# Patient Record
Sex: MALE | Race: WHITE | NOT HISPANIC OR LATINO | Employment: FULL TIME | URBAN - METROPOLITAN AREA
[De-identification: names, ages, dates, MRNs, and addresses within clinical notes are randomized per-mention and may not be internally consistent; named-entity substitution may affect disease eponyms.]

---

## 2019-01-13 ENCOUNTER — APPOINTMENT (OUTPATIENT)
Dept: GENERAL RADIOLOGY | Facility: HOSPITAL | Age: 34
End: 2019-01-13

## 2019-01-13 ENCOUNTER — APPOINTMENT (OUTPATIENT)
Dept: CT IMAGING | Facility: HOSPITAL | Age: 34
End: 2019-01-13

## 2019-01-13 ENCOUNTER — HOSPITAL ENCOUNTER (INPATIENT)
Facility: HOSPITAL | Age: 34
LOS: 12 days | Discharge: HOME | End: 2019-01-25
Attending: EMERGENCY MEDICINE | Admitting: SURGERY

## 2019-01-13 ENCOUNTER — APPOINTMENT (OUTPATIENT)
Dept: ULTRASOUND IMAGING | Facility: HOSPITAL | Age: 34
End: 2019-01-13

## 2019-01-13 DIAGNOSIS — D64.9 ANEMIA, UNSPECIFIED TYPE: Primary | ICD-10-CM

## 2019-01-13 DIAGNOSIS — N17.9 ACUTE RENAL FAILURE, UNSPECIFIED ACUTE RENAL FAILURE TYPE (HCC): ICD-10-CM

## 2019-01-13 DIAGNOSIS — I50.9 ACUTE CONGESTIVE HEART FAILURE, UNSPECIFIED HEART FAILURE TYPE (HCC): ICD-10-CM

## 2019-01-13 LAB
ABO GROUP BLD: NORMAL
ALBUMIN SERPL-MCNC: 2.5 G/DL (ref 3.5–5.2)
ALBUMIN/GLOB SERPL: 0.7 G/DL
ALP SERPL-CCNC: 97 U/L (ref 39–117)
ALT SERPL W P-5'-P-CCNC: 30 U/L (ref 1–41)
AMORPH URATE CRY URNS QL MICRO: ABNORMAL /HPF
ANION GAP SERPL CALCULATED.3IONS-SCNC: 35 MMOL/L
AST SERPL-CCNC: 15 U/L (ref 1–40)
BACTERIA UR QL AUTO: ABNORMAL /HPF
BASOPHILS # BLD AUTO: 0.05 10*3/MM3 (ref 0–0.2)
BASOPHILS NFR BLD AUTO: 0.4 % (ref 0–1.5)
BH CV VAS MEAS BASILIC ANTECUBITAL FOSSA LEFT: 0.4 CM
BH CV VAS MEAS BASILIC ANTECUBITAL FOSSA RIGHT: 0.3 CM
BH CV VAS MEAS BASILIC FOREARM LEFT - DIST: 0.3 CM
BH CV VAS MEAS BASILIC FOREARM LEFT - MID: 0.28 CM
BH CV VAS MEAS BASILIC FOREARM LEFT - PROX: 0.46 CM
BH CV VAS MEAS BASILIC FOREARM RIGHT - DIST: 0.2 CM
BH CV VAS MEAS BASILIC FOREARM RIGHT - MID: 0.28 CM
BH CV VAS MEAS BASILIC FOREARM RIGHT - PROX: 0.27 CM
BH CV VAS MEAS BASILIC UPPER ARM LEFT - DIST: 0.73 CM
BH CV VAS MEAS BASILIC UPPER ARM LEFT - MID: 0.71 CM
BH CV VAS MEAS BASILIC UPPER ARM LEFT - PROX: 0.79 CM
BH CV VAS MEAS BASILIC UPPER ARM RIGHT - DIST: 0.45 CM
BH CV VAS MEAS BASILIC UPPER ARM RIGHT - MID: 0.69 CM
BH CV VAS MEAS BASILIC UPPER ARM RIGHT - PROX: 0.77 CM
BH CV VAS MEAS CEPHALIC ANTECUBITAL FOSSA LEFT: 0.59 CM
BH CV VAS MEAS CEPHALIC ANTECUBITAL FOSSA RIGHT: 0.78 CM
BH CV VAS MEAS CEPHALIC FOREARM LEFT - DIST: 0.34 CM
BH CV VAS MEAS CEPHALIC FOREARM LEFT - MID: 0.29 CM
BH CV VAS MEAS CEPHALIC FOREARM LEFT - PROX: 0.35 CM
BH CV VAS MEAS CEPHALIC FOREARM RIGHT - DIST: 0.34 CM
BH CV VAS MEAS CEPHALIC FOREARM RIGHT - MID: 0.52 CM
BH CV VAS MEAS CEPHALIC FOREARM RIGHT - PROX: 0.41 CM
BH CV VAS MEAS CEPHALIC UPPER ARM LEFT - DIST: 0.43 CM
BH CV VAS MEAS CEPHALIC UPPER ARM LEFT - MID: 0.47 CM
BH CV VAS MEAS CEPHALIC UPPER ARM LEFT - PROX: 0.47 CM
BH CV VAS MEAS CEPHALIC UPPER ARM RIGHT - DIST: 0.36 CM
BH CV VAS MEAS CEPHALIC UPPER ARM RIGHT - MID: 0.34 CM
BH CV VAS MEAS CEPHALIC UPPER ARM RIGHT - PROX: 0.43 CM
BH CV VAS MEAS RADIAL UPPER ARM LEFT - DIST: 0.23 CM
BH CV VAS MEAS RADIAL UPPER ARM LEFT - MID: 0.24 CM
BH CV VAS MEAS RADIAL UPPER ARM LEFT - PROX: 0.35 CM
BH CV VAS MEAS RADIAL UPPER ARM RIGHT - DIST: 0.22 CM
BH CV VAS MEAS RADIAL UPPER ARM RIGHT - MID: 0.25 CM
BH CV VAS MEAS RADIAL UPPER ARM RIGHT - PROX: 0.31 CM
BILIRUB SERPL-MCNC: 0.2 MG/DL (ref 0.1–1.2)
BILIRUB UR QL STRIP: NEGATIVE
BLD GP AB SCN SERPL QL: NEGATIVE
BUN BLD-MCNC: 258 MG/DL (ref 6–20)
BUN/CREAT SERPL: 14.7 (ref 7–25)
CALCIUM SPEC-SCNC: 9.6 MG/DL (ref 8.6–10.5)
CHLORIDE SERPL-SCNC: 94 MMOL/L (ref 98–107)
CLARITY UR: CLEAR
CO2 SERPL-SCNC: 11 MMOL/L (ref 22–29)
COLOR UR: YELLOW
CREAT BLD-MCNC: 17.6 MG/DL (ref 0.76–1.27)
CREAT UR-MCNC: 71.6 MG/DL
DEPRECATED RDW RBC AUTO: 40.5 FL (ref 37–54)
EOSINOPHIL # BLD AUTO: 0.63 10*3/MM3 (ref 0–0.7)
EOSINOPHIL NFR BLD AUTO: 5.5 % (ref 0.3–6.2)
ERYTHROCYTE [DISTWIDTH] IN BLOOD BY AUTOMATED COUNT: 13 % (ref 11.5–14.5)
GFR SERPL CREATININE-BSD FRML MDRD: 3 ML/MIN/1.73
GFR SERPL CREATININE-BSD FRML MDRD: ABNORMAL ML/MIN/1.73
GLOBULIN UR ELPH-MCNC: 3.7 GM/DL
GLUCOSE BLD-MCNC: 120 MG/DL (ref 65–99)
GLUCOSE BLDC GLUCOMTR-MCNC: 122 MG/DL (ref 70–130)
GLUCOSE UR STRIP-MCNC: NEGATIVE MG/DL
HBV SURFACE AG SERPL QL IA: NORMAL
HCT VFR BLD AUTO: 16.4 % (ref 40.4–52.2)
HGB BLD-MCNC: 5.1 G/DL (ref 13.7–17.6)
HGB UR QL STRIP.AUTO: ABNORMAL
HOLD SPECIMEN: NORMAL
HOLD SPECIMEN: NORMAL
HYALINE CASTS UR QL AUTO: ABNORMAL /LPF
IMM GRANULOCYTES # BLD AUTO: 0.03 10*3/MM3 (ref 0–0.03)
IMM GRANULOCYTES NFR BLD AUTO: 0.3 % (ref 0–0.5)
KETONES UR QL STRIP: NEGATIVE
LEUKOCYTE ESTERASE UR QL STRIP.AUTO: ABNORMAL
LYMPHOCYTES # BLD AUTO: 0.7 10*3/MM3 (ref 0.9–4.8)
LYMPHOCYTES NFR BLD AUTO: 6.1 % (ref 19.6–45.3)
MCH RBC QN AUTO: 26.6 PG (ref 27–32.7)
MCHC RBC AUTO-ENTMCNC: 31.1 G/DL (ref 32.6–36.4)
MCV RBC AUTO: 85.4 FL (ref 79.8–96.2)
MONOCYTES # BLD AUTO: 0.43 10*3/MM3 (ref 0.2–1.2)
MONOCYTES NFR BLD AUTO: 3.7 % (ref 5–12)
NEUTROPHILS # BLD AUTO: 9.7 10*3/MM3 (ref 1.9–8.1)
NEUTROPHILS NFR BLD AUTO: 84 % (ref 42.7–76)
NITRITE UR QL STRIP: NEGATIVE
NRBC BLD AUTO-RTO: 0 /100 WBC (ref 0–0)
NT-PROBNP SERPL-MCNC: ABNORMAL PG/ML (ref 5–450)
PH UR STRIP.AUTO: <=5 [PH] (ref 5–8)
PLATELET # BLD AUTO: 317 10*3/MM3 (ref 140–500)
PMV BLD AUTO: 9.8 FL (ref 6–12)
POTASSIUM BLD-SCNC: 5.8 MMOL/L (ref 3.5–5.2)
PROT SERPL-MCNC: 6.2 G/DL (ref 6–8.5)
PROT UR QL STRIP: ABNORMAL
PROT UR-MCNC: 177 MG/DL
RBC # BLD AUTO: 1.92 10*6/MM3 (ref 4.6–6)
RBC # UR: ABNORMAL /HPF
REF LAB TEST METHOD: ABNORMAL
RH BLD: POSITIVE
SODIUM BLD-SCNC: 140 MMOL/L (ref 136–145)
SP GR UR STRIP: 1.02 (ref 1–1.03)
SQUAMOUS #/AREA URNS HPF: ABNORMAL /HPF
T&S EXPIRATION DATE: NORMAL
T4 FREE SERPL-MCNC: 0.55 NG/DL (ref 0.93–1.7)
TROPONIN T SERPL-MCNC: 0.12 NG/ML (ref 0–0.03)
TSH SERPL DL<=0.05 MIU/L-ACNC: 0.63 MIU/ML (ref 0.27–4.2)
UROBILINOGEN UR QL STRIP: ABNORMAL
WBC NRBC COR # BLD: 11.54 10*3/MM3 (ref 4.5–10.7)
WBC UR QL AUTO: ABNORMAL /HPF
WHOLE BLOOD HOLD SPECIMEN: NORMAL
WHOLE BLOOD HOLD SPECIMEN: NORMAL

## 2019-01-13 PROCEDURE — 86900 BLOOD TYPING SEROLOGIC ABO: CPT

## 2019-01-13 PROCEDURE — 86256 FLUORESCENT ANTIBODY TITER: CPT | Performed by: INTERNAL MEDICINE

## 2019-01-13 PROCEDURE — 86235 NUCLEAR ANTIGEN ANTIBODY: CPT | Performed by: INTERNAL MEDICINE

## 2019-01-13 PROCEDURE — 36415 COLL VENOUS BLD VENIPUNCTURE: CPT

## 2019-01-13 PROCEDURE — 84443 ASSAY THYROID STIM HORMONE: CPT | Performed by: EMERGENCY MEDICINE

## 2019-01-13 PROCEDURE — 85025 COMPLETE CBC W/AUTO DIFF WBC: CPT | Performed by: EMERGENCY MEDICINE

## 2019-01-13 PROCEDURE — P9016 RBC LEUKOCYTES REDUCED: HCPCS

## 2019-01-13 PROCEDURE — 93010 ELECTROCARDIOGRAM REPORT: CPT | Performed by: INTERNAL MEDICINE

## 2019-01-13 PROCEDURE — 71045 X-RAY EXAM CHEST 1 VIEW: CPT

## 2019-01-13 PROCEDURE — 86060 ANTISTREPTOLYSIN O TITER: CPT | Performed by: INTERNAL MEDICINE

## 2019-01-13 PROCEDURE — 86901 BLOOD TYPING SEROLOGIC RH(D): CPT | Performed by: EMERGENCY MEDICINE

## 2019-01-13 PROCEDURE — 86431 RHEUMATOID FACTOR QUANT: CPT | Performed by: INTERNAL MEDICINE

## 2019-01-13 PROCEDURE — 86901 BLOOD TYPING SEROLOGIC RH(D): CPT

## 2019-01-13 PROCEDURE — 83520 IMMUNOASSAY QUANT NOS NONAB: CPT | Performed by: INTERNAL MEDICINE

## 2019-01-13 PROCEDURE — 63710000001 INSULIN REGULAR HUMAN PER 5 UNITS: Performed by: EMERGENCY MEDICINE

## 2019-01-13 PROCEDURE — 86923 COMPATIBILITY TEST ELECTRIC: CPT

## 2019-01-13 PROCEDURE — 82962 GLUCOSE BLOOD TEST: CPT

## 2019-01-13 PROCEDURE — 82570 ASSAY OF URINE CREATININE: CPT | Performed by: EMERGENCY MEDICINE

## 2019-01-13 PROCEDURE — 80053 COMPREHEN METABOLIC PANEL: CPT | Performed by: EMERGENCY MEDICINE

## 2019-01-13 PROCEDURE — 84156 ASSAY OF PROTEIN URINE: CPT | Performed by: EMERGENCY MEDICINE

## 2019-01-13 PROCEDURE — 76775 US EXAM ABDO BACK WALL LIM: CPT

## 2019-01-13 PROCEDURE — 84484 ASSAY OF TROPONIN QUANT: CPT | Performed by: EMERGENCY MEDICINE

## 2019-01-13 PROCEDURE — 25010000002 CALCIUM GLUCONATE PER 10 ML: Performed by: EMERGENCY MEDICINE

## 2019-01-13 PROCEDURE — 86900 BLOOD TYPING SEROLOGIC ABO: CPT | Performed by: EMERGENCY MEDICINE

## 2019-01-13 PROCEDURE — 87340 HEPATITIS B SURFACE AG IA: CPT | Performed by: INTERNAL MEDICINE

## 2019-01-13 PROCEDURE — 83516 IMMUNOASSAY NONANTIBODY: CPT | Performed by: INTERNAL MEDICINE

## 2019-01-13 PROCEDURE — 86225 DNA ANTIBODY NATIVE: CPT | Performed by: INTERNAL MEDICINE

## 2019-01-13 PROCEDURE — 99291 CRITICAL CARE FIRST HOUR: CPT

## 2019-01-13 PROCEDURE — 5A1D70Z PERFORMANCE OF URINARY FILTRATION, INTERMITTENT, LESS THAN 6 HOURS PER DAY: ICD-10-PCS | Performed by: INTERNAL MEDICINE

## 2019-01-13 PROCEDURE — 81001 URINALYSIS AUTO W/SCOPE: CPT | Performed by: EMERGENCY MEDICINE

## 2019-01-13 PROCEDURE — 93005 ELECTROCARDIOGRAM TRACING: CPT | Performed by: EMERGENCY MEDICINE

## 2019-01-13 PROCEDURE — 83880 ASSAY OF NATRIURETIC PEPTIDE: CPT | Performed by: EMERGENCY MEDICINE

## 2019-01-13 PROCEDURE — 02HV33Z INSERTION OF INFUSION DEVICE INTO SUPERIOR VENA CAVA, PERCUTANEOUS APPROACH: ICD-10-PCS | Performed by: INTERNAL MEDICINE

## 2019-01-13 PROCEDURE — 86160 COMPLEMENT ANTIGEN: CPT | Performed by: INTERNAL MEDICINE

## 2019-01-13 PROCEDURE — 71250 CT THORAX DX C-: CPT

## 2019-01-13 PROCEDURE — 71046 X-RAY EXAM CHEST 2 VIEWS: CPT

## 2019-01-13 PROCEDURE — 25010000002 HEPARIN (PORCINE) PER 1000 UNITS: Performed by: SURGERY

## 2019-01-13 PROCEDURE — 86850 RBC ANTIBODY SCREEN: CPT | Performed by: EMERGENCY MEDICINE

## 2019-01-13 PROCEDURE — 84439 ASSAY OF FREE THYROXINE: CPT | Performed by: EMERGENCY MEDICINE

## 2019-01-13 RX ORDER — SODIUM CHLORIDE 0.9 % (FLUSH) 0.9 %
3-10 SYRINGE (ML) INJECTION AS NEEDED
Status: DISCONTINUED | OUTPATIENT
Start: 2019-01-13 | End: 2019-01-25 | Stop reason: HOSPADM

## 2019-01-13 RX ORDER — HEPARIN SODIUM 1000 [USP'U]/ML
3000 INJECTION, SOLUTION INTRAVENOUS; SUBCUTANEOUS ONCE
Status: COMPLETED | OUTPATIENT
Start: 2019-01-13 | End: 2019-01-13

## 2019-01-13 RX ORDER — SODIUM CHLORIDE 0.9 % (FLUSH) 0.9 %
3 SYRINGE (ML) INJECTION EVERY 12 HOURS SCHEDULED
Status: DISCONTINUED | OUTPATIENT
Start: 2019-01-13 | End: 2019-01-25 | Stop reason: HOSPADM

## 2019-01-13 RX ORDER — SODIUM CHLORIDE 0.9 % (FLUSH) 0.9 %
10 SYRINGE (ML) INJECTION AS NEEDED
Status: DISCONTINUED | OUTPATIENT
Start: 2019-01-13 | End: 2019-01-25 | Stop reason: HOSPADM

## 2019-01-13 RX ORDER — PANTOPRAZOLE SODIUM 40 MG/10ML
40 INJECTION, POWDER, LYOPHILIZED, FOR SOLUTION INTRAVENOUS
Status: DISCONTINUED | OUTPATIENT
Start: 2019-01-14 | End: 2019-01-15

## 2019-01-13 RX ORDER — DEXTROSE MONOHYDRATE 25 G/50ML
50 INJECTION, SOLUTION INTRAVENOUS ONCE
Status: COMPLETED | OUTPATIENT
Start: 2019-01-13 | End: 2019-01-13

## 2019-01-13 RX ADMIN — SODIUM CHLORIDE, PRESERVATIVE FREE 3 ML: 5 INJECTION INTRAVENOUS at 21:47

## 2019-01-13 RX ADMIN — SODIUM CHLORIDE, POTASSIUM CHLORIDE, SODIUM LACTATE AND CALCIUM CHLORIDE 500 ML: 600; 310; 30; 20 INJECTION, SOLUTION INTRAVENOUS at 16:46

## 2019-01-13 RX ADMIN — INSULIN HUMAN 10 UNITS: 100 INJECTION, SOLUTION PARENTERAL at 17:46

## 2019-01-13 RX ADMIN — CALCIUM GLUCONATE 1 G: 98 INJECTION, SOLUTION INTRAVENOUS at 17:46

## 2019-01-13 RX ADMIN — HEPARIN SODIUM 3000 UNITS: 1000 INJECTION, SOLUTION INTRAVENOUS; SUBCUTANEOUS at 21:46

## 2019-01-13 RX ADMIN — SODIUM BICARBONATE 50 MEQ: 84 INJECTION, SOLUTION INTRAVENOUS at 17:46

## 2019-01-13 RX ADMIN — DEXTROSE MONOHYDRATE 50 ML: 25 INJECTION, SOLUTION INTRAVENOUS at 17:46

## 2019-01-14 ENCOUNTER — APPOINTMENT (OUTPATIENT)
Dept: CARDIOLOGY | Facility: HOSPITAL | Age: 34
End: 2019-01-14
Attending: INTERNAL MEDICINE

## 2019-01-14 LAB
ABO + RH BLD: NORMAL
ABO + RH BLD: NORMAL
ALBUMIN SERPL-MCNC: 2.2 G/DL (ref 3.5–5.2)
ALBUMIN SERPL-MCNC: 2.2 G/DL (ref 3.5–5.2)
ANION GAP SERPL CALCULATED.3IONS-SCNC: 22.8 MMOL/L
ANION GAP SERPL CALCULATED.3IONS-SCNC: 24.8 MMOL/L
BASOPHILS # BLD AUTO: 0.03 10*3/MM3 (ref 0–0.2)
BASOPHILS NFR BLD AUTO: 0.3 % (ref 0–1.5)
BH BB BLOOD EXPIRATION DATE: NORMAL
BH BB BLOOD EXPIRATION DATE: NORMAL
BH BB BLOOD TYPE BARCODE: 7300
BH BB BLOOD TYPE BARCODE: 7300
BH BB DISPENSE STATUS: NORMAL
BH BB DISPENSE STATUS: NORMAL
BH BB PRODUCT CODE: NORMAL
BH BB PRODUCT CODE: NORMAL
BH BB UNIT NUMBER: NORMAL
BH BB UNIT NUMBER: NORMAL
BH CV ECHO MEAS - ACS: 2.3 CM
BH CV ECHO MEAS - AI DEC SLOPE: 367 CM/SEC^2
BH CV ECHO MEAS - AI MAX PG: 71.2 MMHG
BH CV ECHO MEAS - AI MAX VEL: 422 CM/SEC
BH CV ECHO MEAS - AI P1/2T: 336.8 MSEC
BH CV ECHO MEAS - AO MAX PG (FULL): 1.2 MMHG
BH CV ECHO MEAS - AO MAX PG: 7.4 MMHG
BH CV ECHO MEAS - AO MEAN PG (FULL): 2 MMHG
BH CV ECHO MEAS - AO MEAN PG: 5 MMHG
BH CV ECHO MEAS - AO ROOT AREA (BSA CORRECTED): 1.8
BH CV ECHO MEAS - AO ROOT AREA: 9.6 CM^2
BH CV ECHO MEAS - AO ROOT DIAM: 3.5 CM
BH CV ECHO MEAS - AO V2 MAX: 136 CM/SEC
BH CV ECHO MEAS - AO V2 MEAN: 107 CM/SEC
BH CV ECHO MEAS - AO V2 VTI: 22.9 CM
BH CV ECHO MEAS - AVA(I,A): 2.8 CM^2
BH CV ECHO MEAS - AVA(I,D): 2.8 CM^2
BH CV ECHO MEAS - AVA(V,A): 3.2 CM^2
BH CV ECHO MEAS - AVA(V,D): 3.2 CM^2
BH CV ECHO MEAS - BSA(HAYCOCK): 2 M^2
BH CV ECHO MEAS - BSA: 2 M^2
BH CV ECHO MEAS - BZI_BMI: 26.3 KILOGRAMS/M^2
BH CV ECHO MEAS - BZI_METRIC_HEIGHT: 175.3 CM
BH CV ECHO MEAS - BZI_METRIC_WEIGHT: 80.7 KG
BH CV ECHO MEAS - EDV(CUBED): 262.1 ML
BH CV ECHO MEAS - EDV(MOD-SP2): 206 ML
BH CV ECHO MEAS - EDV(MOD-SP4): 272 ML
BH CV ECHO MEAS - EDV(TEICH): 208.5 ML
BH CV ECHO MEAS - EF(CUBED): 43.2 %
BH CV ECHO MEAS - EF(MOD-BP): 23 %
BH CV ECHO MEAS - EF(MOD-SP2): 16 %
BH CV ECHO MEAS - EF(MOD-SP4): 29.8 %
BH CV ECHO MEAS - EF(TEICH): 35.1 %
BH CV ECHO MEAS - ESV(CUBED): 148.9 ML
BH CV ECHO MEAS - ESV(MOD-SP2): 173 ML
BH CV ECHO MEAS - ESV(MOD-SP4): 191 ML
BH CV ECHO MEAS - ESV(TEICH): 135.3 ML
BH CV ECHO MEAS - FS: 17.2 %
BH CV ECHO MEAS - IVS/LVPW: 1
BH CV ECHO MEAS - IVSD: 1.4 CM
BH CV ECHO MEAS - LAT PEAK E' VEL: 5 CM/SEC
BH CV ECHO MEAS - LV DIASTOLIC VOL/BSA (35-75): 138.4 ML/M^2
BH CV ECHO MEAS - LV MASS(C)D: 430.4 GRAMS
BH CV ECHO MEAS - LV MASS(C)DI: 218.9 GRAMS/M^2
BH CV ECHO MEAS - LV MAX PG: 6.2 MMHG
BH CV ECHO MEAS - LV MEAN PG: 3 MMHG
BH CV ECHO MEAS - LV SYSTOLIC VOL/BSA (12-30): 97.2 ML/M^2
BH CV ECHO MEAS - LV V1 MAX: 124 CM/SEC
BH CV ECHO MEAS - LV V1 MEAN: 83.8 CM/SEC
BH CV ECHO MEAS - LV V1 VTI: 18.8 CM
BH CV ECHO MEAS - LVIDD: 6.4 CM
BH CV ECHO MEAS - LVIDS: 5.3 CM
BH CV ECHO MEAS - LVLD AP2: 10.5 CM
BH CV ECHO MEAS - LVLD AP4: 9.8 CM
BH CV ECHO MEAS - LVLS AP2: 10 CM
BH CV ECHO MEAS - LVLS AP4: 9.2 CM
BH CV ECHO MEAS - LVOT AREA (M): 3.5 CM^2
BH CV ECHO MEAS - LVOT AREA: 3.5 CM^2
BH CV ECHO MEAS - LVOT DIAM: 2.1 CM
BH CV ECHO MEAS - LVPWD: 1.4 CM
BH CV ECHO MEAS - MED PEAK E' VEL: 4 CM/SEC
BH CV ECHO MEAS - MV A DUR: 0.08 SEC
BH CV ECHO MEAS - MV A MAX VEL: 56 CM/SEC
BH CV ECHO MEAS - MV DEC SLOPE: 797 CM/SEC^2
BH CV ECHO MEAS - MV DEC TIME: 0.13 SEC
BH CV ECHO MEAS - MV E MAX VEL: 119 CM/SEC
BH CV ECHO MEAS - MV E/A: 2.1
BH CV ECHO MEAS - MV MAX PG: 4.8 MMHG
BH CV ECHO MEAS - MV MEAN PG: 2 MMHG
BH CV ECHO MEAS - MV P1/2T MAX VEL: 121 CM/SEC
BH CV ECHO MEAS - MV P1/2T: 44.5 MSEC
BH CV ECHO MEAS - MV V2 MAX: 109 CM/SEC
BH CV ECHO MEAS - MV V2 MEAN: 68.8 CM/SEC
BH CV ECHO MEAS - MV V2 VTI: 26.4 CM
BH CV ECHO MEAS - MVA P1/2T LCG: 1.8 CM^2
BH CV ECHO MEAS - MVA(P1/2T): 4.9 CM^2
BH CV ECHO MEAS - MVA(VTI): 2.5 CM^2
BH CV ECHO MEAS - PA ACC TIME: 0.11 SEC
BH CV ECHO MEAS - PA MAX PG (FULL): 2.3 MMHG
BH CV ECHO MEAS - PA MAX PG: 6.3 MMHG
BH CV ECHO MEAS - PA PR(ACCEL): 31.3 MMHG
BH CV ECHO MEAS - PA V2 MAX: 125 CM/SEC
BH CV ECHO MEAS - PULM A REVS DUR: 0.1 SEC
BH CV ECHO MEAS - PULM A REVS VEL: 28.9 CM/SEC
BH CV ECHO MEAS - PULM DIAS VEL: 42.7 CM/SEC
BH CV ECHO MEAS - PULM S/D: 1.4
BH CV ECHO MEAS - PULM SYS VEL: 59.7 CM/SEC
BH CV ECHO MEAS - PVA(V,A): 5.3 CM^2
BH CV ECHO MEAS - PVA(V,D): 5.3 CM^2
BH CV ECHO MEAS - QP/QS: 1.7
BH CV ECHO MEAS - RAP SYSTOLE: 8 MMHG
BH CV ECHO MEAS - RV MAX PG: 4 MMHG
BH CV ECHO MEAS - RV MEAN PG: 2 MMHG
BH CV ECHO MEAS - RV V1 MAX: 100 CM/SEC
BH CV ECHO MEAS - RV V1 MEAN: 63 CM/SEC
BH CV ECHO MEAS - RV V1 VTI: 17.1 CM
BH CV ECHO MEAS - RVOT AREA: 6.6 CM^2
BH CV ECHO MEAS - RVOT DIAM: 2.9 CM
BH CV ECHO MEAS - SI(AO): 112.1 ML/M^2
BH CV ECHO MEAS - SI(CUBED): 57.6 ML/M^2
BH CV ECHO MEAS - SI(LVOT): 33.1 ML/M^2
BH CV ECHO MEAS - SI(MOD-SP2): 16.8 ML/M^2
BH CV ECHO MEAS - SI(MOD-SP4): 41.2 ML/M^2
BH CV ECHO MEAS - SI(TEICH): 37.2 ML/M^2
BH CV ECHO MEAS - SV(AO): 220.3 ML
BH CV ECHO MEAS - SV(CUBED): 113.3 ML
BH CV ECHO MEAS - SV(LVOT): 65.1 ML
BH CV ECHO MEAS - SV(MOD-SP2): 33 ML
BH CV ECHO MEAS - SV(MOD-SP4): 81 ML
BH CV ECHO MEAS - SV(RVOT): 112.9 ML
BH CV ECHO MEAS - SV(TEICH): 73.2 ML
BH CV ECHO MEAS - TAPSE (>1.6): 2.3 CM2
BH CV ECHO MEASUREMENTS AVERAGE E/E' RATIO: 26.44
BH CV VAS BP LEFT ARM: NORMAL MMHG
BH CV XLRA - RV BASE: 3.9 CM
BH CV XLRA - TDI S': 16 CM/SEC
BUN BLD-MCNC: 129 MG/DL (ref 6–20)
BUN BLD-MCNC: 133 MG/DL (ref 6–20)
BUN/CREAT SERPL: 11.6 (ref 7–25)
BUN/CREAT SERPL: 12.4 (ref 7–25)
CALCIUM SPEC-SCNC: 8.4 MG/DL (ref 8.6–10.5)
CALCIUM SPEC-SCNC: 8.5 MG/DL (ref 8.6–10.5)
CHLORIDE SERPL-SCNC: 95 MMOL/L (ref 98–107)
CHLORIDE SERPL-SCNC: 98 MMOL/L (ref 98–107)
CO2 SERPL-SCNC: 22.2 MMOL/L (ref 22–29)
CO2 SERPL-SCNC: 25.2 MMOL/L (ref 22–29)
CREAT BLD-MCNC: 10.42 MG/DL (ref 0.76–1.27)
CREAT BLD-MCNC: 11.5 MG/DL (ref 0.76–1.27)
DEPRECATED RDW RBC AUTO: 40.7 FL (ref 37–54)
EOSINOPHIL # BLD AUTO: 0.52 10*3/MM3 (ref 0–0.7)
EOSINOPHIL NFR BLD AUTO: 5.4 % (ref 0.3–6.2)
ERYTHROCYTE [DISTWIDTH] IN BLOOD BY AUTOMATED COUNT: 13.4 % (ref 11.5–14.5)
FERRITIN SERPL-MCNC: 867.1 NG/ML (ref 30–400)
GFR SERPL CREATININE-BSD FRML MDRD: 5 ML/MIN/1.73
GFR SERPL CREATININE-BSD FRML MDRD: 6 ML/MIN/1.73
GFR SERPL CREATININE-BSD FRML MDRD: ABNORMAL ML/MIN/1.73
GFR SERPL CREATININE-BSD FRML MDRD: ABNORMAL ML/MIN/1.73
GLUCOSE BLD-MCNC: 153 MG/DL (ref 65–99)
GLUCOSE BLD-MCNC: 92 MG/DL (ref 65–99)
GLUCOSE BLDC GLUCOMTR-MCNC: 164 MG/DL (ref 70–130)
GLUCOSE BLDC GLUCOMTR-MCNC: 94 MG/DL (ref 70–130)
HCT VFR BLD AUTO: 20.6 % (ref 40.4–52.2)
HCT VFR BLD AUTO: 21.2 % (ref 40.4–52.2)
HCT VFR BLD AUTO: 21.4 % (ref 40.4–52.2)
HCT VFR BLD AUTO: 24 % (ref 40.4–52.2)
HGB BLD-MCNC: 6.8 G/DL (ref 13.7–17.6)
HGB BLD-MCNC: 7.1 G/DL (ref 13.7–17.6)
HGB BLD-MCNC: 7.2 G/DL (ref 13.7–17.6)
HGB BLD-MCNC: 8 G/DL (ref 13.7–17.6)
IMM GRANULOCYTES # BLD AUTO: 0.02 10*3/MM3 (ref 0–0.03)
IMM GRANULOCYTES NFR BLD AUTO: 0.2 % (ref 0–0.5)
IRON 24H UR-MRATE: 28 MCG/DL (ref 59–158)
IRON SATN MFR SERPL: 15 % (ref 20–50)
LEFT ATRIUM VOLUME INDEX: 53 ML/M2
LYMPHOCYTES # BLD AUTO: 0.56 10*3/MM3 (ref 0.9–4.8)
LYMPHOCYTES NFR BLD AUTO: 5.8 % (ref 19.6–45.3)
MAXIMAL PREDICTED HEART RATE: 186 BPM
MCH RBC QN AUTO: 28 PG (ref 27–32.7)
MCHC RBC AUTO-ENTMCNC: 33.5 G/DL (ref 32.6–36.4)
MCV RBC AUTO: 83.5 FL (ref 79.8–96.2)
MONOCYTES # BLD AUTO: 0.4 10*3/MM3 (ref 0.2–1.2)
MONOCYTES NFR BLD AUTO: 4.1 % (ref 5–12)
NEUTROPHILS # BLD AUTO: 8.16 10*3/MM3 (ref 1.9–8.1)
NEUTROPHILS NFR BLD AUTO: 84.2 % (ref 42.7–76)
PHOSPHATE SERPL-MCNC: 8.2 MG/DL (ref 2.5–4.5)
PHOSPHATE SERPL-MCNC: 9.7 MG/DL (ref 2.5–4.5)
PLATELET # BLD AUTO: 256 10*3/MM3 (ref 140–500)
PMV BLD AUTO: 9.9 FL (ref 6–12)
POTASSIUM BLD-SCNC: 4.1 MMOL/L (ref 3.5–5.2)
POTASSIUM BLD-SCNC: 4.5 MMOL/L (ref 3.5–5.2)
PROCALCITONIN SERPL-MCNC: 3.67 NG/ML (ref 0.1–0.25)
RBC # BLD AUTO: 2.54 10*6/MM3 (ref 4.6–6)
SODIUM BLD-SCNC: 143 MMOL/L (ref 136–145)
SODIUM BLD-SCNC: 145 MMOL/L (ref 136–145)
STRESS TARGET HR: 158 BPM
TIBC SERPL-MCNC: 191 MCG/DL
TRANSFERRIN SERPL-MCNC: 128 MG/DL (ref 200–360)
UNIT  ABO: NORMAL
UNIT  ABO: NORMAL
UNIT  RH: NORMAL
UNIT  RH: NORMAL
WBC NRBC COR # BLD: 9.69 10*3/MM3 (ref 4.5–10.7)

## 2019-01-14 PROCEDURE — 84145 PROCALCITONIN (PCT): CPT | Performed by: INTERNAL MEDICINE

## 2019-01-14 PROCEDURE — 25010000002 ALBUMIN HUMAN 5% PER 50 ML: Performed by: INTERNAL MEDICINE

## 2019-01-14 PROCEDURE — 83540 ASSAY OF IRON: CPT | Performed by: INTERNAL MEDICINE

## 2019-01-14 PROCEDURE — P9016 RBC LEUKOCYTES REDUCED: HCPCS

## 2019-01-14 PROCEDURE — 82728 ASSAY OF FERRITIN: CPT | Performed by: INTERNAL MEDICINE

## 2019-01-14 PROCEDURE — P9041 ALBUMIN (HUMAN),5%, 50ML: HCPCS | Performed by: INTERNAL MEDICINE

## 2019-01-14 PROCEDURE — 36514 APHERESIS PLASMA: CPT

## 2019-01-14 PROCEDURE — 25010000002 PIPERACILLIN SOD-TAZOBACTAM PER 1 G: Performed by: INTERNAL MEDICINE

## 2019-01-14 PROCEDURE — 6A551Z3 PHERESIS OF PLASMA, MULTIPLE: ICD-10-PCS | Performed by: INTERNAL MEDICINE

## 2019-01-14 PROCEDURE — 85025 COMPLETE CBC W/AUTO DIFF WBC: CPT | Performed by: INTERNAL MEDICINE

## 2019-01-14 PROCEDURE — 80069 RENAL FUNCTION PANEL: CPT | Performed by: INTERNAL MEDICINE

## 2019-01-14 PROCEDURE — 82962 GLUCOSE BLOOD TEST: CPT

## 2019-01-14 PROCEDURE — 86900 BLOOD TYPING SEROLOGIC ABO: CPT

## 2019-01-14 PROCEDURE — 5A1D70Z PERFORMANCE OF URINARY FILTRATION, INTERMITTENT, LESS THAN 6 HOURS PER DAY: ICD-10-PCS | Performed by: INTERNAL MEDICINE

## 2019-01-14 PROCEDURE — 85018 HEMOGLOBIN: CPT | Performed by: INTERNAL MEDICINE

## 2019-01-14 PROCEDURE — 85014 HEMATOCRIT: CPT | Performed by: INTERNAL MEDICINE

## 2019-01-14 PROCEDURE — 25010000002 AZITHROMYCIN PER 500 MG: Performed by: INTERNAL MEDICINE

## 2019-01-14 PROCEDURE — 25010000002 HEPARIN (PORCINE) PER 1000 UNITS: Performed by: INTERNAL MEDICINE

## 2019-01-14 PROCEDURE — 93306 TTE W/DOPPLER COMPLETE: CPT

## 2019-01-14 PROCEDURE — 93005 ELECTROCARDIOGRAM TRACING: CPT | Performed by: INTERNAL MEDICINE

## 2019-01-14 PROCEDURE — 93306 TTE W/DOPPLER COMPLETE: CPT | Performed by: INTERNAL MEDICINE

## 2019-01-14 PROCEDURE — 93010 ELECTROCARDIOGRAM REPORT: CPT | Performed by: INTERNAL MEDICINE

## 2019-01-14 PROCEDURE — 99254 IP/OBS CNSLTJ NEW/EST MOD 60: CPT | Performed by: INTERNAL MEDICINE

## 2019-01-14 PROCEDURE — 84466 ASSAY OF TRANSFERRIN: CPT | Performed by: INTERNAL MEDICINE

## 2019-01-14 PROCEDURE — 25010000002 PERFLUTREN (DEFINITY) 8.476 MG IN SODIUM CHLORIDE 0.9 % 10 ML INJECTION: Performed by: INTERNAL MEDICINE

## 2019-01-14 PROCEDURE — 25010000002 CALCIUM GLUCONATE PER 10 ML: Performed by: INTERNAL MEDICINE

## 2019-01-14 RX ORDER — ANTICOAGULANT CITRATE DEXTROSE SOLUTION FORMULA A 12.25; 11; 3.65 G/500ML; G/500ML; G/500ML
1000 SOLUTION INTRAVENOUS DAILY
Status: COMPLETED | OUTPATIENT
Start: 2019-01-14 | End: 2019-01-18

## 2019-01-14 RX ORDER — AMLODIPINE BESYLATE 5 MG/1
5 TABLET ORAL
Status: DISCONTINUED | OUTPATIENT
Start: 2019-01-14 | End: 2019-01-22

## 2019-01-14 RX ORDER — ALBUMIN, HUMAN INJ 5% 5 %
4000 SOLUTION INTRAVENOUS ONCE
Status: DISCONTINUED | OUTPATIENT
Start: 2019-01-14 | End: 2019-01-14

## 2019-01-14 RX ORDER — HEPARIN SODIUM 1000 [USP'U]/ML
3000 INJECTION, SOLUTION INTRAVENOUS; SUBCUTANEOUS AS NEEDED
Status: DISCONTINUED | OUTPATIENT
Start: 2019-01-14 | End: 2019-01-25 | Stop reason: HOSPADM

## 2019-01-14 RX ORDER — CARVEDILOL 12.5 MG/1
12.5 TABLET ORAL EVERY 12 HOURS SCHEDULED
Status: DISCONTINUED | OUTPATIENT
Start: 2019-01-14 | End: 2019-01-16

## 2019-01-14 RX ORDER — ALBUMIN, HUMAN INJ 5% 5 %
4000 SOLUTION INTRAVENOUS DAILY
Status: COMPLETED | OUTPATIENT
Start: 2019-01-14 | End: 2019-01-18

## 2019-01-14 RX ORDER — ALBUMIN (HUMAN) 12.5 G/50ML
12.5 SOLUTION INTRAVENOUS AS NEEDED
Status: ACTIVE | OUTPATIENT
Start: 2019-01-14 | End: 2019-01-15

## 2019-01-14 RX ORDER — ALBUMIN (HUMAN) 12.5 G/50ML
12.5 SOLUTION INTRAVENOUS AS NEEDED
Status: ACTIVE | OUTPATIENT
Start: 2019-01-14 | End: 2019-01-14

## 2019-01-14 RX ADMIN — CALCIUM GLUCONATE 4 G: 98 INJECTION, SOLUTION INTRAVENOUS at 12:30

## 2019-01-14 RX ADMIN — ANTICOAGULANT CITRATE DEXTROSE SOLUTION FORMULA A 500 ML: 12.25; 11; 3.65 SOLUTION INTRAVENOUS at 12:30

## 2019-01-14 RX ADMIN — SODIUM CHLORIDE, PRESERVATIVE FREE 3 ML: 5 INJECTION INTRAVENOUS at 20:18

## 2019-01-14 RX ADMIN — SODIUM CHLORIDE, PRESERVATIVE FREE 3 ML: 5 INJECTION INTRAVENOUS at 10:49

## 2019-01-14 RX ADMIN — PERFLUTREN 3 ML: 6.52 INJECTION, SUSPENSION INTRAVENOUS at 09:45

## 2019-01-14 RX ADMIN — PANTOPRAZOLE SODIUM 40 MG: 40 INJECTION, POWDER, FOR SOLUTION INTRAVENOUS at 06:52

## 2019-01-14 RX ADMIN — AMLODIPINE BESYLATE 5 MG: 5 TABLET ORAL at 10:48

## 2019-01-14 RX ADMIN — AZITHROMYCIN MONOHYDRATE 500 MG: 500 INJECTION, POWDER, LYOPHILIZED, FOR SOLUTION INTRAVENOUS at 10:48

## 2019-01-14 RX ADMIN — CARVEDILOL 12.5 MG: 12.5 TABLET, FILM COATED ORAL at 20:14

## 2019-01-14 RX ADMIN — HEPARIN SODIUM 3000 UNITS: 1000 INJECTION INTRAVENOUS; SUBCUTANEOUS at 18:20

## 2019-01-14 RX ADMIN — TAZOBACTAM SODIUM AND PIPERACILLIN SODIUM 3.38 G: 375; 3 INJECTION, SOLUTION INTRAVENOUS at 09:59

## 2019-01-14 RX ADMIN — CARVEDILOL 12.5 MG: 12.5 TABLET, FILM COATED ORAL at 09:48

## 2019-01-14 RX ADMIN — ALBUMIN (HUMAN) 4000 ML: 12.5 SOLUTION INTRAVENOUS at 12:30

## 2019-01-14 RX ADMIN — TAZOBACTAM SODIUM AND PIPERACILLIN SODIUM 3.38 G: 375; 3 INJECTION, SOLUTION INTRAVENOUS at 20:14

## 2019-01-15 ENCOUNTER — APPOINTMENT (OUTPATIENT)
Dept: GENERAL RADIOLOGY | Facility: HOSPITAL | Age: 34
End: 2019-01-15

## 2019-01-15 LAB
ABO + RH BLD: NORMAL
ABO + RH BLD: NORMAL
ALBUMIN SERPL-MCNC: 2.7 G/DL (ref 3.5–5.2)
ANION GAP SERPL CALCULATED.3IONS-SCNC: 14.5 MMOL/L
ASO AB SERPL-ACNC: 32.1 IU/ML (ref 0–200)
BH BB BLOOD EXPIRATION DATE: NORMAL
BH BB BLOOD EXPIRATION DATE: NORMAL
BH BB BLOOD TYPE BARCODE: 7300
BH BB BLOOD TYPE BARCODE: 7300
BH BB DISPENSE STATUS: NORMAL
BH BB DISPENSE STATUS: NORMAL
BH BB PRODUCT CODE: NORMAL
BH BB PRODUCT CODE: NORMAL
BH BB UNIT NUMBER: NORMAL
BH BB UNIT NUMBER: NORMAL
BUN BLD-MCNC: 78 MG/DL (ref 6–20)
BUN/CREAT SERPL: 10.1 (ref 7–25)
C3 SERPL-MCNC: 119 MG/DL (ref 82–167)
C4 SERPL-MCNC: 21 MG/DL (ref 14–44)
CALCIUM SPEC-SCNC: 8.2 MG/DL (ref 8.6–10.5)
CHLORIDE SERPL-SCNC: 101 MMOL/L (ref 98–107)
CHROMATIN AB SERPL-ACNC: <0.2 AI (ref 0–0.9)
CK SERPL-CCNC: 61 U/L (ref 20–200)
CO2 SERPL-SCNC: 26.5 MMOL/L (ref 22–29)
CREAT BLD-MCNC: 7.74 MG/DL (ref 0.76–1.27)
DEPRECATED RDW RBC AUTO: 44.3 FL (ref 37–54)
DSDNA AB SER-ACNC: <1 IU/ML (ref 0–9)
ENA RNP AB SER-ACNC: <0.2 AI (ref 0–0.9)
ENA SM AB SER-ACNC: <0.2 AI (ref 0–0.9)
ENA SS-A AB SER-ACNC: <0.2 AI (ref 0–0.9)
ENA SS-B AB SER-ACNC: <0.2 AI (ref 0–0.9)
ERYTHROCYTE [DISTWIDTH] IN BLOOD BY AUTOMATED COUNT: 13.9 % (ref 11.5–14.5)
GFR SERPL CREATININE-BSD FRML MDRD: 8 ML/MIN/1.73
GFR SERPL CREATININE-BSD FRML MDRD: ABNORMAL ML/MIN/1.73
GLUCOSE BLD-MCNC: 112 MG/DL (ref 65–99)
GLUCOSE BLDC GLUCOMTR-MCNC: 134 MG/DL (ref 70–130)
GLUCOSE BLDC GLUCOMTR-MCNC: 172 MG/DL (ref 70–130)
GLUCOSE BLDC GLUCOMTR-MCNC: 181 MG/DL (ref 70–130)
HCT VFR BLD AUTO: 23.1 % (ref 40.4–52.2)
HCT VFR BLD AUTO: 23.8 % (ref 40.4–52.2)
HCT VFR BLD AUTO: 24.2 % (ref 40.4–52.2)
HCT VFR BLD AUTO: 24.2 % (ref 40.4–52.2)
HCT VFR BLD AUTO: 26.9 % (ref 40.4–52.2)
HGB BLD-MCNC: 7.7 G/DL (ref 13.7–17.6)
HGB BLD-MCNC: 7.8 G/DL (ref 13.7–17.6)
HGB BLD-MCNC: 7.9 G/DL (ref 13.7–17.6)
HGB BLD-MCNC: 7.9 G/DL (ref 13.7–17.6)
HGB BLD-MCNC: 8.9 G/DL (ref 13.7–17.6)
INR PPP: 1.44 (ref 0.9–1.1)
MAGNESIUM SERPL-MCNC: 2.2 MG/DL (ref 1.6–2.6)
MCH RBC QN AUTO: 28.2 PG (ref 27–32.7)
MCHC RBC AUTO-ENTMCNC: 32.6 G/DL (ref 32.6–36.4)
MCV RBC AUTO: 86.4 FL (ref 79.8–96.2)
PHOSPHATE SERPL-MCNC: 7.6 MG/DL (ref 2.5–4.5)
PLATELET # BLD AUTO: 207 10*3/MM3 (ref 140–500)
PMV BLD AUTO: 10.2 FL (ref 6–12)
POTASSIUM BLD-SCNC: 4.7 MMOL/L (ref 3.5–5.2)
PROTHROMBIN TIME: 17.3 SECONDS (ref 11.7–14.2)
RA LATEX TURBID: 13.6 IU/ML (ref 0–13.9)
RBC # BLD AUTO: 2.8 10*6/MM3 (ref 4.6–6)
SODIUM BLD-SCNC: 142 MMOL/L (ref 136–145)
UNIT  ABO: NORMAL
UNIT  ABO: NORMAL
UNIT  RH: NORMAL
UNIT  RH: NORMAL
WBC NRBC COR # BLD: 11.96 10*3/MM3 (ref 4.5–10.7)

## 2019-01-15 PROCEDURE — 85014 HEMATOCRIT: CPT | Performed by: INTERNAL MEDICINE

## 2019-01-15 PROCEDURE — 80069 RENAL FUNCTION PANEL: CPT | Performed by: INTERNAL MEDICINE

## 2019-01-15 PROCEDURE — 83735 ASSAY OF MAGNESIUM: CPT | Performed by: INTERNAL MEDICINE

## 2019-01-15 PROCEDURE — 85610 PROTHROMBIN TIME: CPT | Performed by: INTERNAL MEDICINE

## 2019-01-15 PROCEDURE — 25010000002 CALCIUM GLUCONATE PER 10 ML: Performed by: INTERNAL MEDICINE

## 2019-01-15 PROCEDURE — 25010000002 HEPARIN (PORCINE) PER 1000 UNITS: Performed by: INTERNAL MEDICINE

## 2019-01-15 PROCEDURE — 82550 ASSAY OF CK (CPK): CPT | Performed by: INTERNAL MEDICINE

## 2019-01-15 PROCEDURE — 82668 ASSAY OF ERYTHROPOIETIN: CPT | Performed by: INTERNAL MEDICINE

## 2019-01-15 PROCEDURE — 82962 GLUCOSE BLOOD TEST: CPT

## 2019-01-15 PROCEDURE — 25010000002 PIPERACILLIN SOD-TAZOBACTAM PER 1 G: Performed by: INTERNAL MEDICINE

## 2019-01-15 PROCEDURE — 85018 HEMOGLOBIN: CPT | Performed by: INTERNAL MEDICINE

## 2019-01-15 PROCEDURE — 86900 BLOOD TYPING SEROLOGIC ABO: CPT

## 2019-01-15 PROCEDURE — 71045 X-RAY EXAM CHEST 1 VIEW: CPT

## 2019-01-15 PROCEDURE — 36514 APHERESIS PLASMA: CPT

## 2019-01-15 PROCEDURE — P9016 RBC LEUKOCYTES REDUCED: HCPCS

## 2019-01-15 PROCEDURE — 25010000002 ALBUMIN HUMAN 5% PER 50 ML: Performed by: INTERNAL MEDICINE

## 2019-01-15 PROCEDURE — 85027 COMPLETE CBC AUTOMATED: CPT | Performed by: INTERNAL MEDICINE

## 2019-01-15 PROCEDURE — 36430 TRANSFUSION BLD/BLD COMPNT: CPT

## 2019-01-15 PROCEDURE — P9041 ALBUMIN (HUMAN),5%, 50ML: HCPCS | Performed by: INTERNAL MEDICINE

## 2019-01-15 RX ORDER — PANTOPRAZOLE SODIUM 40 MG/1
40 TABLET, DELAYED RELEASE ORAL
Status: DISCONTINUED | OUTPATIENT
Start: 2019-01-15 | End: 2019-01-25 | Stop reason: HOSPADM

## 2019-01-15 RX ADMIN — PANTOPRAZOLE SODIUM 40 MG: 40 TABLET, DELAYED RELEASE ORAL at 13:30

## 2019-01-15 RX ADMIN — SODIUM CHLORIDE, PRESERVATIVE FREE 3 ML: 5 INJECTION INTRAVENOUS at 09:00

## 2019-01-15 RX ADMIN — CARVEDILOL 12.5 MG: 12.5 TABLET, FILM COATED ORAL at 11:49

## 2019-01-15 RX ADMIN — CARVEDILOL 12.5 MG: 12.5 TABLET, FILM COATED ORAL at 20:20

## 2019-01-15 RX ADMIN — TAZOBACTAM SODIUM AND PIPERACILLIN SODIUM 3.38 G: 375; 3 INJECTION, SOLUTION INTRAVENOUS at 20:21

## 2019-01-15 RX ADMIN — ANTICOAGULANT CITRATE DEXTROSE SOLUTION FORMULA A 1000 ML: 12.25; 11; 3.65 SOLUTION INTRAVENOUS at 12:40

## 2019-01-15 RX ADMIN — SODIUM CHLORIDE, PRESERVATIVE FREE 3 ML: 5 INJECTION INTRAVENOUS at 20:22

## 2019-01-15 RX ADMIN — CALCIUM GLUCONATE 4 G: 98 INJECTION, SOLUTION INTRAVENOUS at 12:40

## 2019-01-15 RX ADMIN — HEPARIN SODIUM 3000 UNITS: 1000 INJECTION INTRAVENOUS; SUBCUTANEOUS at 18:45

## 2019-01-15 RX ADMIN — TAZOBACTAM SODIUM AND PIPERACILLIN SODIUM 3.38 G: 375; 3 INJECTION, SOLUTION INTRAVENOUS at 12:56

## 2019-01-15 RX ADMIN — AMLODIPINE BESYLATE 5 MG: 5 TABLET ORAL at 11:49

## 2019-01-15 RX ADMIN — ALBUMIN (HUMAN) 3500 ML: 12.5 SOLUTION INTRAVENOUS at 12:40

## 2019-01-15 RX ADMIN — AZITHROMYCIN MONOHYDRATE 500 MG: 500 INJECTION, POWDER, LYOPHILIZED, FOR SOLUTION INTRAVENOUS at 11:50

## 2019-01-16 ENCOUNTER — APPOINTMENT (OUTPATIENT)
Dept: CT IMAGING | Facility: HOSPITAL | Age: 34
End: 2019-01-16

## 2019-01-16 LAB
ABO + RH BLD: NORMAL
ALBUMIN SERPL-MCNC: 3 G/DL (ref 3.5–5.2)
ALBUMIN/GLOB SERPL: 1.5 G/DL
ALP SERPL-CCNC: 26 U/L (ref 39–117)
ALT SERPL W P-5'-P-CCNC: 10 U/L (ref 1–41)
ANION GAP SERPL CALCULATED.3IONS-SCNC: 11.8 MMOL/L
AST SERPL-CCNC: 7 U/L (ref 1–40)
BACTERIA UR QL AUTO: ABNORMAL /HPF
BH BB BLOOD EXPIRATION DATE: NORMAL
BH BB BLOOD TYPE BARCODE: 7300
BH BB DISPENSE STATUS: NORMAL
BH BB PRODUCT CODE: NORMAL
BH BB UNIT NUMBER: NORMAL
BILIRUB SERPL-MCNC: 0.7 MG/DL (ref 0.1–1.2)
BILIRUB UR QL STRIP: NEGATIVE
BUN BLD-MCNC: 44 MG/DL (ref 6–20)
BUN/CREAT SERPL: 7.9 (ref 7–25)
C-ANCA TITR SER IF: NORMAL TITER
CALCIUM SPEC-SCNC: 8 MG/DL (ref 8.6–10.5)
CHLORIDE SERPL-SCNC: 100 MMOL/L (ref 98–107)
CLARITY UR: ABNORMAL
CO2 SERPL-SCNC: 29.2 MMOL/L (ref 22–29)
COLOR UR: YELLOW
CREAT BLD-MCNC: 5.54 MG/DL (ref 0.76–1.27)
DEPRECATED RDW RBC AUTO: 44.2 FL (ref 37–54)
ERYTHROCYTE [DISTWIDTH] IN BLOOD BY AUTOMATED COUNT: 13.9 % (ref 11.5–14.5)
ETHNIC BACKGROUND STATED: 6.8 MIU/ML (ref 2.6–18.5)
GBM IGG SER-ACNC: 4 UNITS (ref 0–20)
GFR SERPL CREATININE-BSD FRML MDRD: 12 ML/MIN/1.73
GFR SERPL CREATININE-BSD FRML MDRD: ABNORMAL ML/MIN/1.73
GLOBULIN UR ELPH-MCNC: 2 GM/DL
GLUCOSE BLD-MCNC: 104 MG/DL (ref 65–99)
GLUCOSE BLDC GLUCOMTR-MCNC: 106 MG/DL (ref 70–130)
GLUCOSE BLDC GLUCOMTR-MCNC: 120 MG/DL (ref 70–130)
GLUCOSE BLDC GLUCOMTR-MCNC: 121 MG/DL (ref 70–130)
GLUCOSE BLDC GLUCOMTR-MCNC: 135 MG/DL (ref 70–130)
GLUCOSE BLDC GLUCOMTR-MCNC: 167 MG/DL (ref 70–130)
GLUCOSE BLDC GLUCOMTR-MCNC: 266 MG/DL (ref 70–130)
GLUCOSE UR STRIP-MCNC: ABNORMAL MG/DL
HBA1C MFR BLD: 5 % (ref 4.8–5.6)
HCT VFR BLD AUTO: 22.8 % (ref 40.4–52.2)
HCT VFR BLD AUTO: 25.9 % (ref 40.4–52.2)
HCT VFR BLD AUTO: 25.9 % (ref 40.4–52.2)
HCT VFR BLD AUTO: 26 % (ref 40.4–52.2)
HGB BLD-MCNC: 7.4 G/DL (ref 13.7–17.6)
HGB BLD-MCNC: 8.4 G/DL (ref 13.7–17.6)
HGB BLD-MCNC: 8.4 G/DL (ref 13.7–17.6)
HGB BLD-MCNC: 8.5 G/DL (ref 13.7–17.6)
HGB UR QL STRIP.AUTO: ABNORMAL
HYALINE CASTS UR QL AUTO: ABNORMAL /LPF
INR PPP: 1.43 (ref 0.9–1.1)
KETONES UR QL STRIP: NEGATIVE
LEUKOCYTE ESTERASE UR QL STRIP.AUTO: ABNORMAL
MCH RBC QN AUTO: 28.1 PG (ref 27–32.7)
MCHC RBC AUTO-ENTMCNC: 32.4 G/DL (ref 32.6–36.4)
MCV RBC AUTO: 86.6 FL (ref 79.8–96.2)
MYELOPEROXIDASE AB SER-ACNC: <9 U/ML (ref 0–9)
NITRITE UR QL STRIP: NEGATIVE
P-ANCA ATYPICAL TITR SER IF: NORMAL TITER
P-ANCA TITR SER IF: NORMAL TITER
PH UR STRIP.AUTO: 5.5 [PH] (ref 5–8)
PLATELET # BLD AUTO: 201 10*3/MM3 (ref 140–500)
PMV BLD AUTO: 10.1 FL (ref 6–12)
POTASSIUM BLD-SCNC: 4.3 MMOL/L (ref 3.5–5.2)
PROT SERPL-MCNC: 5 G/DL (ref 6–8.5)
PROT UR QL STRIP: ABNORMAL
PROTEINASE3 AB SER IA-ACNC: <3.5 U/ML (ref 0–3.5)
PROTHROMBIN TIME: 17.1 SECONDS (ref 11.7–14.2)
RBC # BLD AUTO: 2.99 10*6/MM3 (ref 4.6–6)
RBC # UR: ABNORMAL /HPF
REF LAB TEST METHOD: ABNORMAL
SODIUM BLD-SCNC: 141 MMOL/L (ref 136–145)
SP GR UR STRIP: 1.01 (ref 1–1.03)
SQUAMOUS #/AREA URNS HPF: ABNORMAL /HPF
UNIT  ABO: NORMAL
UNIT  RH: NORMAL
UROBILINOGEN UR QL STRIP: ABNORMAL
WBC NRBC COR # BLD: 11.59 10*3/MM3 (ref 4.5–10.7)
WBC UR QL AUTO: ABNORMAL /HPF

## 2019-01-16 PROCEDURE — 25010000002 CALCIUM GLUCONATE PER 10 ML: Performed by: INTERNAL MEDICINE

## 2019-01-16 PROCEDURE — P9041 ALBUMIN (HUMAN),5%, 50ML: HCPCS | Performed by: INTERNAL MEDICINE

## 2019-01-16 PROCEDURE — 0TB03ZX EXCISION OF RIGHT KIDNEY, PERCUTANEOUS APPROACH, DIAGNOSTIC: ICD-10-PCS | Performed by: INTERNAL MEDICINE

## 2019-01-16 PROCEDURE — 88300 SURGICAL PATH GROSS: CPT | Performed by: INTERNAL MEDICINE

## 2019-01-16 PROCEDURE — 82962 GLUCOSE BLOOD TEST: CPT

## 2019-01-16 PROCEDURE — 99232 SBSQ HOSP IP/OBS MODERATE 35: CPT | Performed by: INTERNAL MEDICINE

## 2019-01-16 PROCEDURE — 85018 HEMOGLOBIN: CPT | Performed by: INTERNAL MEDICINE

## 2019-01-16 PROCEDURE — 86927 PLASMA FRESH FROZEN: CPT

## 2019-01-16 PROCEDURE — 25010000002 ALBUMIN HUMAN 5% PER 50 ML: Performed by: INTERNAL MEDICINE

## 2019-01-16 PROCEDURE — 83036 HEMOGLOBIN GLYCOSYLATED A1C: CPT | Performed by: INTERNAL MEDICINE

## 2019-01-16 PROCEDURE — 81001 URINALYSIS AUTO W/SCOPE: CPT | Performed by: INTERNAL MEDICINE

## 2019-01-16 PROCEDURE — 85027 COMPLETE CBC AUTOMATED: CPT | Performed by: INTERNAL MEDICINE

## 2019-01-16 PROCEDURE — 36514 APHERESIS PLASMA: CPT

## 2019-01-16 PROCEDURE — 25010000002 DESMOPRESSIN PER 1 MCG: Performed by: INTERNAL MEDICINE

## 2019-01-16 PROCEDURE — P9017 PLASMA 1 DONOR FRZ W/IN 8 HR: HCPCS

## 2019-01-16 PROCEDURE — 80053 COMPREHEN METABOLIC PANEL: CPT | Performed by: INTERNAL MEDICINE

## 2019-01-16 PROCEDURE — 77012 CT SCAN FOR NEEDLE BIOPSY: CPT

## 2019-01-16 PROCEDURE — 25010000002 AZITHROMYCIN PER 500 MG: Performed by: INTERNAL MEDICINE

## 2019-01-16 PROCEDURE — 85014 HEMATOCRIT: CPT | Performed by: INTERNAL MEDICINE

## 2019-01-16 PROCEDURE — 25010000002 PIPERACILLIN SOD-TAZOBACTAM PER 1 G: Performed by: INTERNAL MEDICINE

## 2019-01-16 PROCEDURE — 63710000001 INSULIN LISPRO (HUMAN) PER 5 UNITS: Performed by: INTERNAL MEDICINE

## 2019-01-16 PROCEDURE — 85610 PROTHROMBIN TIME: CPT | Performed by: INTERNAL MEDICINE

## 2019-01-16 RX ORDER — NICOTINE POLACRILEX 4 MG
15 LOZENGE BUCCAL
Status: DISCONTINUED | OUTPATIENT
Start: 2019-01-16 | End: 2019-01-25 | Stop reason: HOSPADM

## 2019-01-16 RX ORDER — DEXTROSE MONOHYDRATE 25 G/50ML
25 INJECTION, SOLUTION INTRAVENOUS
Status: DISCONTINUED | OUTPATIENT
Start: 2019-01-16 | End: 2019-01-25 | Stop reason: HOSPADM

## 2019-01-16 RX ORDER — SODIUM CHLORIDE 0.9 % (FLUSH) 0.9 %
3 SYRINGE (ML) INJECTION EVERY 12 HOURS SCHEDULED
Status: DISCONTINUED | OUTPATIENT
Start: 2019-01-16 | End: 2019-01-17

## 2019-01-16 RX ORDER — LIDOCAINE HYDROCHLORIDE 10 MG/ML
20 INJECTION, SOLUTION INFILTRATION; PERINEURAL ONCE
Status: COMPLETED | OUTPATIENT
Start: 2019-01-16 | End: 2019-01-16

## 2019-01-16 RX ORDER — CARVEDILOL 25 MG/1
25 TABLET ORAL EVERY 12 HOURS SCHEDULED
Status: DISCONTINUED | OUTPATIENT
Start: 2019-01-16 | End: 2019-01-22

## 2019-01-16 RX ORDER — ACETAMINOPHEN 325 MG/1
650 TABLET ORAL EVERY 4 HOURS PRN
Status: DISCONTINUED | OUTPATIENT
Start: 2019-01-16 | End: 2019-01-21

## 2019-01-16 RX ORDER — SODIUM CHLORIDE 0.9 % (FLUSH) 0.9 %
1-10 SYRINGE (ML) INJECTION AS NEEDED
Status: DISCONTINUED | OUTPATIENT
Start: 2019-01-16 | End: 2019-01-17

## 2019-01-16 RX ADMIN — CARVEDILOL 25 MG: 25 TABLET, FILM COATED ORAL at 20:00

## 2019-01-16 RX ADMIN — SODIUM CHLORIDE, PRESERVATIVE FREE 3 ML: 5 INJECTION INTRAVENOUS at 20:03

## 2019-01-16 RX ADMIN — CARVEDILOL 25 MG: 25 TABLET, FILM COATED ORAL at 08:57

## 2019-01-16 RX ADMIN — PANTOPRAZOLE SODIUM 40 MG: 40 TABLET, DELAYED RELEASE ORAL at 06:09

## 2019-01-16 RX ADMIN — LIDOCAINE HYDROCHLORIDE 20 ML: 10 INJECTION, SOLUTION INFILTRATION; PERINEURAL at 15:05

## 2019-01-16 RX ADMIN — ANTICOAGULANT CITRATE DEXTROSE SOLUTION FORMULA A 500 ML: 12.25; 11; 3.65 SOLUTION INTRAVENOUS at 07:50

## 2019-01-16 RX ADMIN — DESMOPRESSIN ACETATE 23.2 MCG: 4 SOLUTION INTRAVENOUS at 13:53

## 2019-01-16 RX ADMIN — GELATIN ABSORBABLE SPONGE 12-7 MM 1 EACH: 12-7 MISC at 15:00

## 2019-01-16 RX ADMIN — AZITHROMYCIN MONOHYDRATE 500 MG: 500 INJECTION, POWDER, LYOPHILIZED, FOR SOLUTION INTRAVENOUS at 08:59

## 2019-01-16 RX ADMIN — TAZOBACTAM SODIUM AND PIPERACILLIN SODIUM 3.38 G: 375; 3 INJECTION, SOLUTION INTRAVENOUS at 08:59

## 2019-01-16 RX ADMIN — INSULIN LISPRO 4 UNITS: 100 INJECTION, SOLUTION INTRAVENOUS; SUBCUTANEOUS at 17:41

## 2019-01-16 RX ADMIN — CALCIUM GLUCONATE 4 G: 98 INJECTION, SOLUTION INTRAVENOUS at 07:55

## 2019-01-16 RX ADMIN — ALBUMIN (HUMAN) 3500 ML: 12.5 SOLUTION INTRAVENOUS at 07:50

## 2019-01-16 RX ADMIN — AMLODIPINE BESYLATE 5 MG: 5 TABLET ORAL at 06:49

## 2019-01-16 RX ADMIN — TAZOBACTAM SODIUM AND PIPERACILLIN SODIUM 3.38 G: 375; 3 INJECTION, SOLUTION INTRAVENOUS at 20:01

## 2019-01-17 ENCOUNTER — APPOINTMENT (OUTPATIENT)
Dept: GENERAL RADIOLOGY | Facility: HOSPITAL | Age: 34
End: 2019-01-17
Attending: INTERNAL MEDICINE

## 2019-01-17 LAB
ABO + RH BLD: NORMAL
ABO GROUP BLD: NORMAL
ALBUMIN SERPL-MCNC: 3.3 G/DL (ref 3.5–5.2)
ANION GAP SERPL CALCULATED.3IONS-SCNC: 16.7 MMOL/L
BH BB BLOOD EXPIRATION DATE: NORMAL
BH BB BLOOD TYPE BARCODE: 1700
BH BB DISPENSE STATUS: NORMAL
BH BB PRODUCT CODE: NORMAL
BH BB UNIT NUMBER: NORMAL
BLD GP AB SCN SERPL QL: NEGATIVE
BUN BLD-MCNC: 54 MG/DL (ref 6–20)
BUN/CREAT SERPL: 7.6 (ref 7–25)
CALCIUM SPEC-SCNC: 8.4 MG/DL (ref 8.6–10.5)
CHLORIDE SERPL-SCNC: 100 MMOL/L (ref 98–107)
CO2 SERPL-SCNC: 22.3 MMOL/L (ref 22–29)
CREAT BLD-MCNC: 7.1 MG/DL (ref 0.76–1.27)
DEPRECATED RDW RBC AUTO: 44.9 FL (ref 37–54)
ERYTHROCYTE [DISTWIDTH] IN BLOOD BY AUTOMATED COUNT: 13.8 % (ref 11.5–14.5)
GFR SERPL CREATININE-BSD FRML MDRD: 9 ML/MIN/1.73
GFR SERPL CREATININE-BSD FRML MDRD: ABNORMAL ML/MIN/1.73
GLUCOSE BLD-MCNC: 108 MG/DL (ref 65–99)
GLUCOSE BLDC GLUCOMTR-MCNC: 106 MG/DL (ref 70–130)
GLUCOSE BLDC GLUCOMTR-MCNC: 127 MG/DL (ref 70–130)
GLUCOSE BLDC GLUCOMTR-MCNC: 159 MG/DL (ref 70–130)
GLUCOSE BLDC GLUCOMTR-MCNC: 181 MG/DL (ref 70–130)
HCT VFR BLD AUTO: 23.2 % (ref 40.4–52.2)
HCT VFR BLD AUTO: 26.2 % (ref 40.4–52.2)
HGB BLD-MCNC: 7.5 G/DL (ref 13.7–17.6)
HGB BLD-MCNC: 8.5 G/DL (ref 13.7–17.6)
MCH RBC QN AUTO: 28.4 PG (ref 27–32.7)
MCHC RBC AUTO-ENTMCNC: 32.3 G/DL (ref 32.6–36.4)
MCV RBC AUTO: 87.9 FL (ref 79.8–96.2)
PHOSPHATE SERPL-MCNC: 8.4 MG/DL (ref 2.5–4.5)
PLATELET # BLD AUTO: 168 10*3/MM3 (ref 140–500)
PMV BLD AUTO: 10.5 FL (ref 6–12)
POTASSIUM BLD-SCNC: 4.5 MMOL/L (ref 3.5–5.2)
RBC # BLD AUTO: 2.64 10*6/MM3 (ref 4.6–6)
RH BLD: POSITIVE
SODIUM BLD-SCNC: 139 MMOL/L (ref 136–145)
T&S EXPIRATION DATE: NORMAL
UNIT  ABO: NORMAL
UNIT  RH: NORMAL
WBC NRBC COR # BLD: 11.71 10*3/MM3 (ref 4.5–10.7)

## 2019-01-17 PROCEDURE — 25010000002 AZITHROMYCIN PER 500 MG: Performed by: INTERNAL MEDICINE

## 2019-01-17 PROCEDURE — 25010000002 ALBUMIN HUMAN 5% PER 50 ML: Performed by: INTERNAL MEDICINE

## 2019-01-17 PROCEDURE — 25010000002 CALCIUM GLUCONATE PER 10 ML: Performed by: INTERNAL MEDICINE

## 2019-01-17 PROCEDURE — 85018 HEMOGLOBIN: CPT | Performed by: INTERNAL MEDICINE

## 2019-01-17 PROCEDURE — 99232 SBSQ HOSP IP/OBS MODERATE 35: CPT | Performed by: INTERNAL MEDICINE

## 2019-01-17 PROCEDURE — P9041 ALBUMIN (HUMAN),5%, 50ML: HCPCS | Performed by: INTERNAL MEDICINE

## 2019-01-17 PROCEDURE — 25010000002 PIPERACILLIN SOD-TAZOBACTAM PER 1 G: Performed by: INTERNAL MEDICINE

## 2019-01-17 PROCEDURE — 85027 COMPLETE CBC AUTOMATED: CPT | Performed by: INTERNAL MEDICINE

## 2019-01-17 PROCEDURE — 25010000002 HEPARIN (PORCINE) PER 1000 UNITS: Performed by: INTERNAL MEDICINE

## 2019-01-17 PROCEDURE — 82962 GLUCOSE BLOOD TEST: CPT

## 2019-01-17 PROCEDURE — 86850 RBC ANTIBODY SCREEN: CPT | Performed by: INTERNAL MEDICINE

## 2019-01-17 PROCEDURE — 86900 BLOOD TYPING SEROLOGIC ABO: CPT | Performed by: INTERNAL MEDICINE

## 2019-01-17 PROCEDURE — P9016 RBC LEUKOCYTES REDUCED: HCPCS

## 2019-01-17 PROCEDURE — 86923 COMPATIBILITY TEST ELECTRIC: CPT

## 2019-01-17 PROCEDURE — 71046 X-RAY EXAM CHEST 2 VIEWS: CPT

## 2019-01-17 PROCEDURE — 80069 RENAL FUNCTION PANEL: CPT | Performed by: INTERNAL MEDICINE

## 2019-01-17 PROCEDURE — 86901 BLOOD TYPING SEROLOGIC RH(D): CPT | Performed by: INTERNAL MEDICINE

## 2019-01-17 PROCEDURE — 85014 HEMATOCRIT: CPT | Performed by: INTERNAL MEDICINE

## 2019-01-17 PROCEDURE — 63710000001 INSULIN LISPRO (HUMAN) PER 5 UNITS: Performed by: INTERNAL MEDICINE

## 2019-01-17 PROCEDURE — 5A1D70Z PERFORMANCE OF URINARY FILTRATION, INTERMITTENT, LESS THAN 6 HOURS PER DAY: ICD-10-PCS | Performed by: INTERNAL MEDICINE

## 2019-01-17 PROCEDURE — 36514 APHERESIS PLASMA: CPT

## 2019-01-17 PROCEDURE — 86900 BLOOD TYPING SEROLOGIC ABO: CPT

## 2019-01-17 RX ORDER — HEPARIN SODIUM 1000 [USP'U]/ML
3000 INJECTION, SOLUTION INTRAVENOUS; SUBCUTANEOUS ONCE
Status: COMPLETED | OUTPATIENT
Start: 2019-01-17 | End: 2019-01-17

## 2019-01-17 RX ORDER — ALBUMIN (HUMAN) 12.5 G/50ML
12.5 SOLUTION INTRAVENOUS AS NEEDED
Status: ACTIVE | OUTPATIENT
Start: 2019-01-17 | End: 2019-01-18

## 2019-01-17 RX ADMIN — CALCIUM ACETATE 1334 MG: 667 CAPSULE ORAL at 20:57

## 2019-01-17 RX ADMIN — CARVEDILOL 25 MG: 25 TABLET, FILM COATED ORAL at 09:21

## 2019-01-17 RX ADMIN — SODIUM CHLORIDE, PRESERVATIVE FREE 3 ML: 5 INJECTION INTRAVENOUS at 09:22

## 2019-01-17 RX ADMIN — SODIUM CHLORIDE, PRESERVATIVE FREE 3 ML: 5 INJECTION INTRAVENOUS at 20:58

## 2019-01-17 RX ADMIN — ANTICOAGULANT CITRATE DEXTROSE SOLUTION FORMULA A 1000 ML: 12.25; 11; 3.65 SOLUTION INTRAVENOUS at 08:25

## 2019-01-17 RX ADMIN — PANTOPRAZOLE SODIUM 40 MG: 40 TABLET, DELAYED RELEASE ORAL at 05:08

## 2019-01-17 RX ADMIN — INSULIN LISPRO 4 UNITS: 100 INJECTION, SOLUTION INTRAVENOUS; SUBCUTANEOUS at 12:20

## 2019-01-17 RX ADMIN — HEPARIN SODIUM 3000 UNITS: 1000 INJECTION INTRAVENOUS; SUBCUTANEOUS at 16:52

## 2019-01-17 RX ADMIN — TAZOBACTAM SODIUM AND PIPERACILLIN SODIUM 3.38 G: 375; 3 INJECTION, SOLUTION INTRAVENOUS at 10:38

## 2019-01-17 RX ADMIN — AMLODIPINE BESYLATE 5 MG: 5 TABLET ORAL at 09:21

## 2019-01-17 RX ADMIN — AZITHROMYCIN MONOHYDRATE 500 MG: 500 INJECTION, POWDER, LYOPHILIZED, FOR SOLUTION INTRAVENOUS at 09:21

## 2019-01-17 RX ADMIN — CALCIUM GLUCONATE 4 G: 98 INJECTION, SOLUTION INTRAVENOUS at 08:25

## 2019-01-17 RX ADMIN — INSULIN LISPRO 4 UNITS: 100 INJECTION, SOLUTION INTRAVENOUS; SUBCUTANEOUS at 20:57

## 2019-01-17 RX ADMIN — TAZOBACTAM SODIUM AND PIPERACILLIN SODIUM 3.38 G: 375; 3 INJECTION, SOLUTION INTRAVENOUS at 20:57

## 2019-01-17 RX ADMIN — CARVEDILOL 25 MG: 25 TABLET, FILM COATED ORAL at 20:57

## 2019-01-17 RX ADMIN — HEPARIN SODIUM 3000 UNITS: 1000 INJECTION INTRAVENOUS; SUBCUTANEOUS at 17:54

## 2019-01-17 RX ADMIN — CALCIUM ACETATE 1334 MG: 667 CAPSULE ORAL at 12:20

## 2019-01-17 RX ADMIN — ALBUMIN (HUMAN) 4000 ML: 12.5 SOLUTION INTRAVENOUS at 08:25

## 2019-01-18 LAB
ABO + RH BLD: NORMAL
ALBUMIN SERPL-MCNC: 3.5 G/DL (ref 3.5–5.2)
ANION GAP SERPL CALCULATED.3IONS-SCNC: 15.5 MMOL/L
BH BB BLOOD EXPIRATION DATE: NORMAL
BH BB BLOOD TYPE BARCODE: 7300
BH BB DISPENSE STATUS: NORMAL
BH BB PRODUCT CODE: NORMAL
BH BB UNIT NUMBER: NORMAL
BUN BLD-MCNC: 34 MG/DL (ref 6–20)
BUN/CREAT SERPL: 6.3 (ref 7–25)
CALCIUM SPEC-SCNC: 8.4 MG/DL (ref 8.6–10.5)
CHLORIDE SERPL-SCNC: 99 MMOL/L (ref 98–107)
CO2 SERPL-SCNC: 23.5 MMOL/L (ref 22–29)
CREAT BLD-MCNC: 5.36 MG/DL (ref 0.76–1.27)
DEPRECATED RDW RBC AUTO: 44.5 FL (ref 37–54)
ERYTHROCYTE [DISTWIDTH] IN BLOOD BY AUTOMATED COUNT: 13.7 % (ref 11.5–14.5)
GFR SERPL CREATININE-BSD FRML MDRD: 12 ML/MIN/1.73
GFR SERPL CREATININE-BSD FRML MDRD: ABNORMAL ML/MIN/1.73
GLUCOSE BLD-MCNC: 107 MG/DL (ref 65–99)
GLUCOSE BLDC GLUCOMTR-MCNC: 102 MG/DL (ref 70–130)
GLUCOSE BLDC GLUCOMTR-MCNC: 152 MG/DL (ref 70–130)
GLUCOSE BLDC GLUCOMTR-MCNC: 157 MG/DL (ref 70–130)
GLUCOSE BLDC GLUCOMTR-MCNC: 97 MG/DL (ref 70–130)
HCT VFR BLD AUTO: 26.2 % (ref 40.4–52.2)
HCT VFR BLD AUTO: 26.2 % (ref 40.4–52.2)
HCT VFR BLD AUTO: 26.5 % (ref 40.4–52.2)
HGB BLD-MCNC: 8.4 G/DL (ref 13.7–17.6)
MAGNESIUM SERPL-MCNC: 1.9 MG/DL (ref 1.6–2.6)
MCH RBC QN AUTO: 28.5 PG (ref 27–32.7)
MCHC RBC AUTO-ENTMCNC: 32.1 G/DL (ref 32.6–36.4)
MCV RBC AUTO: 88.8 FL (ref 79.8–96.2)
PHOSPHATE SERPL-MCNC: 6.1 MG/DL (ref 2.5–4.5)
PLATELET # BLD AUTO: 175 10*3/MM3 (ref 140–500)
PMV BLD AUTO: 10.6 FL (ref 6–12)
POTASSIUM BLD-SCNC: 4.4 MMOL/L (ref 3.5–5.2)
RBC # BLD AUTO: 2.95 10*6/MM3 (ref 4.6–6)
SODIUM BLD-SCNC: 138 MMOL/L (ref 136–145)
UNIT  ABO: NORMAL
UNIT  RH: NORMAL
WBC NRBC COR # BLD: 12.23 10*3/MM3 (ref 4.5–10.7)

## 2019-01-18 PROCEDURE — 83735 ASSAY OF MAGNESIUM: CPT | Performed by: INTERNAL MEDICINE

## 2019-01-18 PROCEDURE — 36514 APHERESIS PLASMA: CPT

## 2019-01-18 PROCEDURE — 25010000002 AZITHROMYCIN PER 500 MG: Performed by: INTERNAL MEDICINE

## 2019-01-18 PROCEDURE — 80069 RENAL FUNCTION PANEL: CPT | Performed by: INTERNAL MEDICINE

## 2019-01-18 PROCEDURE — 85018 HEMOGLOBIN: CPT | Performed by: INTERNAL MEDICINE

## 2019-01-18 PROCEDURE — 85027 COMPLETE CBC AUTOMATED: CPT | Performed by: INTERNAL MEDICINE

## 2019-01-18 PROCEDURE — 99232 SBSQ HOSP IP/OBS MODERATE 35: CPT | Performed by: INTERNAL MEDICINE

## 2019-01-18 PROCEDURE — P9041 ALBUMIN (HUMAN),5%, 50ML: HCPCS | Performed by: INTERNAL MEDICINE

## 2019-01-18 PROCEDURE — 82962 GLUCOSE BLOOD TEST: CPT

## 2019-01-18 PROCEDURE — 25010000002 PIPERACILLIN SOD-TAZOBACTAM PER 1 G: Performed by: INTERNAL MEDICINE

## 2019-01-18 PROCEDURE — 25010000002 ALBUMIN HUMAN 5% PER 50 ML: Performed by: INTERNAL MEDICINE

## 2019-01-18 PROCEDURE — 85014 HEMATOCRIT: CPT | Performed by: INTERNAL MEDICINE

## 2019-01-18 PROCEDURE — 25010000002 CALCIUM GLUCONATE PER 10 ML: Performed by: INTERNAL MEDICINE

## 2019-01-18 PROCEDURE — 63710000001 INSULIN LISPRO (HUMAN) PER 5 UNITS: Performed by: INTERNAL MEDICINE

## 2019-01-18 RX ADMIN — CARVEDILOL 25 MG: 25 TABLET, FILM COATED ORAL at 10:10

## 2019-01-18 RX ADMIN — INSULIN LISPRO 2 UNITS: 100 INJECTION, SOLUTION INTRAVENOUS; SUBCUTANEOUS at 10:10

## 2019-01-18 RX ADMIN — INSULIN LISPRO 4 UNITS: 100 INJECTION, SOLUTION INTRAVENOUS; SUBCUTANEOUS at 20:53

## 2019-01-18 RX ADMIN — TAZOBACTAM SODIUM AND PIPERACILLIN SODIUM 3.38 G: 375; 3 INJECTION, SOLUTION INTRAVENOUS at 22:14

## 2019-01-18 RX ADMIN — CARVEDILOL 25 MG: 25 TABLET, FILM COATED ORAL at 20:53

## 2019-01-18 RX ADMIN — CALCIUM ACETATE 1334 MG: 667 CAPSULE ORAL at 12:21

## 2019-01-18 RX ADMIN — ALBUMIN (HUMAN) 3500 ML: 12.5 SOLUTION INTRAVENOUS at 08:06

## 2019-01-18 RX ADMIN — CALCIUM ACETATE 1334 MG: 667 CAPSULE ORAL at 10:10

## 2019-01-18 RX ADMIN — ANTICOAGULANT CITRATE DEXTROSE SOLUTION FORMULA A 500 ML: 12.25; 11; 3.65 SOLUTION INTRAVENOUS at 08:07

## 2019-01-18 RX ADMIN — AMLODIPINE BESYLATE 5 MG: 5 TABLET ORAL at 10:10

## 2019-01-18 RX ADMIN — CALCIUM ACETATE 1334 MG: 667 CAPSULE ORAL at 18:32

## 2019-01-18 RX ADMIN — PANTOPRAZOLE SODIUM 40 MG: 40 TABLET, DELAYED RELEASE ORAL at 05:33

## 2019-01-18 RX ADMIN — SODIUM CHLORIDE, PRESERVATIVE FREE 3 ML: 5 INJECTION INTRAVENOUS at 11:12

## 2019-01-18 RX ADMIN — SODIUM CHLORIDE, PRESERVATIVE FREE 3 ML: 5 INJECTION INTRAVENOUS at 20:52

## 2019-01-18 RX ADMIN — CALCIUM GLUCONATE 4 G: 98 INJECTION, SOLUTION INTRAVENOUS at 08:07

## 2019-01-18 RX ADMIN — AZITHROMYCIN MONOHYDRATE 500 MG: 500 INJECTION, POWDER, LYOPHILIZED, FOR SOLUTION INTRAVENOUS at 11:10

## 2019-01-18 RX ADMIN — TAZOBACTAM SODIUM AND PIPERACILLIN SODIUM 3.38 G: 375; 3 INJECTION, SOLUTION INTRAVENOUS at 12:21

## 2019-01-19 LAB
ALBUMIN SERPL-MCNC: 3.4 G/DL (ref 3.5–5.2)
ANION GAP SERPL CALCULATED.3IONS-SCNC: 18.5 MMOL/L
BUN BLD-MCNC: 53 MG/DL (ref 6–20)
BUN/CREAT SERPL: 7.3 (ref 7–25)
CALCIUM SPEC-SCNC: 8.6 MG/DL (ref 8.6–10.5)
CHLORIDE SERPL-SCNC: 102 MMOL/L (ref 98–107)
CO2 SERPL-SCNC: 18.5 MMOL/L (ref 22–29)
CREAT BLD-MCNC: 7.28 MG/DL (ref 0.76–1.27)
DEPRECATED RDW RBC AUTO: 44.7 FL (ref 37–54)
ERYTHROCYTE [DISTWIDTH] IN BLOOD BY AUTOMATED COUNT: 13.6 % (ref 11.5–14.5)
GFR SERPL CREATININE-BSD FRML MDRD: 9 ML/MIN/1.73
GFR SERPL CREATININE-BSD FRML MDRD: ABNORMAL ML/MIN/1.73
GLUCOSE BLD-MCNC: 101 MG/DL (ref 65–99)
GLUCOSE BLDC GLUCOMTR-MCNC: 107 MG/DL (ref 70–130)
GLUCOSE BLDC GLUCOMTR-MCNC: 124 MG/DL (ref 70–130)
GLUCOSE BLDC GLUCOMTR-MCNC: 140 MG/DL (ref 70–130)
HCT VFR BLD AUTO: 26.1 % (ref 40.4–52.2)
HCT VFR BLD AUTO: 29.8 % (ref 40.4–52.2)
HGB BLD-MCNC: 8.4 G/DL (ref 13.7–17.6)
HGB BLD-MCNC: 9.5 G/DL (ref 13.7–17.6)
MAGNESIUM SERPL-MCNC: 1.8 MG/DL (ref 1.6–2.6)
MCH RBC QN AUTO: 28.7 PG (ref 27–32.7)
MCHC RBC AUTO-ENTMCNC: 32.2 G/DL (ref 32.6–36.4)
MCV RBC AUTO: 89.1 FL (ref 79.8–96.2)
PHOSPHATE SERPL-MCNC: 6.4 MG/DL (ref 2.5–4.5)
PLATELET # BLD AUTO: 177 10*3/MM3 (ref 140–500)
PMV BLD AUTO: 10.1 FL (ref 6–12)
POTASSIUM BLD-SCNC: 4.9 MMOL/L (ref 3.5–5.2)
RBC # BLD AUTO: 2.93 10*6/MM3 (ref 4.6–6)
SODIUM BLD-SCNC: 139 MMOL/L (ref 136–145)
WBC NRBC COR # BLD: 13.97 10*3/MM3 (ref 4.5–10.7)

## 2019-01-19 PROCEDURE — 85018 HEMOGLOBIN: CPT | Performed by: INTERNAL MEDICINE

## 2019-01-19 PROCEDURE — 85027 COMPLETE CBC AUTOMATED: CPT | Performed by: INTERNAL MEDICINE

## 2019-01-19 PROCEDURE — 80069 RENAL FUNCTION PANEL: CPT | Performed by: INTERNAL MEDICINE

## 2019-01-19 PROCEDURE — 83735 ASSAY OF MAGNESIUM: CPT | Performed by: INTERNAL MEDICINE

## 2019-01-19 PROCEDURE — 85014 HEMATOCRIT: CPT | Performed by: INTERNAL MEDICINE

## 2019-01-19 PROCEDURE — 82962 GLUCOSE BLOOD TEST: CPT

## 2019-01-19 PROCEDURE — 5A1D70Z PERFORMANCE OF URINARY FILTRATION, INTERMITTENT, LESS THAN 6 HOURS PER DAY: ICD-10-PCS | Performed by: INTERNAL MEDICINE

## 2019-01-19 PROCEDURE — 25010000002 PIPERACILLIN SOD-TAZOBACTAM PER 1 G: Performed by: INTERNAL MEDICINE

## 2019-01-19 RX ORDER — MANNITOL 250 MG/ML
12.5 INJECTION, SOLUTION INTRAVENOUS AS NEEDED
Status: DISPENSED | OUTPATIENT
Start: 2019-01-19 | End: 2019-01-20

## 2019-01-19 RX ADMIN — CARVEDILOL 25 MG: 25 TABLET, FILM COATED ORAL at 08:26

## 2019-01-19 RX ADMIN — AMLODIPINE BESYLATE 5 MG: 5 TABLET ORAL at 08:27

## 2019-01-19 RX ADMIN — PANTOPRAZOLE SODIUM 40 MG: 40 TABLET, DELAYED RELEASE ORAL at 05:55

## 2019-01-19 RX ADMIN — SODIUM CHLORIDE, PRESERVATIVE FREE 3 ML: 5 INJECTION INTRAVENOUS at 21:12

## 2019-01-19 RX ADMIN — CALCIUM ACETATE 1334 MG: 667 CAPSULE ORAL at 08:26

## 2019-01-19 RX ADMIN — TAZOBACTAM SODIUM AND PIPERACILLIN SODIUM 3.38 G: 375; 3 INJECTION, SOLUTION INTRAVENOUS at 21:05

## 2019-01-19 RX ADMIN — CARVEDILOL 25 MG: 25 TABLET, FILM COATED ORAL at 21:05

## 2019-01-19 RX ADMIN — TAZOBACTAM SODIUM AND PIPERACILLIN SODIUM 3.38 G: 375; 3 INJECTION, SOLUTION INTRAVENOUS at 08:26

## 2019-01-19 RX ADMIN — CALCIUM ACETATE 1334 MG: 667 CAPSULE ORAL at 12:06

## 2019-01-19 RX ADMIN — CALCIUM ACETATE 1334 MG: 667 CAPSULE ORAL at 18:55

## 2019-01-19 RX ADMIN — SODIUM CHLORIDE, PRESERVATIVE FREE 3 ML: 5 INJECTION INTRAVENOUS at 08:28

## 2019-01-20 ENCOUNTER — APPOINTMENT (OUTPATIENT)
Dept: GENERAL RADIOLOGY | Facility: HOSPITAL | Age: 34
End: 2019-01-20

## 2019-01-20 LAB
ALBUMIN SERPL-MCNC: 3.2 G/DL (ref 3.5–5.2)
ANION GAP SERPL CALCULATED.3IONS-SCNC: 15 MMOL/L
BASOPHILS # BLD AUTO: 0.04 10*3/MM3 (ref 0–0.2)
BASOPHILS NFR BLD AUTO: 0.3 % (ref 0–1.5)
BUN BLD-MCNC: 42 MG/DL (ref 6–20)
BUN/CREAT SERPL: 6.9 (ref 7–25)
CALCIUM SPEC-SCNC: 8.9 MG/DL (ref 8.6–10.5)
CHLORIDE SERPL-SCNC: 103 MMOL/L (ref 98–107)
CO2 SERPL-SCNC: 24 MMOL/L (ref 22–29)
CREAT BLD-MCNC: 6.05 MG/DL (ref 0.76–1.27)
DEPRECATED RDW RBC AUTO: 44.5 FL (ref 37–54)
EOSINOPHIL # BLD AUTO: 0.04 10*3/MM3 (ref 0–0.7)
EOSINOPHIL NFR BLD AUTO: 0.3 % (ref 0.3–6.2)
ERYTHROCYTE [DISTWIDTH] IN BLOOD BY AUTOMATED COUNT: 13.6 % (ref 11.5–14.5)
GFR SERPL CREATININE-BSD FRML MDRD: 11 ML/MIN/1.73
GFR SERPL CREATININE-BSD FRML MDRD: ABNORMAL ML/MIN/1.73
GLUCOSE BLD-MCNC: 106 MG/DL (ref 65–99)
GLUCOSE BLDC GLUCOMTR-MCNC: 101 MG/DL (ref 70–130)
GLUCOSE BLDC GLUCOMTR-MCNC: 104 MG/DL (ref 70–130)
GLUCOSE BLDC GLUCOMTR-MCNC: 107 MG/DL (ref 70–130)
GLUCOSE BLDC GLUCOMTR-MCNC: 149 MG/DL (ref 70–130)
HCT VFR BLD AUTO: 26.2 % (ref 40.4–52.2)
HCT VFR BLD AUTO: 26.9 % (ref 40.4–52.2)
HGB BLD-MCNC: 8 G/DL (ref 13.7–17.6)
HGB BLD-MCNC: 8.4 G/DL (ref 13.7–17.6)
IMM GRANULOCYTES # BLD AUTO: 0.03 10*3/MM3 (ref 0–0.03)
IMM GRANULOCYTES NFR BLD AUTO: 0.2 % (ref 0–0.5)
LYMPHOCYTES # BLD AUTO: 0.71 10*3/MM3 (ref 0.9–4.8)
LYMPHOCYTES NFR BLD AUTO: 5.6 % (ref 19.6–45.3)
MCH RBC QN AUTO: 27.8 PG (ref 27–32.7)
MCHC RBC AUTO-ENTMCNC: 31.2 G/DL (ref 32.6–36.4)
MCV RBC AUTO: 89.1 FL (ref 79.8–96.2)
MONOCYTES # BLD AUTO: 1.02 10*3/MM3 (ref 0.2–1.2)
MONOCYTES NFR BLD AUTO: 8 % (ref 5–12)
NEUTROPHILS # BLD AUTO: 10.93 10*3/MM3 (ref 1.9–8.1)
NEUTROPHILS NFR BLD AUTO: 85.8 % (ref 42.7–76)
PHOSPHATE SERPL-MCNC: 5.1 MG/DL (ref 2.5–4.5)
PLATELET # BLD AUTO: 192 10*3/MM3 (ref 140–500)
PMV BLD AUTO: 10.8 FL (ref 6–12)
POTASSIUM BLD-SCNC: 5.3 MMOL/L (ref 3.5–5.2)
RBC # BLD AUTO: 3.02 10*6/MM3 (ref 4.6–6)
SODIUM BLD-SCNC: 142 MMOL/L (ref 136–145)
WBC NRBC COR # BLD: 12.74 10*3/MM3 (ref 4.5–10.7)

## 2019-01-20 PROCEDURE — 85025 COMPLETE CBC W/AUTO DIFF WBC: CPT | Performed by: INTERNAL MEDICINE

## 2019-01-20 PROCEDURE — 82962 GLUCOSE BLOOD TEST: CPT

## 2019-01-20 PROCEDURE — 85018 HEMOGLOBIN: CPT | Performed by: INTERNAL MEDICINE

## 2019-01-20 PROCEDURE — 25010000002 PIPERACILLIN SOD-TAZOBACTAM PER 1 G: Performed by: INTERNAL MEDICINE

## 2019-01-20 PROCEDURE — 85014 HEMATOCRIT: CPT | Performed by: INTERNAL MEDICINE

## 2019-01-20 PROCEDURE — 80069 RENAL FUNCTION PANEL: CPT | Performed by: INTERNAL MEDICINE

## 2019-01-20 PROCEDURE — 71046 X-RAY EXAM CHEST 2 VIEWS: CPT

## 2019-01-20 RX ADMIN — CARVEDILOL 25 MG: 25 TABLET, FILM COATED ORAL at 08:31

## 2019-01-20 RX ADMIN — SODIUM CHLORIDE, PRESERVATIVE FREE 3 ML: 5 INJECTION INTRAVENOUS at 08:37

## 2019-01-20 RX ADMIN — SODIUM CHLORIDE, PRESERVATIVE FREE 3 ML: 5 INJECTION INTRAVENOUS at 20:22

## 2019-01-20 RX ADMIN — PANTOPRAZOLE SODIUM 40 MG: 40 TABLET, DELAYED RELEASE ORAL at 06:44

## 2019-01-20 RX ADMIN — CALCIUM ACETATE 1334 MG: 667 CAPSULE ORAL at 08:31

## 2019-01-20 RX ADMIN — CALCIUM ACETATE 1334 MG: 667 CAPSULE ORAL at 12:38

## 2019-01-20 RX ADMIN — CARVEDILOL 25 MG: 25 TABLET, FILM COATED ORAL at 20:22

## 2019-01-20 RX ADMIN — TAZOBACTAM SODIUM AND PIPERACILLIN SODIUM 3.38 G: 375; 3 INJECTION, SOLUTION INTRAVENOUS at 08:37

## 2019-01-20 RX ADMIN — AMLODIPINE BESYLATE 5 MG: 5 TABLET ORAL at 08:31

## 2019-01-20 RX ADMIN — TAZOBACTAM SODIUM AND PIPERACILLIN SODIUM 3.38 G: 375; 3 INJECTION, SOLUTION INTRAVENOUS at 20:30

## 2019-01-20 RX ADMIN — CALCIUM ACETATE 1334 MG: 667 CAPSULE ORAL at 18:12

## 2019-01-21 ENCOUNTER — ANESTHESIA EVENT (OUTPATIENT)
Dept: PERIOP | Facility: HOSPITAL | Age: 34
End: 2019-01-21

## 2019-01-21 ENCOUNTER — APPOINTMENT (OUTPATIENT)
Dept: CARDIOLOGY | Facility: HOSPITAL | Age: 34
End: 2019-01-21
Attending: SURGERY

## 2019-01-21 ENCOUNTER — ANESTHESIA (OUTPATIENT)
Dept: PERIOP | Facility: HOSPITAL | Age: 34
End: 2019-01-21

## 2019-01-21 ENCOUNTER — APPOINTMENT (OUTPATIENT)
Dept: GENERAL RADIOLOGY | Facility: HOSPITAL | Age: 34
End: 2019-01-21

## 2019-01-21 LAB
ALBUMIN SERPL-MCNC: 3.3 G/DL (ref 3.5–5.2)
ANION GAP SERPL CALCULATED.3IONS-SCNC: 18 MMOL/L
BASOPHILS # BLD AUTO: 0.05 10*3/MM3 (ref 0–0.2)
BASOPHILS NFR BLD AUTO: 0.4 % (ref 0–1.5)
BUN BLD-MCNC: 67 MG/DL (ref 6–20)
BUN/CREAT SERPL: 8.2 (ref 7–25)
CALCIUM SPEC-SCNC: 8.5 MG/DL (ref 8.6–10.5)
CHLORIDE SERPL-SCNC: 100 MMOL/L (ref 98–107)
CO2 SERPL-SCNC: 23 MMOL/L (ref 22–29)
CREAT BLD-MCNC: 8.21 MG/DL (ref 0.76–1.27)
DEPRECATED RDW RBC AUTO: 44.5 FL (ref 37–54)
EOSINOPHIL # BLD AUTO: 0.06 10*3/MM3 (ref 0–0.7)
EOSINOPHIL NFR BLD AUTO: 0.5 % (ref 0.3–6.2)
ERYTHROCYTE [DISTWIDTH] IN BLOOD BY AUTOMATED COUNT: 13.4 % (ref 11.5–14.5)
GFR SERPL CREATININE-BSD FRML MDRD: 8 ML/MIN/1.73
GFR SERPL CREATININE-BSD FRML MDRD: ABNORMAL ML/MIN/1.73
GLUCOSE BLD-MCNC: 107 MG/DL (ref 65–99)
GLUCOSE BLDC GLUCOMTR-MCNC: 106 MG/DL (ref 70–130)
GLUCOSE BLDC GLUCOMTR-MCNC: 134 MG/DL (ref 70–130)
GLUCOSE BLDC GLUCOMTR-MCNC: 86 MG/DL (ref 70–130)
HCT VFR BLD AUTO: 25.7 % (ref 40.4–52.2)
HCT VFR BLD AUTO: 27.7 % (ref 40.4–52.2)
HGB BLD-MCNC: 8.2 G/DL (ref 13.7–17.6)
HGB BLD-MCNC: 8.7 G/DL (ref 13.7–17.6)
IMM GRANULOCYTES # BLD AUTO: 0.02 10*3/MM3 (ref 0–0.03)
IMM GRANULOCYTES NFR BLD AUTO: 0.2 % (ref 0–0.5)
LYMPHOCYTES # BLD AUTO: 0.66 10*3/MM3 (ref 0.9–4.8)
LYMPHOCYTES NFR BLD AUTO: 5.7 % (ref 19.6–45.3)
MCH RBC QN AUTO: 29 PG (ref 27–32.7)
MCHC RBC AUTO-ENTMCNC: 31.9 G/DL (ref 32.6–36.4)
MCV RBC AUTO: 90.8 FL (ref 79.8–96.2)
MONOCYTES # BLD AUTO: 1.03 10*3/MM3 (ref 0.2–1.2)
MONOCYTES NFR BLD AUTO: 8.9 % (ref 5–12)
NEUTROPHILS # BLD AUTO: 9.72 10*3/MM3 (ref 1.9–8.1)
NEUTROPHILS NFR BLD AUTO: 84.3 % (ref 42.7–76)
PHOSPHATE SERPL-MCNC: 6.1 MG/DL (ref 2.5–4.5)
PLATELET # BLD AUTO: 182 10*3/MM3 (ref 140–500)
PMV BLD AUTO: 9.6 FL (ref 6–12)
POTASSIUM BLD-SCNC: 6 MMOL/L (ref 3.5–5.2)
RBC # BLD AUTO: 2.83 10*6/MM3 (ref 4.6–6)
SODIUM BLD-SCNC: 141 MMOL/L (ref 136–145)
WBC NRBC COR # BLD: 11.54 10*3/MM3 (ref 4.5–10.7)

## 2019-01-21 PROCEDURE — 0JH63XZ INSERTION OF TUNNELED VASCULAR ACCESS DEVICE INTO CHEST SUBCUTANEOUS TISSUE AND FASCIA, PERCUTANEOUS APPROACH: ICD-10-PCS | Performed by: SURGERY

## 2019-01-21 PROCEDURE — 93970 EXTREMITY STUDY: CPT

## 2019-01-21 PROCEDURE — 80069 RENAL FUNCTION PANEL: CPT | Performed by: INTERNAL MEDICINE

## 2019-01-21 PROCEDURE — 99253 IP/OBS CNSLTJ NEW/EST LOW 45: CPT | Performed by: SURGERY

## 2019-01-21 PROCEDURE — 5A1D70Z PERFORMANCE OF URINARY FILTRATION, INTERMITTENT, LESS THAN 6 HOURS PER DAY: ICD-10-PCS | Performed by: INTERNAL MEDICINE

## 2019-01-21 PROCEDURE — 25010000002 PROPOFOL 10 MG/ML EMULSION: Performed by: NURSE ANESTHETIST, CERTIFIED REGISTERED

## 2019-01-21 PROCEDURE — 85018 HEMOGLOBIN: CPT | Performed by: INTERNAL MEDICINE

## 2019-01-21 PROCEDURE — 25010000002 HEPARIN (PORCINE) PER 1000 UNITS: Performed by: INTERNAL MEDICINE

## 2019-01-21 PROCEDURE — 25010000002 HEPARIN (PORCINE) PER 1000 UNITS: Performed by: SURGERY

## 2019-01-21 PROCEDURE — 25010000002 FENTANYL CITRATE (PF) 100 MCG/2ML SOLUTION: Performed by: NURSE ANESTHETIST, CERTIFIED REGISTERED

## 2019-01-21 PROCEDURE — 85025 COMPLETE CBC W/AUTO DIFF WBC: CPT | Performed by: INTERNAL MEDICINE

## 2019-01-21 PROCEDURE — 82962 GLUCOSE BLOOD TEST: CPT

## 2019-01-21 PROCEDURE — 25010000002 MIDAZOLAM PER 1 MG: Performed by: NURSE ANESTHETIST, CERTIFIED REGISTERED

## 2019-01-21 PROCEDURE — 85014 HEMATOCRIT: CPT | Performed by: INTERNAL MEDICINE

## 2019-01-21 PROCEDURE — C1750 CATH, HEMODIALYSIS,LONG-TERM: HCPCS | Performed by: SURGERY

## 2019-01-21 PROCEDURE — 25010000002 PIPERACILLIN SOD-TAZOBACTAM PER 1 G: Performed by: INTERNAL MEDICINE

## 2019-01-21 PROCEDURE — 02HV33Z INSERTION OF INFUSION DEVICE INTO SUPERIOR VENA CAVA, PERCUTANEOUS APPROACH: ICD-10-PCS | Performed by: SURGERY

## 2019-01-21 RX ORDER — PROMETHAZINE HYDROCHLORIDE 25 MG/ML
12.5 INJECTION, SOLUTION INTRAMUSCULAR; INTRAVENOUS ONCE AS NEEDED
Status: DISCONTINUED | OUTPATIENT
Start: 2019-01-21 | End: 2019-01-21 | Stop reason: HOSPADM

## 2019-01-21 RX ORDER — DIPHENHYDRAMINE HCL 25 MG
25 CAPSULE ORAL
Status: DISCONTINUED | OUTPATIENT
Start: 2019-01-21 | End: 2019-01-21 | Stop reason: HOSPADM

## 2019-01-21 RX ORDER — FENTANYL CITRATE 50 UG/ML
50 INJECTION, SOLUTION INTRAMUSCULAR; INTRAVENOUS
Status: DISCONTINUED | OUTPATIENT
Start: 2019-01-21 | End: 2019-01-21

## 2019-01-21 RX ORDER — NALOXONE HCL 0.4 MG/ML
0.2 VIAL (ML) INJECTION AS NEEDED
Status: DISCONTINUED | OUTPATIENT
Start: 2019-01-21 | End: 2019-01-21 | Stop reason: HOSPADM

## 2019-01-21 RX ORDER — MIDAZOLAM HYDROCHLORIDE 1 MG/ML
INJECTION INTRAMUSCULAR; INTRAVENOUS AS NEEDED
Status: DISCONTINUED | OUTPATIENT
Start: 2019-01-21 | End: 2019-01-21 | Stop reason: SURG

## 2019-01-21 RX ORDER — CEFAZOLIN SODIUM 2 G/100ML
2 INJECTION, SOLUTION INTRAVENOUS ONCE
Status: DISCONTINUED | OUTPATIENT
Start: 2019-01-21 | End: 2019-01-21

## 2019-01-21 RX ORDER — SODIUM CHLORIDE 0.9 % (FLUSH) 0.9 %
1-10 SYRINGE (ML) INJECTION AS NEEDED
Status: DISCONTINUED | OUTPATIENT
Start: 2019-01-21 | End: 2019-01-21

## 2019-01-21 RX ORDER — FENTANYL CITRATE 50 UG/ML
50 INJECTION, SOLUTION INTRAMUSCULAR; INTRAVENOUS
Status: DISCONTINUED | OUTPATIENT
Start: 2019-01-21 | End: 2019-01-21 | Stop reason: HOSPADM

## 2019-01-21 RX ORDER — SODIUM CHLORIDE 9 MG/ML
9 INJECTION, SOLUTION INTRAVENOUS CONTINUOUS
Status: DISCONTINUED | OUTPATIENT
Start: 2019-01-21 | End: 2019-01-25 | Stop reason: HOSPADM

## 2019-01-21 RX ORDER — EPHEDRINE SULFATE 50 MG/ML
5 INJECTION, SOLUTION INTRAVENOUS ONCE AS NEEDED
Status: DISCONTINUED | OUTPATIENT
Start: 2019-01-21 | End: 2019-01-21 | Stop reason: HOSPADM

## 2019-01-21 RX ORDER — OXYCODONE AND ACETAMINOPHEN 7.5; 325 MG/1; MG/1
1 TABLET ORAL ONCE AS NEEDED
Status: DISCONTINUED | OUTPATIENT
Start: 2019-01-21 | End: 2019-01-21 | Stop reason: HOSPADM

## 2019-01-21 RX ORDER — ACETAMINOPHEN 325 MG/1
650 TABLET ORAL ONCE AS NEEDED
Status: DISCONTINUED | OUTPATIENT
Start: 2019-01-21 | End: 2019-01-21 | Stop reason: HOSPADM

## 2019-01-21 RX ORDER — HYDRALAZINE HYDROCHLORIDE 20 MG/ML
5 INJECTION INTRAMUSCULAR; INTRAVENOUS
Status: DISCONTINUED | OUTPATIENT
Start: 2019-01-21 | End: 2019-01-21 | Stop reason: HOSPADM

## 2019-01-21 RX ORDER — PROMETHAZINE HYDROCHLORIDE 25 MG/1
25 TABLET ORAL ONCE AS NEEDED
Status: DISCONTINUED | OUTPATIENT
Start: 2019-01-21 | End: 2019-01-21 | Stop reason: HOSPADM

## 2019-01-21 RX ORDER — PROPOFOL 10 MG/ML
VIAL (ML) INTRAVENOUS CONTINUOUS PRN
Status: DISCONTINUED | OUTPATIENT
Start: 2019-01-21 | End: 2019-01-21 | Stop reason: SURG

## 2019-01-21 RX ORDER — MIDAZOLAM HYDROCHLORIDE 1 MG/ML
2 INJECTION INTRAMUSCULAR; INTRAVENOUS
Status: DISCONTINUED | OUTPATIENT
Start: 2019-01-21 | End: 2019-01-21

## 2019-01-21 RX ORDER — FAMOTIDINE 10 MG/ML
20 INJECTION, SOLUTION INTRAVENOUS ONCE
Status: COMPLETED | OUTPATIENT
Start: 2019-01-21 | End: 2019-01-21

## 2019-01-21 RX ORDER — HYDROCODONE BITARTRATE AND ACETAMINOPHEN 7.5; 325 MG/1; MG/1
1 TABLET ORAL ONCE AS NEEDED
Status: DISCONTINUED | OUTPATIENT
Start: 2019-01-21 | End: 2019-01-21 | Stop reason: HOSPADM

## 2019-01-21 RX ORDER — HYDROMORPHONE HYDROCHLORIDE 1 MG/ML
0.5 INJECTION, SOLUTION INTRAMUSCULAR; INTRAVENOUS; SUBCUTANEOUS
Status: DISCONTINUED | OUTPATIENT
Start: 2019-01-21 | End: 2019-01-21 | Stop reason: HOSPADM

## 2019-01-21 RX ORDER — MIDAZOLAM HYDROCHLORIDE 1 MG/ML
1 INJECTION INTRAMUSCULAR; INTRAVENOUS
Status: DISCONTINUED | OUTPATIENT
Start: 2019-01-21 | End: 2019-01-21

## 2019-01-21 RX ORDER — MAGNESIUM HYDROXIDE 1200 MG/15ML
LIQUID ORAL AS NEEDED
Status: DISCONTINUED | OUTPATIENT
Start: 2019-01-21 | End: 2019-01-21 | Stop reason: HOSPADM

## 2019-01-21 RX ORDER — ONDANSETRON 2 MG/ML
4 INJECTION INTRAMUSCULAR; INTRAVENOUS ONCE AS NEEDED
Status: DISCONTINUED | OUTPATIENT
Start: 2019-01-21 | End: 2019-01-21 | Stop reason: HOSPADM

## 2019-01-21 RX ORDER — FLUMAZENIL 0.1 MG/ML
0.2 INJECTION INTRAVENOUS AS NEEDED
Status: DISCONTINUED | OUTPATIENT
Start: 2019-01-21 | End: 2019-01-21 | Stop reason: HOSPADM

## 2019-01-21 RX ORDER — FENTANYL CITRATE 50 UG/ML
INJECTION, SOLUTION INTRAMUSCULAR; INTRAVENOUS AS NEEDED
Status: DISCONTINUED | OUTPATIENT
Start: 2019-01-21 | End: 2019-01-21 | Stop reason: SURG

## 2019-01-21 RX ORDER — LABETALOL HYDROCHLORIDE 5 MG/ML
5 INJECTION, SOLUTION INTRAVENOUS
Status: DISCONTINUED | OUTPATIENT
Start: 2019-01-21 | End: 2019-01-21 | Stop reason: HOSPADM

## 2019-01-21 RX ORDER — HEPARIN SODIUM 1000 [USP'U]/ML
INJECTION, SOLUTION INTRAVENOUS; SUBCUTANEOUS AS NEEDED
Status: DISCONTINUED | OUTPATIENT
Start: 2019-01-21 | End: 2019-01-21 | Stop reason: HOSPADM

## 2019-01-21 RX ORDER — PROMETHAZINE HYDROCHLORIDE 25 MG/1
25 SUPPOSITORY RECTAL ONCE AS NEEDED
Status: DISCONTINUED | OUTPATIENT
Start: 2019-01-21 | End: 2019-01-21 | Stop reason: HOSPADM

## 2019-01-21 RX ORDER — LIDOCAINE HYDROCHLORIDE 10 MG/ML
0.5 INJECTION, SOLUTION EPIDURAL; INFILTRATION; INTRACAUDAL; PERINEURAL ONCE AS NEEDED
Status: DISCONTINUED | OUTPATIENT
Start: 2019-01-21 | End: 2019-01-21

## 2019-01-21 RX ORDER — SODIUM CHLORIDE, SODIUM LACTATE, POTASSIUM CHLORIDE, CALCIUM CHLORIDE 600; 310; 30; 20 MG/100ML; MG/100ML; MG/100ML; MG/100ML
9 INJECTION, SOLUTION INTRAVENOUS CONTINUOUS
Status: DISCONTINUED | OUTPATIENT
Start: 2019-01-21 | End: 2019-01-21

## 2019-01-21 RX ADMIN — CALCIUM ACETATE 2001 MG: 667 CAPSULE ORAL at 17:31

## 2019-01-21 RX ADMIN — SODIUM CHLORIDE, PRESERVATIVE FREE 3 ML: 5 INJECTION INTRAVENOUS at 21:10

## 2019-01-21 RX ADMIN — FAMOTIDINE 20 MG: 10 INJECTION, SOLUTION INTRAVENOUS at 10:48

## 2019-01-21 RX ADMIN — Medication 1 MG: at 11:00

## 2019-01-21 RX ADMIN — CARVEDILOL 25 MG: 25 TABLET, FILM COATED ORAL at 21:01

## 2019-01-21 RX ADMIN — HEPARIN SODIUM 3000 UNITS: 1000 INJECTION INTRAVENOUS; SUBCUTANEOUS at 16:40

## 2019-01-21 RX ADMIN — FENTANYL CITRATE 50 MCG: 50 INJECTION INTRAMUSCULAR; INTRAVENOUS at 11:20

## 2019-01-21 RX ADMIN — FENTANYL CITRATE 50 MCG: 50 INJECTION INTRAMUSCULAR; INTRAVENOUS at 11:00

## 2019-01-21 RX ADMIN — TAZOBACTAM SODIUM AND PIPERACILLIN SODIUM 3.38 G: 375; 3 INJECTION, SOLUTION INTRAVENOUS at 08:21

## 2019-01-21 RX ADMIN — CARVEDILOL 25 MG: 25 TABLET, FILM COATED ORAL at 08:21

## 2019-01-21 RX ADMIN — PROPOFOL 100 MCG/KG/MIN: 10 INJECTION, EMULSION INTRAVENOUS at 11:05

## 2019-01-21 RX ADMIN — Medication 1 MG: at 10:57

## 2019-01-21 RX ADMIN — SODIUM CHLORIDE 9 ML: 9 INJECTION, SOLUTION INTRAVENOUS at 10:48

## 2019-01-21 NOTE — ANESTHESIA POSTPROCEDURE EVALUATION
"Patient: Chandler Buchanan    Procedure Summary     Date:  01/21/19 Room / Location:  [facility] OR [address] [place] [address] MAIN OR    Anesthesia Start:  1054 Anesthesia Stop:  1143    Procedure:  HEMODIALYSIS CATHETER INSERTION (N/A ) Diagnosis:      Surgeon:  Cuong Pedraza Jr., MD Provider:  Dipak Bardales MD    Anesthesia Type:  general ASA Status:  3          Anesthesia Type: general  Last vitals  BP   112/79 (01/21/19 1138)   Temp   36.8 °C (98.3 °F) (01/21/19 1138)   Pulse   91 (01/21/19 1138)   Resp   16 (01/21/19 1138)     SpO2   92 % (01/21/19 1138)     Post Anesthesia Care and Evaluation    Patient location during evaluation: bedside  Patient participation: complete - patient participated  Level of consciousness: awake and alert  Pain management: adequate  Airway patency: patent  Anesthetic complications: No anesthetic complications  PONV Status: none  Cardiovascular status: acceptable  Respiratory status: acceptable  Hydration status: acceptable    Comments: /79 (BP Location: Left arm, Patient Position: Lying)   Pulse 91   Temp 36.8 °C (98.3 °F) (Oral)   Resp 16   Ht 175.3 cm (69\")   Wt 77.9 kg (171 lb 12.8 oz)   SpO2 92%   BMI 25.37 kg/m²         "

## 2019-01-21 NOTE — ANESTHESIA PREPROCEDURE EVALUATION
Anesthesia Evaluation     no history of anesthetic complications:  NPO Solid Status: > 8 hours             Airway   Mallampati: II  Dental      Pulmonary - negative pulmonary ROS and normal exam   Cardiovascular - normal exam        Neuro/Psych  GI/Hepatic/Renal/Endo    (+)   renal disease ESRD,     Musculoskeletal     Abdominal    Substance History      OB/GYN          Other                        Anesthesia Plan    ASA 3     general     Anesthetic plan, all risks, benefits, and alternatives have been provided, discussed and informed consent has been obtained with: patient.

## 2019-01-22 LAB
ABO GROUP BLD: NORMAL
ALBUMIN SERPL-MCNC: 3.1 G/DL (ref 3.5–5.2)
ANION GAP SERPL CALCULATED.3IONS-SCNC: 13.6 MMOL/L
BASOPHILS # BLD AUTO: 0.07 10*3/MM3 (ref 0–0.2)
BASOPHILS NFR BLD AUTO: 0.9 % (ref 0–1.5)
BLD GP AB SCN SERPL QL: NEGATIVE
BUN BLD-MCNC: 49 MG/DL (ref 6–20)
BUN/CREAT SERPL: 8 (ref 7–25)
CALCIUM SPEC-SCNC: 8.5 MG/DL (ref 8.6–10.5)
CHLORIDE SERPL-SCNC: 103 MMOL/L (ref 98–107)
CO2 SERPL-SCNC: 23.4 MMOL/L (ref 22–29)
CREAT BLD-MCNC: 6.12 MG/DL (ref 0.76–1.27)
DEPRECATED RDW RBC AUTO: 45.4 FL (ref 37–54)
EOSINOPHIL # BLD AUTO: 0.66 10*3/MM3 (ref 0–0.7)
EOSINOPHIL NFR BLD AUTO: 8.2 % (ref 0.3–6.2)
ERYTHROCYTE [DISTWIDTH] IN BLOOD BY AUTOMATED COUNT: 13.4 % (ref 11.5–14.5)
GFR SERPL CREATININE-BSD FRML MDRD: 11 ML/MIN/1.73
GFR SERPL CREATININE-BSD FRML MDRD: ABNORMAL ML/MIN/1.73
GLUCOSE BLD-MCNC: 101 MG/DL (ref 65–99)
GLUCOSE BLDC GLUCOMTR-MCNC: 109 MG/DL (ref 70–130)
GLUCOSE BLDC GLUCOMTR-MCNC: 126 MG/DL (ref 70–130)
GLUCOSE BLDC GLUCOMTR-MCNC: 92 MG/DL (ref 70–130)
HCT VFR BLD AUTO: 26.2 % (ref 40.4–52.2)
HGB BLD-MCNC: 7.9 G/DL (ref 13.7–17.6)
IMM GRANULOCYTES # BLD AUTO: 0.02 10*3/MM3 (ref 0–0.03)
IMM GRANULOCYTES NFR BLD AUTO: 0.2 % (ref 0–0.5)
LAB AP CASE REPORT: NORMAL
LAB AP CLINICAL INFORMATION: NORMAL
LYMPHOCYTES # BLD AUTO: 0.75 10*3/MM3 (ref 0.9–4.8)
LYMPHOCYTES NFR BLD AUTO: 9.3 % (ref 19.6–45.3)
MCH RBC QN AUTO: 28.1 PG (ref 27–32.7)
MCHC RBC AUTO-ENTMCNC: 30.2 G/DL (ref 32.6–36.4)
MCV RBC AUTO: 93.2 FL (ref 79.8–96.2)
MONOCYTES # BLD AUTO: 1.05 10*3/MM3 (ref 0.2–1.2)
MONOCYTES NFR BLD AUTO: 13 % (ref 5–12)
NEUTROPHILS # BLD AUTO: 5.53 10*3/MM3 (ref 1.9–8.1)
NEUTROPHILS NFR BLD AUTO: 68.4 % (ref 42.7–76)
PATH REPORT.GROSS SPEC: NORMAL
PHOSPHATE SERPL-MCNC: 5 MG/DL (ref 2.5–4.5)
PLATELET # BLD AUTO: 192 10*3/MM3 (ref 140–500)
PMV BLD AUTO: 9.7 FL (ref 6–12)
POTASSIUM BLD-SCNC: 6.1 MMOL/L (ref 3.5–5.2)
RBC # BLD AUTO: 2.81 10*6/MM3 (ref 4.6–6)
RH BLD: POSITIVE
SODIUM BLD-SCNC: 140 MMOL/L (ref 136–145)
T&S EXPIRATION DATE: NORMAL
WBC NRBC COR # BLD: 8.08 10*3/MM3 (ref 4.5–10.7)

## 2019-01-22 PROCEDURE — 82962 GLUCOSE BLOOD TEST: CPT

## 2019-01-22 PROCEDURE — 94799 UNLISTED PULMONARY SVC/PX: CPT

## 2019-01-22 PROCEDURE — 86901 BLOOD TYPING SEROLOGIC RH(D): CPT | Performed by: SURGERY

## 2019-01-22 PROCEDURE — 36430 TRANSFUSION BLD/BLD COMPNT: CPT

## 2019-01-22 PROCEDURE — 5A1D70Z PERFORMANCE OF URINARY FILTRATION, INTERMITTENT, LESS THAN 6 HOURS PER DAY: ICD-10-PCS | Performed by: INTERNAL MEDICINE

## 2019-01-22 PROCEDURE — 86900 BLOOD TYPING SEROLOGIC ABO: CPT | Performed by: SURGERY

## 2019-01-22 PROCEDURE — 80069 RENAL FUNCTION PANEL: CPT | Performed by: INTERNAL MEDICINE

## 2019-01-22 PROCEDURE — 85025 COMPLETE CBC W/AUTO DIFF WBC: CPT | Performed by: INTERNAL MEDICINE

## 2019-01-22 PROCEDURE — 63510000001 EPOETIN ALFA PER 1000 UNITS: Performed by: INTERNAL MEDICINE

## 2019-01-22 PROCEDURE — 86900 BLOOD TYPING SEROLOGIC ABO: CPT

## 2019-01-22 PROCEDURE — 86923 COMPATIBILITY TEST ELECTRIC: CPT

## 2019-01-22 PROCEDURE — 86850 RBC ANTIBODY SCREEN: CPT | Performed by: SURGERY

## 2019-01-22 PROCEDURE — P9016 RBC LEUKOCYTES REDUCED: HCPCS

## 2019-01-22 RX ORDER — AMLODIPINE BESYLATE 10 MG/1
10 TABLET ORAL
Status: DISCONTINUED | OUTPATIENT
Start: 2019-01-23 | End: 2019-01-22

## 2019-01-22 RX ORDER — CARVEDILOL 25 MG/1
25 TABLET ORAL EVERY 12 HOURS SCHEDULED
Status: DISCONTINUED | OUTPATIENT
Start: 2019-01-22 | End: 2019-01-25 | Stop reason: HOSPADM

## 2019-01-22 RX ORDER — BENZONATATE 100 MG/1
100 CAPSULE ORAL 3 TIMES DAILY PRN
Status: DISCONTINUED | OUTPATIENT
Start: 2019-01-22 | End: 2019-01-25 | Stop reason: HOSPADM

## 2019-01-22 RX ORDER — AMLODIPINE BESYLATE 5 MG/1
5 TABLET ORAL
Status: DISCONTINUED | OUTPATIENT
Start: 2019-01-23 | End: 2019-01-25 | Stop reason: HOSPADM

## 2019-01-22 RX ADMIN — SODIUM CHLORIDE, PRESERVATIVE FREE 3 ML: 5 INJECTION INTRAVENOUS at 20:31

## 2019-01-22 RX ADMIN — CARVEDILOL 25 MG: 25 TABLET, FILM COATED ORAL at 20:30

## 2019-01-22 RX ADMIN — BENZONATATE 100 MG: 100 CAPSULE ORAL at 21:07

## 2019-01-22 RX ADMIN — ERYTHROPOIETIN 10000 UNITS: 10000 INJECTION, SOLUTION INTRAVENOUS; SUBCUTANEOUS at 14:28

## 2019-01-22 RX ADMIN — CALCIUM ACETATE 2001 MG: 667 CAPSULE ORAL at 07:55

## 2019-01-22 RX ADMIN — CALCIUM ACETATE 2001 MG: 667 CAPSULE ORAL at 17:54

## 2019-01-22 RX ADMIN — PANTOPRAZOLE SODIUM 40 MG: 40 TABLET, DELAYED RELEASE ORAL at 07:55

## 2019-01-23 ENCOUNTER — APPOINTMENT (OUTPATIENT)
Dept: GENERAL RADIOLOGY | Facility: HOSPITAL | Age: 34
End: 2019-01-23

## 2019-01-23 ENCOUNTER — ANESTHESIA EVENT (OUTPATIENT)
Dept: PERIOP | Facility: HOSPITAL | Age: 34
End: 2019-01-23

## 2019-01-23 ENCOUNTER — ANESTHESIA (OUTPATIENT)
Dept: PERIOP | Facility: HOSPITAL | Age: 34
End: 2019-01-23

## 2019-01-23 LAB
ABO + RH BLD: NORMAL
ABO + RH BLD: NORMAL
ALBUMIN SERPL-MCNC: 3.1 G/DL (ref 3.5–5.2)
ANION GAP SERPL CALCULATED.3IONS-SCNC: 16 MMOL/L
BH BB BLOOD EXPIRATION DATE: NORMAL
BH BB BLOOD EXPIRATION DATE: NORMAL
BH BB BLOOD TYPE BARCODE: 7300
BH BB BLOOD TYPE BARCODE: 7300
BH BB DISPENSE STATUS: NORMAL
BH BB DISPENSE STATUS: NORMAL
BH BB PRODUCT CODE: NORMAL
BH BB PRODUCT CODE: NORMAL
BH BB UNIT NUMBER: NORMAL
BH BB UNIT NUMBER: NORMAL
BUN BLD-MCNC: 39 MG/DL (ref 6–20)
BUN/CREAT SERPL: 7.8 (ref 7–25)
CALCIUM SPEC-SCNC: 8.4 MG/DL (ref 8.6–10.5)
CHLORIDE SERPL-SCNC: 97 MMOL/L (ref 98–107)
CO2 SERPL-SCNC: 26 MMOL/L (ref 22–29)
CREAT BLD-MCNC: 4.99 MG/DL (ref 0.76–1.27)
DEPRECATED RDW RBC AUTO: 43.9 FL (ref 37–54)
ERYTHROCYTE [DISTWIDTH] IN BLOOD BY AUTOMATED COUNT: 12.9 % (ref 11.5–14.5)
GFR SERPL CREATININE-BSD FRML MDRD: 13 ML/MIN/1.73
GFR SERPL CREATININE-BSD FRML MDRD: ABNORMAL ML/MIN/1.73
GLUCOSE BLD-MCNC: 97 MG/DL (ref 65–99)
GLUCOSE BLDC GLUCOMTR-MCNC: 100 MG/DL (ref 70–130)
GLUCOSE BLDC GLUCOMTR-MCNC: 137 MG/DL (ref 70–130)
GLUCOSE BLDC GLUCOMTR-MCNC: 95 MG/DL (ref 70–130)
HCT VFR BLD AUTO: 30 % (ref 40.4–52.2)
HGB BLD-MCNC: 9.3 G/DL (ref 13.7–17.6)
MCH RBC QN AUTO: 28.6 PG (ref 27–32.7)
MCHC RBC AUTO-ENTMCNC: 31 G/DL (ref 32.6–36.4)
MCV RBC AUTO: 92.3 FL (ref 79.8–96.2)
PHOSPHATE SERPL-MCNC: 5.1 MG/DL (ref 2.5–4.5)
PLATELET # BLD AUTO: 186 10*3/MM3 (ref 140–500)
PMV BLD AUTO: 9.8 FL (ref 6–12)
POTASSIUM BLD-SCNC: 4.7 MMOL/L (ref 3.5–5.2)
RBC # BLD AUTO: 3.25 10*6/MM3 (ref 4.6–6)
SODIUM BLD-SCNC: 139 MMOL/L (ref 136–145)
UNIT  ABO: NORMAL
UNIT  ABO: NORMAL
UNIT  RH: NORMAL
UNIT  RH: NORMAL
WBC NRBC COR # BLD: 7.37 10*3/MM3 (ref 4.5–10.7)

## 2019-01-23 PROCEDURE — 25010000002 FENTANYL CITRATE (PF) 100 MCG/2ML SOLUTION: Performed by: NURSE ANESTHETIST, CERTIFIED REGISTERED

## 2019-01-23 PROCEDURE — 25010000002 PROPOFOL 10 MG/ML EMULSION: Performed by: NURSE ANESTHETIST, CERTIFIED REGISTERED

## 2019-01-23 PROCEDURE — 82962 GLUCOSE BLOOD TEST: CPT

## 2019-01-23 PROCEDURE — 71045 X-RAY EXAM CHEST 1 VIEW: CPT

## 2019-01-23 PROCEDURE — 25010000002 HEPARIN (PORCINE) PER 1000 UNITS: Performed by: SURGERY

## 2019-01-23 PROCEDURE — 85027 COMPLETE CBC AUTOMATED: CPT | Performed by: INTERNAL MEDICINE

## 2019-01-23 PROCEDURE — 25010000002 MIDAZOLAM PER 1 MG: Performed by: NURSE ANESTHETIST, CERTIFIED REGISTERED

## 2019-01-23 PROCEDURE — 80069 RENAL FUNCTION PANEL: CPT | Performed by: INTERNAL MEDICINE

## 2019-01-23 PROCEDURE — 03180ZD BYPASS LEFT BRACHIAL ARTERY TO UPPER ARM VEIN, OPEN APPROACH: ICD-10-PCS | Performed by: SURGERY

## 2019-01-23 PROCEDURE — 25010000002 HEPARIN (PORCINE) PER 1000 UNITS: Performed by: INTERNAL MEDICINE

## 2019-01-23 RX ORDER — FENTANYL CITRATE 50 UG/ML
50 INJECTION, SOLUTION INTRAMUSCULAR; INTRAVENOUS
Status: DISCONTINUED | OUTPATIENT
Start: 2019-01-23 | End: 2019-01-23 | Stop reason: HOSPADM

## 2019-01-23 RX ORDER — MIDAZOLAM HYDROCHLORIDE 1 MG/ML
2 INJECTION INTRAMUSCULAR; INTRAVENOUS
Status: DISCONTINUED | OUTPATIENT
Start: 2019-01-23 | End: 2019-01-23 | Stop reason: HOSPADM

## 2019-01-23 RX ORDER — EPHEDRINE SULFATE 50 MG/ML
5 INJECTION, SOLUTION INTRAVENOUS ONCE AS NEEDED
Status: DISCONTINUED | OUTPATIENT
Start: 2019-01-23 | End: 2019-01-23 | Stop reason: HOSPADM

## 2019-01-23 RX ORDER — LABETALOL HYDROCHLORIDE 5 MG/ML
5 INJECTION, SOLUTION INTRAVENOUS
Status: DISCONTINUED | OUTPATIENT
Start: 2019-01-23 | End: 2019-01-23 | Stop reason: HOSPADM

## 2019-01-23 RX ORDER — PROPOFOL 10 MG/ML
VIAL (ML) INTRAVENOUS CONTINUOUS PRN
Status: DISCONTINUED | OUTPATIENT
Start: 2019-01-23 | End: 2019-01-23 | Stop reason: SURG

## 2019-01-23 RX ORDER — MIDAZOLAM HYDROCHLORIDE 1 MG/ML
1 INJECTION INTRAMUSCULAR; INTRAVENOUS
Status: DISCONTINUED | OUTPATIENT
Start: 2019-01-23 | End: 2019-01-23 | Stop reason: HOSPADM

## 2019-01-23 RX ORDER — SODIUM CHLORIDE 0.9 % (FLUSH) 0.9 %
1-10 SYRINGE (ML) INJECTION AS NEEDED
Status: DISCONTINUED | OUTPATIENT
Start: 2019-01-23 | End: 2019-01-23 | Stop reason: HOSPADM

## 2019-01-23 RX ORDER — DIPHENHYDRAMINE HCL 25 MG
25 CAPSULE ORAL
Status: DISCONTINUED | OUTPATIENT
Start: 2019-01-23 | End: 2019-01-23 | Stop reason: HOSPADM

## 2019-01-23 RX ORDER — HYDROMORPHONE HYDROCHLORIDE 1 MG/ML
0.5 INJECTION, SOLUTION INTRAMUSCULAR; INTRAVENOUS; SUBCUTANEOUS
Status: DISCONTINUED | OUTPATIENT
Start: 2019-01-23 | End: 2019-01-23 | Stop reason: HOSPADM

## 2019-01-23 RX ORDER — PROMETHAZINE HYDROCHLORIDE 25 MG/ML
12.5 INJECTION, SOLUTION INTRAMUSCULAR; INTRAVENOUS ONCE AS NEEDED
Status: DISCONTINUED | OUTPATIENT
Start: 2019-01-23 | End: 2019-01-23 | Stop reason: HOSPADM

## 2019-01-23 RX ORDER — HYDRALAZINE HYDROCHLORIDE 20 MG/ML
5 INJECTION INTRAMUSCULAR; INTRAVENOUS
Status: DISCONTINUED | OUTPATIENT
Start: 2019-01-23 | End: 2019-01-23 | Stop reason: HOSPADM

## 2019-01-23 RX ORDER — CEFAZOLIN SODIUM 2 G/100ML
2 INJECTION, SOLUTION INTRAVENOUS ONCE
Status: DISCONTINUED | OUTPATIENT
Start: 2019-01-23 | End: 2019-01-23 | Stop reason: HOSPADM

## 2019-01-23 RX ORDER — SODIUM CHLORIDE 9 MG/ML
9 INJECTION, SOLUTION INTRAVENOUS CONTINUOUS PRN
Status: DISCONTINUED | OUTPATIENT
Start: 2019-01-23 | End: 2019-01-25 | Stop reason: HOSPADM

## 2019-01-23 RX ORDER — MANNITOL 250 MG/ML
12.5 INJECTION, SOLUTION INTRAVENOUS AS NEEDED
Status: ACTIVE | OUTPATIENT
Start: 2019-01-23 | End: 2019-01-23

## 2019-01-23 RX ORDER — ONDANSETRON 2 MG/ML
4 INJECTION INTRAMUSCULAR; INTRAVENOUS ONCE AS NEEDED
Status: DISCONTINUED | OUTPATIENT
Start: 2019-01-23 | End: 2019-01-23 | Stop reason: HOSPADM

## 2019-01-23 RX ORDER — PROMETHAZINE HYDROCHLORIDE 25 MG/1
25 SUPPOSITORY RECTAL ONCE AS NEEDED
Status: DISCONTINUED | OUTPATIENT
Start: 2019-01-23 | End: 2019-01-23 | Stop reason: HOSPADM

## 2019-01-23 RX ORDER — DIPHENHYDRAMINE HCL 25 MG
25 CAPSULE ORAL EVERY 6 HOURS PRN
Status: DISCONTINUED | OUTPATIENT
Start: 2019-01-23 | End: 2019-01-23 | Stop reason: HOSPADM

## 2019-01-23 RX ORDER — FAMOTIDINE 10 MG/ML
20 INJECTION, SOLUTION INTRAVENOUS ONCE
Status: COMPLETED | OUTPATIENT
Start: 2019-01-23 | End: 2019-01-23

## 2019-01-23 RX ORDER — FLUMAZENIL 0.1 MG/ML
0.2 INJECTION INTRAVENOUS AS NEEDED
Status: DISCONTINUED | OUTPATIENT
Start: 2019-01-23 | End: 2019-01-23 | Stop reason: HOSPADM

## 2019-01-23 RX ORDER — FENTANYL CITRATE 50 UG/ML
INJECTION, SOLUTION INTRAMUSCULAR; INTRAVENOUS AS NEEDED
Status: DISCONTINUED | OUTPATIENT
Start: 2019-01-23 | End: 2019-01-23 | Stop reason: SURG

## 2019-01-23 RX ORDER — NALOXONE HCL 0.4 MG/ML
0.2 VIAL (ML) INJECTION AS NEEDED
Status: DISCONTINUED | OUTPATIENT
Start: 2019-01-23 | End: 2019-01-23 | Stop reason: HOSPADM

## 2019-01-23 RX ORDER — HYDROCODONE BITARTRATE AND ACETAMINOPHEN 7.5; 325 MG/1; MG/1
1 TABLET ORAL ONCE AS NEEDED
Status: DISCONTINUED | OUTPATIENT
Start: 2019-01-23 | End: 2019-01-23 | Stop reason: HOSPADM

## 2019-01-23 RX ORDER — LIDOCAINE HYDROCHLORIDE 20 MG/ML
INJECTION, SOLUTION INFILTRATION; PERINEURAL AS NEEDED
Status: DISCONTINUED | OUTPATIENT
Start: 2019-01-23 | End: 2019-01-23 | Stop reason: SURG

## 2019-01-23 RX ORDER — MIDAZOLAM HYDROCHLORIDE 1 MG/ML
INJECTION INTRAMUSCULAR; INTRAVENOUS AS NEEDED
Status: DISCONTINUED | OUTPATIENT
Start: 2019-01-23 | End: 2019-01-23 | Stop reason: SURG

## 2019-01-23 RX ORDER — LIDOCAINE HYDROCHLORIDE 10 MG/ML
0.5 INJECTION, SOLUTION EPIDURAL; INFILTRATION; INTRACAUDAL; PERINEURAL ONCE AS NEEDED
Status: DISCONTINUED | OUTPATIENT
Start: 2019-01-23 | End: 2019-01-23 | Stop reason: HOSPADM

## 2019-01-23 RX ORDER — PROMETHAZINE HYDROCHLORIDE 25 MG/1
25 TABLET ORAL ONCE AS NEEDED
Status: DISCONTINUED | OUTPATIENT
Start: 2019-01-23 | End: 2019-01-23 | Stop reason: HOSPADM

## 2019-01-23 RX ORDER — HYDROCODONE BITARTRATE AND ACETAMINOPHEN 5; 325 MG/1; MG/1
1 TABLET ORAL EVERY 6 HOURS PRN
Status: DISCONTINUED | OUTPATIENT
Start: 2019-01-23 | End: 2019-01-25 | Stop reason: HOSPADM

## 2019-01-23 RX ORDER — MAGNESIUM HYDROXIDE 1200 MG/15ML
LIQUID ORAL AS NEEDED
Status: DISCONTINUED | OUTPATIENT
Start: 2019-01-23 | End: 2019-01-23 | Stop reason: HOSPADM

## 2019-01-23 RX ADMIN — PROPOFOL 100 MCG/KG/MIN: 10 INJECTION, EMULSION INTRAVENOUS at 12:23

## 2019-01-23 RX ADMIN — CALCIUM ACETATE 2001 MG: 667 CAPSULE ORAL at 18:48

## 2019-01-23 RX ADMIN — SODIUM CHLORIDE, PRESERVATIVE FREE 3 ML: 5 INJECTION INTRAVENOUS at 08:50

## 2019-01-23 RX ADMIN — BENZONATATE 100 MG: 100 CAPSULE ORAL at 21:56

## 2019-01-23 RX ADMIN — AMLODIPINE BESYLATE 5 MG: 5 TABLET ORAL at 09:45

## 2019-01-23 RX ADMIN — CARVEDILOL 25 MG: 25 TABLET, FILM COATED ORAL at 09:45

## 2019-01-23 RX ADMIN — FENTANYL CITRATE 50 MCG: 50 INJECTION INTRAMUSCULAR; INTRAVENOUS at 12:31

## 2019-01-23 RX ADMIN — FAMOTIDINE 20 MG: 10 INJECTION, SOLUTION INTRAVENOUS at 10:49

## 2019-01-23 RX ADMIN — Medication 2 MG: at 12:20

## 2019-01-23 RX ADMIN — HEPARIN SODIUM 5000 UNITS: 1000 INJECTION INTRAVENOUS; SUBCUTANEOUS at 12:45

## 2019-01-23 RX ADMIN — CARVEDILOL 25 MG: 25 TABLET, FILM COATED ORAL at 20:26

## 2019-01-23 RX ADMIN — SODIUM CHLORIDE 9 ML/HR: 9 INJECTION, SOLUTION INTRAVENOUS at 10:49

## 2019-01-23 RX ADMIN — PANTOPRAZOLE SODIUM 40 MG: 40 TABLET, DELAYED RELEASE ORAL at 09:45

## 2019-01-23 RX ADMIN — LIDOCAINE HYDROCHLORIDE 50 MG: 20 INJECTION, SOLUTION INFILTRATION; PERINEURAL at 12:23

## 2019-01-23 RX ADMIN — SODIUM CHLORIDE, PRESERVATIVE FREE 3 ML: 5 INJECTION INTRAVENOUS at 20:26

## 2019-01-23 NOTE — ANESTHESIA POSTPROCEDURE EVALUATION
Patient: Chandler Buchanan    Procedure Summary     Date:  01/23/19 Room / Location:  [facility] OR [address] [place] [address] MAIN OR    Anesthesia Start:  1216 Anesthesia Stop:  1324    Procedure:  LEFT ARM FISTULA CREATION (Left ) Diagnosis:      Surgeon:  Bogdan Velásquez MD Provider:  Tami Christensen MD    Anesthesia Type:  MAC ASA Status:  3          Anesthesia Type: MAC  Last vitals  BP   121/81 (01/23/19 1340)   Temp   36.5 °C (97.7 °F) (01/23/19 1340)   Pulse   85 (01/23/19 1325)   Resp   16 (01/23/19 1340)     SpO2   92 % (01/23/19 1325)     Post Anesthesia Care and Evaluation    Patient location during evaluation: bedside  Patient participation: complete - patient participated  Level of consciousness: awake and alert  Pain management: adequate  Airway patency: patent  Anesthetic complications: No anesthetic complications  PONV Status: controlled  Cardiovascular status: acceptable  Respiratory status: acceptable  Hydration status: acceptable

## 2019-01-23 NOTE — ANESTHESIA PREPROCEDURE EVALUATION
Anesthesia Evaluation     Patient summary reviewed and Nursing notes reviewed   no history of anesthetic complications:  NPO Solid Status: > 8 hours  NPO Liquid Status: > 8 hours           Airway   Mallampati: II  TM distance: >3 FB  Neck ROM: full  No difficulty expected  Dental      Pulmonary - normal exam   (+) pneumonia (and alveolar hemorrhage, hypoxemic resp failure) , pleural effusion,   Cardiovascular - normal exam  Exercise tolerance: poor (<4 METS)    ECG reviewed  Patient on routine beta blocker and Beta blocker given within 24 hours of surgery    (+) hypertension, CHF (ef 20%),   Dysrhythmias: prolonged QTc 507.      Neuro/Psych  GI/Hepatic/Renal/Endo    (+)   renal disease ESRD and dialysis,     Musculoskeletal     Abdominal    Substance History      OB/GYN          Other   (+) blood dyscrasia (anemia, severe)     ROS/Med Hx Other: · Left ventricular systolic function is severely decreased. Calculated EF = 23.0%. Estimated EF was in agreement with the calculated EF. There is left ventricular global hypokinesis noted.  · The left ventricular cavity is severely dilated. Left ventricular wall thickness is consistent with moderate concentric hypertrophy. Left ventricular diastolic function was indeterminate. The appearance of the apex was initially suggestive of noncompaction, but contrast images show normal trabeculation.  · Left atrial cavity size is mildly dilated.  · There is a small (<1cm) circumferential pericardial effusion.                Anesthesia Plan    ASA 3     MAC     intravenous induction   Anesthetic plan, all risks, benefits, and alternatives have been provided, discussed and informed consent has been obtained with: patient.

## 2019-01-24 LAB
ALBUMIN SERPL-MCNC: 3 G/DL (ref 3.5–5.2)
ANION GAP SERPL CALCULATED.3IONS-SCNC: 17.9 MMOL/L
BUN BLD-MCNC: 56 MG/DL (ref 6–20)
BUN/CREAT SERPL: 7.8 (ref 7–25)
CALCIUM SPEC-SCNC: 7.8 MG/DL (ref 8.6–10.5)
CHLORIDE SERPL-SCNC: 100 MMOL/L (ref 98–107)
CO2 SERPL-SCNC: 22.1 MMOL/L (ref 22–29)
CREAT BLD-MCNC: 7.14 MG/DL (ref 0.76–1.27)
DEPRECATED RDW RBC AUTO: 43.7 FL (ref 37–54)
ERYTHROCYTE [DISTWIDTH] IN BLOOD BY AUTOMATED COUNT: 13 % (ref 11.5–14.5)
GFR SERPL CREATININE-BSD FRML MDRD: 9 ML/MIN/1.73
GFR SERPL CREATININE-BSD FRML MDRD: ABNORMAL ML/MIN/1.73
GLUCOSE BLD-MCNC: 116 MG/DL (ref 65–99)
GLUCOSE BLDC GLUCOMTR-MCNC: 109 MG/DL (ref 70–130)
GLUCOSE BLDC GLUCOMTR-MCNC: 109 MG/DL (ref 70–130)
GLUCOSE BLDC GLUCOMTR-MCNC: 98 MG/DL (ref 70–130)
HCT VFR BLD AUTO: 28.3 % (ref 40.4–52.2)
HGB BLD-MCNC: 8.8 G/DL (ref 13.7–17.6)
MCH RBC QN AUTO: 28.8 PG (ref 27–32.7)
MCHC RBC AUTO-ENTMCNC: 31.1 G/DL (ref 32.6–36.4)
MCV RBC AUTO: 92.5 FL (ref 79.8–96.2)
PHOSPHATE SERPL-MCNC: 6.6 MG/DL (ref 2.5–4.5)
PLATELET # BLD AUTO: 200 10*3/MM3 (ref 140–500)
PMV BLD AUTO: 10.1 FL (ref 6–12)
POTASSIUM BLD-SCNC: 5 MMOL/L (ref 3.5–5.2)
RBC # BLD AUTO: 3.06 10*6/MM3 (ref 4.6–6)
SODIUM BLD-SCNC: 140 MMOL/L (ref 136–145)
WBC NRBC COR # BLD: 6.89 10*3/MM3 (ref 4.5–10.7)

## 2019-01-24 PROCEDURE — 85027 COMPLETE CBC AUTOMATED: CPT | Performed by: INTERNAL MEDICINE

## 2019-01-24 PROCEDURE — 80069 RENAL FUNCTION PANEL: CPT | Performed by: INTERNAL MEDICINE

## 2019-01-24 PROCEDURE — 86481 TB AG RESPONSE T-CELL SUSP: CPT | Performed by: INTERNAL MEDICINE

## 2019-01-24 PROCEDURE — 82962 GLUCOSE BLOOD TEST: CPT

## 2019-01-24 PROCEDURE — 5A1D70Z PERFORMANCE OF URINARY FILTRATION, INTERMITTENT, LESS THAN 6 HOURS PER DAY: ICD-10-PCS | Performed by: INTERNAL MEDICINE

## 2019-01-24 RX ADMIN — CALCIUM ACETATE 2001 MG: 667 CAPSULE ORAL at 13:51

## 2019-01-24 RX ADMIN — CARVEDILOL 25 MG: 25 TABLET, FILM COATED ORAL at 20:56

## 2019-01-24 RX ADMIN — CALCIUM ACETATE 2001 MG: 667 CAPSULE ORAL at 17:14

## 2019-01-24 RX ADMIN — PANTOPRAZOLE SODIUM 40 MG: 40 TABLET, DELAYED RELEASE ORAL at 06:51

## 2019-01-24 RX ADMIN — SODIUM CHLORIDE, PRESERVATIVE FREE 3 ML: 5 INJECTION INTRAVENOUS at 13:52

## 2019-01-24 RX ADMIN — CARVEDILOL 25 MG: 25 TABLET, FILM COATED ORAL at 13:51

## 2019-01-24 RX ADMIN — AMLODIPINE BESYLATE 5 MG: 5 TABLET ORAL at 13:53

## 2019-01-24 RX ADMIN — SODIUM CHLORIDE, PRESERVATIVE FREE 3 ML: 5 INJECTION INTRAVENOUS at 20:56

## 2019-01-25 VITALS
SYSTOLIC BLOOD PRESSURE: 133 MMHG | DIASTOLIC BLOOD PRESSURE: 90 MMHG | RESPIRATION RATE: 16 BRPM | HEART RATE: 94 BPM | TEMPERATURE: 98.5 F | HEIGHT: 69 IN | BODY MASS INDEX: 24.35 KG/M2 | OXYGEN SATURATION: 95 % | WEIGHT: 164.4 LBS

## 2019-01-25 LAB
ALBUMIN SERPL-MCNC: 3.1 G/DL (ref 3.5–5.2)
ANION GAP SERPL CALCULATED.3IONS-SCNC: 17.2 MMOL/L
BASOPHILS # BLD AUTO: 0.15 10*3/MM3 (ref 0–0.2)
BASOPHILS NFR BLD AUTO: 2.2 % (ref 0–1.5)
BUN BLD-MCNC: 40 MG/DL (ref 6–20)
BUN/CREAT SERPL: 7 (ref 7–25)
CALCIUM SPEC-SCNC: 8.1 MG/DL (ref 8.6–10.5)
CHLORIDE SERPL-SCNC: 100 MMOL/L (ref 98–107)
CO2 SERPL-SCNC: 25.8 MMOL/L (ref 22–29)
CREAT BLD-MCNC: 5.73 MG/DL (ref 0.76–1.27)
DEPRECATED RDW RBC AUTO: 43 FL (ref 37–54)
EOSINOPHIL # BLD AUTO: 0.05 10*3/MM3 (ref 0–0.7)
EOSINOPHIL NFR BLD AUTO: 0.7 % (ref 0.3–6.2)
ERYTHROCYTE [DISTWIDTH] IN BLOOD BY AUTOMATED COUNT: 12.9 % (ref 11.5–14.5)
GFR SERPL CREATININE-BSD FRML MDRD: 11 ML/MIN/1.73
GFR SERPL CREATININE-BSD FRML MDRD: ABNORMAL ML/MIN/1.73
GLUCOSE BLD-MCNC: 128 MG/DL (ref 65–99)
GLUCOSE BLDC GLUCOMTR-MCNC: 102 MG/DL (ref 70–130)
GLUCOSE BLDC GLUCOMTR-MCNC: 99 MG/DL (ref 70–130)
HCT VFR BLD AUTO: 28.4 % (ref 40.4–52.2)
HGB BLD-MCNC: 9 G/DL (ref 13.7–17.6)
IMM GRANULOCYTES # BLD AUTO: 0 10*3/MM3 (ref 0–0.03)
IMM GRANULOCYTES NFR BLD AUTO: 0 % (ref 0–0.5)
LYMPHOCYTES # BLD AUTO: 1.27 10*3/MM3 (ref 0.9–4.8)
LYMPHOCYTES NFR BLD AUTO: 18.8 % (ref 19.6–45.3)
MCH RBC QN AUTO: 28.7 PG (ref 27–32.7)
MCHC RBC AUTO-ENTMCNC: 31.7 G/DL (ref 32.6–36.4)
MCV RBC AUTO: 90.4 FL (ref 79.8–96.2)
MONOCYTES # BLD AUTO: 0.71 10*3/MM3 (ref 0.2–1.2)
MONOCYTES NFR BLD AUTO: 10.5 % (ref 5–12)
NEUTROPHILS # BLD AUTO: 4.56 10*3/MM3 (ref 1.9–8.1)
NEUTROPHILS NFR BLD AUTO: 67.8 % (ref 42.7–76)
PHOSPHATE SERPL-MCNC: 5.3 MG/DL (ref 2.5–4.5)
PLATELET # BLD AUTO: 225 10*3/MM3 (ref 140–500)
PMV BLD AUTO: 9.9 FL (ref 6–12)
POTASSIUM BLD-SCNC: 4.2 MMOL/L (ref 3.5–5.2)
RBC # BLD AUTO: 3.14 10*6/MM3 (ref 4.6–6)
SODIUM BLD-SCNC: 143 MMOL/L (ref 136–145)
TSPOT INTERPRETATION: NORMAL
TSPOT NIL CONTROL INTERPRETATION: NORMAL
TSPOT PANEL A: 0
TSPOT PANEL B: 0
TSPOT POS CONTROL INTERPRETATION: NORMAL
WBC NRBC COR # BLD: 6.74 10*3/MM3 (ref 4.5–10.7)

## 2019-01-25 PROCEDURE — 82962 GLUCOSE BLOOD TEST: CPT

## 2019-01-25 PROCEDURE — 85025 COMPLETE CBC W/AUTO DIFF WBC: CPT | Performed by: INTERNAL MEDICINE

## 2019-01-25 PROCEDURE — 80069 RENAL FUNCTION PANEL: CPT | Performed by: INTERNAL MEDICINE

## 2019-01-25 RX ORDER — CARVEDILOL 25 MG/1
25 TABLET ORAL EVERY 12 HOURS SCHEDULED
Qty: 60 TABLET | Refills: 0 | Status: SHIPPED | OUTPATIENT
Start: 2019-01-25

## 2019-01-25 RX ORDER — HYDROCODONE BITARTRATE AND ACETAMINOPHEN 5; 325 MG/1; MG/1
1 TABLET ORAL EVERY 6 HOURS PRN
Qty: 15 TABLET | Refills: 0 | Status: SHIPPED | OUTPATIENT
Start: 2019-01-25 | End: 2019-02-02

## 2019-01-25 RX ORDER — BENZONATATE 100 MG/1
100 CAPSULE ORAL 3 TIMES DAILY PRN
Qty: 60 CAPSULE | Refills: 0 | Status: SHIPPED | OUTPATIENT
Start: 2019-01-25 | End: 2019-03-05

## 2019-01-25 RX ORDER — PANTOPRAZOLE SODIUM 40 MG/1
40 TABLET, DELAYED RELEASE ORAL DAILY
Qty: 30 TABLET | Refills: 0 | Status: SHIPPED | OUTPATIENT
Start: 2019-01-25 | End: 2020-12-04

## 2019-01-25 RX ORDER — AMLODIPINE BESYLATE 5 MG/1
5 TABLET ORAL
Qty: 30 TABLET | Refills: 0 | Status: SHIPPED | OUTPATIENT
Start: 2019-01-25 | End: 2019-03-05

## 2019-01-25 RX ADMIN — CALCIUM ACETATE 2001 MG: 667 CAPSULE ORAL at 11:39

## 2019-01-25 RX ADMIN — AMLODIPINE BESYLATE 5 MG: 5 TABLET ORAL at 08:43

## 2019-01-25 RX ADMIN — PANTOPRAZOLE SODIUM 40 MG: 40 TABLET, DELAYED RELEASE ORAL at 06:33

## 2019-01-25 RX ADMIN — CARVEDILOL 25 MG: 25 TABLET, FILM COATED ORAL at 08:43

## 2019-01-25 RX ADMIN — CALCIUM ACETATE 2001 MG: 667 CAPSULE ORAL at 08:42

## 2019-01-26 ENCOUNTER — READMISSION MANAGEMENT (OUTPATIENT)
Dept: CALL CENTER | Facility: HOSPITAL | Age: 34
End: 2019-01-26

## 2019-01-26 NOTE — OUTREACH NOTE
Prep Survey      Responses   Facility patient discharged from?  [facility]   Is patient eligible?  Yes   Discharge diagnosis  Acute hypoxemic respiratory failure due to PNA/alveolar hemorrhage,  ESRD,  PD catheter placement   Does the patient have one of the following disease processes/diagnoses(primary or secondary)?  Other   Does the patient have Home health ordered?  No   Is there a DME ordered?  No   Comments regarding appointments  Needs MD f/u scheduled.    General alerts for this patient  Peritoneal dialysis being set up outpatient.    Prep survey completed?  Yes          Sivan Gutierrez RN

## 2019-01-28 ENCOUNTER — READMISSION MANAGEMENT (OUTPATIENT)
Dept: CALL CENTER | Facility: HOSPITAL | Age: 34
End: 2019-01-28

## 2019-01-28 NOTE — OUTREACH NOTE
Medical Week 1 Survey      Responses   Facility patient discharged from?  [facility]   Does the patient have one of the following disease processes/diagnoses(primary or secondary)?  Other   Is there a successful TCM telephone encounter documented?  No   Week 1 attempt successful?  No   Unsuccessful attempts  Attempt 1          Buddy Atkins RN

## 2019-01-29 ENCOUNTER — READMISSION MANAGEMENT (OUTPATIENT)
Dept: CALL CENTER | Facility: HOSPITAL | Age: 34
End: 2019-01-29

## 2019-01-29 NOTE — OUTREACH NOTE
Medical Week 1 Survey      Responses   Facility patient discharged from?  [facility]   Does the patient have one of the following disease processes/diagnoses(primary or secondary)?  Other   Is there a successful TCM telephone encounter documented?  No   Week 1 attempt successful?  No   Unsuccessful attempts  Attempt 2          Minna Arrigaa RN

## 2019-01-30 ENCOUNTER — READMISSION MANAGEMENT (OUTPATIENT)
Dept: CALL CENTER | Facility: HOSPITAL | Age: 34
End: 2019-01-30

## 2019-01-30 NOTE — OUTREACH NOTE
Medical Week 1 Survey      Responses   Facility patient discharged from?  [facility]   Does the patient have one of the following disease processes/diagnoses(primary or secondary)?  Other   Is there a successful TCM telephone encounter documented?  No   Week 1 attempt successful?  Yes   Call start time  1547   Call end time  1553   General alerts for this patient  Peritoneal dialysis being set up outpatient.    Discharge diagnosis  Acute hypoxemic respiratory failure due to PNA/alveolar hemorrhage,  ESRD,  PD catheter placement   Meds reviewed with patient/caregiver?  Yes   Is the patient having any side effects they believe may be caused by any medication additions or changes?  No   Does the patient have all medications ordered at discharge?  Yes   Is the patient taking all medications as directed (includes completed medication regime)?  Yes   Comments regarding appointments  Needs MD f/u scheduled. 1/30 States was not aware of any f/u other than HD   Does the patient have a primary care provider?   No   Comments regarding PCP  Awaiting call about new PCP appt   What is preventing the patient from scheduling follow up appointments within 7 days of discharge?  Unsure of when or with whom [Advised to check AVS for appts to be made]   Nursing Interventions  Advised patient to make appointment   Has home health visited the patient within 72 hours of discharge?  N/A   Psychosocial issues?  No   Did the patient receive a copy of their discharge instructions?  Yes   Nursing interventions  Reviewed instructions with patient   What is the patient's perception of their health status since discharge?  Improving   Is the patient/caregiver able to teach back signs and symptoms related to disease process for when to call PCP?  Yes   Additional teach back comments  States AV site is healing, hopes to be able to do home PD   Week 1 call completed?  Yes          Tracy Navarro RN

## 2019-01-31 ENCOUNTER — TELEPHONE (OUTPATIENT)
Dept: SURGERY | Facility: CLINIC | Age: 34
End: 2019-01-31

## 2019-01-31 DIAGNOSIS — D50.9 IRON DEFICIENCY ANEMIA, UNSPECIFIED IRON DEFICIENCY ANEMIA TYPE: Primary | ICD-10-CM

## 2019-02-06 ENCOUNTER — READMISSION MANAGEMENT (OUTPATIENT)
Dept: CALL CENTER | Facility: HOSPITAL | Age: 34
End: 2019-02-06

## 2019-02-06 NOTE — OUTREACH NOTE
Medical Week 2 Survey      Responses   Facility patient discharged from?  [facility]   Does the patient have one of the following disease processes/diagnoses(primary or secondary)?  Other   Week 2 attempt successful?  Yes   Call start time  1530   General alerts for this patient  Peritoneal dialysis being set up outpatient.    Discharge diagnosis  Acute hypoxemic respiratory failure due to PNA/alveolar hemorrhage,  ESRD,  PD catheter placement   Call end time  1532   Meds reviewed with patient/caregiver?  Yes   Is the patient having any side effects they believe may be caused by any medication additions or changes?  No   Does the patient have all medications ordered at discharge?  Yes   Is the patient taking all medications as directed (includes completed medication regime)?  Yes   Does the patient have a primary care provider?   Yes   Does the patient have an appointment with their PCP within 7 days of discharge?  Yes   Has the patient kept scheduled appointments due by today?  Yes   Comments  Reviewed appts. He goes to dialysis regularly.   Has home health visited the patient within 72 hours of discharge?  N/A   Psychosocial issues?  No   Did the patient receive a copy of their discharge instructions?  Yes   Nursing interventions  Reviewed instructions with patient   What is the patient's perception of their health status since discharge?  Improving   Is the patient/caregiver able to teach back signs and symptoms related to disease process for when to call PCP?  Yes   Is the patient/caregiver able to teach back signs and symptoms related to disease process for when to call 911?  Yes   Week 2 Call Completed?  Yes          Ronak Rebolledo RN

## 2019-02-13 ENCOUNTER — READMISSION MANAGEMENT (OUTPATIENT)
Dept: CALL CENTER | Facility: HOSPITAL | Age: 34
End: 2019-02-13

## 2019-02-13 NOTE — OUTREACH NOTE
Medical Week 3 Survey      Responses   Facility patient discharged from?  [facility]   Does the patient have one of the following disease processes/diagnoses(primary or secondary)?  Other   Week 3 attempt successful?  No   Unsuccessful attempts  Attempt 1          Rachelle Good RN

## 2019-02-14 ENCOUNTER — READMISSION MANAGEMENT (OUTPATIENT)
Dept: CALL CENTER | Facility: HOSPITAL | Age: 34
End: 2019-02-14

## 2019-02-14 NOTE — OUTREACH NOTE
Medical Week 3 Survey      Responses   Facility patient discharged from?  [facility]   Does the patient have one of the following disease processes/diagnoses(primary or secondary)?  Other   Week 3 attempt successful?  No   Unsuccessful attempts  Attempt 2          Sonia Cody RN

## 2019-02-19 ENCOUNTER — OFFICE VISIT (OUTPATIENT)
Dept: SURGERY | Facility: CLINIC | Age: 34
End: 2019-02-19

## 2019-02-19 VITALS — HEART RATE: 88 BPM | OXYGEN SATURATION: 98 % | BODY MASS INDEX: 24.59 KG/M2 | WEIGHT: 166 LBS | HEIGHT: 69 IN

## 2019-02-19 DIAGNOSIS — N18.6 ESRD ON DIALYSIS (HCC): ICD-10-CM

## 2019-02-19 DIAGNOSIS — D63.1 ANEMIA DUE TO STAGE 4 CHRONIC KIDNEY DISEASE (HCC): Primary | ICD-10-CM

## 2019-02-19 DIAGNOSIS — N18.4 ANEMIA DUE TO STAGE 4 CHRONIC KIDNEY DISEASE (HCC): Primary | ICD-10-CM

## 2019-02-19 DIAGNOSIS — Z99.2 ESRD ON DIALYSIS (HCC): ICD-10-CM

## 2019-02-19 PROCEDURE — 99213 OFFICE O/P EST LOW 20 MIN: CPT | Performed by: SURGERY

## 2019-02-19 RX ORDER — CEFAZOLIN SODIUM 2 G/100ML
2 INJECTION, SOLUTION INTRAVENOUS ONCE
Status: CANCELLED | OUTPATIENT
Start: 2019-03-14 | End: 2019-02-19

## 2019-02-20 NOTE — PROGRESS NOTES
Chief Complaint   Patient presents with   • PD Catheter Consult       Subjective      Chandler Buchanan is a 33 y.o. male who is referred by Dr. Radford to be evaluated for peritoneal dialysis catheter placement. Patient has ESRD secondary to hypertensive nephrosclerosis and  is on dialysis. He gets dialysis on Monday, Wednesday and Friday since January 2019 . Patient does have a AV fistula in the was recently performed..  Patient has not had a recent intraabdominal infection. She reports having regular bowel movements.  He was found to be extremely anemic during his hospital admission he has never have a colonoscopy.  He is mother has history of colon cancer when she was 59 years old.  He denies any recent change of bowel habits or melanotic stools      Past Medical History:   Diagnosis Date   • Acute renal failure (CMS/HCC)    • Anemia    • Cardiomyopathy (CMS/HCC)     New onset systolic    • Hypertension    • Pericardial effusion        Past Surgical History:   Procedure Laterality Date   • ARTERIOVENOUS FISTULA/SHUNT SURGERY Left 1/23/2019    Procedure: LEFT ARM FISTULA CREATION;  Surgeon: Bogdan Velásquez MD;  Location: [facility];  Service: Vascular   • INSERTION HEMODIALYSIS CATHETER N/A 1/21/2019    Procedure: HEMODIALYSIS CATHETER INSERTION;  Surgeon: Cuong Pedraza Jr., MD;  Location: [facility] OR;  Service: Vascular         Current Outpatient Medications:   •  amLODIPine (NORVASC) 5 MG tablet, Take 1 tablet by mouth Daily., Disp: 30 tablet, Rfl: 0  •  benzonatate (TESSALON) 100 MG capsule, Take 1 capsule by mouth 3 (Three) Times a Day As Needed for Cough., Disp: 60 capsule, Rfl: 0  •  calcium acetate (PHOSLO) 667 MG capsule, Take 3 capsules by mouth 3 (Three) Times a Day With Meals., Disp: 90 capsule, Rfl: 0  •  carvedilol (COREG) 25 MG tablet, Take 1 tablet by mouth Every 12 (Twelve) Hours., Disp: 60 tablet, Rfl: 0  •  epoetin che (EPOGEN,PROCRIT) 20450 UNIT/ML injection, Inject 1  "mL under the skin into the appropriate area as directed 3 (Three) Times a Week., Disp: , Rfl:   •  pantoprazole (PROTONIX) 40 MG EC tablet, Take 1 tablet by mouth Daily., Disp: 30 tablet, Rfl: 0    No Known Allergies    Family History   Problem Relation Age of Onset   • Colon cancer Mother        Social History     Socioeconomic History   • Marital status: Single     Spouse name: Not on file   • Number of children: Not on file   • Years of education: Not on file   • Highest education level: Not on file   Social Needs   • Financial resource strain: Not on file   • Food insecurity - worry: Not on file   • Food insecurity - inability: Not on file   • Transportation needs - medical: Not on file   • Transportation needs - non-medical: Not on file   Occupational History   • Not on file   Tobacco Use   • Smoking status: Never Smoker   Substance and Sexual Activity   • Alcohol use: No     Frequency: Never   • Drug use: No   • Sexual activity: Defer   Other Topics Concern   • Not on file   Social History Narrative   • Not on file     REVIEW OF SYSTEMS    Review of Systems   Constitutional: Negative for activity change and chills.   HENT: Negative.    Eyes: Negative.    Respiratory: Positive for cough. Negative for apnea, chest tightness and shortness of breath.    Cardiovascular: Positive for leg swelling. Negative for chest pain.   Gastrointestinal: Positive for vomiting. Negative for abdominal distention and abdominal pain.   Genitourinary: Negative for difficulty urinating and flank pain.   Musculoskeletal: Negative for arthralgias and gait problem.   Skin: Negative for color change and pallor.       Physical Examination  Pulse 88   Ht 175.3 cm (69\")   Wt 75.3 kg (166 lb)   SpO2 98%   BMI 24.51 kg/m²   Body mass index is 24.51 kg/m².  Physical Exam   Constitutional: He is oriented to person, place, and time. He appears well-developed and well-nourished.   HENT:   Head: Normocephalic and atraumatic.   Eyes: No scleral " icterus.   Neck: Normal range of motion. Neck supple.   Cardiovascular: Normal rate and regular rhythm.   Pulmonary/Chest: Effort normal and breath sounds normal.   Abdominal: Bowel sounds are normal. He exhibits no distension. There is no tenderness. No hernia.   Musculoskeletal: Normal range of motion.   Neurological: He is alert and oriented to person, place, and time.   Skin: Skin is warm and dry.   Psychiatric: He has a normal mood and affect. His behavior is normal.     1/25/2019  Hemoglobin 9  White blood cell 6.7    Assessment:   Chandler Buchanan is a 33 y.o. male with ESRD due to  hypertensive nephrosclerosis that is on dialysis and will need peritoneal dialysis catheter placement.  He was found to be anemic that is likely from chronic kidney disease but he has family history of colon cancer in his mother and he will need to have an upper endoscopy and colonoscopy prior to proceeding with peritoneal dialysis catheter placement      The procedure was explained in detail to the patient including risks and benefits.  The benefits including the possibility of having dialysis at home without the side effects of the hemodialysis.  The risks including but not limited to catheter dislodgment, obstruction, malfunction, bleeding, infection and possible injury to surrounding organs during peritoneal access. He is interested in proceeding with laparoscopic placement of peritoneal dialysis catheter. The patient understands that the catheter will not be used for dialysis for a period of approximately 2 weeks after its placement and that during this time they should not shower until the exit site is completely healed. The patient underwent at least one training session with the peritoneal dialysis nurse and their house was evaluated and is ready for the peritoneal dialysis. Patient verbalized understanding and agreed with the plan. All questions were answered at this time.      Plan:     -Plan for upper endoscopy and  colonoscopy  -Will need to have clear liquid diet and bowel preparation  - Laparoscopic peritoneal dialysis catheter placement, possible open.  - Preparation for surgery orders have been placed  - Surgery scheduling.     Indications, risks and procedure were discussed with Him including but not limited to bleeding, infection, possibility of perforation and possible polypectomy. All of their questions were answered and  would like to proceed with the above recommendations.  Any additional follow-up will be discussed with Him after the results have been reviewed.    Jered Obrien MD  General, Minimally Invasive and Endoscopic Surgery  [facility] [address], [address]  [place], [place], [address]  P: [phone]  F: [phone]

## 2019-03-05 ENCOUNTER — APPOINTMENT (OUTPATIENT)
Dept: PREADMISSION TESTING | Facility: HOSPITAL | Age: 34
End: 2019-03-05

## 2019-03-05 VITALS
WEIGHT: 162 LBS | BODY MASS INDEX: 23.99 KG/M2 | HEIGHT: 69 IN | SYSTOLIC BLOOD PRESSURE: 125 MMHG | RESPIRATION RATE: 16 BRPM | HEART RATE: 76 BPM | TEMPERATURE: 97.6 F | DIASTOLIC BLOOD PRESSURE: 67 MMHG | OXYGEN SATURATION: 99 %

## 2019-03-05 DIAGNOSIS — N18.6 ESRD ON DIALYSIS (HCC): ICD-10-CM

## 2019-03-05 DIAGNOSIS — Z99.2 ESRD ON DIALYSIS (HCC): ICD-10-CM

## 2019-03-05 PROCEDURE — 93010 ELECTROCARDIOGRAM REPORT: CPT | Performed by: INTERNAL MEDICINE

## 2019-03-05 PROCEDURE — 93005 ELECTROCARDIOGRAM TRACING: CPT

## 2019-03-05 RX ORDER — AMLODIPINE BESYLATE 5 MG/1
5 TABLET ORAL DAILY
COMMUNITY
End: 2019-10-31 | Stop reason: HOSPADM

## 2019-03-05 NOTE — DISCHARGE INSTRUCTIONS
Take the following medications the morning of surgery with a small sip of water:  AMLODIPINE,CARVEDILOL AND PANTOPRAZOLE      General Instructions:  • Do not eat solid food after midnight the night before surgery.  • You may drink clear liquids day of surgery but must stop at least one hour before your hospital arrival time.  • It is beneficial for you to have a clear drink that contains carbohydrates the day of surgery.  We suggest a 12 to 20 ounce bottle of Gatorade or Powerade for non-diabetic patients or a 12 to 20 ounce bottle of G2 or Powerade Zero for diabetic patients. (Pediatric patients, are not advised to drink a 12 to 20 ounce carbohydrate drink)    Clear liquids are liquids you can see through.  Nothing red in color.     Plain water                               Sports drinks  Sodas                                   Gelatin (Jell-O)  Fruit juices without pulp such as white grape juice and apple juice  Popsicles that contain no fruit or yogurt  Tea or coffee (no cream or milk added)  Gatorade / Powerade  G2 / Powerade Zero    • Infants may have breast milk up to four hours before surgery.  • Infants drinking formula may drink formula up to six hours before surgery.   • Patients who avoid smoking, chewing tobacco and alcohol for 4 weeks prior to surgery have a reduced risk of post-operative complications.  Quit smoking as many days before surgery as you can.  • Do not smoke, use chewing tobacco or drink alcohol the day of surgery.   • If applicable bring your C-PAP/ BI-PAP machine.  • Bring any papers given to you in the doctor’s office.  • Wear clean comfortable clothes and socks.  • Do not wear contact lenses or make-up.  Bring a case for your glasses.   • Bring crutches or walker if applicable.  • Remove all piercings.  Leave jewelry and any other valuables at home.  • Hair extensions with metal clips must be removed prior to surgery.  • The Pre-Admission Testing nurse will instruct you to bring  medications if unable to obtain an accurate list in Pre-Admission Testing.        If you were given a blood bank ID arm band remember to bring it with you the day of surgery.    Preventing a Surgical Site Infection:  • For 2 to 3 days before surgery, avoid shaving with a razor because the razor can irritate skin and make it easier to develop an infection.    • Any areas of open skin can increase the risk of a post-operative wound infection by allowing bacteria to enter and travel throughout the body.  Notify your surgeon if you have any skin wounds / rashes even if it is not near the expected surgical site.  The area will need assessed to determine if surgery should be delayed until it is healed.  • The night prior to surgery sleep in a clean bed with clean clothing.  Do not allow pets to sleep with you.  • Shower on the morning of surgery using a fresh bar of anti-bacterial soap (such as Dial) and clean washcloth.  Dry with a clean towel and dress in clean clothing.  • Ask your surgeon if you will be receiving antibiotics prior to surgery.  • Make sure you, your family, and all healthcare providers clean their hands with soap and water or an alcohol based hand  before caring for you or your wound.    Day of surgery: 3/14/2019 ARRIVAL TIME  1:30 PM  Upon arrival, a Pre-op nurse and Anesthesiologist will review your health history, obtain vital signs, and answer questions you may have.  The only belongings needed at this time will be your home medications and if applicable your C-PAP/BI-PAP machine.  If you are staying overnight your family can leave the rest of your belongings in the car and bring them to your room later.  A Pre-op nurse will start an IV and you may receive medication in preparation for surgery, including something to help you relax.  Your family will be able to see you in the Pre-op area.  While you are in surgery your family should notify the waiting room  if they leave the  waiting room area and provide a contact phone number.    Please be aware that surgery does come with discomfort.  We want to make every effort to control your discomfort so please discuss any uncontrolled symptoms with your nurse.   Your doctor will most likely have prescribed pain medications.      If you are going home after surgery you will receive individualized written care instructions before being discharged.  A responsible adult must drive you to and from the hospital on the day of your surgery and stay with you for 24 hours.    If you are staying overnight following surgery, you will be transported to your hospital room following the recovery period.  [facility] has all private rooms.    You have received a list of surgical assistants for your reference.  If you have any questions please call Pre-Admission Testing at [phone].  Deductibles and co-payments are collected on the day of service. Please be prepared to pay the required co-pay, deductible or deposit on the day of service as defined by your plan.

## 2019-03-12 ENCOUNTER — ANESTHESIA (OUTPATIENT)
Dept: GASTROENTEROLOGY | Facility: HOSPITAL | Age: 34
End: 2019-03-12

## 2019-03-12 ENCOUNTER — HOSPITAL ENCOUNTER (OUTPATIENT)
Facility: HOSPITAL | Age: 34
Setting detail: HOSPITAL OUTPATIENT SURGERY
Discharge: HOME | End: 2019-03-12
Attending: SURGERY | Admitting: SURGERY

## 2019-03-12 ENCOUNTER — ANESTHESIA EVENT (OUTPATIENT)
Dept: GASTROENTEROLOGY | Facility: HOSPITAL | Age: 34
End: 2019-03-12

## 2019-03-12 VITALS
OXYGEN SATURATION: 99 % | HEIGHT: 69 IN | BODY MASS INDEX: 23.47 KG/M2 | SYSTOLIC BLOOD PRESSURE: 106 MMHG | RESPIRATION RATE: 14 BRPM | WEIGHT: 158.5 LBS | TEMPERATURE: 98.7 F | DIASTOLIC BLOOD PRESSURE: 59 MMHG | HEART RATE: 74 BPM

## 2019-03-12 DIAGNOSIS — D63.1 ANEMIA DUE TO STAGE 4 CHRONIC KIDNEY DISEASE (HCC): ICD-10-CM

## 2019-03-12 DIAGNOSIS — N18.4 ANEMIA DUE TO STAGE 4 CHRONIC KIDNEY DISEASE (HCC): ICD-10-CM

## 2019-03-12 PROCEDURE — 25010000002 PROPOFOL 10 MG/ML EMULSION: Performed by: ANESTHESIOLOGY

## 2019-03-12 PROCEDURE — 25010000002 MIDAZOLAM PER 1 MG: Performed by: ANESTHESIOLOGY

## 2019-03-12 PROCEDURE — 45380 COLONOSCOPY AND BIOPSY: CPT | Performed by: SURGERY

## 2019-03-12 PROCEDURE — 43239 EGD BIOPSY SINGLE/MULTIPLE: CPT | Performed by: SURGERY

## 2019-03-12 PROCEDURE — 88342 IMHCHEM/IMCYTCHM 1ST ANTB: CPT | Performed by: SURGERY

## 2019-03-12 PROCEDURE — 88305 TISSUE EXAM BY PATHOLOGIST: CPT | Performed by: SURGERY

## 2019-03-12 RX ORDER — PROPOFOL 10 MG/ML
VIAL (ML) INTRAVENOUS AS NEEDED
Status: DISCONTINUED | OUTPATIENT
Start: 2019-03-12 | End: 2019-03-12 | Stop reason: SURG

## 2019-03-12 RX ORDER — PROPOFOL 10 MG/ML
VIAL (ML) INTRAVENOUS CONTINUOUS PRN
Status: DISCONTINUED | OUTPATIENT
Start: 2019-03-12 | End: 2019-03-12 | Stop reason: SURG

## 2019-03-12 RX ORDER — SODIUM CHLORIDE, SODIUM LACTATE, POTASSIUM CHLORIDE, CALCIUM CHLORIDE 600; 310; 30; 20 MG/100ML; MG/100ML; MG/100ML; MG/100ML
INJECTION, SOLUTION INTRAVENOUS CONTINUOUS PRN
Status: DISCONTINUED | OUTPATIENT
Start: 2019-03-12 | End: 2019-03-12

## 2019-03-12 RX ORDER — SODIUM CHLORIDE 9 MG/ML
30 INJECTION, SOLUTION INTRAVENOUS CONTINUOUS PRN
Status: DISCONTINUED | OUTPATIENT
Start: 2019-03-12 | End: 2019-03-12 | Stop reason: HOSPADM

## 2019-03-12 RX ORDER — LIDOCAINE HYDROCHLORIDE 20 MG/ML
INJECTION, SOLUTION INFILTRATION; PERINEURAL AS NEEDED
Status: DISCONTINUED | OUTPATIENT
Start: 2019-03-12 | End: 2019-03-12 | Stop reason: SURG

## 2019-03-12 RX ORDER — MIDAZOLAM HYDROCHLORIDE 1 MG/ML
INJECTION INTRAMUSCULAR; INTRAVENOUS AS NEEDED
Status: DISCONTINUED | OUTPATIENT
Start: 2019-03-12 | End: 2019-03-12 | Stop reason: SURG

## 2019-03-12 RX ADMIN — PROPOFOL 160 MG: 10 INJECTION, EMULSION INTRAVENOUS at 08:03

## 2019-03-12 RX ADMIN — SODIUM CHLORIDE 30 ML/HR: 9 INJECTION, SOLUTION INTRAVENOUS at 07:34

## 2019-03-12 RX ADMIN — PROPOFOL 30 MG: 10 INJECTION, EMULSION INTRAVENOUS at 08:20

## 2019-03-12 RX ADMIN — PROPOFOL 30 MG: 10 INJECTION, EMULSION INTRAVENOUS at 08:25

## 2019-03-12 RX ADMIN — MIDAZOLAM 2 MG: 1 INJECTION INTRAMUSCULAR; INTRAVENOUS at 08:29

## 2019-03-12 RX ADMIN — LIDOCAINE HYDROCHLORIDE 50 MG: 20 INJECTION, SOLUTION INFILTRATION; PERINEURAL at 08:02

## 2019-03-12 RX ADMIN — PROPOFOL 140 MCG/KG/MIN: 10 INJECTION, EMULSION INTRAVENOUS at 08:05

## 2019-03-12 RX ADMIN — PROPOFOL 50 MG: 10 INJECTION, EMULSION INTRAVENOUS at 08:06

## 2019-03-12 NOTE — ANESTHESIA POSTPROCEDURE EVALUATION
"Patient: Chandler Buchanan    Procedure Summary     Date:  03/12/19 Room / Location:   [facility] /  [facility]    Anesthesia Start:  0759 Anesthesia Stop:  0848    Procedures:       COLONOSCOPY wwith Biiopsies (N/A )      ESOPHAGOGASTRODUODENOSCOPY WITH BIOPSY (N/A Esophagus) Diagnosis:       Anemia due to stage 4 chronic kidney disease (CMS/HCC)      (Anemia due to stage 4 chronic kidney disease (CMS/HCC) [N18.4, D63.1])    Surgeon:  Jered Obrien MD Provider:  Zainab Gonzalez MD    Anesthesia Type:  MAC ASA Status:  3          Anesthesia Type: MAC  Last vitals  BP   106/59 (03/12/19 0934)   Temp   37.1 °C (98.7 °F) (03/12/19 0726)   Pulse   74 (03/12/19 0934)   Resp   14 (03/12/19 0934)     SpO2   99 % (03/12/19 0934)     Post Anesthesia Care and Evaluation      Comments: Patient discharged before being evaluated by an Anesthesiologist. No apparent complications per the record.  This case was not medically directed. I am completing this chart for medical records purposes; I personally have no medical involvement with this patient.    /59 (Patient Position: Sitting)   Pulse 74   Temp 37.1 °C (98.7 °F) (Oral)   Resp 14   Ht 175.3 cm (69\")   Wt 71.9 kg (158 lb 8 oz)   SpO2 99%   BMI 23.41 kg/m²           "

## 2019-03-12 NOTE — ANESTHESIA PREPROCEDURE EVALUATION
Anesthesia Evaluation     Patient summary reviewed and Nursing notes reviewed   no history of anesthetic complications:  NPO Solid Status: > 8 hours  NPO Liquid Status: > 2 hours           Airway   Mallampati: II  TM distance: >3 FB  Neck ROM: full  No difficulty expected  Dental - normal exam     Pulmonary - negative pulmonary ROS and normal exam    breath sounds clear to auscultation  Cardiovascular     ECG reviewed  Patient on routine beta blocker  Rhythm: regular  Rate: normal    (+) hypertension, CHF (related to ESRD), pericardial effusion, murmur (2/6 sys murmur LSB),   (-) valvular problems/murmurs    ROS comment: Cardiomyopathy, EF = 20% January    Neuro/Psych- negative ROS  GI/Hepatic/Renal/Endo    (+)  GERD,  renal disease ESRD and dialysis,     Musculoskeletal (-) negative ROS    Abdominal  - normal exam   Substance History - negative use     OB/GYN negative ob/gyn ROS         Other - negative ROS                     Anesthesia Plan    ASA 3     MAC     intravenous induction   Anesthetic plan, all risks, benefits, and alternatives have been provided, discussed and informed consent has been obtained with: patient.

## 2019-03-12 NOTE — DISCHARGE INSTRUCTIONS
For the next 24 hours patient needs to be with a responsible adult.    For 24 hours DO NOT drive, operate machinery, appliances, drink alcohol, make important decisions or sign legal documents.    Start with a light or bland diet if you are feeling sick to your stomach otherwise advance to regular diet as tolerated.    Follow recommendations on procedure report if provided by your doctor.    Call Dr. Obrien for problems [phone]    Problems may include but not limited to: large amounts of bleeding, trouble breathing, repeated vomiting, severe unrelieved pain, fever or chills.

## 2019-03-14 ENCOUNTER — ANESTHESIA EVENT (OUTPATIENT)
Dept: PERIOP | Facility: HOSPITAL | Age: 34
End: 2019-03-14

## 2019-03-14 ENCOUNTER — ANESTHESIA (OUTPATIENT)
Dept: PERIOP | Facility: HOSPITAL | Age: 34
End: 2019-03-14

## 2019-03-14 ENCOUNTER — HOSPITAL ENCOUNTER (OUTPATIENT)
Facility: HOSPITAL | Age: 34
Setting detail: HOSPITAL OUTPATIENT SURGERY
Discharge: HOME | End: 2019-03-14
Attending: SURGERY | Admitting: SURGERY

## 2019-03-14 VITALS
DIASTOLIC BLOOD PRESSURE: 88 MMHG | HEART RATE: 70 BPM | TEMPERATURE: 98.4 F | RESPIRATION RATE: 14 BRPM | SYSTOLIC BLOOD PRESSURE: 133 MMHG | WEIGHT: 160.38 LBS | BODY MASS INDEX: 23.76 KG/M2 | HEIGHT: 69 IN | OXYGEN SATURATION: 100 %

## 2019-03-14 DIAGNOSIS — Z99.2 ESRD ON DIALYSIS (HCC): ICD-10-CM

## 2019-03-14 DIAGNOSIS — N18.6 ESRD ON DIALYSIS (HCC): ICD-10-CM

## 2019-03-14 LAB
ANION GAP SERPL CALCULATED.3IONS-SCNC: 17.8 MMOL/L
BASOPHILS # BLD AUTO: 0.08 10*3/MM3 (ref 0–0.2)
BASOPHILS NFR BLD AUTO: 1.1 % (ref 0–1.5)
BUN BLD-MCNC: 59 MG/DL (ref 6–20)
BUN/CREAT SERPL: 7.4 (ref 7–25)
CALCIUM SPEC-SCNC: 9.8 MG/DL (ref 8.6–10.5)
CHLORIDE SERPL-SCNC: 94 MMOL/L (ref 98–107)
CO2 SERPL-SCNC: 27.2 MMOL/L (ref 22–29)
CREAT BLD-MCNC: 7.92 MG/DL (ref 0.76–1.27)
CYTO UR: NORMAL
DEPRECATED RDW RBC AUTO: 52.7 FL (ref 37–54)
EOSINOPHIL # BLD AUTO: 0 10*3/MM3 (ref 0–0.4)
EOSINOPHIL NFR BLD AUTO: 0 % (ref 0.3–6.2)
ERYTHROCYTE [DISTWIDTH] IN BLOOD BY AUTOMATED COUNT: 15.8 % (ref 12.3–15.4)
GFR SERPL CREATININE-BSD FRML MDRD: 8 ML/MIN/1.73
GFR SERPL CREATININE-BSD FRML MDRD: ABNORMAL ML/MIN/1.73
GLUCOSE BLD-MCNC: 89 MG/DL (ref 65–99)
HCT VFR BLD AUTO: 33.7 % (ref 37.5–51)
HGB BLD-MCNC: 10.6 G/DL (ref 13–17.7)
IMM GRANULOCYTES # BLD AUTO: 0.03 10*3/MM3 (ref 0–0.05)
IMM GRANULOCYTES NFR BLD AUTO: 0.4 % (ref 0–0.5)
LAB AP CASE REPORT: NORMAL
LAB AP DIAGNOSIS COMMENT: NORMAL
LYMPHOCYTES # BLD AUTO: 1.54 10*3/MM3 (ref 0.7–3.1)
LYMPHOCYTES NFR BLD AUTO: 20.9 % (ref 19.6–45.3)
MCH RBC QN AUTO: 29.2 PG (ref 26.6–33)
MCHC RBC AUTO-ENTMCNC: 31.5 G/DL (ref 31.5–35.7)
MCV RBC AUTO: 92.8 FL (ref 79–97)
MONOCYTES # BLD AUTO: 0.8 10*3/MM3 (ref 0.1–0.9)
MONOCYTES NFR BLD AUTO: 10.8 % (ref 5–12)
NEUTROPHILS # BLD AUTO: 4.93 10*3/MM3 (ref 1.4–7)
NEUTROPHILS NFR BLD AUTO: 66.8 % (ref 42.7–76)
NRBC BLD AUTO-RTO: 0 /100 WBC (ref 0–0)
PATH REPORT.FINAL DX SPEC: NORMAL
PATH REPORT.GROSS SPEC: NORMAL
PLATELET # BLD AUTO: 136 10*3/MM3 (ref 140–450)
PMV BLD AUTO: 9.9 FL (ref 6–12)
POTASSIUM BLD-SCNC: 4.9 MMOL/L (ref 3.5–5.2)
RBC # BLD AUTO: 3.63 10*6/MM3 (ref 4.14–5.8)
SODIUM BLD-SCNC: 139 MMOL/L (ref 136–145)
WBC NRBC COR # BLD: 7.38 10*3/MM3 (ref 3.4–10.8)

## 2019-03-14 PROCEDURE — 49324 LAP INSERT TUNNEL IP CATH: CPT | Performed by: SURGERY

## 2019-03-14 PROCEDURE — 80048 BASIC METABOLIC PNL TOTAL CA: CPT | Performed by: SURGERY

## 2019-03-14 PROCEDURE — 25010000002 NEOSTIGMINE PER 0.5 MG: Performed by: NURSE ANESTHETIST, CERTIFIED REGISTERED

## 2019-03-14 PROCEDURE — 25010000003 CEFAZOLIN IN DEXTROSE 2-4 GM/100ML-% SOLUTION: Performed by: SURGERY

## 2019-03-14 PROCEDURE — 25010000002 PROPOFOL 10 MG/ML EMULSION: Performed by: NURSE ANESTHETIST, CERTIFIED REGISTERED

## 2019-03-14 PROCEDURE — 25010000002 DEXAMETHASONE PER 1 MG: Performed by: NURSE ANESTHETIST, CERTIFIED REGISTERED

## 2019-03-14 PROCEDURE — 25010000002 ONDANSETRON PER 1 MG: Performed by: NURSE ANESTHETIST, CERTIFIED REGISTERED

## 2019-03-14 PROCEDURE — 49326 LAP W/OMENTOPEXY ADD-ON: CPT | Performed by: PHYSICIAN ASSISTANT

## 2019-03-14 PROCEDURE — 49326 LAP W/OMENTOPEXY ADD-ON: CPT | Performed by: SURGERY

## 2019-03-14 PROCEDURE — 49324 LAP INSERT TUNNEL IP CATH: CPT | Performed by: PHYSICIAN ASSISTANT

## 2019-03-14 PROCEDURE — C1750 CATH, HEMODIALYSIS,LONG-TERM: HCPCS | Performed by: SURGERY

## 2019-03-14 PROCEDURE — 25010000002 MIDAZOLAM PER 1 MG: Performed by: ANESTHESIOLOGY

## 2019-03-14 PROCEDURE — 25010000002 HEPARIN (PORCINE) PER 1000 UNITS: Performed by: SURGERY

## 2019-03-14 PROCEDURE — 25010000002 FENTANYL CITRATE (PF) 100 MCG/2ML SOLUTION: Performed by: ANESTHESIOLOGY

## 2019-03-14 PROCEDURE — 85025 COMPLETE CBC W/AUTO DIFF WBC: CPT | Performed by: SURGERY

## 2019-03-14 DEVICE — IMPLANTABLE DEVICE: Type: IMPLANTABLE DEVICE | Site: ABDOMEN | Status: FUNCTIONAL

## 2019-03-14 RX ORDER — PROMETHAZINE HYDROCHLORIDE 25 MG/1
12.5 TABLET ORAL ONCE AS NEEDED
Status: DISCONTINUED | OUTPATIENT
Start: 2019-03-14 | End: 2019-03-14 | Stop reason: HOSPADM

## 2019-03-14 RX ORDER — ACETAMINOPHEN 325 MG/1
650 TABLET ORAL ONCE
Status: DISCONTINUED | OUTPATIENT
Start: 2019-03-14 | End: 2019-03-14 | Stop reason: HOSPADM

## 2019-03-14 RX ORDER — HYDROMORPHONE HYDROCHLORIDE 1 MG/ML
0.5 INJECTION, SOLUTION INTRAMUSCULAR; INTRAVENOUS; SUBCUTANEOUS
Status: DISCONTINUED | OUTPATIENT
Start: 2019-03-14 | End: 2019-03-14 | Stop reason: HOSPADM

## 2019-03-14 RX ORDER — ONDANSETRON 2 MG/ML
4 INJECTION INTRAMUSCULAR; INTRAVENOUS ONCE AS NEEDED
Status: DISCONTINUED | OUTPATIENT
Start: 2019-03-14 | End: 2019-03-14 | Stop reason: HOSPADM

## 2019-03-14 RX ORDER — LABETALOL HYDROCHLORIDE 5 MG/ML
5 INJECTION, SOLUTION INTRAVENOUS
Status: DISCONTINUED | OUTPATIENT
Start: 2019-03-14 | End: 2019-03-14 | Stop reason: HOSPADM

## 2019-03-14 RX ORDER — DIPHENHYDRAMINE HYDROCHLORIDE 50 MG/ML
12.5 INJECTION INTRAMUSCULAR; INTRAVENOUS
Status: DISCONTINUED | OUTPATIENT
Start: 2019-03-14 | End: 2019-03-14 | Stop reason: HOSPADM

## 2019-03-14 RX ORDER — PROMETHAZINE HYDROCHLORIDE 25 MG/ML
12.5 INJECTION, SOLUTION INTRAMUSCULAR; INTRAVENOUS ONCE AS NEEDED
Status: DISCONTINUED | OUTPATIENT
Start: 2019-03-14 | End: 2019-03-14 | Stop reason: HOSPADM

## 2019-03-14 RX ORDER — BUPIVACAINE HYDROCHLORIDE AND EPINEPHRINE 5; 5 MG/ML; UG/ML
INJECTION, SOLUTION PERINEURAL AS NEEDED
Status: DISCONTINUED | OUTPATIENT
Start: 2019-03-14 | End: 2019-03-14 | Stop reason: HOSPADM

## 2019-03-14 RX ORDER — SENNA PLUS 8.6 MG/1
1 TABLET ORAL ONCE
Status: DISCONTINUED | OUTPATIENT
Start: 2019-03-14 | End: 2019-03-14 | Stop reason: HOSPADM

## 2019-03-14 RX ORDER — PROMETHAZINE HYDROCHLORIDE 25 MG/1
25 TABLET ORAL ONCE AS NEEDED
Status: DISCONTINUED | OUTPATIENT
Start: 2019-03-14 | End: 2019-03-14 | Stop reason: HOSPADM

## 2019-03-14 RX ORDER — HYDROCODONE BITARTRATE AND ACETAMINOPHEN 7.5; 325 MG/1; MG/1
1 TABLET ORAL ONCE AS NEEDED
Status: DISCONTINUED | OUTPATIENT
Start: 2019-03-14 | End: 2019-03-14 | Stop reason: HOSPADM

## 2019-03-14 RX ORDER — LIDOCAINE HYDROCHLORIDE 10 MG/ML
0.5 INJECTION, SOLUTION EPIDURAL; INFILTRATION; INTRACAUDAL; PERINEURAL ONCE AS NEEDED
Status: DISCONTINUED | OUTPATIENT
Start: 2019-03-14 | End: 2019-03-14 | Stop reason: HOSPADM

## 2019-03-14 RX ORDER — CEFAZOLIN SODIUM 2 G/100ML
2 INJECTION, SOLUTION INTRAVENOUS ONCE
Status: COMPLETED | OUTPATIENT
Start: 2019-03-14 | End: 2019-03-14

## 2019-03-14 RX ORDER — HYDRALAZINE HYDROCHLORIDE 20 MG/ML
5 INJECTION INTRAMUSCULAR; INTRAVENOUS
Status: DISCONTINUED | OUTPATIENT
Start: 2019-03-14 | End: 2019-03-14 | Stop reason: HOSPADM

## 2019-03-14 RX ORDER — EPHEDRINE SULFATE 50 MG/ML
5 INJECTION, SOLUTION INTRAVENOUS ONCE AS NEEDED
Status: DISCONTINUED | OUTPATIENT
Start: 2019-03-14 | End: 2019-03-14 | Stop reason: HOSPADM

## 2019-03-14 RX ORDER — SODIUM CHLORIDE 9 MG/ML
INJECTION, SOLUTION INTRAVENOUS CONTINUOUS PRN
Status: COMPLETED | OUTPATIENT
Start: 2019-03-14 | End: 2019-03-14

## 2019-03-14 RX ORDER — FAMOTIDINE 10 MG/ML
20 INJECTION, SOLUTION INTRAVENOUS ONCE
Status: COMPLETED | OUTPATIENT
Start: 2019-03-14 | End: 2019-03-14

## 2019-03-14 RX ORDER — NALOXONE HCL 0.4 MG/ML
0.2 VIAL (ML) INJECTION AS NEEDED
Status: DISCONTINUED | OUTPATIENT
Start: 2019-03-14 | End: 2019-03-14 | Stop reason: HOSPADM

## 2019-03-14 RX ORDER — MAGNESIUM HYDROXIDE 1200 MG/15ML
LIQUID ORAL AS NEEDED
Status: DISCONTINUED | OUTPATIENT
Start: 2019-03-14 | End: 2019-03-14 | Stop reason: HOSPADM

## 2019-03-14 RX ORDER — SODIUM CHLORIDE 0.9 % (FLUSH) 0.9 %
1-10 SYRINGE (ML) INJECTION AS NEEDED
Status: DISCONTINUED | OUTPATIENT
Start: 2019-03-14 | End: 2019-03-14 | Stop reason: HOSPADM

## 2019-03-14 RX ORDER — LIDOCAINE HYDROCHLORIDE 20 MG/ML
INJECTION, SOLUTION INFILTRATION; PERINEURAL AS NEEDED
Status: DISCONTINUED | OUTPATIENT
Start: 2019-03-14 | End: 2019-03-14 | Stop reason: SURG

## 2019-03-14 RX ORDER — FENTANYL CITRATE 50 UG/ML
50 INJECTION, SOLUTION INTRAMUSCULAR; INTRAVENOUS
Status: DISCONTINUED | OUTPATIENT
Start: 2019-03-14 | End: 2019-03-14 | Stop reason: HOSPADM

## 2019-03-14 RX ORDER — ONDANSETRON 2 MG/ML
INJECTION INTRAMUSCULAR; INTRAVENOUS AS NEEDED
Status: DISCONTINUED | OUTPATIENT
Start: 2019-03-14 | End: 2019-03-14 | Stop reason: SURG

## 2019-03-14 RX ORDER — HYDROCODONE BITARTRATE AND ACETAMINOPHEN 5; 325 MG/1; MG/1
1 TABLET ORAL EVERY 6 HOURS PRN
Qty: 30 TABLET | Refills: 0 | Status: SHIPPED | OUTPATIENT
Start: 2019-03-14 | End: 2019-04-17

## 2019-03-14 RX ORDER — ACETAMINOPHEN 325 MG/1
650 TABLET ORAL ONCE AS NEEDED
Status: DISCONTINUED | OUTPATIENT
Start: 2019-03-14 | End: 2019-03-14 | Stop reason: HOSPADM

## 2019-03-14 RX ORDER — PROMETHAZINE HYDROCHLORIDE 25 MG/1
25 SUPPOSITORY RECTAL ONCE AS NEEDED
Status: DISCONTINUED | OUTPATIENT
Start: 2019-03-14 | End: 2019-03-14 | Stop reason: HOSPADM

## 2019-03-14 RX ORDER — GLYCOPYRROLATE 0.2 MG/ML
INJECTION INTRAMUSCULAR; INTRAVENOUS AS NEEDED
Status: DISCONTINUED | OUTPATIENT
Start: 2019-03-14 | End: 2019-03-14 | Stop reason: SURG

## 2019-03-14 RX ORDER — SODIUM CHLORIDE 9 MG/ML
9 INJECTION, SOLUTION INTRAVENOUS CONTINUOUS PRN
Status: DISCONTINUED | OUTPATIENT
Start: 2019-03-14 | End: 2019-03-14 | Stop reason: HOSPADM

## 2019-03-14 RX ORDER — OXYCODONE AND ACETAMINOPHEN 7.5; 325 MG/1; MG/1
1 TABLET ORAL ONCE AS NEEDED
Status: COMPLETED | OUTPATIENT
Start: 2019-03-14 | End: 2019-03-14

## 2019-03-14 RX ORDER — SENNOSIDES 8.6 MG
1 TABLET ORAL NIGHTLY
Status: DISCONTINUED | OUTPATIENT
Start: 2019-03-14 | End: 2019-03-14 | Stop reason: HOSPADM

## 2019-03-14 RX ORDER — MIDAZOLAM HYDROCHLORIDE 1 MG/ML
1 INJECTION INTRAMUSCULAR; INTRAVENOUS
Status: DISCONTINUED | OUTPATIENT
Start: 2019-03-14 | End: 2019-03-14 | Stop reason: HOSPADM

## 2019-03-14 RX ORDER — PROPOFOL 10 MG/ML
VIAL (ML) INTRAVENOUS AS NEEDED
Status: DISCONTINUED | OUTPATIENT
Start: 2019-03-14 | End: 2019-03-14 | Stop reason: SURG

## 2019-03-14 RX ORDER — DEXAMETHASONE SODIUM PHOSPHATE 10 MG/ML
INJECTION INTRAMUSCULAR; INTRAVENOUS AS NEEDED
Status: DISCONTINUED | OUTPATIENT
Start: 2019-03-14 | End: 2019-03-14 | Stop reason: SURG

## 2019-03-14 RX ORDER — FLUMAZENIL 0.1 MG/ML
0.2 INJECTION INTRAVENOUS AS NEEDED
Status: DISCONTINUED | OUTPATIENT
Start: 2019-03-14 | End: 2019-03-14 | Stop reason: HOSPADM

## 2019-03-14 RX ORDER — MIDAZOLAM HYDROCHLORIDE 1 MG/ML
2 INJECTION INTRAMUSCULAR; INTRAVENOUS
Status: DISCONTINUED | OUTPATIENT
Start: 2019-03-14 | End: 2019-03-14 | Stop reason: HOSPADM

## 2019-03-14 RX ORDER — ROCURONIUM BROMIDE 10 MG/ML
INJECTION, SOLUTION INTRAVENOUS AS NEEDED
Status: DISCONTINUED | OUTPATIENT
Start: 2019-03-14 | End: 2019-03-14 | Stop reason: SURG

## 2019-03-14 RX ORDER — HYDROCODONE BITARTRATE AND ACETAMINOPHEN 5; 325 MG/1; MG/1
1 TABLET ORAL ONCE AS NEEDED
Status: DISCONTINUED | OUTPATIENT
Start: 2019-03-14 | End: 2019-03-14 | Stop reason: HOSPADM

## 2019-03-14 RX ORDER — DIPHENHYDRAMINE HCL 25 MG
25 CAPSULE ORAL
Status: DISCONTINUED | OUTPATIENT
Start: 2019-03-14 | End: 2019-03-14 | Stop reason: HOSPADM

## 2019-03-14 RX ADMIN — SODIUM CHLORIDE: 9 INJECTION, SOLUTION INTRAVENOUS at 16:04

## 2019-03-14 RX ADMIN — DEXAMETHASONE SODIUM PHOSPHATE 8 MG: 10 INJECTION INTRAMUSCULAR; INTRAVENOUS at 16:17

## 2019-03-14 RX ADMIN — PROPOFOL 50 MG: 10 INJECTION, EMULSION INTRAVENOUS at 16:08

## 2019-03-14 RX ADMIN — LIDOCAINE HYDROCHLORIDE 100 MG: 20 INJECTION, SOLUTION INFILTRATION; PERINEURAL at 16:07

## 2019-03-14 RX ADMIN — NEOSTIGMINE METHYLSULFATE 3 MG: 1 INJECTION INTRAMUSCULAR; INTRAVENOUS; SUBCUTANEOUS at 16:59

## 2019-03-14 RX ADMIN — FENTANYL CITRATE 50 MCG: 50 INJECTION INTRAMUSCULAR; INTRAVENOUS at 16:15

## 2019-03-14 RX ADMIN — FENTANYL CITRATE 100 MCG: 50 INJECTION INTRAMUSCULAR; INTRAVENOUS at 16:07

## 2019-03-14 RX ADMIN — ROCURONIUM BROMIDE 50 MG: 10 INJECTION INTRAVENOUS at 16:07

## 2019-03-14 RX ADMIN — SODIUM CHLORIDE 9 ML/HR: 9 INJECTION, SOLUTION INTRAVENOUS at 14:49

## 2019-03-14 RX ADMIN — PROPOFOL 50 MG: 10 INJECTION, EMULSION INTRAVENOUS at 16:09

## 2019-03-14 RX ADMIN — ONDANSETRON 4 MG: 2 INJECTION INTRAMUSCULAR; INTRAVENOUS at 16:58

## 2019-03-14 RX ADMIN — FENTANYL CITRATE 50 MCG: 50 INJECTION INTRAMUSCULAR; INTRAVENOUS at 16:58

## 2019-03-14 RX ADMIN — MIDAZOLAM 1 MG: 1 INJECTION INTRAMUSCULAR; INTRAVENOUS at 14:50

## 2019-03-14 RX ADMIN — CEFAZOLIN SODIUM 2 G: 2 INJECTION, SOLUTION INTRAVENOUS at 16:14

## 2019-03-14 RX ADMIN — OXYCODONE HYDROCHLORIDE AND ACETAMINOPHEN 1 TABLET: 7.5; 325 TABLET ORAL at 17:52

## 2019-03-14 RX ADMIN — PROPOFOL 150 MG: 10 INJECTION, EMULSION INTRAVENOUS at 16:07

## 2019-03-14 RX ADMIN — FAMOTIDINE 20 MG: 10 INJECTION INTRAVENOUS at 14:50

## 2019-03-14 RX ADMIN — GLYCOPYRROLATE 0.2 MG: 0.2 INJECTION INTRAMUSCULAR; INTRAVENOUS at 16:59

## 2019-03-14 NOTE — ANESTHESIA PROCEDURE NOTES
Airway  Urgency: elective    Airway not difficult    General Information and Staff    Patient location during procedure: OR  CRNA: Fredy Gimenez CRNA    Indications and Patient Condition  Indications for airway management: airway protection    Preoxygenated: yes  MILS maintained throughout  Mask difficulty assessment: 1 - vent by mask    Final Airway Details  Final airway type: endotracheal airway      Successful airway: ETT    Successful intubation technique: direct laryngoscopy  Blade: Layla  Blade size: 3  ETT size (mm): 7.0  Cormack-Lehane Classification: grade I - full view of glottis  Placement verified by: chest auscultation   Measured from: teeth  Number of attempts at approach: 1

## 2019-03-14 NOTE — ANESTHESIA POSTPROCEDURE EVALUATION
Patient: Chandler Buchanan    Procedure Summary     Date:  03/14/19 Room / Location:  [facility] OR  / [facility] MAIN OR    Anesthesia Start:  1604 Anesthesia Stop:  1717    Procedure:  INSERTION PERITONEAL DIALYSIS CATHETER LAPAROSCOPIC AND OMENTOPEXY (N/A Abdomen) Diagnosis:       ESRD on dialysis (CMS/HCC)      (ESRD on dialysis (CMS/HCC) [N18.6, Z99.2])    Surgeon:  Jered Obrien MD Provider:  Anna Atkinson MD    Anesthesia Type:  general ASA Status:  3          Anesthesia Type: general  Last vitals  BP   135/90 (03/14/19 1755)   Temp   36.9 °C (98.4 °F) (03/14/19 1714)   Pulse   71 (03/14/19 1755)   Resp   14 (03/14/19 1755)     SpO2   98 % (03/14/19 1755)     Post Anesthesia Care and Evaluation    Patient location during evaluation: PHASE II  Patient participation: complete - patient participated  Level of consciousness: awake and alert  Pain management: adequate  Airway patency: patent  Anesthetic complications: No anesthetic complications  PONV Status: none  Cardiovascular status: acceptable  Respiratory status: acceptable  Hydration status: acceptable

## 2019-03-14 NOTE — OP NOTE
DATE OF PROCEDURE: 3/14/19    SURGEON: Jered Obrien MD     ASSISTANT: MILA Garza PA-C     PREOPERATIVE DIAGNOSES:   1. Chronic kidney disease in need of dialysis.     POSTOPERATIVE DIAGNOSES:   1. Chronic kidney disease in need of dialysis.     PROCEDURES PERFORMED:   1. Laparoscopic insertion of [place] peritoneal dialysis catheter.  2. Laparoscopic omentpexy     ANESTHESIA: General.   ESTIMATED BLOOD LOSS: Minimal.   SPECIMEN: None.   IMPLANT: [place] peritoneal dialysis catheter.   FINDINGS: Great catheter flow, redundant omentum. Weak bilateral inguinal canal.   COMPLICATIONS: None.     INDICATIONS FOR PROCEDURE: The patient is a very pleasant 34 y.o. year old male with end stage renal disease in need of dialysis. He was evaluated for peritoneal dialysis and was found to be good candidate. I offered the patient laparoscopic peritoneal dialysis catheter placement. The risks and benefits of the procedure were explained to him. The patient verbalized understanding and agreed with the plan.     DESCRIPTION OF PROCEDURE: The patient was taken to the operating room and she was placed on the OR table in the supine position. Preoperative antibiotics were given and SCDs were placed. General endotracheal anesthesia was then induced. The patient's abdomen was then prepped and draped in the usual sterile fashion. Timeout was performed and the patient was correctly identified. I started the procedure by injecting 0.5% Marcaine with epinephrine in the left upper abdomen. With optiview technique a 5 mm trocar was introduced in the patient abdomen.  I then proceeded to insufflate patient's abdomen again, and upon exploration of the abdominal cavity there was no injury from the trocar placement.  Both inguinal canals looked weak and although there was no evidence of obvious inguinal hernias small inguinal hernias could develop with peritoneal dialysis. After this, 0.5% Marcaine was injected in the left  periumbilical area and  an incision was performed. An 8 mm trocar was then placed in that position at 60°. It was pointed towards the pelvis. An [place] coiled catheter was then placed through the trocar and the trocar was removed. It was positioned in the retrovesical area. The first cuff was withdrawn back to the level of the rectus muscle fascia. The catheter was then tunnelized and an exit site was selected in the left upper quadrant. The catheter was then flushed and there was no evidence of resistance. I then proceeded to desufflate the pneumoperitoneum and let 500 mL of heparinized saline run. The fluid flowed without any resistance. The catheter was placed then to gravity and good flow of the fluid was found and 300 mL of heparinized saline was retrieved. The catheter was then capped and pneumoperitoneum was again insufflated. Upon exploration of the abdominal cavity, there was no evidence of injury from the procedure.  The omentum was redundant and reaching the pelvis so I decided to perform an omentopexy to avoid occlusion of the catheter with the omentum. I placed another 5 mm trocar in the left lower abdomen under direct laparoscopic vision.  The omentum was grasped and brought up to the right upper quadrant.  With Endo Close technique and 2-0 silk several passes through the omentum were performed.  Sutures were tied and the omentum was adequately tacked to the anterior abdominal wall.  I then proceeded to remove all the trocars under direct laparoscopic vision. The catheter was then flushed with 25 mL of heparinized saline without any resistance to the flow. All the incisions were then closed in 2 layers with 3-0 Vicryl and 4-0 Monocryl. The catheter was secured in place with Steri-Strips. A biopatch was placed around the exit site.  All the incisions were covered with 4 x 4 and Tegaderm. The patient was awakened in the operating room and she was taken to the recovery area in stable condition.     Jered Obrien M.D.

## 2019-03-14 NOTE — ANESTHESIA PREPROCEDURE EVALUATION
" Anesthesia Evaluation     Patient summary reviewed and Nursing notes reviewed                Airway   Mallampati: II  No difficulty expected  Dental      Pulmonary - negative pulmonary ROS   Cardiovascular     Rhythm: regular  Rate: normal    (+) hypertension, valvular problems/murmurs murmur, CHF,       Neuro/Psych- negative ROS  GI/Hepatic/Renal/Endo    (+)  GERD,  renal disease dialysis and ESRD,     Musculoskeletal (-) negative ROS    Abdominal    Substance History - negative use     OB/GYN negative ob/gyn ROS         Other                        Anesthesia Plan    ASA 3     general   (CHF  ESRD        Patient uses his middle name                                                              \"Anjum\")  intravenous induction   Anesthetic plan, all risks, benefits, and alternatives have been provided, discussed and informed consent has been obtained with: patient.      "

## 2019-03-14 NOTE — BRIEF OP NOTE
INSERTION PERITONEAL DIALYSIS CATHETER LAPAROSCOPIC  Progress Note    Chandler Buchanan  3/14/2019    Pre-op Diagnosis:   ESRD on dialysis (CMS/[facility]) [N18.6, Z99.2]       Post-Op Diagnosis Codes:     * ESRD on dialysis (CMS/[facility]) [N18.6, Z99.2]    Procedure/CPT® Codes:      Procedure(s):  INSERTION PERITONEAL DIALYSIS CATHETER LAPAROSCOPIC AND OMENTOPEXY    Surgeon(s):  Jered Obrien MD    Anesthesia: General    Staff:   Circulator: Sravanthi Gaona RN; Rosio Moreno RN; Megan Ochoa RN  Scrub Person: Wilfrid Patton; Kristi You  Assistant: Yann Garza PA    Estimated Blood Loss: minimal    Urine Voided: * No values recorded between 3/14/2019  4:01 PM and 3/14/2019  5:00 PM *    Specimens:                None      Drains:  ARGYL coiled tip PD catheter    Findings: Great working catheter, redundant omentum down to the pelvis    Complications: None      Jered Obrien MD     Date: 3/14/2019  Time: 5:06 PM

## 2019-04-17 ENCOUNTER — OFFICE VISIT (OUTPATIENT)
Dept: SURGERY | Facility: CLINIC | Age: 34
End: 2019-04-17

## 2019-04-17 DIAGNOSIS — Z09 POSTOP CHECK: ICD-10-CM

## 2019-04-17 DIAGNOSIS — Z51.89 VISIT FOR WOUND CHECK: Primary | ICD-10-CM

## 2019-04-17 PROCEDURE — 99212 OFFICE O/P EST SF 10 MIN: CPT | Performed by: SURGERY

## 2019-04-17 NOTE — PROGRESS NOTES
CC: Postop check    S: This is a 34 y.o. male who presents for a post-operative visit after undergoing a Laparoscopic Peritoneal dialysis catheter placement on 3/14/19.  He also underwent upper endoscopy and colonoscopy for anemia on 3/12/2019    Patient reports he is doing well. Eating well without any significant nausea. Having good bowel function. No problems with constipation or diarrhea. No urinary complaints. Denies fever. Ambulating well and slowly returning to normal activities. Has been following with the PD nurse and catheter has been flushed without any problems    soft, nontender, nondistended, no masses or organomegaly  Incisions are healing well without any erythema or signs of infection. No signs of hernia. The catheter exit site is clean, dry and intact.      Path:   Final Diagnosis   1. Duodenum, Second Portion, Biopsy: Benign small bowel mucosa with               A. Normal intact villous surface.               B. No significant inflammation, no granulomas.               C. No viral inclusions or other organisms on routinely stained sections.      2. Duodenum, Bulb, Biopsy: Benign small bowel mucosa with               A. Normal intact villous surface.               B. No significant inflammation, no granulomas.               C. No viral inclusions or other organisms on routinely stained sections.                D. Benign submucosal Brunner gland hyperplasia.     3. Stomach, Prepyloric, Biopsy: Antral type gastric mucosa with                A. Focal minimal chronic active inflammation and repair.               B. No metaplasia, dysplasia or malignancy.               C. Negative for H. pylori by immunohistochemical staining.     4. Stomach, Body, Biopsy: Body fundic type gastric mucosa with               A. No significant inflammation.               B. No metaplasia, dysplasia or malignancy.               C. No H. pylori-like organisms identified.     5. Gastroesophageal Junction, Biopsy:  Squamoglandular mucosa and submucosa with                A. No definitive complete goblet cell metaplasia identified by routine staining.               B. Mild chronic active inflammation and repair without dysplasia or malignancy.               C. No significant esophageal eosinophilia.               D. No viral inclusions or fungal organisms identified.     6. Esophagus, Upper, Biopsy: Squamoglandular mucosa and submucosal with               A. No definitive complete goblet cell metaplasia identified by routine staining. See comment.               B. Mild chronic inflammation and repair without dysplasia or malignancy.               C. No significant esophageal eosinophilia.               D. No viral inclusions or fungal organisms identified.     7. Colon, Rectum, Biopsy: Benign colonic mucosa with               A. No hyperplastic or tubulovillous change.               B. No significant inflammation.               C. No crypt distortion or basement membrane thickening.               D. No viral inclusions or other organisms on routinely stained sections.      Assessment and plan:     The patient is a very pleasant 34 y.o. male s/p laparoscopic peritoneal dialysis catheter placement.  Patient is doing fine and does not have any specific complaints other than mild incisional pain.  The patient has been using the catheter that has been working great.  Upper endoscopy with mild chronic inflammation at the esophagus and colonoscopy was unremarkable.    -Continue using the catheter, avoid constipation  -PPI as needed  -We will need to have a colonoscopy in 10 years for screening    Patient was given time to ask questions and all of them were answered appropriately.  The patient verbalized understanding and agreed with the plan.    he will follow-up at our office on a prn basis unless there are any problems.

## 2019-07-11 ENCOUNTER — LAB (OUTPATIENT)
Dept: LAB | Facility: HOSPITAL | Age: 34
End: 2019-07-11

## 2019-07-11 ENCOUNTER — OFFICE VISIT (OUTPATIENT)
Dept: NEUROLOGY | Facility: CLINIC | Age: 34
End: 2019-07-11

## 2019-07-11 VITALS
HEIGHT: 69 IN | HEART RATE: 72 BPM | SYSTOLIC BLOOD PRESSURE: 126 MMHG | OXYGEN SATURATION: 98 % | DIASTOLIC BLOOD PRESSURE: 84 MMHG | WEIGHT: 167.7 LBS | BODY MASS INDEX: 24.84 KG/M2

## 2019-07-11 DIAGNOSIS — G60.9 IDIOPATHIC PERIPHERAL NEUROPATHY: ICD-10-CM

## 2019-07-11 DIAGNOSIS — G60.9 IDIOPATHIC PERIPHERAL NEUROPATHY: Primary | ICD-10-CM

## 2019-07-11 LAB
ERYTHROCYTE [SEDIMENTATION RATE] IN BLOOD: 23 MM/HR (ref 0–15)
FOLATE SERPL-MCNC: 5.38 NG/ML (ref 4.78–24.2)
T4 FREE SERPL-MCNC: 1.11 NG/DL (ref 0.93–1.7)
TSH SERPL DL<=0.05 MIU/L-ACNC: 0.85 MIU/ML (ref 0.27–4.2)
VIT B12 BLD-MCNC: 1193 PG/ML (ref 211–946)

## 2019-07-11 PROCEDURE — 82784 ASSAY IGA/IGD/IGG/IGM EACH: CPT

## 2019-07-11 PROCEDURE — 82595 ASSAY OF CRYOGLOBULIN: CPT

## 2019-07-11 PROCEDURE — 85652 RBC SED RATE AUTOMATED: CPT | Performed by: PSYCHIATRY & NEUROLOGY

## 2019-07-11 PROCEDURE — 82746 ASSAY OF FOLIC ACID SERUM: CPT | Performed by: PSYCHIATRY & NEUROLOGY

## 2019-07-11 PROCEDURE — 36415 COLL VENOUS BLD VENIPUNCTURE: CPT | Performed by: PSYCHIATRY & NEUROLOGY

## 2019-07-11 PROCEDURE — 82607 VITAMIN B-12: CPT | Performed by: PSYCHIATRY & NEUROLOGY

## 2019-07-11 PROCEDURE — 84165 PROTEIN E-PHORESIS SERUM: CPT | Performed by: PSYCHIATRY & NEUROLOGY

## 2019-07-11 PROCEDURE — 83825 ASSAY OF MERCURY: CPT | Performed by: PSYCHIATRY & NEUROLOGY

## 2019-07-11 PROCEDURE — 99243 OFF/OP CNSLTJ NEW/EST LOW 30: CPT | Performed by: PSYCHIATRY & NEUROLOGY

## 2019-07-11 PROCEDURE — 83655 ASSAY OF LEAD: CPT | Performed by: PSYCHIATRY & NEUROLOGY

## 2019-07-11 PROCEDURE — 84155 ASSAY OF PROTEIN SERUM: CPT | Performed by: PSYCHIATRY & NEUROLOGY

## 2019-07-11 PROCEDURE — 86592 SYPHILIS TEST NON-TREP QUAL: CPT | Performed by: PSYCHIATRY & NEUROLOGY

## 2019-07-11 PROCEDURE — 82525 ASSAY OF COPPER: CPT

## 2019-07-11 PROCEDURE — 86334 IMMUNOFIX E-PHORESIS SERUM: CPT

## 2019-07-11 PROCEDURE — 84443 ASSAY THYROID STIM HORMONE: CPT | Performed by: PSYCHIATRY & NEUROLOGY

## 2019-07-11 PROCEDURE — 84439 ASSAY OF FREE THYROXINE: CPT | Performed by: PSYCHIATRY & NEUROLOGY

## 2019-07-11 PROCEDURE — 82175 ASSAY OF ARSENIC: CPT | Performed by: PSYCHIATRY & NEUROLOGY

## 2019-07-11 RX ORDER — SUCROFERRIC OXYHYDROXIDE 500 MG/1
TABLET, CHEWABLE ORAL
Refills: 3 | COMMUNITY
Start: 2019-06-10 | End: 2019-10-31 | Stop reason: HOSPADM

## 2019-07-11 NOTE — PROGRESS NOTES
CC: Numbness in the feet    HPI:  Chandler Buchanan is a  34 y.o.  right-handed white male who was sent by Dr. Almanzar for a neurologic consultation regarding numbness in the feet.  The patient developed renal failure diagnosed January 13, 2019 and had hypertension at the time but was unaware of this previously.  His evaluation did not specifically identify a cause.  His renal biopsy showed inflammation.  Laboratories showed negative ANCA, double-stranded DNA and he was found not to have diabetes with hemoglobin A1c of 5%.  A fistula was placed in his left arm and he is dialyzed 3 days weekly.  He had a lot of pedal edema at the time which has improved.  He notices his balance is off and his feet are numb to the ankles and have some hypersensitivity.  He says he occasionally gets numbness in his hands either one.  He does not have substantial painful symptoms in the hands or feet.  He had previously injured his back about 5 years ago but no surgery and chiropractic seems to help.  Patient is not a smoker or a drinker.    He has no history of significant head trauma, meningitis, seizures stroke or syncope.  His family history includes stroke in an uncle a couple of months ago age 61.  No one with brain tumor, brain hemorrhage or aneurysm of her brain artery.  No one with peripheral neuropathy.    His review of systems was positive for headaches at the time of diagnosis but he states he does not really have headaches now.        Past Medical History:   Diagnosis Date   • Acute renal failure (CMS/HCC)     ON DIALYSIS MON,WED AND FRI   • Anemia    • Cardiomyopathy (CMS/HCC)     New onset systolic    • GERD (gastroesophageal reflux disease)    • History of transfusion    • Hypertension    • Pericardial effusion    • Puncture wound     RIGHT NECK HEALING FROM CATHETER REMOVAL   • Stage 4 chronic kidney disease (CMS/HCC)          Past Surgical History:   Procedure Laterality Date   • ARTERIOVENOUS FISTULA/SHUNT SURGERY Left  1/23/2019    Procedure: LEFT ARM FISTULA CREATION;  Surgeon: Bogdan Velásquez MD;  Location:  [facility];  Service: Vascular   • COLONOSCOPY N/A 3/12/2019    Procedure: COLONOSCOPY wwith Biiopsies;  Surgeon: Jered Obrien MD;  Location:  [facility];  Service: General   • ENDOSCOPY N/A 3/12/2019    Procedure: ESOPHAGOGASTRODUODENOSCOPY WITH BIOPSY;  Surgeon: Jerde Obrien MD;  [facility];  Service: General   • INSERTION HEMODIALYSIS CATHETER N/A 1/21/2019    Procedure: HEMODIALYSIS CATHETER INSERTION;  Surgeon: Cuong Pedraza Jr., MD;  Location:  [facility];  Service: Vascular   • INSERTION PERITONEAL DIALYSIS CATHETER N/A 3/14/2019    Procedure: INSERTION PERITONEAL DIALYSIS CATHETER LAPAROSCOPIC AND OMENTOPEXY;  Surgeon: Jered Obrien MD;  Location:  [facility];  Service: General           Current Outpatient Medications:   •  amLODIPine (NORVASC) 5 MG tablet, Take 5 mg by mouth Daily., Disp: , Rfl:   •  carvedilol (COREG) 25 MG tablet, Take 1 tablet by mouth Every 12 (Twelve) Hours., Disp: 60 tablet, Rfl: 0  •  epoetin che (EPOGEN,PROCRIT) 81135 UNIT/ML injection, Inject 1 mL under the skin into the appropriate area as directed 3 (Three) Times a Week. (Patient taking differently: Inject 10,000 Units under the skin into the appropriate area as directed 3 (Three) Times a Week.), Disp: , Rfl:   •  pantoprazole (PROTONIX) 40 MG EC tablet, Take 1 tablet by mouth Daily., Disp: 30 tablet, Rfl: 0  •  VELPHORO 500 MG chewable tablet, CHEW & SWALLOW 3 TABLETS BY MOUTH FOUR TIMES DAILY WITH MEALS, Disp: , Rfl: 3  •  calcium acetate (PHOSLO) 667 MG capsule, Take 3 capsules by mouth 3 (Three) Times a Day With Meals., Disp: 90 capsule, Rfl: 0      Family History   Problem Relation Age of Onset   • Colon cancer Mother    • Seizures Maternal Grandmother    • Malig Hyperthermia Neg Hx          Social History     Socioeconomic History   • Marital  "status: Single     Spouse name: Not on file   • Number of children: Not on file   • Years of education: Not on file   • Highest education level: Not on file   Tobacco Use   • Smoking status: Never Smoker   • Smokeless tobacco: Never Used   Substance and Sexual Activity   • Alcohol use: No     Frequency: Never   • Drug use: No   • Sexual activity: Defer         No Known Allergies      Pain Scale: 1/10      ROS:  Review of Systems   Constitutional: Negative for activity change, appetite change and fatigue.   HENT: Negative for ear pain, facial swelling and hearing loss.    Eyes: Negative for pain, redness and itching.   Respiratory: Negative for cough, choking and shortness of breath.    Cardiovascular: Negative for chest pain and leg swelling.   Gastrointestinal: Negative for abdominal pain, nausea and vomiting.   Endocrine: Negative for cold intolerance and heat intolerance.   Musculoskeletal: Positive for back pain. Negative for arthralgias and joint swelling.   Skin: Negative for color change, pallor, rash and wound.   Allergic/Immunologic: Negative for environmental allergies and food allergies.   Neurological: Positive for numbness. Negative for dizziness, tremors, seizures, syncope, facial asymmetry, speech difficulty, weakness, light-headedness and headaches.   Hematological: Does not bruise/bleed easily.   Psychiatric/Behavioral: Negative for agitation, behavioral problems, confusion, decreased concentration, dysphoric mood, hallucinations, self-injury, sleep disturbance and suicidal ideas. The patient is not nervous/anxious and is not hyperactive.          I have reviewed and agree with the above ROS completed by the medical assistant.      Physical Exam:  Vitals:    07/11/19 1359   BP: 126/84   Pulse: 72   SpO2: 98%   Weight: 76.1 kg (167 lb 11.2 oz)   Height: 175.3 cm (69\")     Orthostatic BP:    Body mass index is 24.76 kg/m².    Physical Exam  General: Well-developed white male no acute distress  HEENT: " Normocephalic no evidence of trauma.  Discs flat.  No AV nicking.  Neck: Supple.  No thyromegaly.  No carotid bruits or right subclavian bruit.  Left subclavian bruit present (side of his fistula)  Heart: Regular rate and rhythm no murmurs  Extremities: No pedal edema.  Radial pulses strong and simultaneous      Neurological Exam:   Mental Status: Awake, alert, oriented to person, place and time.  Conversant without evidence of an affective disorder, thought disorder, delusions or hallucinations.  Attention span and concentration are normal.  HCF: No aphasia, apraxia or dysarthria.  Recent and remote memory intact.  Knowledge of recent events intact.  CN: I:   II: Visual fields full without left inattention   III, IV, VI: Eye movements intact without nystagmus or ptosis.  Pupils equal round and reactive to light.   V,VII: Light touch and pinprick intact all 3 divisions of V.  Facial muscles symmetrical.   VIII: Hearing intact to finger rub   IX,X: Soft palate elevates symmetrically   XI: Sternomastoid and trapezius are strong.   XII: Tongue midline without atrophy or fasciculations  Motor: Normal tone and bulk in the upper and lower extremities   Power testing: Mild weakness right abductor pollicis brevis with normal strength in all other muscles tested in the arms.  In the lower extremities there is great toe extensor and flexor weakness with gastrocnemius weakness but not tibialis anterior weakness.  All proximal muscles were normal.  Reflexes: Upper extremities: Upper extremities +3.        Lower extremities: Knee jerk +2, ankle jerk +1        Toe signs: Downgoing  Sensory: Light touch: Diffusely intact arms and legs        Pinprick: Diffusely intact but hypersensitive on the feet        Vibration: Intact at the ankles        Position: Intact in the great toes    Cerebellar: Finger-to-nose: Normal           Rapid movement: Normal           Heel-to-shin: Normal  Gait and Station: Casual walk is mildly broad-based.   He can toe walk but cannot heel walk.  Tandem walking is mildly unsteady.  No Romberg no drift    Results:      Lab Results   Component Value Date    GLUCOSE 89 03/14/2019    BUN 59 (H) 03/14/2019    CREATININE 7.92 (H) 03/14/2019    EGFRIFNONA 8 (L) 03/14/2019    EGFRIFAFRI  03/14/2019      Comment:      <15 Indicative of kidney failure.    BCR 7.4 03/14/2019    CO2 27.2 03/14/2019    CALCIUM 9.8 03/14/2019    ALBUMIN 3.10 (L) 01/25/2019    AST 7 01/16/2019    ALT 10 01/16/2019       Lab Results   Component Value Date    WBC 7.38 03/14/2019    HGB 10.6 (L) 03/14/2019    HCT 33.7 (L) 03/14/2019    MCV 92.8 03/14/2019     (L) 03/14/2019         .No results found for: RPR      Lab Results   Component Value Date    TSH 0.634 01/13/2019         No results found for: [id number]      No results found for: FOLATE      Lab Results   Component Value Date    HGBA1C 5.00 01/16/2019               Assessment:   1.  Peripheral neuropathy, unusual presentation.  It is primarily tibial nerve but there is some peroneal nerve involvement on both sides.  The right median nerve appears to have some abnormality also.  The patient did not have an acute illness consistent with critical illness neuropathy i.e. respiratory failure and hypotension.          Plan:  1.  EMG both legs and some study of the right arm  2.  Labs including sed rate, RPR, B12 and folate, thyroid functions, heavy metals, copper level, cryoglobulins, SPEP and IEP  3.  To follow-up at time of his EMG                      Dictated utilizing Dragon dictation.

## 2019-07-12 LAB
ALBUMIN SERPL-MCNC: 3.5 G/DL (ref 2.9–4.4)
ALBUMIN/GLOB SERPL: 1.6 {RATIO} (ref 0.7–1.7)
ALPHA1 GLOB FLD ELPH-MCNC: 0.2 G/DL (ref 0–0.4)
ALPHA2 GLOB SERPL ELPH-MCNC: 0.5 G/DL (ref 0.4–1)
B-GLOBULIN SERPL ELPH-MCNC: 0.9 G/DL (ref 0.7–1.3)
GAMMA GLOB SERPL ELPH-MCNC: 0.6 G/DL (ref 0.4–1.8)
GLOBULIN SER CALC-MCNC: 2.2 G/DL (ref 2.2–3.9)
IGA SERPL-MCNC: 336 MG/DL (ref 90–386)
IGG SERPL-MCNC: 678 MG/DL (ref 700–1600)
IGM SERPL-MCNC: 28 MG/DL (ref 20–172)
Lab: ABNORMAL
M-SPIKE: ABNORMAL G/DL
PROT PATTERN SERPL ELPH-IMP: ABNORMAL
PROT PATTERN SERPL IFE-IMP: ABNORMAL
PROT SERPL-MCNC: 5.7 G/DL (ref 6–8.5)
RPR SER QL: NORMAL

## 2019-07-13 LAB — COPPER SERPL-MCNC: 68 UG/DL (ref 72–166)

## 2019-07-15 LAB — CRYOGLOB SER QL 1D COLD INC: NORMAL

## 2019-07-17 LAB
ARSENIC BLD-MCNC: 10 UG/L (ref 2–23)
LEAD BLD-MCNC: NORMAL UG/DL (ref 0–4)
MERCURY BLD-MCNC: 2 UG/L (ref 0–14.9)

## 2019-09-17 ENCOUNTER — PREP FOR SURGERY (OUTPATIENT)
Dept: OTHER | Facility: HOSPITAL | Age: 34
End: 2019-09-17

## 2019-09-17 ENCOUNTER — LAB (OUTPATIENT)
Dept: LAB | Facility: HOSPITAL | Age: 34
End: 2019-09-17

## 2019-09-17 ENCOUNTER — PROCEDURE VISIT (OUTPATIENT)
Dept: NEUROLOGY | Facility: CLINIC | Age: 34
End: 2019-09-17

## 2019-09-17 VITALS — WEIGHT: 167 LBS | HEIGHT: 69 IN | BODY MASS INDEX: 24.73 KG/M2

## 2019-09-17 DIAGNOSIS — G60.9 IDIOPATHIC PERIPHERAL NEUROPATHY: Primary | ICD-10-CM

## 2019-09-17 DIAGNOSIS — G60.9 IDIOPATHIC PERIPHERAL NEUROPATHY: ICD-10-CM

## 2019-09-17 PROCEDURE — 86255 FLUORESCENT ANTIBODY SCREEN: CPT

## 2019-09-17 PROCEDURE — 86256 FLUORESCENT ANTIBODY TITER: CPT

## 2019-09-17 PROCEDURE — 83516 IMMUNOASSAY NONANTIBODY: CPT

## 2019-09-17 PROCEDURE — 36415 COLL VENOUS BLD VENIPUNCTURE: CPT

## 2019-09-17 RX ORDER — GENTAMICIN SULFATE 1 MG/G
CREAM TOPICAL
Refills: 3 | COMMUNITY
Start: 2019-07-26 | End: 2019-10-30

## 2019-09-17 NOTE — PROGRESS NOTES
EMG and Nerve Conduction Studies    I.      Instrument used: Neuromax 1002  II.     Please see data sheets for tabular summary of NCS and details on methods, temperatures and lab standards.   III.    EMG muscles tested for upper extremity studies include the deltoid, biceps, triceps, pronator teres, extensor digitorum communis, first dorsal interosseous and abductor pollicis brevis.    IV.   EMG muscles tested for lower extremity studies include the vastus lateralis, tibialis anterior, peroneus longus, medial gastrocnemius and extensor digitorum brevis.    V.    Additional muscles tested as needed.  Paraspinal muscles tested as needed.   VI.   Please see data sheets for tabular summary of EMG findings.   VII. The complete report includes the data sheets.      Indication: Peripheral neuropathy  History: 34-year-old male with end-stage renal disease on hemodialysis with peripheral neuropathy.  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      Ht: 175.3 cm  Wt: 75.8 kg; BMI 24.7  HbA1C:   Lab Results   Component Value Date    HGBA1C 5.00 01/16/2019     TSH:   Lab Results   Component Value Date    TSH 0.853 07/11/2019       Technical summary:  Nerve conduction studies were obtained in the right arm, right leg and some comparisons in the left arm.  Skin temperatures were at least 32 °C measured on the palms and at the ankle.  Needle examination was obtained on selected muscles of the right arm and leg    Results:  1.  Severely prolonged right median sensory latency at 5.9 ms with low amplitude of 17 µV.  Severely prolonged left median sensory latency at 7.9 ms with low amplitude of 6.3 µV.  2.  Prolonged right ulnar sensory latency at 4.4 ms with normal amplitude.  Prolonged left ulnar sensory latency at 4.1 ms with low amplitude of 14.7 µV.  3.  Normal right radial sensory study.  4.  Severely prolonged right median motor latency at 6.4 ms with slow conduction velocity in the forearm at 38.9 m/s.  Normal velocity in the above  elbow segment.  Normal amplitudes without evidence of motor conduction block at the wrist or proximally.  Severely prolonged left median motor latency at 8 ms with slow velocity in the forearm at 40 m/s.  The above elbow segment was slow also at 42.2 m/s.  The amplitude was normal from wrist stimulation at 5.5 ms it was somewhat lower proximally but not greater than 50% lower.  (Mild technical problem given the location of the fistula regarding proximal stimulation)  5.  Normal right ulnar motor study.  6.  Prolonged right sural sensory latency at 4.9 ms with normal amplitude.  7.  Prolonged right superficial peroneal sensory latency at 4.9 ms with low amplitude of 3.3 µV.  8.  Absent right peroneal motor responses recorded over the extensor digitorum brevis.  Normal right peroneal motor velocity in the short segment across the fibular head when recorded over the tibialis anterior.  Normal amplitudes.  9.  Very low amplitude right tibial motor response from ankle stimulation at 0.142 mV.  No response from the popliteal fossa.  10.  Needle examination of selected muscles of the right arm and leg showed 1 train of positive sharp waves in the peroneus longus and 2+ positive sharp waves in the medial gastrocnemius.  There was a mild increased number of large motor units in the gastrocnemius with polyphasics, increased firing rate and reduced interference pattern.  There was normal insertional activity of the EDB with no motor units.  Remaining muscles tested showed normal insertional activities, motor units and recruitment.  Lumbar paraspinals at L5 showed no abnormality.    In the right arm there were normal insertional activities throughout with normal motor units and recruitment except perhaps the abductor pollicis brevis showing some reduced recruitment.    Impression:  Abnormal study showing severe peripheral neuropathy.  There are significant demyelinative characteristics and some of them nerves tested.  Although  no clear-cut conduction block was seen in the upper extremity studies I cannot entirely exclude conduction block in the right peroneal and tibial motor studies due to the severity of finding.  Typically severe axonal findings are associated with profuse needle exam changes in muscles innervated by said nerves.  The needle exam changes were not to this degree to confirm the major findings to be axonal.  Clinical correlation is suggested.  Study results were discussed with the patient.    Adolph Atkinson M.D.      Addendum:  Given the results of the EMG as well as the relative absence of cause for his neuropathy on lab work further investigation is recommended.  A motor and sensory neuropathy panel and lumbar puncture have been ordered.  He will follow-up after the LP.  We discussed nerve biopsy but most cases of vasculitic neuropathy are very painful which is is not and he has no skin lesions consistent with cutaneous vasculitis.  Additional thoughts include potential second opinion.    GNS        Dictated utilizing Dragon dictation.

## 2019-10-02 LAB
ANTI-GM1 (IGG)*: NORMAL TITER
ANTI-RI ANTIBODIES*: NORMAL TITER
GD1A GANGL IGG TITR SER IA: NORMAL TITER
GD1A GANGL IGM TITR SER IA: NORMAL TITER
GD1B GANGL IGG TITR SER: NORMAL TITER
GD1B GANGL IGM TITR SER: NORMAL TITER
GM1 ASIALO IGG TITR SER IA: NORMAL TITER
GM1 ASIALO IGM TITR SER IA: NORMAL TITER
GM1 GANGL IGM TITR SER IA: NORMAL TITER
HU1 AB TITR SER: NORMAL TITER
MAG IGM TITR SER: NORMAL TITER

## 2019-10-09 ENCOUNTER — TELEPHONE (OUTPATIENT)
Dept: NEUROLOGY | Facility: CLINIC | Age: 34
End: 2019-10-09

## 2019-10-09 NOTE — TELEPHONE ENCOUNTER
Called pt lvm to remind pt of order for LP to see if he can get it completed before appt on Monday 10/14

## 2019-10-17 ENCOUNTER — HOSPITAL ENCOUNTER (OUTPATIENT)
Dept: GENERAL RADIOLOGY | Facility: HOSPITAL | Age: 34
Discharge: HOME | End: 2019-10-17

## 2019-10-18 ENCOUNTER — TELEPHONE (OUTPATIENT)
Dept: SURGERY | Facility: CLINIC | Age: 34
End: 2019-10-18

## 2019-10-18 ENCOUNTER — PREP FOR SURGERY (OUTPATIENT)
Dept: OTHER | Facility: HOSPITAL | Age: 34
End: 2019-10-18

## 2019-10-18 DIAGNOSIS — Z99.2 PERITONEAL DIALYSIS CATHETER IN PLACE (HCC): Primary | ICD-10-CM

## 2019-10-18 RX ORDER — CEFAZOLIN SODIUM 2 G/100ML
2 INJECTION, SOLUTION INTRAVENOUS ONCE
Status: CANCELLED | OUTPATIENT
Start: 2019-10-31 | End: 2019-10-18

## 2019-10-21 ENCOUNTER — TELEPHONE (OUTPATIENT)
Dept: SURGERY | Facility: CLINIC | Age: 34
End: 2019-10-21

## 2019-10-21 PROBLEM — Z99.2 PERITONEAL DIALYSIS CATHETER IN PLACE (HCC): Status: ACTIVE | Noted: 2019-10-21

## 2019-10-21 NOTE — TELEPHONE ENCOUNTER
[facility] once I receive a authorization I will call pt to schedule

## 2019-10-31 ENCOUNTER — HOSPITAL ENCOUNTER (OUTPATIENT)
Facility: HOSPITAL | Age: 34
Setting detail: HOSPITAL OUTPATIENT SURGERY
Discharge: HOME | End: 2019-10-31
Attending: SURGERY | Admitting: SURGERY

## 2019-10-31 ENCOUNTER — ANESTHESIA (OUTPATIENT)
Dept: PERIOP | Facility: HOSPITAL | Age: 34
End: 2019-10-31

## 2019-10-31 ENCOUNTER — ANESTHESIA EVENT (OUTPATIENT)
Dept: PERIOP | Facility: HOSPITAL | Age: 34
End: 2019-10-31

## 2019-10-31 VITALS
SYSTOLIC BLOOD PRESSURE: 126 MMHG | TEMPERATURE: 97.6 F | HEIGHT: 69 IN | HEART RATE: 81 BPM | RESPIRATION RATE: 16 BRPM | BODY MASS INDEX: 23.96 KG/M2 | OXYGEN SATURATION: 100 % | WEIGHT: 161.8 LBS | DIASTOLIC BLOOD PRESSURE: 86 MMHG

## 2019-10-31 DIAGNOSIS — Z99.2 PERITONEAL DIALYSIS CATHETER IN PLACE (HCC): ICD-10-CM

## 2019-10-31 LAB
ANION GAP SERPL CALCULATED.3IONS-SCNC: 16.3 MMOL/L (ref 5–15)
BUN BLD-MCNC: 63 MG/DL (ref 6–20)
BUN/CREAT SERPL: 6.2 (ref 7–25)
CALCIUM SPEC-SCNC: 9.4 MG/DL (ref 8.6–10.5)
CHLORIDE SERPL-SCNC: 94 MMOL/L (ref 98–107)
CO2 SERPL-SCNC: 28.7 MMOL/L (ref 22–29)
CREAT BLD-MCNC: 10.15 MG/DL (ref 0.76–1.27)
DEPRECATED RDW RBC AUTO: 42.2 FL (ref 37–54)
ERYTHROCYTE [DISTWIDTH] IN BLOOD BY AUTOMATED COUNT: 12.4 % (ref 12.3–15.4)
GFR SERPL CREATININE-BSD FRML MDRD: 6 ML/MIN/1.73
GFR SERPL CREATININE-BSD FRML MDRD: ABNORMAL ML/MIN/{1.73_M2}
GLUCOSE BLD-MCNC: 96 MG/DL (ref 65–99)
HCT VFR BLD AUTO: 27.3 % (ref 37.5–51)
HGB BLD-MCNC: 9.4 G/DL (ref 13–17.7)
MCH RBC QN AUTO: 31.6 PG (ref 26.6–33)
MCHC RBC AUTO-ENTMCNC: 34.4 G/DL (ref 31.5–35.7)
MCV RBC AUTO: 91.9 FL (ref 79–97)
PLATELET # BLD AUTO: 131 10*3/MM3 (ref 140–450)
PMV BLD AUTO: 9.2 FL (ref 6–12)
POTASSIUM BLD-SCNC: 3.8 MMOL/L (ref 3.5–5.2)
RBC # BLD AUTO: 2.97 10*6/MM3 (ref 4.14–5.8)
SODIUM BLD-SCNC: 139 MMOL/L (ref 136–145)
WBC NRBC COR # BLD: 6.85 10*3/MM3 (ref 3.4–10.8)

## 2019-10-31 PROCEDURE — 25010000002 PROPOFOL 10 MG/ML EMULSION: Performed by: ANESTHESIOLOGY

## 2019-10-31 PROCEDURE — 80048 BASIC METABOLIC PNL TOTAL CA: CPT | Performed by: SURGERY

## 2019-10-31 PROCEDURE — 93005 ELECTROCARDIOGRAM TRACING: CPT | Performed by: SURGERY

## 2019-10-31 PROCEDURE — 25010000003 CEFAZOLIN IN DEXTROSE 2-4 GM/100ML-% SOLUTION: Performed by: SURGERY

## 2019-10-31 PROCEDURE — 85027 COMPLETE CBC AUTOMATED: CPT | Performed by: SURGERY

## 2019-10-31 PROCEDURE — S0260 H&P FOR SURGERY: HCPCS | Performed by: SURGERY

## 2019-10-31 PROCEDURE — 93010 ELECTROCARDIOGRAM REPORT: CPT | Performed by: INTERNAL MEDICINE

## 2019-10-31 PROCEDURE — 25010000002 MIDAZOLAM PER 1 MG: Performed by: ANESTHESIOLOGY

## 2019-10-31 PROCEDURE — 49422 REMOVE TUNNELED IP CATH: CPT | Performed by: SURGERY

## 2019-10-31 RX ORDER — ACETAMINOPHEN 325 MG/1
650 TABLET ORAL ONCE AS NEEDED
Status: CANCELLED | OUTPATIENT
Start: 2019-10-31

## 2019-10-31 RX ORDER — PROMETHAZINE HYDROCHLORIDE 12.5 MG/1
12.5 TABLET ORAL EVERY 6 HOURS PRN
Qty: 10 TABLET | Refills: 0 | Status: SHIPPED | OUTPATIENT
Start: 2019-10-31 | End: 2020-12-04

## 2019-10-31 RX ORDER — SODIUM CHLORIDE 0.9 % (FLUSH) 0.9 %
3 SYRINGE (ML) INJECTION EVERY 12 HOURS SCHEDULED
Status: DISCONTINUED | OUTPATIENT
Start: 2019-10-31 | End: 2019-10-31 | Stop reason: HOSPADM

## 2019-10-31 RX ORDER — PROMETHAZINE HYDROCHLORIDE 25 MG/ML
12.5 INJECTION, SOLUTION INTRAMUSCULAR; INTRAVENOUS ONCE AS NEEDED
Status: CANCELLED | OUTPATIENT
Start: 2019-10-31

## 2019-10-31 RX ORDER — PROPOFOL 10 MG/ML
VIAL (ML) INTRAVENOUS AS NEEDED
Status: DISCONTINUED | OUTPATIENT
Start: 2019-10-31 | End: 2019-10-31 | Stop reason: SURG

## 2019-10-31 RX ORDER — ONDANSETRON 2 MG/ML
4 INJECTION INTRAMUSCULAR; INTRAVENOUS ONCE AS NEEDED
Status: CANCELLED | OUTPATIENT
Start: 2019-10-31

## 2019-10-31 RX ORDER — FAMOTIDINE 10 MG/ML
20 INJECTION, SOLUTION INTRAVENOUS ONCE
Status: COMPLETED | OUTPATIENT
Start: 2019-10-31 | End: 2019-10-31

## 2019-10-31 RX ORDER — TRAMADOL HYDROCHLORIDE 50 MG/1
50 TABLET ORAL 2 TIMES DAILY
Qty: 20 TABLET | Refills: 0 | Status: SHIPPED | OUTPATIENT
Start: 2019-10-31 | End: 2020-10-30

## 2019-10-31 RX ORDER — HYDRALAZINE HYDROCHLORIDE 20 MG/ML
5 INJECTION INTRAMUSCULAR; INTRAVENOUS
Status: CANCELLED | OUTPATIENT
Start: 2019-10-31

## 2019-10-31 RX ORDER — EPHEDRINE SULFATE 50 MG/ML
5 INJECTION, SOLUTION INTRAVENOUS ONCE AS NEEDED
Status: CANCELLED | OUTPATIENT
Start: 2019-10-31

## 2019-10-31 RX ORDER — SODIUM CHLORIDE, SODIUM LACTATE, POTASSIUM CHLORIDE, CALCIUM CHLORIDE 600; 310; 30; 20 MG/100ML; MG/100ML; MG/100ML; MG/100ML
9 INJECTION, SOLUTION INTRAVENOUS CONTINUOUS
Status: DISCONTINUED | OUTPATIENT
Start: 2019-10-31 | End: 2019-10-31 | Stop reason: HOSPADM

## 2019-10-31 RX ORDER — PROMETHAZINE HYDROCHLORIDE 25 MG/1
25 SUPPOSITORY RECTAL ONCE AS NEEDED
Status: CANCELLED | OUTPATIENT
Start: 2019-10-31

## 2019-10-31 RX ORDER — MIDAZOLAM HYDROCHLORIDE 1 MG/ML
INJECTION INTRAMUSCULAR; INTRAVENOUS AS NEEDED
Status: DISCONTINUED | OUTPATIENT
Start: 2019-10-31 | End: 2019-10-31 | Stop reason: SURG

## 2019-10-31 RX ORDER — FLUMAZENIL 0.1 MG/ML
0.2 INJECTION INTRAVENOUS AS NEEDED
Status: CANCELLED | OUTPATIENT
Start: 2019-10-31

## 2019-10-31 RX ORDER — LIDOCAINE HYDROCHLORIDE 20 MG/ML
INJECTION, SOLUTION INFILTRATION; PERINEURAL AS NEEDED
Status: DISCONTINUED | OUTPATIENT
Start: 2019-10-31 | End: 2019-10-31 | Stop reason: SURG

## 2019-10-31 RX ORDER — DIPHENHYDRAMINE HYDROCHLORIDE 50 MG/ML
12.5 INJECTION INTRAMUSCULAR; INTRAVENOUS
Status: CANCELLED | OUTPATIENT
Start: 2019-10-31

## 2019-10-31 RX ORDER — HYDROCODONE BITARTRATE AND ACETAMINOPHEN 7.5; 325 MG/1; MG/1
1 TABLET ORAL ONCE AS NEEDED
Status: CANCELLED | OUTPATIENT
Start: 2019-10-31

## 2019-10-31 RX ORDER — LABETALOL HYDROCHLORIDE 5 MG/ML
5 INJECTION, SOLUTION INTRAVENOUS
Status: CANCELLED | OUTPATIENT
Start: 2019-10-31

## 2019-10-31 RX ORDER — PROMETHAZINE HYDROCHLORIDE 25 MG/ML
6.25 INJECTION, SOLUTION INTRAMUSCULAR; INTRAVENOUS
Status: CANCELLED | OUTPATIENT
Start: 2019-10-31

## 2019-10-31 RX ORDER — NALOXONE HCL 0.4 MG/ML
0.2 VIAL (ML) INJECTION AS NEEDED
Status: CANCELLED | OUTPATIENT
Start: 2019-10-31

## 2019-10-31 RX ORDER — BUPIVACAINE HYDROCHLORIDE AND EPINEPHRINE 5; 5 MG/ML; UG/ML
INJECTION, SOLUTION EPIDURAL; INTRACAUDAL; PERINEURAL AS NEEDED
Status: DISCONTINUED | OUTPATIENT
Start: 2019-10-31 | End: 2019-10-31 | Stop reason: HOSPADM

## 2019-10-31 RX ORDER — LIDOCAINE HYDROCHLORIDE 10 MG/ML
0.5 INJECTION, SOLUTION EPIDURAL; INFILTRATION; INTRACAUDAL; PERINEURAL ONCE AS NEEDED
Status: DISCONTINUED | OUTPATIENT
Start: 2019-10-31 | End: 2019-10-31 | Stop reason: HOSPADM

## 2019-10-31 RX ORDER — OXYCODONE AND ACETAMINOPHEN 7.5; 325 MG/1; MG/1
1 TABLET ORAL ONCE AS NEEDED
Status: CANCELLED | OUTPATIENT
Start: 2019-10-31

## 2019-10-31 RX ORDER — DIPHENHYDRAMINE HCL 25 MG
25 CAPSULE ORAL
Status: CANCELLED | OUTPATIENT
Start: 2019-10-31

## 2019-10-31 RX ORDER — PROPOFOL 10 MG/ML
VIAL (ML) INTRAVENOUS CONTINUOUS PRN
Status: DISCONTINUED | OUTPATIENT
Start: 2019-10-31 | End: 2019-10-31 | Stop reason: SURG

## 2019-10-31 RX ORDER — PROMETHAZINE HYDROCHLORIDE 25 MG/1
25 TABLET ORAL ONCE AS NEEDED
Status: CANCELLED | OUTPATIENT
Start: 2019-10-31

## 2019-10-31 RX ORDER — CEFAZOLIN SODIUM 2 G/100ML
2 INJECTION, SOLUTION INTRAVENOUS ONCE
Status: COMPLETED | OUTPATIENT
Start: 2019-10-31 | End: 2019-10-31

## 2019-10-31 RX ORDER — SODIUM CHLORIDE 0.9 % (FLUSH) 0.9 %
3-10 SYRINGE (ML) INJECTION AS NEEDED
Status: DISCONTINUED | OUTPATIENT
Start: 2019-10-31 | End: 2019-10-31 | Stop reason: HOSPADM

## 2019-10-31 RX ORDER — HYDROMORPHONE HYDROCHLORIDE 1 MG/ML
0.5 INJECTION, SOLUTION INTRAMUSCULAR; INTRAVENOUS; SUBCUTANEOUS
Status: CANCELLED | OUTPATIENT
Start: 2019-10-31

## 2019-10-31 RX ORDER — FENTANYL CITRATE 50 UG/ML
50 INJECTION, SOLUTION INTRAMUSCULAR; INTRAVENOUS
Status: CANCELLED | OUTPATIENT
Start: 2019-10-31

## 2019-10-31 RX ORDER — SODIUM CHLORIDE 9 MG/ML
9 INJECTION, SOLUTION INTRAVENOUS CONTINUOUS
Status: DISCONTINUED | OUTPATIENT
Start: 2019-10-31 | End: 2019-10-31 | Stop reason: HOSPADM

## 2019-10-31 RX ADMIN — SODIUM CHLORIDE 9 ML/HR: 9 INJECTION, SOLUTION INTRAVENOUS at 13:42

## 2019-10-31 RX ADMIN — Medication 2 MG: at 17:02

## 2019-10-31 RX ADMIN — LIDOCAINE HYDROCHLORIDE 100 MG: 20 INJECTION, SOLUTION INFILTRATION; PERINEURAL at 16:57

## 2019-10-31 RX ADMIN — PROPOFOL 200 MCG/KG/MIN: 10 INJECTION, EMULSION INTRAVENOUS at 16:57

## 2019-10-31 RX ADMIN — FAMOTIDINE 20 MG: 10 INJECTION INTRAVENOUS at 13:41

## 2019-10-31 RX ADMIN — CEFAZOLIN SODIUM 2 G: 2 INJECTION, SOLUTION INTRAVENOUS at 17:02

## 2019-10-31 RX ADMIN — PROPOFOL 100 MG: 10 INJECTION, EMULSION INTRAVENOUS at 16:57

## 2019-10-31 NOTE — ANESTHESIA PREPROCEDURE EVALUATION
Anesthesia Evaluation     Patient summary reviewed and Nursing notes reviewed                Airway   Mallampati: II  TM distance: >3 FB  Neck ROM: full  No difficulty expected  Dental - normal exam     Pulmonary - normal exam   Cardiovascular - normal exam    (+) hypertension well controlled 2 medications or greater, pericardial effusion,     ROS comment: Cardiomyopathy    Neuro/Psych  (+) numbness,       ROS Comment: Idiopathic peripheral neuropathy  GI/Hepatic/Renal/Endo    (+)  GERD,  renal disease ESRD and dialysis,     Musculoskeletal     Abdominal  - normal exam   Substance History      OB/GYN          Other                        Anesthesia Plan    ASA 3     MAC   (Patient does not want any pre-op Versed (probable just Propofol drip in OR))  intravenous induction   Anesthetic plan, all risks, benefits, and alternatives have been provided, discussed and informed consent has been obtained with: patient.

## 2019-10-31 NOTE — ANESTHESIA POSTPROCEDURE EVALUATION
Patient: Chandler Buchanan    Procedure Summary     Date:  10/31/19 Room / Location:  [facility] OR  / [facility] MAIN OR    Anesthesia Start:  1657 Anesthesia Stop:  1744    Procedure:  REMOVAL PERITONEAL DIALYSIS CATHETER (N/A Abdomen) Diagnosis:       Peritoneal dialysis catheter in place (CMS/HCC)      (Peritoneal dialysis catheter in place (CMS/HCC) [Z99.2])    Surgeon:  Jered Obrien MD Provider:  Dipak Yost MD    Anesthesia Type:  MAC ASA Status:  3          Anesthesia Type: MAC  Last vitals  BP   126/86 (10/31/19 1810)   Temp   36.4 °C (97.6 °F) (10/31/19 1810)   Pulse   81 (10/31/19 1810)   Resp   16 (10/31/19 1810)     SpO2   100 % (10/31/19 1810)     Post Anesthesia Care and Evaluation    Patient location during evaluation: bedside  Patient participation: complete - patient participated  Level of consciousness: awake and alert  Pain management: adequate  Airway patency: patent  Anesthetic complications: No anesthetic complications  PONV Status: controlled  Cardiovascular status: blood pressure returned to baseline and acceptable  Respiratory status: acceptable  Hydration status: acceptable

## 2019-11-14 ENCOUNTER — TELEPHONE (OUTPATIENT)
Dept: SURGERY | Facility: CLINIC | Age: 34
End: 2019-11-14

## 2019-11-22 ENCOUNTER — HOSPITAL ENCOUNTER (OUTPATIENT)
Dept: GENERAL RADIOLOGY | Facility: HOSPITAL | Age: 34
Discharge: HOME | End: 2019-11-22

## 2020-11-23 ENCOUNTER — HOSPITAL ENCOUNTER (OUTPATIENT)
Facility: HOSPITAL | Age: 35
Setting detail: HOSPITAL OUTPATIENT SURGERY
End: 2020-11-23
Attending: SURGERY | Admitting: SURGERY

## 2020-12-04 ENCOUNTER — APPOINTMENT (OUTPATIENT)
Dept: PREADMISSION TESTING | Facility: HOSPITAL | Age: 35
End: 2020-12-04

## 2020-12-04 VITALS
BODY MASS INDEX: 25.68 KG/M2 | HEART RATE: 87 BPM | OXYGEN SATURATION: 98 % | HEIGHT: 69 IN | SYSTOLIC BLOOD PRESSURE: 185 MMHG | RESPIRATION RATE: 16 BRPM | WEIGHT: 173.4 LBS | DIASTOLIC BLOOD PRESSURE: 104 MMHG | TEMPERATURE: 99.1 F

## 2020-12-04 LAB
ANION GAP SERPL CALCULATED.3IONS-SCNC: 9.2 MMOL/L (ref 5–15)
BUN SERPL-MCNC: 55 MG/DL (ref 6–20)
BUN/CREAT SERPL: 6.1 (ref 7–25)
CALCIUM SPEC-SCNC: 9.1 MG/DL (ref 8.6–10.5)
CHLORIDE SERPL-SCNC: 94 MMOL/L (ref 98–107)
CO2 SERPL-SCNC: 33.8 MMOL/L (ref 22–29)
CREAT SERPL-MCNC: 9.02 MG/DL (ref 0.76–1.27)
DEPRECATED RDW RBC AUTO: 43.8 FL (ref 37–54)
ERYTHROCYTE [DISTWIDTH] IN BLOOD BY AUTOMATED COUNT: 13.1 % (ref 12.3–15.4)
GFR SERPL CREATININE-BSD FRML MDRD: 7 ML/MIN/1.73
GFR SERPL CREATININE-BSD FRML MDRD: ABNORMAL ML/MIN/{1.73_M2}
GLUCOSE SERPL-MCNC: 88 MG/DL (ref 65–99)
HCT VFR BLD AUTO: 25.7 % (ref 37.5–51)
HGB BLD-MCNC: 8.5 G/DL (ref 13–17.7)
MCH RBC QN AUTO: 30.7 PG (ref 26.6–33)
MCHC RBC AUTO-ENTMCNC: 33.1 G/DL (ref 31.5–35.7)
MCV RBC AUTO: 92.8 FL (ref 79–97)
PLATELET # BLD AUTO: 79 10*3/MM3 (ref 140–450)
PMV BLD AUTO: 9.9 FL (ref 6–12)
POTASSIUM SERPL-SCNC: 4.4 MMOL/L (ref 3.5–5.2)
QT INTERVAL: 417 MS
RBC # BLD AUTO: 2.77 10*6/MM3 (ref 4.14–5.8)
SODIUM SERPL-SCNC: 137 MMOL/L (ref 136–145)
WBC # BLD AUTO: 2.71 10*3/MM3 (ref 3.4–10.8)

## 2020-12-04 PROCEDURE — 80048 BASIC METABOLIC PNL TOTAL CA: CPT

## 2020-12-04 PROCEDURE — 36415 COLL VENOUS BLD VENIPUNCTURE: CPT

## 2020-12-04 PROCEDURE — U0004 COV-19 TEST NON-CDC HGH THRU: HCPCS | Performed by: NURSE PRACTITIONER

## 2020-12-04 PROCEDURE — 93005 ELECTROCARDIOGRAM TRACING: CPT

## 2020-12-04 PROCEDURE — C9803 HOPD COVID-19 SPEC COLLECT: HCPCS | Performed by: NURSE PRACTITIONER

## 2020-12-04 PROCEDURE — 85027 COMPLETE CBC AUTOMATED: CPT

## 2020-12-04 PROCEDURE — 93010 ELECTROCARDIOGRAM REPORT: CPT | Performed by: INTERNAL MEDICINE

## 2020-12-04 RX ORDER — CEFAZOLIN SODIUM 2 G/100ML
2 INJECTION, SOLUTION INTRAVENOUS ONCE
Status: CANCELLED | OUTPATIENT
Start: 2020-12-07

## 2020-12-04 RX ORDER — CHLORHEXIDINE GLUCONATE 500 MG/1
1 CLOTH TOPICAL TAKE AS DIRECTED
COMMUNITY
End: 2021-03-08 | Stop reason: HOSPADM

## 2020-12-04 NOTE — DISCHARGE INSTRUCTIONS
Take the following medications the morning of surgery:  CARVEDILOL    Arrive to hospital on your day of surgery at 5:30 AM.      If you are on prescription narcotic pain medication to control your pain you may also take that medication the morning of surgery.    General Instructions:  • Do not eat solid food after midnight the night before surgery.  • You may drink clear liquids day of surgery but must stop at least one hour before your hospital arrival time.  • It is beneficial for you to have a clear drink that contains carbohydrates the day of surgery.  We suggest a 12 to 20 ounce bottle of Gatorade or Powerade for non-diabetic patients or a 12 to 20 ounce bottle of G2 or Powerade Zero for diabetic patients. (Pediatric patients, are not advised to drink a 12 to 20 ounce carbohydrate drink)    Clear liquids are liquids you can see through.  Nothing red in color.     Plain water                               Sports drinks  Sodas                                   Gelatin (Jell-O)  Fruit juices without pulp such as white grape juice and apple juice  Popsicles that contain no fruit or yogurt  Tea or coffee (no cream or milk added)  Gatorade / Powerade  G2 / Powerade Zero    • Infants may have breast milk up to four hours before surgery.  • Infants drinking formula may drink formula up to six hours before surgery.   • Patients who avoid smoking, chewing tobacco and alcohol for 4 weeks prior to surgery have a reduced risk of post-operative complications.  Quit smoking as many days before surgery as you can.  • Do not smoke, use chewing tobacco or drink alcohol the day of surgery.   • If applicable bring your C-PAP/ BI-PAP machine.  • Bring any papers given to you in the doctor’s office.  • Wear clean comfortable clothes.  • Do not wear contact lenses, false eyelashes or make-up.  Bring a case for your glasses.   • Bring crutches or walker if applicable.  • Remove all piercings.  Leave jewelry and any other valuables at  home.  • Hair extensions with metal clips must be removed prior to surgery.  • The Pre-Admission Testing nurse will instruct you to bring medications if unable to obtain an accurate list in Pre-Admission Testing.        If you were given a blood bank ID arm band remember to bring it with you the day of surgery.    Preventing a Surgical Site Infection:  • For 2 to 3 days before surgery, avoid shaving with a razor because the razor can irritate skin and make it easier to develop an infection.    • Any areas of open skin can increase the risk of a post-operative wound infection by allowing bacteria to enter and travel throughout the body.  Notify your surgeon if you have any skin wounds / rashes even if it is not near the expected surgical site.  The area will need assessed to determine if surgery should be delayed until it is healed.  • The night prior to surgery shower using a fresh bar of anti-bacterial soap (such as Dial) and clean washcloth.  Sleep in a clean bed with clean clothing.  Do not allow pets to sleep with you.  • Shower on the morning of surgery using a fresh bar of anti-bacterial soap (such as Dial) and clean washcloth.  Dry with a clean towel and dress in clean clothing.  • Ask your surgeon if you will be receiving antibiotics prior to surgery.  • Make sure you, your family, and all healthcare providers clean their hands with soap and water or an alcohol based hand  before caring for you or your wound.    Day of surgery:  Your arrival time is approximately two hours before your scheduled surgery time.  Upon arrival, a Pre-op nurse and Anesthesiologist will review your health history, obtain vital signs, and answer questions you may have.  The only belongings needed at this time will be a list of your home medications and if applicable your C-PAP/BI-PAP machine.  A Pre-op nurse will start an IV and you may receive medication in preparation for surgery, including something to help you relax.      Please be aware that surgery does come with discomfort.  We want to make every effort to control your discomfort so please discuss any uncontrolled symptoms with your nurse.   Your doctor will most likely have prescribed pain medications.      If you are going home after surgery you will receive individualized written care instructions before being discharged.  A responsible adult must drive you to and from the hospital on the day of your surgery and stay with you for 24 hours.    If you are staying overnight following surgery, you will be transported to your hospital room following the recovery period.  [facility] has all private rooms.    If you have any questions please call Pre-Admission Testing at [phone].  Deductibles and co-payments are collected on the day of service. Please be prepared to pay the required co-pay, deductible or deposit on the day of service as defined by your plan.    Patient Education for Self-Quarantine Process    Following your COVID testing, we strongly recommend that you do not leave your home after you have been tested for COVID except to get medical care. This includes not going to work, school or to public areas.  If this is not possible for you to do please limit your activities to only required outings.  Be sure to wear a mask when you are with other people, practice social distancing and wash your hands frequently.      The following items provide additional details to keep you safe.  • Wash your hands with soap and water frequently for at least 20 seconds.   • Avoid touching your eyes, nose and mouth with unwashed hands.  • Do not share anything - utensils, towels, food from the same bowl.   • Have your own utensils, drinking glass, dishes, towels and bedding.   • Do not have visitors.   • Do use FaceTime to stay in touch with family and friends.  • You should stay in a specific room away from others if possible.   • Stay at least 6 feet away from others  in the home if you cannot have a dedicated room to yourself.   • Do not snuggle with your pet. While the CDC says there is no evidence that pets can spread COVID-19 or be infected from humans, it is probably best to avoid “petting, snuggling, being kissed or licked and sharing food (during self-quarantine)”, according to the CDC.   • Sanitize household surfaces daily. Include all high touch areas (door handles, light switches, phones, countertops, etc.)  • Do not share a bathroom with others, if possible.   • Wear a mask around others in your home if you are unable to stay in a separate room or 6 feet apart. If  you are unable to wear a mask, have your family member wear a mask if they must be within 6 feet of you.   Call your surgeon immediately if you experience any of the following symptoms:  • Sore Throat  • Shortness of Breath or difficulty breathing  • Cough  • Chills  • Body soreness or muscle pain  • Headache  • Fever  • New loss of taste or smell  • Do not arrive for your surgery ill.  Your procedure will need to be rescheduled to another time.  You will need to call your physician before the day of surgery to avoid any unnecessary exposure to hospital staff as well as other patients.    CHLORHEXIDINE CLOTH INSTRUCTIONS  The morning of surgery follow these instructions using the Chlorhexidine cloths you've been given.  These steps reduce bacteria on the body.  Do not use the cloths near your eyes, ears mouth, genitalia or on open wounds.  Throw the cloths away after use but do not try to flush them down a toilet.      • Open and remove one cloth at a time from the package.    • Leave the cloth unfolded and begin the bathing.  • Massage the skin with the cloths using gentle pressure to remove bacteria.  Do not scrub harshly.   • Follow the steps below with one 2% CHG cloth per area (6 total cloths).  • One cloth for neck, shoulders and chest.  • One cloth for both arms, hands, fingers and underarms (do  underarms last).  • One cloth for the abdomen followed by groin.  • One cloth for right leg and foot including between the toes.  • One cloth for left leg and foot including between the toes.  • The last cloth is to be used for the back of the neck, back and buttocks.    Allow the CHG to air dry 3 minutes on the skin which will give it time to work and decrease the chance of irritation.  The skin may feel sticky until it is dry.  Do not rinse with water or any other liquid or you will lose the beneficial effects of the CHG.  If mild skin irritation occurs, do rinse the skin to remove the CHG.  Report this to the nurse at time of admission.  Do not apply lotions, creams, ointments, deodorants or perfumes after using the clothes. Dress in clean clothes before coming to the hospital.

## 2020-12-05 LAB — SARS-COV-2 RNA RESP QL NAA+PROBE: DETECTED

## 2021-01-26 ENCOUNTER — APPOINTMENT (OUTPATIENT)
Dept: PREADMISSION TESTING | Facility: HOSPITAL | Age: 36
End: 2021-01-26

## 2021-02-19 ENCOUNTER — HOSPITAL ENCOUNTER (OUTPATIENT)
Facility: HOSPITAL | Age: 36
Setting detail: OBSERVATION
Discharge: HOME | End: 2021-02-20
Attending: EMERGENCY MEDICINE | Admitting: INTERNAL MEDICINE

## 2021-02-19 ENCOUNTER — PRE-ADMISSION TESTING (OUTPATIENT)
Dept: PREADMISSION TESTING | Facility: HOSPITAL | Age: 36
End: 2021-02-19

## 2021-02-19 VITALS
HEART RATE: 82 BPM | WEIGHT: 172 LBS | TEMPERATURE: 98.6 F | BODY MASS INDEX: 25.48 KG/M2 | RESPIRATION RATE: 16 BRPM | DIASTOLIC BLOOD PRESSURE: 94 MMHG | SYSTOLIC BLOOD PRESSURE: 161 MMHG | HEIGHT: 69 IN | OXYGEN SATURATION: 97 %

## 2021-02-19 DIAGNOSIS — D64.9 ANEMIA REQUIRING TRANSFUSIONS: Primary | ICD-10-CM

## 2021-02-19 PROBLEM — Z86.16 HISTORY OF COVID-19: Status: ACTIVE | Noted: 2021-02-19

## 2021-02-19 PROBLEM — I42.9 CARDIOMYOPATHY (HCC): Status: RESOLVED | Noted: 2021-02-19 | Resolved: 2021-02-19

## 2021-02-19 PROBLEM — I42.9 CARDIOMYOPATHY (HCC): Status: ACTIVE | Noted: 2021-02-19

## 2021-02-19 PROBLEM — I10 HTN (HYPERTENSION): Status: ACTIVE | Noted: 2021-02-19

## 2021-02-19 PROBLEM — I42.8 NICM (NONISCHEMIC CARDIOMYOPATHY): Status: ACTIVE | Noted: 2021-02-19

## 2021-02-19 LAB
ABO GROUP BLD: NORMAL
ANION GAP SERPL CALCULATED.3IONS-SCNC: 12 MMOL/L (ref 5–15)
ANION GAP SERPL CALCULATED.3IONS-SCNC: 13.2 MMOL/L (ref 5–15)
BASOPHILS # BLD AUTO: 0.05 10*3/MM3 (ref 0–0.2)
BASOPHILS NFR BLD AUTO: 0.4 % (ref 0–1.5)
BLD GP AB SCN SERPL QL: NEGATIVE
BUN SERPL-MCNC: 75 MG/DL (ref 6–20)
BUN SERPL-MCNC: 86 MG/DL (ref 6–20)
BUN/CREAT SERPL: 8.1 (ref 7–25)
BUN/CREAT SERPL: 8.4 (ref 7–25)
CALCIUM SPEC-SCNC: 8.7 MG/DL (ref 8.6–10.5)
CALCIUM SPEC-SCNC: 8.9 MG/DL (ref 8.6–10.5)
CHLORIDE SERPL-SCNC: 96 MMOL/L (ref 98–107)
CHLORIDE SERPL-SCNC: 96 MMOL/L (ref 98–107)
CO2 SERPL-SCNC: 30.8 MMOL/L (ref 22–29)
CO2 SERPL-SCNC: 33 MMOL/L (ref 22–29)
CREAT SERPL-MCNC: 10.28 MG/DL (ref 0.76–1.27)
CREAT SERPL-MCNC: 9.27 MG/DL (ref 0.76–1.27)
DEPRECATED RDW RBC AUTO: 44.4 FL (ref 37–54)
DEPRECATED RDW RBC AUTO: 45.5 FL (ref 37–54)
EOSINOPHIL # BLD AUTO: 0 10*3/MM3 (ref 0–0.4)
EOSINOPHIL NFR BLD AUTO: 0 % (ref 0.3–6.2)
ERYTHROCYTE [DISTWIDTH] IN BLOOD BY AUTOMATED COUNT: 13.7 % (ref 12.3–15.4)
ERYTHROCYTE [DISTWIDTH] IN BLOOD BY AUTOMATED COUNT: 13.8 % (ref 12.3–15.4)
FERRITIN SERPL-MCNC: 1497 NG/ML (ref 30–400)
GFR SERPL CREATININE-BSD FRML MDRD: 6 ML/MIN/1.73
GFR SERPL CREATININE-BSD FRML MDRD: 6 ML/MIN/1.73
GFR SERPL CREATININE-BSD FRML MDRD: ABNORMAL ML/MIN/{1.73_M2}
GFR SERPL CREATININE-BSD FRML MDRD: ABNORMAL ML/MIN/{1.73_M2}
GLUCOSE SERPL-MCNC: 99 MG/DL (ref 65–99)
GLUCOSE SERPL-MCNC: 99 MG/DL (ref 65–99)
HCT VFR BLD AUTO: 20.4 % (ref 37.5–51)
HCT VFR BLD AUTO: 20.7 % (ref 37.5–51)
HGB BLD-MCNC: 6.7 G/DL (ref 13–17.7)
HGB BLD-MCNC: 6.8 G/DL (ref 13–17.7)
IMM GRANULOCYTES # BLD AUTO: 0.13 10*3/MM3 (ref 0–0.05)
IMM GRANULOCYTES NFR BLD AUTO: 1.1 % (ref 0–0.5)
IRON 24H UR-MRATE: 46 MCG/DL (ref 59–158)
IRON SATN MFR SERPL: 22 % (ref 20–50)
LYMPHOCYTES # BLD AUTO: 0.7 10*3/MM3 (ref 0.7–3.1)
LYMPHOCYTES NFR BLD AUTO: 5.8 % (ref 19.6–45.3)
MCH RBC QN AUTO: 29.8 PG (ref 26.6–33)
MCH RBC QN AUTO: 30 PG (ref 26.6–33)
MCHC RBC AUTO-ENTMCNC: 32.8 G/DL (ref 31.5–35.7)
MCHC RBC AUTO-ENTMCNC: 32.9 G/DL (ref 31.5–35.7)
MCV RBC AUTO: 90.7 FL (ref 79–97)
MCV RBC AUTO: 91.2 FL (ref 79–97)
MONOCYTES # BLD AUTO: 0.46 10*3/MM3 (ref 0.1–0.9)
MONOCYTES NFR BLD AUTO: 3.8 % (ref 5–12)
NEUTROPHILS NFR BLD AUTO: 10.68 10*3/MM3 (ref 1.7–7)
NEUTROPHILS NFR BLD AUTO: 88.9 % (ref 42.7–76)
NRBC BLD AUTO-RTO: 0 /100 WBC (ref 0–0.2)
PLATELET # BLD AUTO: 304 10*3/MM3 (ref 140–450)
PLATELET # BLD AUTO: 329 10*3/MM3 (ref 140–450)
PMV BLD AUTO: 9.2 FL (ref 6–12)
PMV BLD AUTO: 9.3 FL (ref 6–12)
POTASSIUM SERPL-SCNC: 4.3 MMOL/L (ref 3.5–5.2)
POTASSIUM SERPL-SCNC: 4.5 MMOL/L (ref 3.5–5.2)
RBC # BLD AUTO: 2.25 10*6/MM3 (ref 4.14–5.8)
RBC # BLD AUTO: 2.27 10*6/MM3 (ref 4.14–5.8)
RH BLD: POSITIVE
SARS-COV-2 ORF1AB RESP QL NAA+PROBE: NOT DETECTED
SODIUM SERPL-SCNC: 140 MMOL/L (ref 136–145)
SODIUM SERPL-SCNC: 141 MMOL/L (ref 136–145)
T&S EXPIRATION DATE: NORMAL
TIBC SERPL-MCNC: 210 MCG/DL (ref 298–536)
TRANSFERRIN SERPL-MCNC: 141 MG/DL (ref 200–360)
WBC # BLD AUTO: 11.47 10*3/MM3 (ref 3.4–10.8)
WBC # BLD AUTO: 12.02 10*3/MM3 (ref 3.4–10.8)

## 2021-02-19 PROCEDURE — U0004 COV-19 TEST NON-CDC HGH THRU: HCPCS | Performed by: NURSE PRACTITIONER

## 2021-02-19 PROCEDURE — 85027 COMPLETE CBC AUTOMATED: CPT

## 2021-02-19 PROCEDURE — 80048 BASIC METABOLIC PNL TOTAL CA: CPT | Performed by: PHYSICIAN ASSISTANT

## 2021-02-19 PROCEDURE — 80048 BASIC METABOLIC PNL TOTAL CA: CPT

## 2021-02-19 PROCEDURE — G0378 HOSPITAL OBSERVATION PER HR: HCPCS

## 2021-02-19 PROCEDURE — 86901 BLOOD TYPING SEROLOGIC RH(D): CPT | Performed by: PHYSICIAN ASSISTANT

## 2021-02-19 PROCEDURE — 99284 EMERGENCY DEPT VISIT MOD MDM: CPT

## 2021-02-19 PROCEDURE — 84466 ASSAY OF TRANSFERRIN: CPT | Performed by: NURSE PRACTITIONER

## 2021-02-19 PROCEDURE — 82728 ASSAY OF FERRITIN: CPT | Performed by: NURSE PRACTITIONER

## 2021-02-19 PROCEDURE — 83540 ASSAY OF IRON: CPT | Performed by: NURSE PRACTITIONER

## 2021-02-19 PROCEDURE — 36415 COLL VENOUS BLD VENIPUNCTURE: CPT

## 2021-02-19 PROCEDURE — 85025 COMPLETE CBC W/AUTO DIFF WBC: CPT | Performed by: PHYSICIAN ASSISTANT

## 2021-02-19 PROCEDURE — 36430 TRANSFUSION BLD/BLD COMPNT: CPT

## 2021-02-19 PROCEDURE — C9803 HOPD COVID-19 SPEC COLLECT: HCPCS | Performed by: NURSE PRACTITIONER

## 2021-02-19 PROCEDURE — 86900 BLOOD TYPING SEROLOGIC ABO: CPT

## 2021-02-19 PROCEDURE — P9016 RBC LEUKOCYTES REDUCED: HCPCS

## 2021-02-19 PROCEDURE — 86900 BLOOD TYPING SEROLOGIC ABO: CPT | Performed by: PHYSICIAN ASSISTANT

## 2021-02-19 PROCEDURE — 86923 COMPATIBILITY TEST ELECTRIC: CPT

## 2021-02-19 PROCEDURE — 86850 RBC ANTIBODY SCREEN: CPT | Performed by: PHYSICIAN ASSISTANT

## 2021-02-19 RX ORDER — FERRIC CITRATE 210 MG/1
2 TABLET, COATED ORAL
COMMUNITY
Start: 2021-01-07

## 2021-02-19 RX ORDER — SODIUM BICARBONATE 650 MG/1
1300 TABLET ORAL 2 TIMES DAILY
COMMUNITY
Start: 2021-01-09 | End: 2021-03-24 | Stop reason: HOSPADM

## 2021-02-19 RX ORDER — ONDANSETRON 4 MG/1
4 TABLET, FILM COATED ORAL EVERY 6 HOURS PRN
Status: DISCONTINUED | OUTPATIENT
Start: 2021-02-19 | End: 2021-02-20 | Stop reason: HOSPADM

## 2021-02-19 RX ORDER — ACETAMINOPHEN 325 MG/1
650 TABLET ORAL EVERY 4 HOURS PRN
Status: DISCONTINUED | OUTPATIENT
Start: 2021-02-19 | End: 2021-02-20 | Stop reason: HOSPADM

## 2021-02-19 RX ORDER — SODIUM CHLORIDE 0.9 % (FLUSH) 0.9 %
10 SYRINGE (ML) INJECTION AS NEEDED
Status: DISCONTINUED | OUTPATIENT
Start: 2021-02-19 | End: 2021-02-20 | Stop reason: HOSPADM

## 2021-02-19 RX ORDER — ONDANSETRON 2 MG/ML
4 INJECTION INTRAMUSCULAR; INTRAVENOUS EVERY 6 HOURS PRN
Status: DISCONTINUED | OUTPATIENT
Start: 2021-02-19 | End: 2021-02-20 | Stop reason: HOSPADM

## 2021-02-19 NOTE — ED PROVIDER NOTES
EMERGENCY DEPARTMENT ENCOUNTER    Room Number:  36/36  Date of encounter:  2021  PCP: Provider, No Known  Historian: Patient  Full history not obtainable due to: None    HPI:  Chief Complaint: Fatigue    Context: Chandler Buchanan is a 35 y.o. male who presents to the ED c/o abnormal labs and fatigue.  The patient was at an outpatient lab facility today getting blood drawn for an upcoming surgery.  He is having an operation on his fistula by his vascular surgeon in the next couple of days and had routine blood drawn today.  He was found to be anemic at 6.7 hemoglobin.  He was told to come here for blood transfusion.  Patient states that he has felt fatigued over the past couple of months since being diagnosed with COVID-19 and thought that the symptoms he was having today and during the past couple of days have been secondary to post Covid fatigue.  He denies chest pain, shortness of breath, dizziness, syncope.  There is been no dark or bloody stools.  Patient has been anemic secondary to end-stage renal disease in the past.  Last full round of dialysis was yesterday morning.      MEDICAL RECORD REVIEW:    [facility] CARDIOLOGY  [facility] [facility] [address] [phone]             Chandler Buchanan  Echo Complete w/ Doppler, Color Flow and Contrast  Order# [id number]  Reading physician: Epi Philip MD Ordering physician: Ruth Naylor MD Study date: 19   Patient Information    Patient Name   Chandler Buchanan MRN   [id number] Sex   Male  (Age)   1985 (35 y.o.)   PACS Images     Show images for Adult Transthoracic Echo Complete W/ Cont if Necessary Per Protocol    Sedation Narrator Report    Sedation Narrator Report      Interpretation Summary    · Left ventricular systolic function is severely decreased. Calculated EF = 23.0%. Estimated EF was in agreement with the calculated EF. There is left ventricular global hypokinesis noted.  · The left ventricular cavity is  severely dilated. Left ventricular wall thickness is consistent with moderate concentric hypertrophy. Left ventricular diastolic function was indeterminate. The appearance of the apex was initially suggestive of noncompaction, but contrast images show normal trabeculation.  · Left atrial cavity size is mildly dilated.  · There is a small (<1cm) circumferential pericardial effusion.           PAST MEDICAL HISTORY    Active Ambulatory Problems     Diagnosis Date Noted   • Anemia 01/13/2019   • Anemia due to stage 4 chronic kidney disease (CMS/[facility]) 02/19/2019   • ESRD on dialysis (CMS/[facility]) 02/19/2019   • Idiopathic peripheral neuropathy 09/17/2019     Resolved Ambulatory Problems     Diagnosis Date Noted   • Peritoneal dialysis catheter in place (CMS/[facility]) 10/21/2019     Past Medical History:   Diagnosis Date   • A-V fistula (CMS/[facility])    • Acute renal failure (CMS/[facility])    • Cardiomyopathy (CMS/[facility])    • History of COVID-19    • History of transfusion    • Hypertension    • Pericardial effusion    • Stage 4 chronic kidney disease (CMS/[facility])          PAST SURGICAL HISTORY  Past Surgical History:   Procedure Laterality Date   • ARTERIOVENOUS FISTULA/SHUNT SURGERY Left 1/23/2019    Procedure: LEFT ARM FISTULA CREATION;  Surgeon: Bogdan Velásquez MD;  Location: [facility] OR;  Service: Vascular   • COLONOSCOPY N/A 3/12/2019    Procedure: COLONOSCOPY wwith Biiopsies;  Surgeon: Jered Obrien MD;  Location: [facility] ENDOSCOPY;  Service: General   • ENDOSCOPY N/A 3/12/2019    Procedure: ESOPHAGOGASTRODUODENOSCOPY WITH BIOPSY;  Surgeon: Jered Obrien MD;  Location: [facility] ENDOSCOPY;  Service: General   • INSERTION HEMODIALYSIS CATHETER N/A 1/21/2019    Procedure: HEMODIALYSIS CATHETER INSERTION;  Surgeon: Cuong Pedraza Jr., MD;  Location: [facility] OR;  Service: Vascular   • INSERTION PERITONEAL DIALYSIS CATHETER N/A 3/14/2019    Procedure: INSERTION PERITONEAL DIALYSIS CATHETER  LAPAROSCOPIC AND OMENTOPEXY;  Surgeon: Jered Obrien MD;  Location: [facility] OR;  Service: General   • REMOVAL PERITONEAL DIALYSIS CATHETER N/A 10/31/2019    Procedure: REMOVAL PERITONEAL DIALYSIS CATHETER;  Surgeon: Jered Obrien MD;  Location: [facility] OR;  Service: General         FAMILY HISTORY  Family History   Problem Relation Age of Onset   • Colon cancer Mother    • Seizures Maternal Grandmother    • Malig Hyperthermia Neg Hx          SOCIAL HISTORY  Social History     Socioeconomic History   • Marital status: Single     Spouse name: Not on file   • Number of children: Not on file   • Years of education: Not on file   • Highest education level: Not on file   Tobacco Use   • Smoking status: Never Smoker   • Smokeless tobacco: Never Used   Substance and Sexual Activity   • Alcohol use: No     Frequency: Never   • Drug use: No   • Sexual activity: Defer         ALLERGIES  Patient has no known allergies.        REVIEW OF SYSTEMS  Review of Systems   Constitutional: Positive for fatigue. Negative for chills and fever.   HENT: Negative for congestion.    Eyes: Negative for visual disturbance.   Respiratory: Negative for shortness of breath.    Cardiovascular: Negative for chest pain.   Gastrointestinal: Negative for abdominal pain, blood in stool, nausea and vomiting.   Genitourinary: Negative for difficulty urinating.   Musculoskeletal: Negative for gait problem.   Skin: Negative for wound.   Neurological: Negative for syncope.   Psychiatric/Behavioral: Negative for suicidal ideas.      All systems reviewed and marked as negative except as listed in HPI       PHYSICAL EXAM    I have reviewed the triage vital signs and nursing notes.    ED Triage Vitals   Temp Heart Rate Resp BP SpO2   02/19/21 1740 02/19/21 1740 02/19/21 1740 02/19/21 1749 02/19/21 1740   97.4 °F (36.3 °C) 92 16 (!) 167/119 92 %      Temp src Heart Rate Source Patient Position BP Location FiO2 (%)   -- -- 02/19/21  1749 02/19/21 1749 --     Lying Right arm        Physical Exam  Constitutional:       General: He is not in acute distress.     Appearance: He is well-developed.   HENT:      Head: Normocephalic and atraumatic.   Eyes:      General: No scleral icterus.     Conjunctiva/sclera: Conjunctivae normal.   Neck:      Musculoskeletal: Normal range of motion.      Trachea: No tracheal deviation.   Cardiovascular:      Rate and Rhythm: Normal rate and regular rhythm.      Comments: Fistula to the left upper extremity with positive thrill.  Pulmonary:      Effort: Pulmonary effort is normal.      Breath sounds: Normal breath sounds.   Abdominal:      Palpations: Abdomen is soft.      Tenderness: There is no abdominal tenderness. There is no guarding.   Musculoskeletal:         General: No deformity.      Comments: Moving all extremities spontaneously.   Lymphadenopathy:      Cervical: No cervical adenopathy.   Skin:     General: Skin is warm and dry.   Neurological:      Mental Status: He is alert and oriented to person, place, and time.   Psychiatric:         Behavior: Behavior normal.         Vital signs and nursing notes reviewed.            LAB RESULTS  Recent Results (from the past 24 hour(s))   Basic Metabolic Panel    Collection Time: 02/19/21  8:29 AM    Specimen: Blood   Result Value Ref Range    Glucose 99 65 - 99 mg/dL    BUN 75 (H) 6 - 20 mg/dL    Creatinine 9.27 (H) 0.76 - 1.27 mg/dL    Sodium 141 136 - 145 mmol/L    Potassium 4.5 3.5 - 5.2 mmol/L    Chloride 96 (L) 98 - 107 mmol/L    CO2 33.0 (H) 22.0 - 29.0 mmol/L    Calcium 8.9 8.6 - 10.5 mg/dL    eGFR  African Amer      eGFR Non African Amer 6 (L) >60 mL/min/1.73    BUN/Creatinine Ratio 8.1 7.0 - 25.0    Anion Gap 12.0 5.0 - 15.0 mmol/L   CBC (No Diff)    Collection Time: 02/19/21  8:29 AM    Specimen: Blood   Result Value Ref Range    WBC 11.47 (H) 3.40 - 10.80 10*3/mm3    RBC 2.25 (L) 4.14 - 5.80 10*6/mm3    Hemoglobin 6.7 (C) 13.0 - 17.7 g/dL    Hematocrit  20.4 (C) 37.5 - 51.0 %    MCV 90.7 79.0 - 97.0 fL    MCH 29.8 26.6 - 33.0 pg    MCHC 32.8 31.5 - 35.7 g/dL    RDW 13.7 12.3 - 15.4 %    RDW-SD 44.4 37.0 - 54.0 fl    MPV 9.3 6.0 - 12.0 fL    Platelets 304 140 - 450 10*3/mm3   COVID-19,APTIMA PANTHER,ART IN-HOUSE, NP/OP SWAB IN UTM/VTM/[facility] TRANSPORT MEDIA,24 HR TAT - Swab, Nasopharynx    Collection Time: 02/19/21  8:42 AM    Specimen: Nasopharynx; Swab   Result Value Ref Range    COVID19 Not Detected Not Detected - Ref. Range   Basic Metabolic Panel    Collection Time: 02/19/21  5:56 PM    Specimen: Blood   Result Value Ref Range    Glucose 99 65 - 99 mg/dL    BUN 86 (H) 6 - 20 mg/dL    Creatinine 10.28 (H) 0.76 - 1.27 mg/dL    Sodium 140 136 - 145 mmol/L    Potassium 4.3 3.5 - 5.2 mmol/L    Chloride 96 (L) 98 - 107 mmol/L    CO2 30.8 (H) 22.0 - 29.0 mmol/L    Calcium 8.7 8.6 - 10.5 mg/dL    eGFR  African Amer      eGFR Non African Amer 6 (L) >60 mL/min/1.73    BUN/Creatinine Ratio 8.4 7.0 - 25.0    Anion Gap 13.2 5.0 - 15.0 mmol/L   Type & Screen    Collection Time: 02/19/21  5:56 PM    Specimen: Blood   Result Value Ref Range    ABO Type B     RH type Positive     Antibody Screen Negative     T&S Expiration Date 2/22/2021 11:59:59 PM    CBC Auto Differential    Collection Time: 02/19/21  5:56 PM    Specimen: Blood   Result Value Ref Range    WBC 12.02 (H) 3.40 - 10.80 10*3/mm3    RBC 2.27 (L) 4.14 - 5.80 10*6/mm3    Hemoglobin 6.8 (C) 13.0 - 17.7 g/dL    Hematocrit 20.7 (C) 37.5 - 51.0 %    MCV 91.2 79.0 - 97.0 fL    MCH 30.0 26.6 - 33.0 pg    MCHC 32.9 31.5 - 35.7 g/dL    RDW 13.8 12.3 - 15.4 %    RDW-SD 45.5 37.0 - 54.0 fl    MPV 9.2 6.0 - 12.0 fL    Platelets 329 140 - 450 10*3/mm3    Neutrophil % 88.9 (H) 42.7 - 76.0 %    Lymphocyte % 5.8 (L) 19.6 - 45.3 %    Monocyte % 3.8 (L) 5.0 - 12.0 %    Eosinophil % 0.0 (L) 0.3 - 6.2 %    Basophil % 0.4 0.0 - 1.5 %    Immature Grans % 1.1 (H) 0.0 - 0.5 %    Neutrophils, Absolute 10.68 (H) 1.70 - 7.00 10*3/mm3     Lymphocytes, Absolute 0.70 0.70 - 3.10 10*3/mm3    Monocytes, Absolute 0.46 0.10 - 0.90 10*3/mm3    Eosinophils, Absolute 0.00 0.00 - 0.40 10*3/mm3    Basophils, Absolute 0.05 0.00 - 0.20 10*3/mm3    Immature Grans, Absolute 0.13 (H) 0.00 - 0.05 10*3/mm3    nRBC 0.0 0.0 - 0.2 /100 WBC   Prepare RBC, 2 Units    Collection Time: 02/19/21  7:34 PM   Result Value Ref Range    Product Code Z5664Q67     Unit Number [id number]     UNIT  ABO B     UNIT  RH POS     Crossmatch Interpretation Compatible     Dispense Status XM     Blood Expiration Date 202103172359     Blood Type Barcode 7300     Product Code B9077I33     Unit Number [id number]     UNIT  ABO B     UNIT  RH POS     Crossmatch Interpretation Compatible     Dispense Status IS     Blood Expiration Date 202103182359     Blood Type Barcode 7300        Ordered the above labs and independently reviewed the results.        RADIOLOGY  No Radiology Exams Resulted Within Past 24 Hours    I ordered the above noted radiological studies. Independently reviewed by me and discussed with radiologist.  See dictation above for official radiology interpretation.      PROCEDURES    Procedures        MEDICATIONS GIVEN IN ER    Medications - No data to display      PROGRESS, DATA ANALYSIS, CONSULTS, AND MEDICAL DECISION MAKING    All labs have been independently reviewed by me.  All radiology studies have been reviewed by me.   EKG's independently reviewed by me.  Discussion below represents my analysis of pertinent findings related to patient's condition, differential diagnosis, treatment plan and final disposition.    DIFFERENTIAL DIAGNOSIS INCLUDE BUT NOT LIMITED TO:     COVID-19, viral syndrome, anemia    ED Course as of Feb 19 1938   Fri Feb 19, 2021 1936 I spoke with PETRA Yun with [facility] at this time regarding the patient.  I discussed work-up, results, concerns.  I discussed the consulting provider's desire for med surg admit to [facility]        [DC]      ED  Course User Index  [DC] Ronak Morocho PA       AS OF 19:38 EST VITALS:    BP - (!) 162/101  HR - 92  TEMP - 98.8 °F (37.1 °C) (Oral)  02 SATS - 91%    1938 I rechecked the patient.  I discussed the patient's radiology findings (including all incidental findings), diagnosis, and plan for admission.  All questions answered.    DIAGNOSIS  Final diagnoses:   Anemia requiring transfusions         DISPOSITION  Admit    Pt masked in first look. I wore a surgical mask throughout my encounters with the pt. I performed hand hygiene on entry into the pt room and upon exit.      Ronak Morocho PA  02/19/21 1939

## 2021-02-19 NOTE — DISCHARGE INSTRUCTIONS
Take the following medications the morning of surgery:  CARVEDILOL    If you are on prescription narcotic pain medication to control your pain you may also take that medication the morning of surgery.    General Instructions:  • Do not eat solid food after midnight the night before surgery.  • You may drink clear liquids day of surgery but must stop at least one hour before your hospital arrival time. 4:30 AM  • It is beneficial for you to have a clear drink that contains carbohydrates the day of surgery.  We suggest a 12 to 20 ounce bottle of Gatorade or Powerade for non-diabetic patients or a 12 to 20 ounce bottle of G2 or Powerade Zero for diabetic patients. (Pediatric patients, are not advised to drink a 12 to 20 ounce carbohydrate drink)    Clear liquids are liquids you can see through.  Nothing red in color.     Plain water                               Sports drinks  Sodas                                   Gelatin (Jell-O)  Fruit juices without pulp such as white grape juice and apple juice  Popsicles that contain no fruit or yogurt  Tea or coffee (no cream or milk added)  Gatorade / Powerade  G2 / Powerade Zero    • Infants may have breast milk up to four hours before surgery.  • Infants drinking formula may drink formula up to six hours before surgery.   • Patients who avoid smoking, chewing tobacco and alcohol for 4 weeks prior to surgery have a reduced risk of post-operative complications.  Quit smoking as many days before surgery as you can.  • Do not smoke, use chewing tobacco or drink alcohol the day of surgery.   • If applicable bring your C-PAP/ BI-PAP machine.  • Bring any papers given to you in the doctor’s office.  • Wear clean comfortable clothes.  • Do not wear contact lenses, false eyelashes or make-up.  Bring a case for your glasses.   • Bring crutches or walker if applicable.  • Remove all piercings.  Leave jewelry and any other valuables at home.  • Hair extensions with metal clips must be  removed prior to surgery.  • The Pre-Admission Testing nurse will instruct you to bring medications if unable to obtain an accurate list in Pre-Admission Testing.        If you were given a blood bank ID arm band remember to bring it with you the day of surgery.    Preventing a Surgical Site Infection:  • For 2 to 3 days before surgery, avoid shaving with a razor because the razor can irritate skin and make it easier to develop an infection.    • Any areas of open skin can increase the risk of a post-operative wound infection by allowing bacteria to enter and travel throughout the body.  Notify your surgeon if you have any skin wounds / rashes even if it is not near the expected surgical site.  The area will need assessed to determine if surgery should be delayed until it is healed.  • The night prior to surgery shower using a fresh bar of anti-bacterial soap (such as Dial) and clean washcloth.  Sleep in a clean bed with clean clothing.  Do not allow pets to sleep with you.  • Shower on the morning of surgery using a fresh bar of anti-bacterial soap (such as Dial) and clean washcloth.  Dry with a clean towel and dress in clean clothing.  • Ask your surgeon if you will be receiving antibiotics prior to surgery.  • Make sure you, your family, and all healthcare providers clean their hands with soap and water or an alcohol based hand  before caring for you or your wound.    Day of surgery: 2/22/2021 ARRIVAL TIME 5:30 AM  Your arrival time is approximately two hours before your scheduled surgery time.  Upon arrival, a Pre-op nurse and Anesthesiologist will review your health history, obtain vital signs, and answer questions you may have.  The only belongings needed at this time will be a list of your home medications and if applicable your C-PAP/BI-PAP machine.  A Pre-op nurse will start an IV and you may receive medication in preparation for surgery, including something to help you relax.     Please be aware  that surgery does come with discomfort.  We want to make every effort to control your discomfort so please discuss any uncontrolled symptoms with your nurse.   Your doctor will most likely have prescribed pain medications.      If you are going home after surgery you will receive individualized written care instructions before being discharged.  A responsible adult must drive you to and from the hospital on the day of your surgery and stay with you for 24 hours.  Discharge prescriptions can be filled by the hospital pharmacy during regular pharmacy hours.  If you are having surgery late in the day/evening your prescription may be e-prescribed to your pharmacy.  Please verify your pharmacy hours or chose a 24 hour pharmacy to avoid not having access to your prescription because your pharmacy has closed for the day.    If you are staying overnight following surgery, you will be transported to your hospital room following the recovery period.  [facility] has all private rooms.    If you have any questions please call Pre-Admission Testing at [phone].  Deductibles and co-payments are collected on the day of service. Please be prepared to pay the required co-pay, deductible or deposit on the day of service as defined by your plan.    Patient Education for Self-Quarantine Process    Following your COVID testing, we strongly recommend that you do not leave your home after you have been tested for COVID except to get medical care. This includes not going to work, school or to public areas.  If this is not possible for you to do please limit your activities to only required outings.  Be sure to wear a mask when you are with other people, practice social distancing and wash your hands frequently.      The following items provide additional details to keep you safe.  • Wash your hands with soap and water frequently for at least 20 seconds.   • Avoid touching your eyes, nose and mouth with unwashed  hands.  • Do not share anything - utensils, towels, food from the same bowl.   • Have your own utensils, drinking glass, dishes, towels and bedding.   • Do not have visitors.   • Do use FaceTime to stay in touch with family and friends.  • You should stay in a specific room away from others if possible.   • Stay at least 6 feet away from others in the home if you cannot have a dedicated room to yourself.   • Do not snuggle with your pet. While the CDC says there is no evidence that pets can spread COVID-19 or be infected from humans, it is probably best to avoid “petting, snuggling, being kissed or licked and sharing food (during self-quarantine)”, according to the CDC.   • Sanitize household surfaces daily. Include all high touch areas (door handles, light switches, phones, countertops, etc.)  • Do not share a bathroom with others, if possible.   • Wear a mask around others in your home if you are unable to stay in a separate room or 6 feet apart. If  you are unable to wear a mask, have your family member wear a mask if they must be within 6 feet of you.   Call your surgeon immediately if you experience any of the following symptoms:  • Sore Throat  • Shortness of Breath or difficulty breathing  • Cough  • Chills  • Body soreness or muscle pain  • Headache  • Fever  • New loss of taste or smell  • Do not arrive for your surgery ill.  Your procedure will need to be rescheduled to another time.  You will need to call your physician before the day of surgery to avoid any unnecessary exposure to hospital staff as well as other patients.    CHLORHEXIDINE CLOTH INSTRUCTIONS  The morning of surgery follow these instructions using the Chlorhexidine cloths you've been given.  These steps reduce bacteria on the body.  Do not use the cloths near your eyes, ears mouth, genitalia or on open wounds.  Throw the cloths away after use but do not try to flush them down a toilet.      • Open and remove one cloth at a time from the  package.    • Leave the cloth unfolded and begin the bathing.  • Massage the skin with the cloths using gentle pressure to remove bacteria.  Do not scrub harshly.   • Follow the steps below with one 2% CHG cloth per area (6 total cloths).  • One cloth for neck, shoulders and chest.  • One cloth for both arms, hands, fingers and underarms (do underarms last).  • One cloth for the abdomen followed by groin.  • One cloth for right leg and foot including between the toes.  • One cloth for left leg and foot including between the toes.  • The last cloth is to be used for the back of the neck, back and buttocks.    Allow the CHG to air dry 3 minutes on the skin which will give it time to work and decrease the chance of irritation.  The skin may feel sticky until it is dry.  Do not rinse with water or any other liquid or you will lose the beneficial effects of the CHG.  If mild skin irritation occurs, do rinse the skin to remove the CHG.  Report this to the nurse at time of admission.  Do not apply lotions, creams, ointments, deodorants or perfumes after using the clothes. Dress in clean clothes before coming to the hospital.

## 2021-02-19 NOTE — ED NOTES
Pt was scheduled to have surgery Monday for his fistula, had outpatient labs drawn today and was told his hgb was low and to come to the ED for a blood transfusion.     Leonor Murphy RN  02/19/21 1548

## 2021-02-20 VITALS
BODY MASS INDEX: 25.62 KG/M2 | RESPIRATION RATE: 20 BRPM | OXYGEN SATURATION: 94 % | HEART RATE: 85 BPM | TEMPERATURE: 98.7 F | DIASTOLIC BLOOD PRESSURE: 111 MMHG | WEIGHT: 173 LBS | HEIGHT: 69 IN | SYSTOLIC BLOOD PRESSURE: 150 MMHG

## 2021-02-20 PROBLEM — D63.1 ANEMIA OF CHRONIC KIDNEY FAILURE, STAGE 5 (HCC): Status: ACTIVE | Noted: 2021-02-20

## 2021-02-20 PROBLEM — N18.6 ANEMIA IN ESRD (END-STAGE RENAL DISEASE) (HCC): Status: ACTIVE | Noted: 2021-02-20

## 2021-02-20 PROBLEM — D63.1 ANEMIA IN ESRD (END-STAGE RENAL DISEASE) (HCC): Status: ACTIVE | Noted: 2021-02-20

## 2021-02-20 PROBLEM — N18.5 ANEMIA OF CHRONIC KIDNEY FAILURE, STAGE 5: Status: ACTIVE | Noted: 2021-02-20

## 2021-02-20 LAB
ANION GAP SERPL CALCULATED.3IONS-SCNC: 14.6 MMOL/L (ref 5–15)
BH BB BLOOD EXPIRATION DATE: NORMAL
BH BB BLOOD EXPIRATION DATE: NORMAL
BH BB BLOOD TYPE BARCODE: 7300
BH BB BLOOD TYPE BARCODE: 7300
BH BB DISPENSE STATUS: NORMAL
BH BB DISPENSE STATUS: NORMAL
BH BB PRODUCT CODE: NORMAL
BH BB PRODUCT CODE: NORMAL
BH BB UNIT NUMBER: NORMAL
BH BB UNIT NUMBER: NORMAL
BUN SERPL-MCNC: 94 MG/DL (ref 6–20)
BUN/CREAT SERPL: 8.4 (ref 7–25)
CALCIUM SPEC-SCNC: 8.5 MG/DL (ref 8.6–10.5)
CHLORIDE SERPL-SCNC: 96 MMOL/L (ref 98–107)
CO2 SERPL-SCNC: 28.4 MMOL/L (ref 22–29)
CREAT SERPL-MCNC: 11.17 MG/DL (ref 0.76–1.27)
CROSSMATCH INTERPRETATION: NORMAL
CROSSMATCH INTERPRETATION: NORMAL
FOLATE SERPL-MCNC: 6.96 NG/ML (ref 4.78–24.2)
GFR SERPL CREATININE-BSD FRML MDRD: 5 ML/MIN/1.73
GFR SERPL CREATININE-BSD FRML MDRD: ABNORMAL ML/MIN/{1.73_M2}
GLUCOSE SERPL-MCNC: 100 MG/DL (ref 65–99)
HBV SURFACE AG SERPL QL IA: NORMAL
HCT VFR BLD AUTO: 22.5 % (ref 37.5–51)
HCT VFR BLD AUTO: 22.7 % (ref 37.5–51)
HGB BLD-MCNC: 7.5 G/DL (ref 13–17.7)
HGB BLD-MCNC: 7.5 G/DL (ref 13–17.7)
POTASSIUM SERPL-SCNC: 4.8 MMOL/L (ref 3.5–5.2)
SODIUM SERPL-SCNC: 139 MMOL/L (ref 136–145)
UNIT  ABO: NORMAL
UNIT  ABO: NORMAL
UNIT  RH: NORMAL
UNIT  RH: NORMAL

## 2021-02-20 PROCEDURE — 85014 HEMATOCRIT: CPT | Performed by: NURSE PRACTITIONER

## 2021-02-20 PROCEDURE — G0378 HOSPITAL OBSERVATION PER HR: HCPCS

## 2021-02-20 PROCEDURE — 85018 HEMOGLOBIN: CPT | Performed by: NURSE PRACTITIONER

## 2021-02-20 PROCEDURE — P9016 RBC LEUKOCYTES REDUCED: HCPCS

## 2021-02-20 PROCEDURE — 80048 BASIC METABOLIC PNL TOTAL CA: CPT | Performed by: NURSE PRACTITIONER

## 2021-02-20 PROCEDURE — 36415 COLL VENOUS BLD VENIPUNCTURE: CPT | Performed by: NURSE PRACTITIONER

## 2021-02-20 PROCEDURE — 82746 ASSAY OF FOLIC ACID SERUM: CPT | Performed by: NURSE PRACTITIONER

## 2021-02-20 PROCEDURE — 36430 TRANSFUSION BLD/BLD COMPNT: CPT

## 2021-02-20 PROCEDURE — 87340 HEPATITIS B SURFACE AG IA: CPT | Performed by: INTERNAL MEDICINE

## 2021-02-20 PROCEDURE — 86900 BLOOD TYPING SEROLOGIC ABO: CPT

## 2021-02-20 RX ORDER — SEVELAMER CARBONATE 800 MG/1
400 TABLET, FILM COATED ORAL
Status: DISCONTINUED | OUTPATIENT
Start: 2021-02-20 | End: 2021-02-20 | Stop reason: HOSPADM

## 2021-02-20 RX ORDER — CARVEDILOL 25 MG/1
25 TABLET ORAL EVERY 12 HOURS SCHEDULED
Status: DISCONTINUED | OUTPATIENT
Start: 2021-02-20 | End: 2021-02-20 | Stop reason: HOSPADM

## 2021-02-20 RX ORDER — SODIUM BICARBONATE 650 MG/1
1300 TABLET ORAL 2 TIMES DAILY
Status: DISCONTINUED | OUTPATIENT
Start: 2021-02-20 | End: 2021-02-20 | Stop reason: HOSPADM

## 2021-02-20 RX ORDER — ALBUMIN (HUMAN) 12.5 G/50ML
12.5 SOLUTION INTRAVENOUS AS NEEDED
Status: CANCELLED | OUTPATIENT
Start: 2021-02-20 | End: 2021-02-21

## 2021-02-20 RX ADMIN — CARVEDILOL 25 MG: 25 TABLET, FILM COATED ORAL at 08:14

## 2021-02-20 RX ADMIN — SODIUM BICARBONATE 1300 MG: 650 TABLET ORAL at 08:14

## 2021-02-20 RX ADMIN — CARVEDILOL 25 MG: 25 TABLET, FILM COATED ORAL at 03:56

## 2021-02-20 NOTE — DISCHARGE SUMMARY
"Date of Admission: 2/19/2021  Date of Discharge:  2/20/2021  Primary Care Physician: Provider, No Known     Discharge Diagnosis:  Active Hospital Problems    Diagnosis  POA   • **Anemia requiring transfusions [D64.9]  Yes   • Anemia in ESRD (end-stage renal disease) (CMS/[facility]) [N18.6, D63.1]  Yes   • HTN (hypertension) [I10]  Yes   • History of COVID-19 [Z86.16]  Yes   • NICM (nonischemic cardiomyopathy) (CMS/[facility]) [I42.8]  Yes   • Idiopathic peripheral neuropathy [G60.9]  Yes   • ESRD on dialysis (CMS/[facility]) [N18.6, Z99.2]  Not Applicable      Resolved Hospital Problems   No resolved problems to display.       Presenting Problem/History of Present Illness:  Anemia requiring transfusions [D64.9]  Anemia requiring transfusions [D64.9]     Hospital Course:  The patient is a 35 y.o. male with a history of HTN, NICM, and ESRD on hemodialysis who presented with anemia found on routine preoperative lab work (he was scheduled to have a left arm AV fistula revision on Monday). Please see admission H&P from 2/19/21 for further details. He was transfused 2 units of PRBCs with acceptable response. He had no evidence of bleeding. His iron studies indicate anemia of inflammation/chronic kidney disease. Nephrology evaluated him and will arrange epogen with dialysis. He is medically stable and will be discharged home this afternoon.    Exam Today:  Blood pressure (!) 150/111, pulse 85, temperature 98.7 °F (37.1 °C), temperature source Oral, resp. rate 20, height 175.3 cm (69\"), weight 78.5 kg (173 lb), SpO2 94 %.  Vitals signs and nursing note reviewed.   Constitutional:       General: He is not in acute distress.     Appearance: He is well-developed. He is not toxic-appearing.   HENT:      Head: Normocephalic and atraumatic.   Eyes:      General: PERRL     Conjunctiva/sclera: Conjunctivae normal.   Neck:      Musculoskeletal: Normal range of motion and neck supple.      Vascular: No JVD.   Cardiovascular:      Rate and Rhythm: Normal " rate and regular rhythm.      Heart sounds: Normal heart sounds. No murmur. No friction rub. No gallop.    Pulmonary:      Effort: Pulmonary effort is normal. No respiratory distress.      Breath sounds: Normal breath sounds. No wheezing or rales.   Abdominal:      General: Bowel sounds are normal. There is no distension.      Palpations: Abdomen is soft.      Tenderness: There is no abdominal tenderness. There is no guarding.   Musculoskeletal: Normal range of motion.         General: No tenderness or deformity.   Skin:     General: Skin is warm and dry.      Capillary Refill: Capillary refill takes less than 2 seconds.   Neurological:      Mental Status: He is alert and oriented to person, place, and time.   Psychiatric:         Mood and Affect: Affect is flat.         Behavior: Behavior normal.    Consults:   Consults     Date and Time Order Name Status Description    2/19/2021 2721 Inpatient Nephrology Consult Completed     2/19/2021 4500 [facility] (on-call MD unless specified) Details Completed            Discharge Disposition:  Home or Self Care    Discharge Medications:     Discharge Medications      Continue These Medications      Instructions Start Date   Auryxia 1  MG(Fe) tablet  Generic drug: Ferric Citrate   2 TAB TID      carvedilol 25 MG tablet  Commonly known as: COREG   25 mg, Oral, Every 12 Hours Scheduled      Chlorhexidine Gluconate Cloth 2 % pads   1 application, Apply externally, Take As Directed, Use as directed prior to OR       sodium bicarbonate 650 MG tablet   1,300 mg, Oral, 2 Times Daily             Discharge Diet:   Diet Instructions     Diet: Regular, Renal; Thin      Discharge Diet:  Regular  Renal       Fluid Consistency: Thin          Activity at Discharge:   Activity Instructions     Activity as Tolerated          Pending Labs     Order Current Status    Hemoglobin & Hematocrit, Blood In process    Hepatitis B Surface Antigen In process         Brian Steen MD  02/20/21  12:16  EST    Time Spent on Discharge Activities: Greater than 30 minutes.

## 2021-02-20 NOTE — H&P
Patient Name:  Chandler Buchanan  YOB: 1985  MRN:  [id number]  Admit Date:  2/19/2021  Patient Care Team:  Provider, No Known as PCP - General      Subjective   History Present Illness     Chief Complaint   Patient presents with   • Abnormal Lab       History of Present Illness    Mr. Buchanan is a 35 y.o. non-smoker with a history of nonischemic cardiomyopathy, idiopathic peripheral neuropathy, hypertension, COVID-19, ESRD on dialysis, and anemia that presents to [facility] complaining of abnormal lab work.  Patient is scheduled to have a left arm arteriovenous fistula revision procedure on Monday with Dr. Cuong Pedraza.  Preop labs performed today showed a hemoglobin of 6.7 so patient was sent here to receive a transfusion.  Patient admits to some fatigue and shortness of breath.  He also states that he noticed blood in his stool approximately 1 month ago.  He denies any nausea or vomiting.  He also denies any dizziness or lightheadedness.  Patient reports a history of COVID-19 back in December 2020 and attributed some of his fatigue and shortness of breath to lasting symptoms of Covid.  He has been kept for evaluation.    Review of Systems   Constitutional: Positive for fatigue. Negative for chills and fever.   HENT: Negative for congestion and rhinorrhea.    Eyes: Negative for photophobia and visual disturbance.   Respiratory: Positive for shortness of breath. Negative for cough.    Cardiovascular: Negative for chest pain and palpitations.   Gastrointestinal: Negative for constipation, diarrhea, nausea and vomiting.   Endocrine: Negative for cold intolerance and heat intolerance.   Genitourinary: Negative for difficulty urinating and dysuria.   Musculoskeletal: Negative for gait problem and joint swelling.   Skin: Negative for rash and wound.   Neurological: Positive for weakness. Negative for dizziness, light-headedness and headaches.   Psychiatric/Behavioral: Negative for sleep  disturbance and suicidal ideas.        Personal History     Past Medical History:   Diagnosis Date   • A-V fistula (CMS/HCC)     LEFT ARM   • Acute renal failure (CMS/HCC)     ON DIALYSIS MON,WED AND FRI AT HOME   • Anemia     HX   • Cardiomyopathy (CMS/HCC)     New onset systolic PER NOTE ON ADMISSION 1/2019, PT SAW CARDIO IN THE HOSPITAL BUT STATES HE DOES NOT SEE CARDIO NOW   • History of COVID-19     12/2020   • History of transfusion    • Hypertension    • Pericardial effusion    • Stage 4 chronic kidney disease (CMS/HCC)      Past Surgical History:   Procedure Laterality Date   • ARTERIOVENOUS FISTULA/SHUNT SURGERY Left 1/23/2019    Procedure: LEFT ARM FISTULA CREATION;  Surgeon: Bogdan Velásquez MD;  Location: [facility] MAIN OR;  Service: Vascular   • COLONOSCOPY N/A 3/12/2019    Procedure: COLONOSCOPY wwith Biiopsies;  Surgeon: Jered Obrien MD;  Location: [facility] ENDOSCOPY;  Service: General   • ENDOSCOPY N/A 3/12/2019    Procedure: ESOPHAGOGASTRODUODENOSCOPY WITH BIOPSY;  Surgeon: Jered Obrien MD;  Location: [facility] ENDOSCOPY;  Service: General   • INSERTION HEMODIALYSIS CATHETER N/A 1/21/2019    Procedure: HEMODIALYSIS CATHETER INSERTION;  Surgeon: Cuong Pedraza Jr., MD;  Location: [facility] MAIN OR;  Service: Vascular   • INSERTION PERITONEAL DIALYSIS CATHETER N/A 3/14/2019    Procedure: INSERTION PERITONEAL DIALYSIS CATHETER LAPAROSCOPIC AND OMENTOPEXY;  Surgeon: Jered Obrien MD;  Location: [facility] MAIN OR;  Service: General   • REMOVAL PERITONEAL DIALYSIS CATHETER N/A 10/31/2019    Procedure: REMOVAL PERITONEAL DIALYSIS CATHETER;  Surgeon: Jered Obrien MD;  Location: [facility] MAIN OR;  Service: General     Family History   Problem Relation Age of Onset   • Colon cancer Mother    • Seizures Maternal Grandmother    • Malig Hyperthermia Neg Hx      Social History     Tobacco Use   • Smoking status: Never Smoker   • Smokeless tobacco: Never Used    Substance Use Topics   • Alcohol use: No     Frequency: Never   • Drug use: No     No current facility-administered medications on file prior to encounter.      Current Outpatient Medications on File Prior to Encounter   Medication Sig Dispense Refill   • Auryxia 1  MG(Fe) tablet 2 TAB TID     • carvedilol (COREG) 25 MG tablet Take 1 tablet by mouth Every 12 (Twelve) Hours. 60 tablet 0   • sodium bicarbonate 650 MG tablet Take 1,300 mg by mouth 2 (Two) Times a Day.     • Chlorhexidine Gluconate Cloth 2 % pads Apply 1 application topically Take As Directed. Use as directed prior to OR     • [DISCONTINUED] SODIUM BICARBONATE PO Take  by mouth.       No Known Allergies    Objective    Objective     Vital Signs  Temp:  [97.4 °F (36.3 °C)-98.8 °F (37.1 °C)] 98.4 °F (36.9 °C)  Heart Rate:  [82-97] 93  Resp:  [16-18] 18  BP: (153-167)/() 153/101  SpO2:  [90 %-97 %] 90 %  on   ;   Device (Oxygen Therapy): room air  Body mass index is 25.1 kg/m².    Physical Exam  Vitals signs and nursing note reviewed.   Constitutional:       General: He is not in acute distress.     Appearance: He is well-developed. He is not toxic-appearing.   HENT:      Head: Normocephalic and atraumatic.   Eyes:      General: No scleral icterus.        Right eye: No discharge.         Left eye: No discharge.      Conjunctiva/sclera: Conjunctivae normal.   Neck:      Musculoskeletal: Normal range of motion and neck supple.      Vascular: No JVD.   Cardiovascular:      Rate and Rhythm: Normal rate and regular rhythm.      Heart sounds: Normal heart sounds. No murmur. No friction rub. No gallop.    Pulmonary:      Effort: Pulmonary effort is normal. No respiratory distress.      Breath sounds: Normal breath sounds. No wheezing or rales.   Abdominal:      General: Bowel sounds are normal. There is no distension.      Palpations: Abdomen is soft.      Tenderness: There is no abdominal tenderness. There is no guarding.   Musculoskeletal: Normal  range of motion.         General: No tenderness or deformity.   Skin:     General: Skin is warm and dry.      Capillary Refill: Capillary refill takes less than 2 seconds.   Neurological:      Mental Status: He is alert and oriented to person, place, and time.   Psychiatric:         Mood and Affect: Affect is flat.         Behavior: Behavior normal.       Results Review:  I reviewed the patient's new clinical results.  Discussed with ED provider.    Lab Results (last 24 hours)     Procedure Component Value Units Date/Time    Basic Metabolic Panel [[id number]]  (Abnormal) Collected: 02/19/21 0829    Specimen: Blood Updated: 02/19/21 0922     Glucose 99 mg/dL      BUN 75 mg/dL      Creatinine 9.27 mg/dL      Sodium 141 mmol/L      Potassium 4.5 mmol/L      Chloride 96 mmol/L      CO2 33.0 mmol/L      Calcium 8.9 mg/dL      eGFR   Amer --     Comment: <15 Indicative of kidney failure.        eGFR Non African Amer 6 mL/min/1.73      Comment: <15 Indicative of kidney failure.        BUN/Creatinine Ratio 8.1     Anion Gap 12.0 mmol/L     Narrative:      GFR Normal >60  Chronic Kidney Disease <60  Kidney Failure <15      CBC (No Diff) [[id number]]  (Abnormal) Collected: 02/19/21 0829    Specimen: Blood Updated: 02/19/21 0907     WBC 11.47 10*3/mm3      RBC 2.25 10*6/mm3      Hemoglobin 6.7 g/dL      Hematocrit 20.4 %      MCV 90.7 fL      MCH 29.8 pg      MCHC 32.8 g/dL      RDW 13.7 %      RDW-SD 44.4 fl      MPV 9.3 fL      Platelets 304 10*3/mm3     COVID PRE-OP / PRE-PROCEDURE SCREENING ORDER (NO ISOLATION) - Swab, Nasopharynx [[id number]]  (Normal) Collected: 02/19/21 0842    Specimen: Swab from Nasopharynx Updated: 02/19/21 1245    Narrative:      The following orders were created for panel order COVID PRE-OP / PRE-PROCEDURE SCREENING ORDER (NO ISOLATION) - Swab, Nasopharynx.  Procedure                               Abnormality         Status                     ---------                               -----------          ------                     COVID-19,APTIMA PANTHER,...[[id number]]  Normal              Final result                 Please view results for these tests on the individual orders.    COVID-19,APTIMA PANTHER,ART IN-HOUSE, NP/OP SWAB IN UTM/VTM/SALINE TRANSPORT MEDIA,24 HR TAT - Swab, Nasopharynx [[id number]]  (Normal) Collected: 02/19/21 0842    Specimen: Swab from Nasopharynx Updated: 02/19/21 1245     COVID19 Not Detected    Narrative:      Fact sheet for providers: https://www.fda.gov/media/755543/download     Fact sheet for patients: https://www.fda.gov/media/892389/download    Test performed by RT PCR.    CBC & Differential [[id number]]  (Abnormal) Collected: 02/19/21 1756    Specimen: Blood Updated: 02/19/21 1828    Narrative:      The following orders were created for panel order CBC & Differential.  Procedure                               Abnormality         Status                     ---------                               -----------         ------                     CBC Auto Differential[[id number]]        Abnormal            Final result                 Please view results for these tests on the individual orders.    Basic Metabolic Panel [[id number]]  (Abnormal) Collected: 02/19/21 1756    Specimen: Blood Updated: 02/19/21 1835     Glucose 99 mg/dL      BUN 86 mg/dL      Creatinine 10.28 mg/dL      Sodium 140 mmol/L      Potassium 4.3 mmol/L      Chloride 96 mmol/L      CO2 30.8 mmol/L      Calcium 8.7 mg/dL      eGFR   Amer --     Comment: <15 Indicative of kidney failure.        eGFR Non African Amer 6 mL/min/1.73      Comment: <15 Indicative of kidney failure.        BUN/Creatinine Ratio 8.4     Anion Gap 13.2 mmol/L     Narrative:      GFR Normal >60  Chronic Kidney Disease <60  Kidney Failure <15      CBC Auto Differential [[id number]]  (Abnormal) Collected: 02/19/21 1756    Specimen: Blood Updated: 02/19/21 1828     WBC 12.02 10*3/mm3      RBC 2.27 10*6/mm3      Hemoglobin 6.8 g/dL       Hematocrit 20.7 %      MCV 91.2 fL      MCH 30.0 pg      MCHC 32.9 g/dL      RDW 13.8 %      RDW-SD 45.5 fl      MPV 9.2 fL      Platelets 329 10*3/mm3      Neutrophil % 88.9 %      Lymphocyte % 5.8 %      Monocyte % 3.8 %      Eosinophil % 0.0 %      Basophil % 0.4 %      Immature Grans % 1.1 %      Neutrophils, Absolute 10.68 10*3/mm3      Lymphocytes, Absolute 0.70 10*3/mm3      Monocytes, Absolute 0.46 10*3/mm3      Eosinophils, Absolute 0.00 10*3/mm3      Basophils, Absolute 0.05 10*3/mm3      Immature Grans, Absolute 0.13 10*3/mm3      nRBC 0.0 /100 WBC           Imaging Results (Last 24 Hours)     ** No results found for the last 24 hours. **          Results for orders placed during the hospital encounter of 01/13/19   Adult Transthoracic Echo Complete W/ Cont if Necessary Per Protocol    Narrative · Left ventricular systolic function is severely decreased. Calculated EF   = 23.0%. Estimated EF was in agreement with the calculated EF. There is   left ventricular global hypokinesis noted.  · The left ventricular cavity is severely dilated. Left ventricular wall   thickness is consistent with moderate concentric hypertrophy. Left   ventricular diastolic function was indeterminate. The appearance of the   apex was initially suggestive of noncompaction, but contrast images show   normal trabeculation.  · Left atrial cavity size is mildly dilated.  · There is a small (<1cm) circumferential pericardial effusion.          No orders to display        Assessment/Plan     Active Hospital Problems    Diagnosis  POA   • **Anemia requiring transfusions [D64.9]  Yes   • HTN (hypertension) [I10]  Yes   • History of COVID-19 [Z86.16]  Yes   • NICM (nonischemic cardiomyopathy) (CMS/HCC) [I42.8]  Yes   • Idiopathic peripheral neuropathy [G60.9]  Yes   • ESRD on dialysis (CMS/[facility]) [N18.6, Z99.2]  Not Applicable   • Anemia due to stage 4 chronic kidney disease (CMS/HCC) [N18.4, D63.1]  Yes      Resolved Hospital Problems   No  resolved problems to display.       Mr. Buchanan is a 35 y.o. non-smoker with a history of end-stage renal disease on dialysis who presents with anemia.    Anemia  -In the setting of ESRD. Although patient does admit to some blood in his stool approximately one month ago. Will consult GI.  -Hemoglobin 6.8, hematocrit 20.7.  Patient currently receiving 2 units of packed red blood cells ordered in the emergency department.  -Trend H&H  -Will order iron studies to be ran from labs taken prior to blood transfusion.    ESRD on dialysis  -Consult Dr. Radford of nephrology  -Patient has an upcoming fistula revision with Dr. Cuong Pedraza of Vascular surgery on Monday. Will defer consultation to rounding MD     Hypertension  -Stable, resume home regimen      I discussed the patient's findings and my recommendations with patient, ED provider and Dr. Lewis.    VTE Prophylaxis - SCDs.  Code Status - Full code.       CATIA Green  [facility]  02/19/21  21:30 EST

## 2021-02-20 NOTE — PLAN OF CARE
Pt received 2 units of PRBC's this shift. Hypertensive. Tachypnea is improving. Started on incentive spirometer; pt able to get it up to 1500. Tolerates well. Dry cough present. Lung sounds diminished.      Problem: Adult Inpatient Plan of Care  Goal: Plan of Care Review  Outcome: Ongoing, Progressing  Goal: Patient-Specific Goal (Individualized)  Outcome: Ongoing, Progressing  Goal: Absence of Hospital-Acquired Illness or Injury  Outcome: Ongoing, Progressing  Intervention: Identify and Manage Fall Risk  Recent Flowsheet Documentation  Taken 2/20/2021 0447 by Rosio Sorensen RN  Safety Promotion/Fall Prevention:   assistive device/personal items within reach   clutter free environment maintained   fall prevention program maintained   safety round/check completed  Taken 2/20/2021 0346 by Rosio Sorensen RN  Safety Promotion/Fall Prevention:   assistive device/personal items within reach   clutter free environment maintained   fall prevention program maintained   safety round/check completed  Taken 2/20/2021 0206 by Rosio Sorensen RN  Safety Promotion/Fall Prevention:   assistive device/personal items within reach   clutter free environment maintained   fall prevention program maintained   safety round/check completed  Taken 2/20/2021 0007 by Rosio Sorensen RN  Safety Promotion/Fall Prevention:   assistive device/personal items within reach   clutter free environment maintained   fall prevention program maintained   safety round/check completed  Taken 2/19/2021 2204 by Rosio Sorensen RN  Safety Promotion/Fall Prevention:   activity supervised   assistive device/personal items within reach   clutter free environment maintained   fall prevention program maintained   nonskid shoes/slippers when out of bed   safety round/check completed  Intervention: Prevent Skin Injury  Recent Flowsheet Documentation  Taken 2/20/2021 0447 by Rosio Sorensen RN  Body Position: position changed independently  Taken 2/20/2021  0346 by Rosio Sorensen RN  Body Position: position changed independently  Taken 2/20/2021 0206 by Rosio Sorensen RN  Body Position: position changed independently  Taken 2/20/2021 0007 by Rosio Sorensen RN  Body Position: position changed independently  Taken 2/19/2021 2204 by Rosio Sorensen RN  Body Position: position changed independently  Goal: Optimal Comfort and Wellbeing  Outcome: Ongoing, Progressing  Intervention: Provide Person-Centered Care  Recent Flowsheet Documentation  Taken 2/19/2021 2204 by Rosio Sorensen RN  Trust Relationship/Rapport: care explained  Goal: Readiness for Transition of Care  Outcome: Ongoing, Progressing  Intervention: Mutually Develop Transition Plan  Recent Flowsheet Documentation  Taken 2/19/2021 2202 by Rosio Sorensen RN  Transportation Anticipated: family or friend will provide  Patient/Family Anticipated Services at Transition: none  Patient/Family Anticipates Transition to: home with family  Taken 2/19/2021 2201 by Rosio Sorensen RN  Patient/Family Anticipated Services at Transition: none  Patient/Family Anticipates Transition to: home with family  Taken 2/19/2021 2200 by Rosio Sorensen RN  Equipment Currently Used at Home: (dialysis machine) other (see comments)   Goal Outcome Evaluation:

## 2021-02-20 NOTE — CONSULTS
Referring Provider: Margaret Hannah APRN   Reason for Consultation: ESRD    Subjective     Chief complaint   Chief Complaint   Patient presents with   • Abnormal Lab       History of present illness:      85 years old white male with a past medical history of end-stage renal disease on home hemodialysis likely due to hypertension. Patient came in for an elective surgery on her AV fistula and was found to have severe anemia so he was admitted for blood transfusion. Patient received 2 unit of blood and his last hemoglobin is 7.5. We were consulted help with management of his end-stage renal disease and anemia management. He denies shortness of air or chest pain          Past Medical History:   Diagnosis Date   • A-V fistula (CMS/HCC)     LEFT ARM   • Acute renal failure (CMS/HCC)     ON DIALYSIS MON,WED AND FRI AT HOME   • Anemia     HX   • Cardiomyopathy (CMS/HCC)     New onset systolic PER NOTE ON ADMISSION 1/2019, PT SAW CARDIO IN THE HOSPITAL BUT STATES HE DOES NOT SEE CARDIO NOW   • History of COVID-19     12/2020   • History of transfusion    • Hypertension    • Pericardial effusion    • Stage 4 chronic kidney disease (CMS/HCC)      Past Surgical History:   Procedure Laterality Date   • ARTERIOVENOUS FISTULA/SHUNT SURGERY Left 1/23/2019    Procedure: LEFT ARM FISTULA CREATION;  Surgeon: Bogdan Velásquez MD;  Location: [facility] OR;  Service: Vascular   • COLONOSCOPY N/A 3/12/2019    Procedure: COLONOSCOPY wwith Biiopsies;  Surgeon: Jered Obrien MD;  Location: [facility] ENDOSCOPY;  Service: General   • ENDOSCOPY N/A 3/12/2019    Procedure: ESOPHAGOGASTRODUODENOSCOPY WITH BIOPSY;  Surgeon: Jered Obrien MD;  Location: [facility] ENDOSCOPY;  Service: General   • INSERTION HEMODIALYSIS CATHETER N/A 1/21/2019    Procedure: HEMODIALYSIS CATHETER INSERTION;  Surgeon: Cuong Pedraza Jr., MD;  Location: [facility] OR;  Service: Vascular   • INSERTION PERITONEAL DIALYSIS CATHETER  "N/A 3/14/2019    Procedure: INSERTION PERITONEAL DIALYSIS CATHETER LAPAROSCOPIC AND OMENTOPEXY;  Surgeon: Jered Obrien MD;  Location: [facility] MAIN OR;  Service: General   • REMOVAL PERITONEAL DIALYSIS CATHETER N/A 10/31/2019    Procedure: REMOVAL PERITONEAL DIALYSIS CATHETER;  Surgeon: Jered Obrien MD;  Location: [facility] MAIN OR;  Service: General     Family History   Problem Relation Age of Onset   • Colon cancer Mother    • Seizures Maternal Grandmother    • Malig Hyperthermia Neg Hx      Social History     Tobacco Use   • Smoking status: Never Smoker   • Smokeless tobacco: Never Used   Substance Use Topics   • Alcohol use: No     Frequency: Never   • Drug use: No     Medications Prior to Admission   Medication Sig Dispense Refill Last Dose   • Auryxia 1  MG(Fe) tablet 2 TAB TID   2/19/2021 at Unknown time   • carvedilol (COREG) 25 MG tablet Take 1 tablet by mouth Every 12 (Twelve) Hours. 60 tablet 0 2/19/2021 at Unknown time   • sodium bicarbonate 650 MG tablet Take 1,300 mg by mouth 2 (Two) Times a Day.   2/19/2021 at Unknown time   • Chlorhexidine Gluconate Cloth 2 % pads Apply 1 application topically Take As Directed. Use as directed prior to OR        Allergies:  Patient has no known allergies.    Review of Systems  Pertinent items are noted in HPI.    Objective     Vital Signs  Temp:  [97.4 °F (36.3 °C)-98.8 °F (37.1 °C)] 98.6 °F (37 °C)  Heart Rate:  [81-98] 92  Resp:  [16-28] 24  BP: (153-167)/(101-119) 153/104    Flowsheet Rows      First Filed Value   Admission Height  175.3 cm (69\") Documented at 02/19/2021 1740   Admission Weight  77.1 kg (170 lb) Documented at 02/19/2021 1740           No intake/output data recorded.  I/O last 3 completed shifts:  In: 1080 [P.O.:480; Blood:600]  Out: -     Intake/Output Summary (Last 24 hours) at 2/20/2021 1030  Last data filed at 2/20/2021 0432  Gross per 24 hour   Intake 1080 ml   Output --   Net 1080 ml       Physical Exam:     General " Appearance:    Alert, cooperative, in no acute distress   Head:    Normocephalic, without obvious abnormality, atraumatic   Eyes:            Lids and lashes normal, conjunctivae and sclerae normal, no   icterus, no pallor, corneas clear, PERRLA   Ears:    Ears appear intact with no abnormalities noted   Throat:   No oral lesions, no thrush, oral mucosa moist   Neck:   No adenopathy, supple, trachea midline, no thyromegaly, no   carotid bruit, no JVD   Back:     No kyphosis present, no scoliosis present, no skin lesions,      erythema or scars, no tenderness to percussion or                   palpation,   range of motion normal   Lungs:     Clear to auscultation,respirations regular, even and                  unlabored    Heart:    Regular rhythm and normal rate, normal S1 and S2, no            murmur, no gallop, no rub, no click   Chest Wall:    No abnormalities observed   Abdomen:     Normal bowel sounds, no masses, no organomegaly, soft        non-tender, non-distended, no guarding, no rebound                tenderness   Rectal:     Deferred   Extremities:   Moves all extremities well, no edema, no cyanosis, no             redness   Pulses:   Pulses palpable and equal bilaterally   Skin:   No bleeding, bruising or rash               Results Review:  Results from last 7 days   Lab Units 02/20/21 0521 02/19/21 1756 02/19/21  0829   SODIUM mmol/L 139 140 141   POTASSIUM mmol/L 4.8 4.3 4.5   CHLORIDE mmol/L 96* 96* 96*   CO2 mmol/L 28.4 30.8* 33.0*   BUN mg/dL 94* 86* 75*   CREATININE mg/dL 11.17* 10.28* 9.27*   CALCIUM mg/dL 8.5* 8.7 8.9   GLUCOSE mg/dL 100* 99 99       Estimated Creatinine Clearance: 10.2 mL/min (A) (by C-G formula based on SCr of 11.17 mg/dL (H)).          Results from last 7 days   Lab Units 02/20/21 0521 02/19/21 1756 02/19/21  0829   WBC 10*3/mm3  --  12.02* 11.47*   HEMOGLOBIN g/dL 7.5* 6.8* 6.7*   PLATELETS 10*3/mm3  --  329 304             Active Medications  carvedilol, 25 mg, Oral,  Q12H  sevelamer, 400 mg, Oral, TID With Meals  sodium bicarbonate, 1,300 mg, Oral, BID           Assessment/Plan       -End-stage renal disease on home hemodialysis: Plan for dialysis today with Epogen with dialysis  -Severe anemia: Patient received 2 unit of blood with last hemoglobin 7.5. Again will give Procrit shot. No evidence of active bleed  -Hypertension  -Renal osteodystrophy          Samra Perry MD  02/20/21  10:30 EST

## 2021-02-20 NOTE — ED PROVIDER NOTES
MD ATTESTATION NOTE    The PETRA and I have discussed this patient's history, physical exam, and treatment plan.  I have reviewed the documentation and personally had a face to face interaction with the patient. I affirm the documentation and agree with the treatment and plan.  The attached note describes my personal findings.      Chandler Buchanan is a 35 y.o. male who presents to the ED c/o having abnormal labs.  He reports that he had outpatient labs drawn today in preparation for surgery on his left arm fistula.  He reports that they told him his hemoglobin is low and he needed to come to the emergency room for transfusion.  He reports generalized weakness.  He denies chest pain or shortness of breath.  He denies syncope or palpitations.  He reports he has had anemia in the past related to his chronic kidney disease.  He reports he does dialysis at home.  He states his last dialysis was yesterday.  He reports he is on dialysis for kidney disease related to hypertension.      On exam:  GENERAL: Awake, alert, no acute distress  SKIN: Warm, dry  HENT: Normocephalic, atraumatic  EYES: no scleral icterus  CV: regular rhythm, regular rate  RESPIRATORY: normal effort, lungs clear  ABDOMEN: soft, non-tender, non-distended  MUSCULOSKELETAL: no deformity.  Left arm fistula with palpable thrill  NEURO: alert, moves all extremities, follows commands    Labs  Recent Results (from the past 24 hour(s))   Basic Metabolic Panel    Collection Time: 02/19/21  8:29 AM    Specimen: Blood   Result Value Ref Range    Glucose 99 65 - 99 mg/dL    BUN 75 (H) 6 - 20 mg/dL    Creatinine 9.27 (H) 0.76 - 1.27 mg/dL    Sodium 141 136 - 145 mmol/L    Potassium 4.5 3.5 - 5.2 mmol/L    Chloride 96 (L) 98 - 107 mmol/L    CO2 33.0 (H) 22.0 - 29.0 mmol/L    Calcium 8.9 8.6 - 10.5 mg/dL    eGFR  African Amer      eGFR Non African Amer 6 (L) >60 mL/min/1.73    BUN/Creatinine Ratio 8.1 7.0 - 25.0    Anion Gap 12.0 5.0 - 15.0 mmol/L   CBC (No Diff)     Collection Time: 02/19/21  8:29 AM    Specimen: Blood   Result Value Ref Range    WBC 11.47 (H) 3.40 - 10.80 10*3/mm3    RBC 2.25 (L) 4.14 - 5.80 10*6/mm3    Hemoglobin 6.7 (C) 13.0 - 17.7 g/dL    Hematocrit 20.4 (C) 37.5 - 51.0 %    MCV 90.7 79.0 - 97.0 fL    MCH 29.8 26.6 - 33.0 pg    MCHC 32.8 31.5 - 35.7 g/dL    RDW 13.7 12.3 - 15.4 %    RDW-SD 44.4 37.0 - 54.0 fl    MPV 9.3 6.0 - 12.0 fL    Platelets 304 140 - 450 10*3/mm3   COVID-19,[facility] IN-HOUSE, NP/OP SWAB IN UTM/VTM/[facility] TRANSPORT MEDIA,24 HR TAT - Swab, Nasopharynx    Collection Time: 02/19/21  8:42 AM    Specimen: Nasopharynx; Swab   Result Value Ref Range    COVID19 Not Detected Not Detected - Ref. Range   Basic Metabolic Panel    Collection Time: 02/19/21  5:56 PM    Specimen: Blood   Result Value Ref Range    Glucose 99 65 - 99 mg/dL    BUN 86 (H) 6 - 20 mg/dL    Creatinine 10.28 (H) 0.76 - 1.27 mg/dL    Sodium 140 136 - 145 mmol/L    Potassium 4.3 3.5 - 5.2 mmol/L    Chloride 96 (L) 98 - 107 mmol/L    CO2 30.8 (H) 22.0 - 29.0 mmol/L    Calcium 8.7 8.6 - 10.5 mg/dL    eGFR  African Amer      eGFR Non African Amer 6 (L) >60 mL/min/1.73    BUN/Creatinine Ratio 8.4 7.0 - 25.0    Anion Gap 13.2 5.0 - 15.0 mmol/L   Type & Screen    Collection Time: 02/19/21  5:56 PM    Specimen: Blood   Result Value Ref Range    ABO Type B     RH type Positive     Antibody Screen Negative     T&S Expiration Date 2/22/2021 11:59:59 PM    CBC Auto Differential    Collection Time: 02/19/21  5:56 PM    Specimen: Blood   Result Value Ref Range    WBC 12.02 (H) 3.40 - 10.80 10*3/mm3    RBC 2.27 (L) 4.14 - 5.80 10*6/mm3    Hemoglobin 6.8 (C) 13.0 - 17.7 g/dL    Hematocrit 20.7 (C) 37.5 - 51.0 %    MCV 91.2 79.0 - 97.0 fL    MCH 30.0 26.6 - 33.0 pg    MCHC 32.9 31.5 - 35.7 g/dL    RDW 13.8 12.3 - 15.4 %    RDW-SD 45.5 37.0 - 54.0 fl    MPV 9.2 6.0 - 12.0 fL    Platelets 329 140 - 450 10*3/mm3    Neutrophil % 88.9 (H) 42.7 - 76.0 %    Lymphocyte % 5.8 (L) 19.6 - 45.3  %    Monocyte % 3.8 (L) 5.0 - 12.0 %    Eosinophil % 0.0 (L) 0.3 - 6.2 %    Basophil % 0.4 0.0 - 1.5 %    Immature Grans % 1.1 (H) 0.0 - 0.5 %    Neutrophils, Absolute 10.68 (H) 1.70 - 7.00 10*3/mm3    Lymphocytes, Absolute 0.70 0.70 - 3.10 10*3/mm3    Monocytes, Absolute 0.46 0.10 - 0.90 10*3/mm3    Eosinophils, Absolute 0.00 0.00 - 0.40 10*3/mm3    Basophils, Absolute 0.05 0.00 - 0.20 10*3/mm3    Immature Grans, Absolute 0.13 (H) 0.00 - 0.05 10*3/mm3    nRBC 0.0 0.0 - 0.2 /100 WBC   Prepare RBC, 2 Units    Collection Time: 02/19/21  7:34 PM   Result Value Ref Range    Product Code D6589C45     Unit Number [id number]     UNIT  ABO B     UNIT  RH POS     Crossmatch Interpretation Compatible     Dispense Status XM     Blood Expiration Date [id number]     Blood Type Barcode 7300     Product Code J4152N78     Unit Number [id number]     UNIT  ABO B     UNIT  RH POS     Crossmatch Interpretation Compatible     Dispense Status IS     Blood Expiration Date [id number]     Blood Type Barcode 7300        Radiology  No Radiology Exams Resulted Within Past 24 Hours    Medical Decision Making:  ED Course as of Feb 19 1941   Fri Feb 19, 2021 1936 I spoke with PETRA Yun with [facility] at this time regarding the patient.  I discussed work-up, results, concerns.  I discussed the consulting provider's desire for med surg admit to [facility]        [DC]      ED Course User Index  [DC] Ronak Morocho PA       Plan recheck hemoglobin to ensure no errors, transfuse if low.  He is very unhappy about being here today because he feels like his hemoglobin should have been managed prior to today if it has been trending low.  For transfusion he will require admission per hospital protocol.    Critical Care  Performed by: Nolan Mendoza MD  Authorized by: Nolan Mendoza MD     Critical care provider statement:     Critical care was necessary to treat or prevent imminent or life-threatening deterioration of the following  conditions:  Circulatory failure    Critical care was time spent personally by me on the following activities:  Ordering and performing treatments and interventions, ordering and review of laboratory studies, pulse oximetry, re-evaluation of patient's condition, review of old charts, examination of patient, discussions with consultants and development of treatment plan with patient or surrogate  Comments:      Between 30 and 74 minutes for symptomatic anemia, transfusion          PPE: Both the patient and I wore a surgical mask throughout the entire patient encounter. I wore protective goggles.     Diagnosis  Final diagnoses:   Anemia requiring transfusions        Nolan Mendoza MD  02/19/21 1942

## 2021-02-20 NOTE — ED NOTES
Nursing report ED to floor  Chandler Buchanan  35 y.o.  male    HPI (triage note):   Chief Complaint   Patient presents with   • Abnormal Lab       Admitting doctor:   Ko Lewis MD    Admitting diagnosis:   The encounter diagnosis was Anemia requiring transfusions.    Code status:   Current Code Status     Date Active Code Status Order ID Comments User Context       Prior    Advance Care Planning Activity          Allergies:   Patient has no known allergies.    Weight:       02/19/21  1740   Weight: 77.1 kg (170 lb)       Most recent vitals:   Vitals:    02/19/21 1944 02/19/21 1954 02/19/21 2100 02/19/21 2101   BP:  (!) 163/104 (!) 153/101    BP Location:       Patient Position:       Pulse:  96  93   Resp: 18      Temp:  98.4 °F (36.9 °C)     TempSrc:  Oral     SpO2:  95%  90%   Weight:       Height:           Active LDAs/IV Access:   Lines, Drains & Airways    Active LDAs     Name:   Placement date:   Placement time:   Site:   Days:    Peripheral IV 02/19/21 1754 Right Forearm   02/19/21 1754    Forearm   less than 1                Labs (abnormal labs have a star):   Labs Reviewed   BASIC METABOLIC PANEL - Abnormal; Notable for the following components:       Result Value    BUN 86 (*)     Creatinine 10.28 (*)     Chloride 96 (*)     CO2 30.8 (*)     eGFR Non  Amer 6 (*)     All other components within normal limits    Narrative:     GFR Normal >60  Chronic Kidney Disease <60  Kidney Failure <15     CBC WITH AUTO DIFFERENTIAL - Abnormal; Notable for the following components:    WBC 12.02 (*)     RBC 2.27 (*)     Hemoglobin 6.8 (*)     Hematocrit 20.7 (*)     Neutrophil % 88.9 (*)     Lymphocyte % 5.8 (*)     Monocyte % 3.8 (*)     Eosinophil % 0.0 (*)     Immature Grans % 1.1 (*)     Neutrophils, Absolute 10.68 (*)     Immature Grans, Absolute 0.13 (*)     All other components within normal limits   PREPARE RBC   TYPE AND SCREEN   CBC AND DIFFERENTIAL    Narrative:     The following orders were  created for panel order CBC & Differential.  Procedure                               Abnormality         Status                     ---------                               -----------         ------                     CBC Auto Differential[[id number]]        Abnormal            Final result                 Please view results for these tests on the individual orders.       EKG:   No orders to display       Meds given in ED:   Medications - No data to display    Imaging results:  No radiology results for the last day    Ambulatory status:   - ad tonya    Social issues:   Social History     Socioeconomic History   • Marital status: Single     Spouse name: Not on file   • Number of children: Not on file   • Years of education: Not on file   • Highest education level: Not on file   Tobacco Use   • Smoking status: Never Smoker   • Smokeless tobacco: Never Used   Substance and Sexual Activity   • Alcohol use: No     Frequency: Never   • Drug use: No   • Sexual activity: Alka Ledezma RN  02/19/21 5077

## 2021-02-20 NOTE — NURSING NOTE
Dr. Perry informed patient and nurse that if patient needed to stay another day he was placing dialysis orders. However, if patient to discharge home he could continue home hemodialysis. Read back and verified. Will continue to monitor.

## 2021-02-21 ENCOUNTER — READMISSION MANAGEMENT (OUTPATIENT)
Dept: CALL CENTER | Facility: HOSPITAL | Age: 36
End: 2021-02-21

## 2021-02-21 NOTE — OUTREACH NOTE
Prep Survey      Responses   [facility] facility patient discharged from?  [facility]   Is LACE score < 7 ?  No   Emergency Room discharge w/ pulse ox?  No   Eligibility  Readm Mgmt   Discharge diagnosis  Anemia requiring transfusions, ESRD   Does the patient have one of the following disease processes/diagnoses(primary or secondary)?  Other   Does the patient have Home health ordered?  No   Is there a DME ordered?  No   Comments regarding appointments  Needs f/u scheduled   Prep survey completed?  Yes          Cynthia Westfall RN

## 2021-02-22 NOTE — PROGRESS NOTES
Case Management Discharge Note      Final Note: Per notes patient DC'd home         Selected Continued Care - Discharged on 2/20/2021 Admission date: 2/19/2021 - Discharge disposition: Home or Self Care    Destination    No services have been selected for the patient.              Durable Medical Equipment    No services have been selected for the patient.              Dialysis/Infusion    No services have been selected for the patient.              Home Medical Care    No services have been selected for the patient.              Therapy    No services have been selected for the patient.              Community Resources    No services have been selected for the patient.                  Transportation Services  Private: Car    Final Discharge Disposition Code: 01 - home or self-care

## 2021-02-23 ENCOUNTER — READMISSION MANAGEMENT (OUTPATIENT)
Dept: CALL CENTER | Facility: HOSPITAL | Age: 36
End: 2021-02-23

## 2021-02-23 NOTE — OUTREACH NOTE
Medical Week 1 Survey      Responses   [facility]   Does the patient have one of the following disease processes/diagnoses(primary or secondary)?  Other   Week 1 attempt successful?  Yes   Call start time  0959   Call end time  1001   Discharge diagnosis  Anemia requiring transfusions, ESRD   Is patient permission given to speak with other caregiver?  No   Meds reviewed with patient/caregiver?  Yes   Is the patient having any side effects they believe may be caused by any medication additions or changes?  No   Does the patient have all medications ordered at discharge?  Yes   Is the patient taking all medications as directed (includes completed medication regime)?  Yes   Does the patient have a primary care provider?   Yes   Does the patient have an appointment with their PCP within 7 days of discharge?  Yes   Comments regarding PCP  He sees his kidney specialist on 02/24/2021   Has the patient kept scheduled appointments due by today?  Yes   Has home health visited the patient within 72 hours of discharge?  N/A   Psychosocial issues?  No   Did the patient receive a copy of their discharge instructions?  Yes   Nursing interventions  Reviewed instructions with patient   What is the patient's perception of their health status since discharge?  Improving [He states he is feeling much better. ]   Is the patient/caregiver able to teach back signs and symptoms related to disease process for when to call PCP?  Yes   Is the patient/caregiver able to teach back signs and symptoms related to disease process for when to call 911?  Yes   Is the patient/caregiver able to teach back the hierarchy of who to call/visit for symptoms/problems? PCP, Specialist, Home health nurse, Urgent Care, ED, 911  Yes   Week 1 call completed?  Yes          Ambar Pablo RN

## 2021-03-03 ENCOUNTER — READMISSION MANAGEMENT (OUTPATIENT)
Dept: CALL CENTER | Facility: HOSPITAL | Age: 36
End: 2021-03-03

## 2021-03-03 NOTE — OUTREACH NOTE
Medical Week 2 Survey      Responses   [facility] patient discharged from?  [facility]   Does the patient have one of the following disease processes/diagnoses(primary or secondary)?  Other   Week 2 attempt successful?  Yes   Call start time  1223   Discharge diagnosis  Anemia requiring transfusions, ESRD   Call end time  1228   Meds reviewed with patient/caregiver?  Yes   Is the patient taking all medications as directed (includes completed medication regime)?  Yes   Does the patient have an appointment with their PCP within 7 days of discharge?  No [Says he does not have a PCP offered to assist with PCP selection, did not want to at this time]   Has the patient kept scheduled appointments due by today?  Yes   Comments  Saw kidney MD 02/24/2021    What is the patient's perception of their health status since discharge?  Improving   Week 2 Call Completed?  Yes          Katalina Olmstead RN

## 2021-03-05 ENCOUNTER — APPOINTMENT (OUTPATIENT)
Dept: PREADMISSION TESTING | Facility: HOSPITAL | Age: 36
End: 2021-03-05

## 2021-03-05 VITALS
TEMPERATURE: 97.9 F | HEIGHT: 69 IN | OXYGEN SATURATION: 98 % | WEIGHT: 175 LBS | SYSTOLIC BLOOD PRESSURE: 173 MMHG | RESPIRATION RATE: 16 BRPM | HEART RATE: 75 BPM | BODY MASS INDEX: 25.92 KG/M2 | DIASTOLIC BLOOD PRESSURE: 108 MMHG

## 2021-03-05 LAB
DEPRECATED RDW RBC AUTO: 50.3 FL (ref 37–54)
ERYTHROCYTE [DISTWIDTH] IN BLOOD BY AUTOMATED COUNT: 14.9 % (ref 12.3–15.4)
HCT VFR BLD AUTO: 24 % (ref 37.5–51)
HGB BLD-MCNC: 7.8 G/DL (ref 13–17.7)
MCH RBC QN AUTO: 30.2 PG (ref 26.6–33)
MCHC RBC AUTO-ENTMCNC: 32.5 G/DL (ref 31.5–35.7)
MCV RBC AUTO: 93 FL (ref 79–97)
PLATELET # BLD AUTO: 233 10*3/MM3 (ref 140–450)
PMV BLD AUTO: 9.1 FL (ref 6–12)
RBC # BLD AUTO: 2.58 10*6/MM3 (ref 4.14–5.8)
WBC # BLD AUTO: 10.63 10*3/MM3 (ref 3.4–10.8)

## 2021-03-05 PROCEDURE — U0004 COV-19 TEST NON-CDC HGH THRU: HCPCS | Performed by: NURSE PRACTITIONER

## 2021-03-05 PROCEDURE — 36415 COLL VENOUS BLD VENIPUNCTURE: CPT

## 2021-03-05 PROCEDURE — C9803 HOPD COVID-19 SPEC COLLECT: HCPCS | Performed by: NURSE PRACTITIONER

## 2021-03-05 PROCEDURE — 85027 COMPLETE CBC AUTOMATED: CPT

## 2021-03-05 NOTE — DISCHARGE INSTRUCTIONS
PLEASE ARRIVE AT 12PM ON 3/8/2021      Take the following medications the morning of surgery:  CARVEDILOL    If you are on prescription narcotic pain medication to control your pain you may also take that medication the morning of surgery.    General Instructions:  • Do not eat solid food after midnight the night before surgery.  • You may drink clear liquids day of surgery but must stop at least one hour before your hospital arrival time(11AM).  • It is beneficial for you to have a clear drink that contains carbohydrates the day of surgery.  We suggest a 12 to 20 ounce bottle of Gatorade or Powerade for non-diabetic patients or a 12 to 20 ounce bottle of G2 or Powerade Zero for diabetic patients. (Pediatric patients, are not advised to drink a 12 to 20 ounce carbohydrate drink)    Clear liquids are liquids you can see through.  Nothing red in color.     Plain water                               Sports drinks  Sodas                                   Gelatin (Jell-O)  Fruit juices without pulp such as white grape juice and apple juice  Popsicles that contain no fruit or yogurt  Tea or coffee (no cream or milk added)  Gatorade / Powerade  G2 / Powerade Zero    • Infants may have breast milk up to four hours before surgery.  • Infants drinking formula may drink formula up to six hours before surgery.   • Patients who avoid smoking, chewing tobacco and alcohol for 4 weeks prior to surgery have a reduced risk of post-operative complications.  Quit smoking as many days before surgery as you can.  • Do not smoke, use chewing tobacco or drink alcohol the day of surgery.   • If applicable bring your C-PAP/ BI-PAP machine.  • Bring any papers given to you in the doctor’s office.  • Wear clean comfortable clothes.  • Do not wear contact lenses, false eyelashes or make-up.  Bring a case for your glasses.   • Bring crutches or walker if applicable.  • Remove all piercings.  Leave jewelry and any other valuables at home.  • Hair  extensions with metal clips must be removed prior to surgery.  • The Pre-Admission Testing nurse will instruct you to bring medications if unable to obtain an accurate list in Pre-Admission Testing.            Preventing a Surgical Site Infection:  • For 2 to 3 days before surgery, avoid shaving with a razor because the razor can irritate skin and make it easier to develop an infection.    • Any areas of open skin can increase the risk of a post-operative wound infection by allowing bacteria to enter and travel throughout the body.  Notify your surgeon if you have any skin wounds / rashes even if it is not near the expected surgical site.  The area will need assessed to determine if surgery should be delayed until it is healed.  • The night prior to surgery shower using a fresh bar of anti-bacterial soap (such as Dial) and clean washcloth.  Sleep in a clean bed with clean clothing.  Do not allow pets to sleep with you.  • Shower on the morning of surgery using a fresh bar of anti-bacterial soap (such as Dial) and clean washcloth.  Dry with a clean towel and dress in clean clothing.  • Ask your surgeon if you will be receiving antibiotics prior to surgery.  • Make sure you, your family, and all healthcare providers clean their hands with soap and water or an alcohol based hand  before caring for you or your wound.    Day of surgery:  Your arrival time is approximately two hours before your scheduled surgery time.  Upon arrival, a Pre-op nurse and Anesthesiologist will review your health history, obtain vital signs, and answer questions you may have.  The only belongings needed at this time will be a list of your home medications and if applicable your C-PAP/BI-PAP machine.  A Pre-op nurse will start an IV and you may receive medication in preparation for surgery, including something to help you relax.     Please be aware that surgery does come with discomfort.  We want to make every effort to control your  discomfort so please discuss any uncontrolled symptoms with your nurse.   Your doctor will most likely have prescribed pain medications.      If you are going home after surgery you will receive individualized written care instructions before being discharged.  A responsible adult must drive you to and from the hospital on the day of your surgery and stay with you for 24 hours.  Discharge prescriptions can be filled by the hospital pharmacy during regular pharmacy hours.  If you are having surgery late in the day/evening your prescription may be e-prescribed to your pharmacy.  Please verify your pharmacy hours or chose a 24 hour pharmacy to avoid not having access to your prescription because your pharmacy has closed for the day.    If you are staying overnight following surgery, you will be transported to your hospital room following the recovery period.  [facility] has all private rooms.    If you have any questions please call Pre-Admission Testing at [phone].  Deductibles and co-payments are collected on the day of service. Please be prepared to pay the required co-pay, deductible or deposit on the day of service as defined by your plan.    Patient Education for Self-Quarantine Process    Following your COVID testing, we strongly recommend that you do not leave your home after you have been tested for COVID except to get medical care. This includes not going to work, school or to public areas.  If this is not possible for you to do please limit your activities to only required outings.  Be sure to wear a mask when you are with other people, practice social distancing and wash your hands frequently.      The following items provide additional details to keep you safe.  • Wash your hands with soap and water frequently for at least 20 seconds.   • Avoid touching your eyes, nose and mouth with unwashed hands.  • Do not share anything - utensils, towels, food from the same bowl.   • Have your own  utensils, drinking glass, dishes, towels and bedding.   • Do not have visitors.   • Do use FaceTime to stay in touch with family and friends.  • You should stay in a specific room away from others if possible.   • Stay at least 6 feet away from others in the home if you cannot have a dedicated room to yourself.   • Do not snuggle with your pet. While the CDC says there is no evidence that pets can spread COVID-19 or be infected from humans, it is probably best to avoid “petting, snuggling, being kissed or licked and sharing food (during self-quarantine)”, according to the CDC.   • Sanitize household surfaces daily. Include all high touch areas (door handles, light switches, phones, countertops, etc.)  • Do not share a bathroom with others, if possible.   • Wear a mask around others in your home if you are unable to stay in a separate room or 6 feet apart. If  you are unable to wear a mask, have your family member wear a mask if they must be within 6 feet of you.   Call your surgeon immediately if you experience any of the following symptoms:  • Sore Throat  • Shortness of Breath or difficulty breathing  • Cough  • Chills  • Body soreness or muscle pain  • Headache  • Fever  • New loss of taste or smell  • Do not arrive for your surgery ill.  Your procedure will need to be rescheduled to another time.  You will need to call your physician before the day of surgery to avoid any unnecessary exposure to hospital staff as well as other patients.    CHLORHEXIDINE CLOTH INSTRUCTIONS  The morning of surgery follow these instructions using the Chlorhexidine cloths you've been given.  These steps reduce bacteria on the body.  Do not use the cloths near your eyes, ears mouth, genitalia or on open wounds.  Throw the cloths away after use but do not try to flush them down a toilet.      • Open and remove one cloth at a time from the package.    • Leave the cloth unfolded and begin the bathing.  • Massage the skin with the cloths  using gentle pressure to remove bacteria.  Do not scrub harshly.   • Follow the steps below with one 2% CHG cloth per area (6 total cloths).  • One cloth for neck, shoulders and chest.  • One cloth for both arms, hands, fingers and underarms (do underarms last).  • One cloth for the abdomen followed by groin.  • One cloth for right leg and foot including between the toes.  • One cloth for left leg and foot including between the toes.  • The last cloth is to be used for the back of the neck, back and buttocks.    Allow the CHG to air dry 3 minutes on the skin which will give it time to work and decrease the chance of irritation.  The skin may feel sticky until it is dry.  Do not rinse with water or any other liquid or you will lose the beneficial effects of the CHG.  If mild skin irritation occurs, do rinse the skin to remove the CHG.  Report this to the nurse at time of admission.  Do not apply lotions, creams, ointments, deodorants or perfumes after using the clothes. Dress in clean clothes before coming to the hospital.

## 2021-03-06 LAB — SARS-COV-2 ORF1AB RESP QL NAA+PROBE: NOT DETECTED

## 2021-03-08 ENCOUNTER — ANESTHESIA EVENT (OUTPATIENT)
Dept: PERIOP | Facility: HOSPITAL | Age: 36
End: 2021-03-08

## 2021-03-08 ENCOUNTER — ANESTHESIA (OUTPATIENT)
Dept: PERIOP | Facility: HOSPITAL | Age: 36
End: 2021-03-08

## 2021-03-08 ENCOUNTER — HOSPITAL ENCOUNTER (OUTPATIENT)
Facility: HOSPITAL | Age: 36
Setting detail: HOSPITAL OUTPATIENT SURGERY
Discharge: HOME | End: 2021-03-08
Attending: SURGERY | Admitting: SURGERY

## 2021-03-08 VITALS
SYSTOLIC BLOOD PRESSURE: 145 MMHG | BODY MASS INDEX: 26.22 KG/M2 | DIASTOLIC BLOOD PRESSURE: 99 MMHG | HEIGHT: 69 IN | WEIGHT: 177.03 LBS | TEMPERATURE: 97.7 F | OXYGEN SATURATION: 90 % | RESPIRATION RATE: 16 BRPM | HEART RATE: 80 BPM

## 2021-03-08 DIAGNOSIS — Z99.2 ESRD ON DIALYSIS (HCC): Primary | ICD-10-CM

## 2021-03-08 DIAGNOSIS — N18.6 ESRD ON DIALYSIS (HCC): Primary | ICD-10-CM

## 2021-03-08 LAB
ANION GAP SERPL CALCULATED.3IONS-SCNC: 11.4 MMOL/L (ref 5–15)
BUN SERPL-MCNC: 53 MG/DL (ref 6–20)
BUN/CREAT SERPL: 6.7 (ref 7–25)
CALCIUM SPEC-SCNC: 9 MG/DL (ref 8.6–10.5)
CHLORIDE SERPL-SCNC: 96 MMOL/L (ref 98–107)
CO2 SERPL-SCNC: 32.6 MMOL/L (ref 22–29)
CREAT SERPL-MCNC: 7.95 MG/DL (ref 0.76–1.27)
GFR SERPL CREATININE-BSD FRML MDRD: 8 ML/MIN/1.73
GFR SERPL CREATININE-BSD FRML MDRD: ABNORMAL ML/MIN/{1.73_M2}
GLUCOSE SERPL-MCNC: 99 MG/DL (ref 65–99)
POTASSIUM SERPL-SCNC: 5.1 MMOL/L (ref 3.5–5.2)
SODIUM SERPL-SCNC: 140 MMOL/L (ref 136–145)

## 2021-03-08 PROCEDURE — 80048 BASIC METABOLIC PNL TOTAL CA: CPT | Performed by: SURGERY

## 2021-03-08 PROCEDURE — 25010000003 CEFAZOLIN IN DEXTROSE 2-4 GM/100ML-% SOLUTION: Performed by: SURGERY

## 2021-03-08 PROCEDURE — 25010000002 ONDANSETRON PER 1 MG: Performed by: ANESTHESIOLOGY

## 2021-03-08 PROCEDURE — 25010000003 CEFAZOLIN PER 500 MG: Performed by: SURGERY

## 2021-03-08 PROCEDURE — 25010000002 DEXAMETHASONE PER 1 MG: Performed by: ANESTHESIOLOGY

## 2021-03-08 PROCEDURE — 25010000002 METOCLOPRAMIDE PER 10 MG: Performed by: SURGERY

## 2021-03-08 PROCEDURE — 25010000002 PROPOFOL 10 MG/ML EMULSION: Performed by: ANESTHESIOLOGY

## 2021-03-08 PROCEDURE — 25010000002 HEPARIN (PORCINE) PER 1000 UNITS: Performed by: SURGERY

## 2021-03-08 PROCEDURE — 25010000003 LIDOCAINE 1 % SOLUTION 20 ML VIAL: Performed by: SURGERY

## 2021-03-08 PROCEDURE — 25010000002 FENTANYL CITRATE (PF) 100 MCG/2ML SOLUTION: Performed by: ANESTHESIOLOGY

## 2021-03-08 RX ORDER — METOCLOPRAMIDE 5 MG/1
5 TABLET ORAL
Qty: 30 TABLET | Refills: 1 | Status: SHIPPED | OUTPATIENT
Start: 2021-03-08 | End: 2021-03-24 | Stop reason: HOSPADM

## 2021-03-08 RX ORDER — MIDAZOLAM HYDROCHLORIDE 1 MG/ML
2 INJECTION INTRAMUSCULAR; INTRAVENOUS
Status: DISCONTINUED | OUTPATIENT
Start: 2021-03-08 | End: 2021-03-08 | Stop reason: HOSPADM

## 2021-03-08 RX ORDER — LIDOCAINE HYDROCHLORIDE 20 MG/ML
INJECTION, SOLUTION INFILTRATION; PERINEURAL AS NEEDED
Status: DISCONTINUED | OUTPATIENT
Start: 2021-03-08 | End: 2021-03-08 | Stop reason: SURG

## 2021-03-08 RX ORDER — NALBUPHINE HCL 10 MG/ML
10 AMPUL (ML) INJECTION EVERY 4 HOURS PRN
Status: DISCONTINUED | OUTPATIENT
Start: 2021-03-08 | End: 2021-03-08 | Stop reason: HOSPADM

## 2021-03-08 RX ORDER — METOPROLOL TARTRATE 5 MG/5ML
INJECTION INTRAVENOUS AS NEEDED
Status: DISCONTINUED | OUTPATIENT
Start: 2021-03-08 | End: 2021-03-08 | Stop reason: SURG

## 2021-03-08 RX ORDER — FAMOTIDINE 10 MG/ML
20 INJECTION, SOLUTION INTRAVENOUS
Status: COMPLETED | OUTPATIENT
Start: 2021-03-08 | End: 2021-03-08

## 2021-03-08 RX ORDER — DIPHENHYDRAMINE HYDROCHLORIDE 50 MG/ML
12.5 INJECTION INTRAMUSCULAR; INTRAVENOUS
Status: DISCONTINUED | OUTPATIENT
Start: 2021-03-08 | End: 2021-03-08 | Stop reason: HOSPADM

## 2021-03-08 RX ORDER — PROPOFOL 10 MG/ML
VIAL (ML) INTRAVENOUS CONTINUOUS PRN
Status: DISCONTINUED | OUTPATIENT
Start: 2021-03-08 | End: 2021-03-08 | Stop reason: SURG

## 2021-03-08 RX ORDER — HYDROCODONE BITARTRATE AND ACETAMINOPHEN 5; 325 MG/1; MG/1
1 TABLET ORAL ONCE AS NEEDED
Status: DISCONTINUED | OUTPATIENT
Start: 2021-03-08 | End: 2021-03-08 | Stop reason: HOSPADM

## 2021-03-08 RX ORDER — FENTANYL CITRATE 50 UG/ML
INJECTION, SOLUTION INTRAMUSCULAR; INTRAVENOUS AS NEEDED
Status: DISCONTINUED | OUTPATIENT
Start: 2021-03-08 | End: 2021-03-08 | Stop reason: SURG

## 2021-03-08 RX ORDER — ONDANSETRON 2 MG/ML
INJECTION INTRAMUSCULAR; INTRAVENOUS AS NEEDED
Status: DISCONTINUED | OUTPATIENT
Start: 2021-03-08 | End: 2021-03-08 | Stop reason: SURG

## 2021-03-08 RX ORDER — DEXAMETHASONE SODIUM PHOSPHATE 4 MG/ML
INJECTION, SOLUTION INTRA-ARTICULAR; INTRALESIONAL; INTRAMUSCULAR; INTRAVENOUS; SOFT TISSUE AS NEEDED
Status: DISCONTINUED | OUTPATIENT
Start: 2021-03-08 | End: 2021-03-08 | Stop reason: SURG

## 2021-03-08 RX ORDER — CEFAZOLIN SODIUM 2 G/100ML
2 INJECTION, SOLUTION INTRAVENOUS ONCE
Status: COMPLETED | OUTPATIENT
Start: 2021-03-08 | End: 2021-03-08

## 2021-03-08 RX ORDER — TRAMADOL HYDROCHLORIDE 50 MG/1
50 TABLET ORAL EVERY 8 HOURS PRN
Qty: 20 TABLET | Refills: 0 | Status: SHIPPED | OUTPATIENT
Start: 2021-03-08 | End: 2021-03-24 | Stop reason: HOSPADM

## 2021-03-08 RX ORDER — MIDAZOLAM HYDROCHLORIDE 1 MG/ML
1 INJECTION INTRAMUSCULAR; INTRAVENOUS
Status: DISCONTINUED | OUTPATIENT
Start: 2021-03-08 | End: 2021-03-08 | Stop reason: HOSPADM

## 2021-03-08 RX ORDER — SODIUM CHLORIDE 0.9 % (FLUSH) 0.9 %
10 SYRINGE (ML) INJECTION EVERY 12 HOURS SCHEDULED
Status: DISCONTINUED | OUTPATIENT
Start: 2021-03-08 | End: 2021-03-08 | Stop reason: HOSPADM

## 2021-03-08 RX ORDER — METOCLOPRAMIDE HYDROCHLORIDE 5 MG/ML
10 INJECTION INTRAMUSCULAR; INTRAVENOUS ONCE
Status: COMPLETED | OUTPATIENT
Start: 2021-03-08 | End: 2021-03-08

## 2021-03-08 RX ORDER — METOPROLOL TARTRATE 5 MG/5ML
INJECTION INTRAVENOUS AS NEEDED
Status: DISCONTINUED | OUTPATIENT
Start: 2021-03-08 | End: 2021-03-08

## 2021-03-08 RX ORDER — NALBUPHINE HCL 10 MG/ML
2 AMPUL (ML) INJECTION EVERY 4 HOURS PRN
Status: DISCONTINUED | OUTPATIENT
Start: 2021-03-08 | End: 2021-03-08 | Stop reason: HOSPADM

## 2021-03-08 RX ORDER — SODIUM CHLORIDE 9 MG/ML
9 INJECTION, SOLUTION INTRAVENOUS CONTINUOUS PRN
Status: DISCONTINUED | OUTPATIENT
Start: 2021-03-08 | End: 2021-03-08 | Stop reason: HOSPADM

## 2021-03-08 RX ORDER — HYDRALAZINE HYDROCHLORIDE 20 MG/ML
5 INJECTION INTRAMUSCULAR; INTRAVENOUS
Status: DISCONTINUED | OUTPATIENT
Start: 2021-03-08 | End: 2021-03-08 | Stop reason: HOSPADM

## 2021-03-08 RX ORDER — NALOXONE HCL 0.4 MG/ML
0.4 VIAL (ML) INJECTION AS NEEDED
Status: DISCONTINUED | OUTPATIENT
Start: 2021-03-08 | End: 2021-03-08 | Stop reason: HOSPADM

## 2021-03-08 RX ORDER — ONDANSETRON 2 MG/ML
4 INJECTION INTRAMUSCULAR; INTRAVENOUS ONCE AS NEEDED
Status: DISCONTINUED | OUTPATIENT
Start: 2021-03-08 | End: 2021-03-08 | Stop reason: HOSPADM

## 2021-03-08 RX ORDER — FENTANYL CITRATE 50 UG/ML
25 INJECTION, SOLUTION INTRAMUSCULAR; INTRAVENOUS
Status: DISCONTINUED | OUTPATIENT
Start: 2021-03-08 | End: 2021-03-08 | Stop reason: HOSPADM

## 2021-03-08 RX ORDER — SODIUM CHLORIDE 0.9 % (FLUSH) 0.9 %
10 SYRINGE (ML) INJECTION AS NEEDED
Status: DISCONTINUED | OUTPATIENT
Start: 2021-03-08 | End: 2021-03-08 | Stop reason: HOSPADM

## 2021-03-08 RX ORDER — PROMETHAZINE HYDROCHLORIDE 25 MG/1
25 TABLET ORAL ONCE AS NEEDED
Status: DISCONTINUED | OUTPATIENT
Start: 2021-03-08 | End: 2021-03-08 | Stop reason: HOSPADM

## 2021-03-08 RX ADMIN — METOROPROLOL TARTRATE 1 MG: 5 INJECTION, SOLUTION INTRAVENOUS at 16:17

## 2021-03-08 RX ADMIN — CEFAZOLIN SODIUM 2 G: 2 INJECTION, SOLUTION INTRAVENOUS at 15:34

## 2021-03-08 RX ADMIN — ONDANSETRON 4 MG: 2 INJECTION INTRAMUSCULAR; INTRAVENOUS at 15:57

## 2021-03-08 RX ADMIN — FAMOTIDINE 20 MG: 10 INJECTION INTRAVENOUS at 13:06

## 2021-03-08 RX ADMIN — METOCLOPRAMIDE HYDROCHLORIDE 10 MG: 5 INJECTION INTRAMUSCULAR; INTRAVENOUS at 13:07

## 2021-03-08 RX ADMIN — DEXAMETHASONE SODIUM PHOSPHATE 4 MG: 4 INJECTION, SOLUTION INTRAMUSCULAR; INTRAVENOUS at 15:35

## 2021-03-08 RX ADMIN — SODIUM CHLORIDE 9 ML/HR: 9 INJECTION, SOLUTION INTRAVENOUS at 13:07

## 2021-03-08 RX ADMIN — LIDOCAINE HYDROCHLORIDE 50 MG: 20 INJECTION, SOLUTION INFILTRATION; PERINEURAL at 15:26

## 2021-03-08 RX ADMIN — METOROPROLOL TARTRATE 1 MG: 5 INJECTION, SOLUTION INTRAVENOUS at 15:49

## 2021-03-08 RX ADMIN — METOROPROLOL TARTRATE 1 MG: 5 INJECTION, SOLUTION INTRAVENOUS at 15:57

## 2021-03-08 RX ADMIN — FENTANYL CITRATE 25 MCG: 50 INJECTION INTRAMUSCULAR; INTRAVENOUS at 15:47

## 2021-03-08 RX ADMIN — PROPOFOL 200 MCG/KG/MIN: 10 INJECTION, EMULSION INTRAVENOUS at 15:30

## 2021-03-08 RX ADMIN — LIDOCAINE HYDROCHLORIDE 50 MG: 20 INJECTION, SOLUTION INFILTRATION; PERINEURAL at 15:49

## 2021-03-08 NOTE — ANESTHESIA POSTPROCEDURE EVALUATION
"Patient: Chandler Buchanan    Procedure Summary     Date: 03/08/21 Room / Location: [facility] OR 09 / [facility] MAIN OR    Anesthesia Start: 1524 Anesthesia Stop: 1632    Procedure: LEFT ARM ARTERIAL VENOUS FISTULA REVISION (Left ) Diagnosis:     Surgeons: Cuong Pedraza Jr., MD Provider: Jignesh Whittington MD    Anesthesia Type: MAC ASA Status: 3          Anesthesia Type: MAC    Vitals  Vitals Value Taken Time   /110 03/08/21 1727   Temp 36.5 °C (97.7 °F) 03/08/21 1630   Pulse 81 03/08/21 1724   Resp 16 03/08/21 1715   SpO2 90 % 03/08/21 1729   Vitals shown include unvalidated device data.        Post Anesthesia Care and Evaluation    Patient location during evaluation: PHASE II  Patient participation: complete - patient participated  Level of consciousness: sleepy but conscious  Pain management: adequate  Airway patency: patent  Anesthetic complications: No anesthetic complications    Cardiovascular status: acceptable  Respiratory status: acceptable  Hydration status: acceptable    Comments: /98 (BP Location: Right arm, Patient Position: Lying)   Pulse 80   Temp 36.5 °C (97.7 °F) (Oral)   Resp 16   Ht 175.3 cm (69\")   Wt 80.3 kg (177 lb 0.5 oz)   SpO2 91%   BMI 26.14 kg/m²           "

## 2021-03-08 NOTE — DISCHARGE INSTRUCTIONS
[facility]  Milo Wang, Demarcus Ruiz Scherrer, [place]  [address], [address]  [phone]    Post-Operative Instructions for AV Fistula / Graft   Diet: Regular Diet    Medications: Take your regularly scheduled medications on the day of your surgery, unless your doctor has directed you otherwise. You may be sent home with a prescription for pain medication, follow the directions as prescribed.    Activity Restrictions / Driving: Avoid lifting more than 15 pounds or other activities that stress or compress the access area. No driving for the remainder of the day after surgery. You may drive when you no longer are taking narcotic pain medications. If a nerve block was done to numb your arm for surgery, you will be placed in an arm sling.  This numbness and inability to move the arm can last for as little as 6 hours but as many as 18.  The sling should be used during this time but can be removed when sensation and movement of your arm is normal and does not need to be used after that. Use of the arm is encouraged after the surgery.    Incision Care: Some bruising is normal. If you have drainage from the incision please notify the office. Dressing should be removed in 48 hours. After dressing is removed, it is OK to shower. Do not submerge incision until cleared by your surgeon (bath or swimming).    Bathing and Showering: You may shower after you remove your dressing.    Follow-up Appointments: You will need to return to the office for a follow-up visit within 1-3 weeks after your surgery. Please make sure you have your appointment scheduled, call [phone].    The patient (you) should:  1. Avoid wearing tight constrictive clothing over that arm.  2. Avoid wearing jewelry that is tight, such as a watch on the access arm.  3. Avoid carrying heavy objects.  4. Avoid purse straps over the fistula.  5. Avoid sleeping on the arm or keeping it bent for extended periods of time.  6. Each day,  "using your opposite hand, feel over the fistula for the \"thrill\" or vibration that is normally present.    Fistula Information / Care:  ·  It is normal to have swelling in the surgical area. To help control this swelling, you should elevate your arm on a pillow.  ·  Wiggle your fingers and clinch your fist 10 times every hour, while awake, for the first 5-7 days. Also, bend and straighten at the elbow to regain normal range of motion. These exercises are designed to promote circulation in the fingers and aid in draining away the excess fluid accumulation in the immediate area.  · No blood pressures or needle sticks in the arm with your access.    Call the office for the followin. Fever greater than 101.0  2. Uncontrolled pain. This is on a scale of 1-10 (10 being the worst pain imaginable) your pain is a level 7 or above.  3. It is important that you notify our office if you are having numbness and significant pain in the extremity in which you have just had surgery!  4. Decreased or absent thrill.  5. Nausea, diarrhea, and/or vomiting that continue for 12-24 hours.  6. Signs of an infection: redness, increased swelling, drainage, fever and/or chills.  7. Chest pain or difficulty breathing.    The fistula or graft CAN NOT be used until the MD has given written approval. Generally, a graft will be ready to use in 2 weeks, and a fistula will be ready to use in 6-8 weeks.     If you have further questions after reading this handout, the office is open from 8:30am to 5:00pm Monday through Friday. Call [phone].  "

## 2021-03-08 NOTE — H&P
History of present illness: Patient is a 36-year-old gentleman with end-stage renal disease on hemodialysis.  He has a left brachiocephalic AV fistula.  He was noted on fistulogram to have a very large stealing branch which originates just above the antecubital fossa and extends down into the forearm.  There is arm and hand swelling.  Plan is for ligation of this large stealing branch.    Past medical history, family history, social history, review of systems: All included in office dictation.    Physical examination: Well-developed thin gentleman no acute distress awake, alert, oriented x3  HEENT: Normocephalic and atraumatic with extraocular movements intact.  Sclera nonicteric  Neck: Supple, for any motion, no JVD  Heart: Normal sinus rhythm without murmur  Chest: Equal bilateral expansion and symmetrical  Abdomen: Soft and benign no masses palpated  Neuro: Grossly intact without focal deficit  Extremities: Easily palpable thrill noted in the AV fistula.  The large stealing branch is noted just above the antecubital fossa and extends down into the forearm.  There is trace to 1+ hand and forearm swelling.  Multiple dilated superficial veins with thrills present.    Impression: End-stage renal disease on hemodialysis with a patent AV fistula but a large stealing branch noted    Plan: Ligation of large stealing branch.  The procedure risk and benefits have been discussed in detail with the patient he is in agreement with the plan

## 2021-03-08 NOTE — OP NOTE
Operative Note  Date of Admission:  3/8/2021  OR Date: 3/8/2021    Pre-op Diagnosis:   Failing left upper extremity AV fistula secondary to large stealing branch    Post-op Diagnosis:     Same    Procedure:   1) revision left arm AV fistula without thrombectomy (ligation of large stealing branch, greater than 3 cm in diameter)    Surgeon: Cuong Pedraza Jr, MD    Assistant: BETZAIDA Denis CSA and they provided critical assistance during the case including suctioning, exposure, retraction, and reduction of blood loss.    Anesthesia: Monitored Anesthesia Care    Staff:   Circulator: Juanito Mosher RN  Scrub Person: Leonie Burris John  Assistant: Rosa M Escalera CSA    Estimated Blood Loss: Minimal    Specimens: None  Order Name Source Comment Collection Info Order Time   BASIC METABOLIC PANEL   Collected By: Layla Hickman RN 3/8/2021 12:19 PM       Complications: None    Findings: Large stealing branch off of the left brachiocephalic AV fistula about 5 cm from the anastomosis.  The stealing branch was as large as the fistula itself, measuring just over 3 cm in diameter.  The mouth of the stealing branch was closed with a running 5-0 Prolene suture in a horizontal mattress followed by an over and over stitch, 2 layers of this    Indications:  As in preop diagnosis           Procedure: The patient was placed on the operating table supine position.  Intravenous sedation was then given.  The patient was prepped from the fingertips to the left anterior chest using ChloraPrep and draped in usual sterile fashion.  Local anesthetic was infiltrated into the skin and subcutaneous tissue directly over the large stealing branch off of the brachiocephalic AV fistula.  A transverse incision was made and the electrocautery was used to dissect through the subcutaneous tissue.  The large stealing branch was isolated circumferentially for a distance of approximately 4 cm.  An angled DeBakey clamp was placed  across the origin of this large stealing branch.  The vein was then ligated approximately 3 cm distal to this.  The origin of the stealing branch was closed with a running 5-0 Prolene suture in a horizontal mattress fashion followed by an over and over stitch.  This closure was performed twice.  A segment of vein approximately 2 cm was excised with both closures.  The pressure within the venous system had dramatically decreased following this.  The wound was irrigated with saline and dried.  The subcutaneous tissue was closed with a running 3-0 Vicryl suture and the skin was closed with running 4-0 Vicryl in a subcuticular fashion.  Dermabond was placed over the incision.  Sponge, needle, and instrument counts were all reported as correct.  The patient was transported to recovery room in satisfactory condition.        Radiographic Findings:  1) none performed      There are no hospital problems to display for this patient.     Cuong Pedraza Jr., MD     Date: 3/8/2021  Time: 16:27 EST

## 2021-03-08 NOTE — PROGRESS NOTES
Mr. Buchanan had AV fistula revision under MAC anesthesia.  He was very slow to wake up.  He had only 25 mcg of fentanyl and a very low basal rate of propofol infusion.  His oxygen saturations were not acceptable for phase 2 so he was sent to the main recovery room.  In main recovery room he did respond to verbal.

## 2021-03-08 NOTE — PERIOPERATIVE NURSING NOTE
Notified Dr. Pedraza that pt's Hgb is 7.8.  He was OK with that value and did not want me to draw a CBC.  I also notified Dr. Pedraza that pt's abdomen is bloated and hurts.  Orders noted.

## 2021-03-08 NOTE — PERIOPERATIVE NURSING NOTE
Left a message for Dr. Umanzor that pt's Potassium is 5.1 today.   Which birth control pill? She is on progesterone only due to her history of smoking. She can be referred to gynecology and they can go over her contraceptive methods.

## 2021-03-08 NOTE — ANESTHESIA PREPROCEDURE EVALUATION
Anesthesia Evaluation     Patient summary reviewed                Airway   Mallampati: II  Neck ROM: full  No difficulty expected  Dental      Pulmonary    Cardiovascular     ECG reviewed  Rhythm: regular    (+) hypertension, pericardial effusion,     ROS comment: EF 23%, LVH    Neuro/Psych  GI/Hepatic/Renal/Endo    (+)   renal disease ESRD,     Musculoskeletal     Abdominal    Substance History      OB/GYN          Other          Other Comment: Hb 7,8                  Anesthesia Plan    ASA 3     MAC       Anesthetic plan, all risks, benefits, and alternatives have been provided, discussed and informed consent has been obtained with: patient.  Use of blood products discussed with patient .

## 2021-03-10 ENCOUNTER — READMISSION MANAGEMENT (OUTPATIENT)
Dept: CALL CENTER | Facility: HOSPITAL | Age: 36
End: 2021-03-10

## 2021-03-10 NOTE — OUTREACH NOTE
Medical Week 3 Survey      Responses   [facility] patient discharged from?  [place]   Does the patient have one of the following disease processes/diagnoses(primary or secondary)?  Other   Week 3 attempt successful?  Yes   Call start time  1134   Call end time  1137   General alerts for this patient  T Thurs S S patient does at home.   Discharge diagnosis  Anemia requiring transfusions, ESRD   Meds reviewed with patient/caregiver?  Yes   Is the patient taking all medications as directed (includes completed medication regime)?  Yes   Has the patient kept scheduled appointments due by today?  Yes   What is the patient's perception of their health status since discharge?  Improving   Additional teach back comments  Revision to fistula on Monday 03/08/2021., functioning and using for home dialysis   Week 3 Call Completed?  Yes          Katalina Olmstead RN

## 2021-03-12 ENCOUNTER — HOSPITAL ENCOUNTER (INPATIENT)
Dept: CT IMAGING | Facility: HOSPITAL | Age: 36
LOS: 12 days | Discharge: HOME | End: 2021-03-24
Attending: HOSPITALIST | Admitting: HOSPITALIST

## 2021-03-12 ENCOUNTER — HOSPITAL ENCOUNTER (OUTPATIENT)
Dept: CARDIOLOGY | Facility: HOSPITAL | Age: 36
Discharge: HOME | End: 2021-03-12

## 2021-03-12 VITALS
WEIGHT: 184 LBS | DIASTOLIC BLOOD PRESSURE: 85 MMHG | BODY MASS INDEX: 27.89 KG/M2 | HEIGHT: 68 IN | SYSTOLIC BLOOD PRESSURE: 126 MMHG | RESPIRATION RATE: 20 BRPM | TEMPERATURE: 96.4 F | HEART RATE: 89 BPM | OXYGEN SATURATION: 97 %

## 2021-03-12 DIAGNOSIS — I31.39 PERICARDIAL EFFUSION: ICD-10-CM

## 2021-03-12 DIAGNOSIS — R07.2 PRECORDIAL PAIN: ICD-10-CM

## 2021-03-12 DIAGNOSIS — R06.02 SHORTNESS OF BREATH: ICD-10-CM

## 2021-03-12 DIAGNOSIS — R59.0 HILAR ADENOPATHY: Primary | ICD-10-CM

## 2021-03-12 DIAGNOSIS — R91.8 RIGHT LOWER LOBE LUNG MASS: ICD-10-CM

## 2021-03-12 DIAGNOSIS — R59.0 MEDIASTINAL ADENOPATHY: ICD-10-CM

## 2021-03-12 PROBLEM — R79.89 ELEVATED BRAIN NATRIURETIC PEPTIDE (BNP) LEVEL: Status: ACTIVE | Noted: 2021-03-12

## 2021-03-12 PROBLEM — R77.8 ELEVATED TROPONIN: Status: ACTIVE | Noted: 2021-03-12

## 2021-03-12 LAB
ALBUMIN SERPL-MCNC: 3.7 G/DL (ref 3.5–5.2)
ALBUMIN/GLOB SERPL: 1.1 G/DL
ALP SERPL-CCNC: 133 U/L (ref 39–117)
ALT SERPL W P-5'-P-CCNC: 137 U/L (ref 1–41)
ANION GAP SERPL CALCULATED.3IONS-SCNC: 16.1 MMOL/L (ref 5–15)
AST SERPL-CCNC: 157 U/L (ref 1–40)
BASOPHILS # BLD AUTO: 0.1 10*3/MM3 (ref 0–0.2)
BASOPHILS NFR BLD AUTO: 0.9 % (ref 0–1.5)
BILIRUB SERPL-MCNC: 0.8 MG/DL (ref 0–1.2)
BUN SERPL-MCNC: 57 MG/DL (ref 6–20)
BUN/CREAT SERPL: 7.4 (ref 7–25)
CALCIUM SPEC-SCNC: 9.3 MG/DL (ref 8.6–10.5)
CHLORIDE SERPL-SCNC: 91 MMOL/L (ref 98–107)
CO2 SERPL-SCNC: 30.9 MMOL/L (ref 22–29)
CREAT SERPL-MCNC: 7.71 MG/DL (ref 0.76–1.27)
D DIMER PPP FEU-MCNC: 3.79 MCGFEU/ML (ref 0–0.49)
DEPRECATED RDW RBC AUTO: 52.6 FL (ref 37–54)
EOSINOPHIL # BLD AUTO: 0 10*3/MM3 (ref 0–0.4)
EOSINOPHIL NFR BLD AUTO: 0 % (ref 0.3–6.2)
ERYTHROCYTE [DISTWIDTH] IN BLOOD BY AUTOMATED COUNT: 15.5 % (ref 12.3–15.4)
GFR SERPL CREATININE-BSD FRML MDRD: 8 ML/MIN/1.73
GFR SERPL CREATININE-BSD FRML MDRD: ABNORMAL ML/MIN/{1.73_M2}
GLOBULIN UR ELPH-MCNC: 3.4 GM/DL
GLUCOSE SERPL-MCNC: 121 MG/DL (ref 65–99)
HCT VFR BLD AUTO: 26.7 % (ref 37.5–51)
HGB BLD-MCNC: 8.5 G/DL (ref 13–17.7)
IMM GRANULOCYTES # BLD AUTO: 0.13 10*3/MM3 (ref 0–0.05)
IMM GRANULOCYTES NFR BLD AUTO: 1.2 % (ref 0–0.5)
LYMPHOCYTES # BLD AUTO: 1.2 10*3/MM3 (ref 0.7–3.1)
LYMPHOCYTES NFR BLD AUTO: 10.9 % (ref 19.6–45.3)
MCH RBC QN AUTO: 29.7 PG (ref 26.6–33)
MCHC RBC AUTO-ENTMCNC: 31.8 G/DL (ref 31.5–35.7)
MCV RBC AUTO: 93.4 FL (ref 79–97)
MONOCYTES # BLD AUTO: 0.75 10*3/MM3 (ref 0.1–0.9)
MONOCYTES NFR BLD AUTO: 6.8 % (ref 5–12)
NEUTROPHILS NFR BLD AUTO: 8.81 10*3/MM3 (ref 1.7–7)
NEUTROPHILS NFR BLD AUTO: 80.2 % (ref 42.7–76)
NRBC BLD AUTO-RTO: 0.5 /100 WBC (ref 0–0.2)
NT-PROBNP SERPL-MCNC: ABNORMAL PG/ML (ref 0–450)
PLATELET # BLD AUTO: 254 10*3/MM3 (ref 140–450)
PMV BLD AUTO: 9.9 FL (ref 6–12)
POTASSIUM SERPL-SCNC: 5.6 MMOL/L (ref 3.5–5.2)
PROT SERPL-MCNC: 7.1 G/DL (ref 6–8.5)
RBC # BLD AUTO: 2.86 10*6/MM3 (ref 4.14–5.8)
SARS-COV-2 ORF1AB RESP QL NAA+PROBE: NOT DETECTED
SODIUM SERPL-SCNC: 138 MMOL/L (ref 136–145)
TROPONIN T SERPL-MCNC: 0.08 NG/ML (ref 0–0.03)
WBC # BLD AUTO: 10.99 10*3/MM3 (ref 3.4–10.8)

## 2021-03-12 PROCEDURE — 93356 MYOCRD STRAIN IMG SPCKL TRCK: CPT | Performed by: INTERNAL MEDICINE

## 2021-03-12 PROCEDURE — 99215 OFFICE O/P EST HI 40 MIN: CPT | Performed by: INTERNAL MEDICINE

## 2021-03-12 PROCEDURE — 83880 ASSAY OF NATRIURETIC PEPTIDE: CPT | Performed by: INTERNAL MEDICINE

## 2021-03-12 PROCEDURE — 93005 ELECTROCARDIOGRAM TRACING: CPT | Performed by: INTERNAL MEDICINE

## 2021-03-12 PROCEDURE — 80053 COMPREHEN METABOLIC PANEL: CPT

## 2021-03-12 PROCEDURE — 94760 N-INVAS EAR/PLS OXIMETRY 1: CPT

## 2021-03-12 PROCEDURE — 93306 TTE W/DOPPLER COMPLETE: CPT | Performed by: INTERNAL MEDICINE

## 2021-03-12 PROCEDURE — 93010 ELECTROCARDIOGRAM REPORT: CPT | Performed by: INTERNAL MEDICINE

## 2021-03-12 PROCEDURE — 85379 FIBRIN DEGRADATION QUANT: CPT | Performed by: INTERNAL MEDICINE

## 2021-03-12 PROCEDURE — 93356 MYOCRD STRAIN IMG SPCKL TRCK: CPT

## 2021-03-12 PROCEDURE — 71275 CT ANGIOGRAPHY CHEST: CPT

## 2021-03-12 PROCEDURE — 36415 COLL VENOUS BLD VENIPUNCTURE: CPT

## 2021-03-12 PROCEDURE — U0004 COV-19 TEST NON-CDC HGH THRU: HCPCS | Performed by: HOSPITALIST

## 2021-03-12 PROCEDURE — 0 IOPAMIDOL PER 1 ML: Performed by: INTERNAL MEDICINE

## 2021-03-12 PROCEDURE — 85025 COMPLETE CBC W/AUTO DIFF WBC: CPT

## 2021-03-12 PROCEDURE — 25010000002 PERFLUTREN (DEFINITY) 8.476 MG IN SODIUM CHLORIDE (PF) 0.9 % 10 ML INJECTION: Performed by: INTERNAL MEDICINE

## 2021-03-12 PROCEDURE — 93306 TTE W/DOPPLER COMPLETE: CPT

## 2021-03-12 PROCEDURE — 84484 ASSAY OF TROPONIN QUANT: CPT | Performed by: INTERNAL MEDICINE

## 2021-03-12 RX ORDER — SODIUM CHLORIDE 0.9 % (FLUSH) 0.9 %
10 SYRINGE (ML) INJECTION AS NEEDED
Status: DISCONTINUED | OUTPATIENT
Start: 2021-03-12 | End: 2021-03-24 | Stop reason: HOSPADM

## 2021-03-12 RX ORDER — ACETAMINOPHEN 160 MG/5ML
650 SOLUTION ORAL EVERY 4 HOURS PRN
Status: DISCONTINUED | OUTPATIENT
Start: 2021-03-12 | End: 2021-03-24 | Stop reason: HOSPADM

## 2021-03-12 RX ORDER — ALBUMIN (HUMAN) 12.5 G/50ML
12.5 SOLUTION INTRAVENOUS AS NEEDED
Status: CANCELLED | OUTPATIENT
Start: 2021-03-12 | End: 2021-03-13

## 2021-03-12 RX ORDER — ALBUMIN (HUMAN) 12.5 G/50ML
12.5 SOLUTION INTRAVENOUS AS NEEDED
Status: CANCELLED | OUTPATIENT
Start: 2021-03-13 | End: 2021-03-13

## 2021-03-12 RX ORDER — ACETAMINOPHEN 650 MG/1
650 SUPPOSITORY RECTAL EVERY 4 HOURS PRN
Status: DISCONTINUED | OUTPATIENT
Start: 2021-03-12 | End: 2021-03-24 | Stop reason: HOSPADM

## 2021-03-12 RX ORDER — NITROGLYCERIN 0.4 MG/1
0.4 TABLET SUBLINGUAL
Status: DISCONTINUED | OUTPATIENT
Start: 2021-03-12 | End: 2021-03-24 | Stop reason: HOSPADM

## 2021-03-12 RX ORDER — ONDANSETRON 4 MG/1
4 TABLET, FILM COATED ORAL EVERY 6 HOURS PRN
Status: DISCONTINUED | OUTPATIENT
Start: 2021-03-12 | End: 2021-03-24 | Stop reason: HOSPADM

## 2021-03-12 RX ORDER — ACETAMINOPHEN 325 MG/1
650 TABLET ORAL EVERY 4 HOURS PRN
Status: DISCONTINUED | OUTPATIENT
Start: 2021-03-12 | End: 2021-03-24 | Stop reason: HOSPADM

## 2021-03-12 RX ORDER — ONDANSETRON 2 MG/ML
4 INJECTION INTRAMUSCULAR; INTRAVENOUS EVERY 6 HOURS PRN
Status: DISCONTINUED | OUTPATIENT
Start: 2021-03-12 | End: 2021-03-24 | Stop reason: HOSPADM

## 2021-03-12 RX ORDER — SODIUM CHLORIDE 0.9 % (FLUSH) 0.9 %
10 SYRINGE (ML) INJECTION EVERY 12 HOURS SCHEDULED
Status: DISCONTINUED | OUTPATIENT
Start: 2021-03-12 | End: 2021-03-24 | Stop reason: HOSPADM

## 2021-03-12 RX ORDER — CARVEDILOL 25 MG/1
25 TABLET ORAL EVERY 12 HOURS SCHEDULED
Status: DISCONTINUED | OUTPATIENT
Start: 2021-03-12 | End: 2021-03-19

## 2021-03-12 RX ADMIN — SODIUM CHLORIDE, PRESERVATIVE FREE 10 ML: 5 INJECTION INTRAVENOUS at 20:08

## 2021-03-12 RX ADMIN — IOPAMIDOL 95 ML: 755 INJECTION, SOLUTION INTRAVENOUS at 14:41

## 2021-03-12 RX ADMIN — CARVEDILOL 25 MG: 25 TABLET, FILM COATED ORAL at 20:08

## 2021-03-12 RX ADMIN — PERFLUTREN 1.5 ML: 6.52 INJECTION, SUSPENSION INTRAVENOUS at 13:56

## 2021-03-12 NOTE — NURSING NOTE
Stat hold & call CTA chest. Per Dr Oneal patient to be admitted by A. Per bed board patient going to [address] ( [address]). Report called to unit and patient placed in transport.

## 2021-03-12 NOTE — CONSULTS
Referring Provider: Dr. Jordin Sanchez  Reason for Consultation: ESRD    Subjective     Chief complaint No chief complaint on file.      History of present illness:  35 yo WM with ESRD (underlying dx not clear; renal bx in '19 showed severe glomerular and tubulo-inbsteritial fibrosis, with collapsing variant FSGS or C3 GN as possible considerations), on home hemodialysis under the care of Dr. Ld Radford of our group, directly admitted to the hospital today for further evaluation of large pericardial effusion, volume overload, and progressive dyspnea.  Full PMH outlined below; Covid-19 in December last year.  He cannulates his left arm AV fistula himself and dialyzes four times per week, 3-hour session each time.  He underwent recent revision (ligation) of left arm AV fistula on 3/8  · Reports compliance with his treatments, with last one earlier today  · Believes his dry weight is 170 pounds, but reports weight 180 pounds for the last two weeks or so; he makes little urine  · Progressive MUNGUIA for the last few weeks along with diffuse body pain with any movement  · Denies orthopnea; has noticed, though, worsening leg swelling that extends to his hips and abdominal wall for the past few weeks  · No fever or chills  · Denies hemoptysis but does report frequent sneezing with blood-tinged mucus on occasion; no nosebleeds or frequent bronchitis    Past Medical History:   Diagnosis Date   • A-V fistula (CMS/HCC)     LEFT ARM   • Acute renal failure (CMS/HCC)     ON DIALYSIS MON,WED AND FRI AT HOME   • Anemia     HX   • Cardiomyopathy (CMS/HCC)     New onset systolic PER NOTE ON ADMISSION 1/2019, PT SAW CARDIO IN THE HOSPITAL BUT STATES HE DOES NOT SEE CARDIO NOW   • Dialysis patient (CMS/HCC)     AT HOME - TUESDAY, THURSDAY, SATURDAY, SUNDAY   • History of COVID-19     12/2020   • History of transfusion 02/2021    NO REACTION    • Hypertension    • Pericardial effusion    • Stage 4 chronic kidney disease (CMS/HCC)       Past Surgical History:   Procedure Laterality Date   • ARTERIOVENOUS FISTULA/SHUNT SURGERY Left 1/23/2019    Procedure: LEFT ARM FISTULA CREATION;  Surgeon: Bogdan Velásquez MD;  Location: [facility] MAIN OR;  Service: Vascular   • ARTERIOVENOUS FISTULA/SHUNT SURGERY Left 3/8/2021    Procedure: LEFT ARM ARTERIAL VENOUS FISTULA REVISION;  Surgeon: Cuong Pedraza Jr., MD;  Location: [facility] MAIN OR;  Service: Vascular;  Laterality: Left;   • COLONOSCOPY N/A 3/12/2019    Procedure: COLONOSCOPY wwith Biiopsies;  Surgeon: Jered Obrien MD;  Location: [facility] ENDOSCOPY;  Service: General   • ENDOSCOPY N/A 3/12/2019    Procedure: ESOPHAGOGASTRODUODENOSCOPY WITH BIOPSY;  Surgeon: Jered Obrien MD;  Location: [facility] ENDOSCOPY;  Service: General   • INSERTION HEMODIALYSIS CATHETER N/A 1/21/2019    Procedure: HEMODIALYSIS CATHETER INSERTION;  Surgeon: Cuong Pedraza Jr., MD;  Location: [facility] MAIN OR;  Service: Vascular   • INSERTION PERITONEAL DIALYSIS CATHETER N/A 3/14/2019    Procedure: INSERTION PERITONEAL DIALYSIS CATHETER LAPAROSCOPIC AND OMENTOPEXY;  Surgeon: Jered Obrien MD;  Location: [facility] MAIN OR;  Service: General   • REMOVAL PERITONEAL DIALYSIS CATHETER N/A 10/31/2019    Procedure: REMOVAL PERITONEAL DIALYSIS CATHETER;  Surgeon: Jered Obrien MD;  Location: [facility] MAIN OR;  Service: General     Family History   Problem Relation Age of Onset   • Colon cancer Mother    • Seizures Maternal Grandmother    • Malig Hyperthermia Neg Hx      Social History     Tobacco Use   • Smoking status: Never Smoker   • Smokeless tobacco: Never Used   Substance Use Topics   • Alcohol use: No   • Drug use: No     Facility-Administered Medications Prior to Admission   Medication Dose Route Frequency Provider Last Rate Last Admin   • nitroglycerin (NITROSTAT) SL tablet 0.4 mg  0.4 mg Sublingual Q5 Min PRN Juanito Millan Jr., MD       • sodium chloride 0.9 % flush 10  "mL  10 mL Intravenous PRN Juanito Millan Jr., MD         Medications Prior to Admission   Medication Sig Dispense Refill Last Dose   • Auryxia 1  MG(Fe) tablet Take 2 tablets by mouth 3 (Three) Times a Day With Meals.      • carvedilol (COREG) 25 MG tablet Take 1 tablet by mouth Every 12 (Twelve) Hours. 60 tablet 0 3/12/2021 at Unknown time   • metoclopramide (REGLAN) 5 MG tablet Take 1 tablet by mouth 3 (Three) Times a Day With Meals for 10 days. 30 tablet 1 3/12/2021 at Unknown time   • sodium bicarbonate 650 MG tablet Take 1,300 mg by mouth 2 (Two) Times a Day.   3/12/2021 at Unknown time   • traMADol (Ultram) 50 MG tablet Take 1 tablet by mouth Every 8 (Eight) Hours As Needed for Moderate Pain . 20 tablet 0      Allergies:  Patient has no known allergies.    Review of Systems  14-point ROS performed and all negative except for pertinent +/-'s detailed in HPI.     Objective     Vital Signs  Temp:  [96.4 °F (35.8 °C)-97.7 °F (36.5 °C)] 97.7 °F (36.5 °C)  Heart Rate:  [84-89] 84  Resp:  [18-20] 18  BP: (126-128)/(85-86) 128/86    Flowsheet Rows      First Filed Value   Admission Height  175.3 cm (69\") Documented at 03/12/2021 1726   Admission Weight  81.7 kg (180 lb 3.2 oz) Documented at 03/12/2021 1726           No intake/output data recorded.  No intake/output data recorded.  No intake or output data in the 24 hours ending 03/12/21 1817    Physical Exam:  NAD; pleasant; oriented; looks older than stated age  Chronically ill-appearing; pale  MMM; AT/NC   No eye discharge; no scleral icterus  +JVD; no carotid bruits  Crackles lower fields bilat; not labored on room air  RRR, 2/6M, no rub  Soft, +T but no G or R, +D, BS+, abdominal wall edema present  +2 edema  Left arm AV fistula patent  No clubbing  No asterixis  Moves all extremities   Mood and affect are normal    Results Review:  Results from last 7 days   Lab Units 03/12/21  1220 03/08/21  1240   SODIUM mmol/L 138 140   POTASSIUM mmol/L 5.6* 5.1 "   CHLORIDE mmol/L 91* 96*   CO2 mmol/L 30.9* 32.6*   BUN mg/dL 57* 53*   CREATININE mg/dL 7.71* 7.95*   CALCIUM mg/dL 9.3 9.0   BILIRUBIN mg/dL 0.8  --    ALK PHOS U/L 133*  --    ALT (SGPT) U/L 137*  --    AST (SGOT) U/L 157*  --    GLUCOSE mg/dL 121* 99       Estimated Creatinine Clearance: 15.2 mL/min (A) (by C-G formula based on SCr of 7.71 mg/dL (H)).          Results from last 7 days   Lab Units 03/12/21  1220   WBC 10*3/mm3 10.99*   HEMOGLOBIN g/dL 8.5*   PLATELETS 10*3/mm3 254             Active Medications  sodium chloride, 10 mL, Intravenous, Q12H           Assessment/Plan   Assessment  1.  ESRD: volume excess, centrally and peripherally.  Mild hyperkalemia, despite reported HD earlier today, raising concerns about treatment compliance.  Underlying renal disease likely autoimmune, which may have bearing on other disease processes in play now  2.  Large pericardial effusion (> 2 cm circumferentially):  no tamponade noted by echocardiogram earlier today  3.  Anemia, with likely dilutional component  4.  Mediastinal and bilateral hilar lymphadenopathy as well as large mass-like opacity right lower lobe with central cavitation  5.  Abdominal distention with ascites and transaminitis  6.  Covid-19 infection, December' 20      Pericardial effusion      Plan  1.  HD with fluid removal both this evening and tomorrow.  Will need to be careful with fluid removal in the presence of pericardial effusion  2.  Re-check ANCA panel  3.  Discontinue sodium bicarbonate given volume overload and already generous bicarbonate level  4.  Will hold Auryxia until phosphorus level back  5.  Thoracic evaluation pending    I discussed the patient's findings and my recommendations with the patient  Dre Samano MD  03/12/21  18:17 EST

## 2021-03-12 NOTE — PLAN OF CARE
Goal Outcome Evaluation:  Plan of Care Reviewed With: patient  Progress: no change  Outcome Summary: Patient alert and oriented. Complaints of constant generalized pain all over. Complaints of SOA with exertion. No complaints of nausea. Pt was a direct admit from CT. AV fistula RT forearm, thrill and bruit present. Up ad tonya. Nephrology, Cardiology and Thorasic Surgery consulted. VSS. No acute distress noted. Nursing will continue to monitor.

## 2021-03-12 NOTE — PROGRESS NOTES
[facility]      Patient Name: Chandler Buchanan  :1985  Age: 36 y.o.  Encounter Provider:  MARLEE SMITH      Chief Complaint:   Chief Complaint   Patient presents with   • Chest Pain   • Edema         HPI  Chandler Buchanan is a 36 y.o. male with past medical history of hypertensive nephropathy and end-stage renal disease on dialysis who presents for evaluation of increased diffuse pain, exertional dyspnea and lower extremity edema.  Patient was diagnosed with Covid infection 3 months ago.  Since then his functional capacity has been declining.  He has difficulty walking room to room without increasing shortness of air.  He has increased abdominal pain which with ambulation radiates towards his chest and lower extremities and overall has diffuse pain complaints.  He was diagnosed with end-stage renal disease in 2019 was on peritoneal dialysis until September when he was switched to use of his AV fistula and has been on home dialysis since.  He states that he took off 2.5 L with dialysis last night but labs today show potassium of 5.6.  EKG shows sinus rhythm with nonspecific ST changes and borderline prolonged QT interval.  D-dimer today is 3.8.  Blood pressure is well controlled and he has adequate oxygenation with no tachycardia on vitals.  He is a lifelong non-smoker who denies alcohol or illicit drug use.  No family history of premature coronary artery disease or sudden cardiac death.      The following portions of the patient's history were reviewed and updated as appropriate: allergies, current medications, past family history, past medical history, past social history, past surgical history and problem list.      Review of Systems   Constitutional: Positive for malaise/fatigue. Negative for chills and fever.   HENT: Negative for hoarse voice and sore throat.    Eyes: Negative for double vision and photophobia.   Cardiovascular: Positive for dyspnea on exertion. Negative for  "chest pain, leg swelling, near-syncope, orthopnea, palpitations, paroxysmal nocturnal dyspnea and syncope.   Respiratory: Negative for cough and wheezing.    Skin: Negative for poor wound healing and rash.   Musculoskeletal: Positive for joint pain. Negative for arthritis and joint swelling.   Gastrointestinal: Positive for abdominal pain. Negative for bloating, hematemesis and hematochezia.   Neurological: Positive for weakness. Negative for dizziness and focal weakness.   Psychiatric/Behavioral: Negative for depression and suicidal ideas.       OBJECTIVE:   Vital Signs  Vitals:    03/12/21 1223   BP: 126/85   Pulse: 89   Resp: 20   Temp: 96.4 °F (35.8 °C)   SpO2: 97%     Estimated body mass index is 27.89 kg/m² as calculated from the following:    Height as of this encounter: 173 cm (68.11\").    Weight as of this encounter: 83.5 kg (184 lb).    Vitals reviewed.   Constitutional:       Appearance: Healthy appearance. Not in distress.   Neck:      Vascular: No JVR. JVD elevated.   Pulmonary:      Effort: Pulmonary effort is normal.      Breath sounds: No wheezing. No rhonchi. No rales.      Comments: Decreased air entry bibasilar zones  Chest:      Chest wall: Not tender to palpatation.   Cardiovascular:      PMI at left midclavicular line. Normal rate. Regular rhythm. Normal distant S1. Normal distant S2.      Murmurs: There is no murmur.      No gallop. No click. No rub.   Pulses:     Intact distal pulses.   Edema:     Peripheral edema present.     Thigh: bilateral 1+ edema of the thigh.     Pretibial: bilateral 1+ edema of the pretibial area.     Ankle: bilateral 1+ edema of the ankle.     Feet: bilateral 1+ edema of the feet.  Abdominal:      General: Bowel sounds are normal. There is distension.      Palpations: Abdomen is soft.      Tenderness: There is abdominal tenderness.   Musculoskeletal: Normal range of motion.         General: No tenderness. Skin:     General: Skin is warm and dry.   Neurological:      " General: No focal deficit present.      Mental Status: Alert and oriented to person, place and time.           ECG 12 Lead    Date/Time: 3/12/2021 4:39 PM  Performed by: Juanito Millan Jr., MD  Authorized by: Juanito Millan Jr., MD   Comparison: compared with previous ECG from 12/4/2020  Comparison to previous ECG: Criteria for LVH is not present  Rhythm: sinus rhythm  Other findings: non-specific ST-T wave changes and prolonged QTc interval    Clinical impression: abnormal EKG                  ASSESSMENT:      Diagnosis Plan   1. Precordial pain  Cardiac Monitoring    Vital Signs - Once    Vital Signs - As Needed    Pulse Oximetry    Oxygen Therapy- Nasal Cannula; Titrate for SPO2: 92%, equal to or greater than    Insert Peripheral IV    sodium chloride 0.9 % flush 10 mL    nitroglycerin (NITROSTAT) SL tablet 0.4 mg    NPO Diet    Bathroom Privileges With Assistance    CBC & Differential    Comprehensive Metabolic Panel    Troponin T    D-Dimer    proBNP    ECG 12 Lead    Cardiac Monitoring    Vital Signs - Once    Insert Peripheral IV    NPO Diet    Bathroom Privileges With Assistance    Troponin T    Troponin T    D-Dimer    D-Dimer    proBNP    proBNP   2. Shortness of breath  Cardiac Monitoring    Vital Signs - Once    Vital Signs - As Needed    Pulse Oximetry    Oxygen Therapy- Nasal Cannula; Titrate for SPO2: 92%, equal to or greater than    Insert Peripheral IV    sodium chloride 0.9 % flush 10 mL    nitroglycerin (NITROSTAT) SL tablet 0.4 mg    NPO Diet    Bathroom Privileges With Assistance    CBC & Differential    Comprehensive Metabolic Panel    Troponin T    D-Dimer    proBNP    ECG 12 Lead    Cardiac Monitoring    Vital Signs - Once    Insert Peripheral IV    NPO Diet    Bathroom Privileges With Assistance    Troponin T    Troponin T    D-Dimer    D-Dimer    proBNP    proBNP    Adult Transthoracic Echo Complete W/ Cont if Necessary Per Protocol    CT Angiogram Chest With & Without Contrast         PLAN  OF CARE:     1. Pericardial effusion -patient has large pericardial effusion without evidence for tamponade.  Clinically the patient is not in tamponade.  Concern for noncompliance with dialysis will need aggressive volume reduction strategy.  Discussed with Dr. Radford and Dr. Sanchez.  Elevated D-dimer with a CTA chest performed showing no evidence for pulmonary embolism however he has tumor-like findings in the base of his left lung with concern for cavitary lesion versus tumor.  Patient will need admission for ongoing management and work-up.  Dr. Sanchez is agreed to take the patient for which I am very appreciative.  We will be happy to follow along with limited echocardiogram tomorrow to evaluate effusion.  2. Elevated D-dimer -no evidence for pulmonary embolism.  May consider lower extremity ultrasound Doppler.  3. Elevated BNP -patient has borderline HFpEF with EF 40 to 45%.  This is consistent with hypertensive heart disease with heart failure.  Volume overload is multifactorial.  Blood pressure was fairly well controlled today in clinic.  Patient has not been making much urine over the past 6 months.  4. Hypertension -continue carvedilol.  Sodium and volume restricted diet.    Return to clinic 4 weeks           Discharge Medications      ASK your doctor about these medications      Instructions Start Date   Auryxia 1  MG(Fe) tablet  Generic drug: Ferric Citrate   2 tablets, Oral, 3 Times Daily With Meals      carvedilol 25 MG tablet  Commonly known as: COREG   25 mg, Oral, Every 12 Hours Scheduled      metoclopramide 5 MG tablet  Commonly known as: REGLAN   5 mg, Oral, 3 Times Daily With Meals      sodium bicarbonate 650 MG tablet   1,300 mg, Oral, 2 Times Daily      traMADol 50 MG tablet  Commonly known as: Ultram   50 mg, Oral, Every 8 Hours PRN             Thank you for allowing me to participate in the care of your patient,      Sincerely,   Juanito Millan Jr, MD  [facility]  03/12/21  16:32 EST

## 2021-03-13 LAB
ALBUMIN SERPL-MCNC: 3.2 G/DL (ref 3.5–5.2)
ANION GAP SERPL CALCULATED.3IONS-SCNC: 18.4 MMOL/L (ref 5–15)
BUN SERPL-MCNC: 72 MG/DL (ref 6–20)
BUN/CREAT SERPL: 8.7 (ref 7–25)
CALCIUM SPEC-SCNC: 8.8 MG/DL (ref 8.6–10.5)
CHLORIDE SERPL-SCNC: 90 MMOL/L (ref 98–107)
CO2 SERPL-SCNC: 27.6 MMOL/L (ref 22–29)
CREAT SERPL-MCNC: 8.26 MG/DL (ref 0.76–1.27)
DEPRECATED RDW RBC AUTO: 50.7 FL (ref 37–54)
ERYTHROCYTE [DISTWIDTH] IN BLOOD BY AUTOMATED COUNT: 15.6 % (ref 12.3–15.4)
GFR SERPL CREATININE-BSD FRML MDRD: 7 ML/MIN/1.73
GFR SERPL CREATININE-BSD FRML MDRD: ABNORMAL ML/MIN/{1.73_M2}
GLUCOSE SERPL-MCNC: 112 MG/DL (ref 65–99)
HCT VFR BLD AUTO: 25.2 % (ref 37.5–51)
HGB BLD-MCNC: 8.1 G/DL (ref 13–17.7)
MAGNESIUM SERPL-MCNC: 2.8 MG/DL (ref 1.6–2.6)
MCH RBC QN AUTO: 29.7 PG (ref 26.6–33)
MCHC RBC AUTO-ENTMCNC: 32.1 G/DL (ref 31.5–35.7)
MCV RBC AUTO: 92.3 FL (ref 79–97)
PHOSPHATE SERPL-MCNC: 8.6 MG/DL (ref 2.5–4.5)
PLATELET # BLD AUTO: 233 10*3/MM3 (ref 140–450)
PMV BLD AUTO: 9.9 FL (ref 6–12)
POTASSIUM SERPL-SCNC: 5.7 MMOL/L (ref 3.5–5.2)
RBC # BLD AUTO: 2.73 10*6/MM3 (ref 4.14–5.8)
SODIUM SERPL-SCNC: 136 MMOL/L (ref 136–145)
WBC # BLD AUTO: 10.31 10*3/MM3 (ref 3.4–10.8)

## 2021-03-13 PROCEDURE — 85027 COMPLETE CBC AUTOMATED: CPT | Performed by: HOSPITALIST

## 2021-03-13 PROCEDURE — 86256 FLUORESCENT ANTIBODY TITER: CPT | Performed by: INTERNAL MEDICINE

## 2021-03-13 PROCEDURE — 83735 ASSAY OF MAGNESIUM: CPT | Performed by: HOSPITALIST

## 2021-03-13 PROCEDURE — 5A1D70Z PERFORMANCE OF URINARY FILTRATION, INTERMITTENT, LESS THAN 6 HOURS PER DAY: ICD-10-PCS | Performed by: INTERNAL MEDICINE

## 2021-03-13 PROCEDURE — 83520 IMMUNOASSAY QUANT NOS NONAB: CPT | Performed by: INTERNAL MEDICINE

## 2021-03-13 PROCEDURE — 80069 RENAL FUNCTION PANEL: CPT | Performed by: INTERNAL MEDICINE

## 2021-03-13 PROCEDURE — 99254 IP/OBS CNSLTJ NEW/EST MOD 60: CPT | Performed by: INTERNAL MEDICINE

## 2021-03-13 RX ORDER — CALCIUM ACETATE 667 MG/1
2001 CAPSULE ORAL
Status: DISCONTINUED | OUTPATIENT
Start: 2021-03-14 | End: 2021-03-24 | Stop reason: HOSPADM

## 2021-03-13 RX ORDER — DOCUSATE SODIUM 100 MG/1
100 CAPSULE, LIQUID FILLED ORAL 2 TIMES DAILY PRN
Status: DISCONTINUED | OUTPATIENT
Start: 2021-03-13 | End: 2021-03-24 | Stop reason: HOSPADM

## 2021-03-13 RX ORDER — ALBUMIN (HUMAN) 12.5 G/50ML
12.5 SOLUTION INTRAVENOUS AS NEEDED
Status: CANCELLED | OUTPATIENT
Start: 2021-03-14 | End: 2021-03-14

## 2021-03-13 RX ORDER — FAMOTIDINE 20 MG/1
20 TABLET, FILM COATED ORAL 2 TIMES DAILY PRN
Status: DISCONTINUED | OUTPATIENT
Start: 2021-03-13 | End: 2021-03-16

## 2021-03-13 RX ORDER — TRAMADOL HYDROCHLORIDE 50 MG/1
50 TABLET ORAL EVERY 6 HOURS PRN
Status: DISCONTINUED | OUTPATIENT
Start: 2021-03-13 | End: 2021-03-16

## 2021-03-13 RX ADMIN — SODIUM CHLORIDE, PRESERVATIVE FREE 10 ML: 5 INJECTION INTRAVENOUS at 20:23

## 2021-03-13 RX ADMIN — CARVEDILOL 25 MG: 25 TABLET, FILM COATED ORAL at 14:31

## 2021-03-13 RX ADMIN — CARVEDILOL 25 MG: 25 TABLET, FILM COATED ORAL at 20:23

## 2021-03-13 NOTE — PROGRESS NOTES
Patient unavailable both times that I attempted to see him today.    I will try again tomorrow.  36-year-old with bilateral lung masses in the setting of a COVID-19 pneumonia history.  We will likely plan CT-guided biopsy of the right lung mass on Monday.

## 2021-03-13 NOTE — PLAN OF CARE
Goal Outcome Evaluation:        Outcome Summary: Pt awake most of the night, denies pain when asked repeatedly, up to br per self x2, taking fluids, denies nausea. abd round and full, edema to ble, scd placed, call light within reach. nad, sr on monitor, room air, satting at 94%.

## 2021-03-13 NOTE — PROGRESS NOTES
[facility] Medicine Services  PROGRESS NOTE    Patient Name: Chandler Buchanan  : 1985  MRN: [id number]    Date of Admission: 3/12/2021  Primary Care Physician: Provider, No Known    Subjective   Subjective     CC:  Follow-up swelling and lung mass    HPI:  Patient says he is doing well today.  He feels better than yesterday.  He is getting dialysis and says he looks forward to getting some swelling off.  Denies any new complaints.    Review of Systems  No current fevers or chills  No current shortness of breath or cough  No current nausea, vomiting, or diarrhea  No current chest pain or palpitations      Objective   Objective     Vital Signs:   Temp:  [96.4 °F (35.8 °C)-98.7 °F (37.1 °C)] 98.5 °F (36.9 °C)  Heart Rate:  [81-89] 81  Resp:  [18-20] 18  BP: (117-128)/(79-89) 121/83        Physical Exam:  Constitutional:Awake, alert  HENT: NCAT, mucous membranes moist, neck supple  Respiratory: Clear to auscultation bilaterally, respiratory effort normal, nonlabored breathing   Cardiovascular: RRR, normal radial pulses  Gastrointestinal: Positive bowel sounds, soft, nontender, nondistended  Musculoskeletal: Normal musculature for age, BMI is 26, mild lower extremity edema  Psychiatric: Appropriate affect, cooperative, conversational  Neurologic: No slurred speech or facial droop, follows commands  Skin: No rashes or jaundice, warm      Results Reviewed:  Results from last 7 days   Lab Units 21  0535 21  1220   WBC 10*3/mm3 10.31 10.99*   HEMOGLOBIN g/dL 8.1* 8.5*   HEMATOCRIT % 25.2* 26.7*   PLATELETS 10*3/mm3 233 254     Results from last 7 days   Lab Units 21  0535 21  1220 21  1240   SODIUM mmol/L 136 138 140   POTASSIUM mmol/L 5.7* 5.6* 5.1   CHLORIDE mmol/L 90* 91* 96*   CO2 mmol/L 27.6 30.9* 32.6*   BUN mg/dL 72* 57* 53*   CREATININE mg/dL 8.26* 7.71* 7.95*   GLUCOSE mg/dL 112* 121* 99   CALCIUM mg/dL 8.8 9.3 9.0   ALT (SGPT) U/L  --  137*  --    AST (SGOT) U/L  --  157*   --    TROPONIN T ng/mL  --  0.083*  --    PROBNP pg/mL  --  >70,000.0*  --      Estimated Creatinine Clearance: 14.2 mL/min (A) (by C-G formula based on SCr of 8.26 mg/dL (H)).    Microbiology Results Abnormal     Procedure Component Value - Date/Time    COVID PRE-OP / PRE-PROCEDURE SCREENING ORDER (NO ISOLATION) - Swab, Nasopharynx [[id number]]  (Normal) Collected: 03/12/21 1738    Lab Status: Final result Specimen: Swab from Nasopharynx Updated: 03/12/21 2205    Narrative:      The following orders were created for panel order COVID PRE-OP / PRE-PROCEDURE SCREENING ORDER (NO ISOLATION) - Swab, Nasopharynx.  Procedure                               Abnormality         Status                     ---------                               -----------         ------                     COVID-19,APTIMA PANTHER,...[[id number]]  Normal              Final result                 Please view results for these tests on the individual orders.    COVID-19,APTIMA PANTHER,ART IN-HOUSE, NP/OP SWAB IN UTM/VTM/SALINE TRANSPORT MEDIA,24 HR TAT - Swab, Nasopharynx [[id number]]  (Normal) Collected: 03/12/21 1738    Lab Status: Final result Specimen: Swab from Nasopharynx Updated: 03/12/21 2205     COVID19 Not Detected    Narrative:      Fact sheet for providers: https://www.fda.gov/media/227638/download     Fact sheet for patients: https://www.fda.gov/media/874612/download    Test performed by RT PCR.          Imaging Results (Last 24 Hours)     ** No results found for the last 24 hours. **          Results for orders placed during the hospital encounter of 03/12/21    Adult Transthoracic Echo Complete W/ Cont if Necessary Per Protocol    Interpretation Summary  · Left ventricular ejection fraction appears to be 41 - 45%. Left ventricular systolic function is mildly decreased. Abnormal global longitudinal LV strain (GLS) = -16.2%. Left ventricle strain data was reviewed by the physician and found to be accurate. Normal left ventricular  cavity size noted. Left ventricular wall thickness is consistent with moderate concentric hypertrophy. There is left ventricular global hypokinesis noted.  · Left ventricular diastolic function is consistent with (grade II w/high LAP) pseudonormalization.  · Calculated right ventricular systolic pressure from tricuspid regurgitation is 39.2 mmHg.  · The inferior vena cava is dilated. IVC inspiratory collapse is absent.  · There is a large (>2cm) circumferential pericardial effusion. The effusion is fluid filled. There is no evidence of cardiac tamponade. There is evidence of ascites present.      I have reviewed the medications:  Scheduled Meds:carvedilol, 25 mg, Oral, Q12H  sodium chloride, 10 mL, Intravenous, Q12H      Continuous Infusions:   PRN Meds:.•  acetaminophen **OR** acetaminophen **OR** acetaminophen  •  ondansetron **OR** ondansetron  •  sodium chloride    Assessment/Plan   Assessment & Plan     Active Hospital Problems    Diagnosis  POA   • **Pericardial effusion [I31.3]  Yes   • Elevated brain natriuretic peptide (BNP) level [R79.89]  Yes   • Anemia in ESRD (end-stage renal disease) (CMS/HCC) [N18.6, D63.1]  Yes   • NICM (nonischemic cardiomyopathy) (CMS/[facility]) [I42.8]  Yes   • HTN (hypertension) [I10]  Yes   • History of COVID-19 [Z86.16]  Yes   • Idiopathic peripheral neuropathy [G60.9]  Yes   • ESRD on dialysis (CMS/[facility]) [N18.6, Z99.2]  Not Applicable      Resolved Hospital Problems   No resolved problems to display.        Brief Hospital Course to date:  Chandler Buchanan is a 36 y.o. male with past medical history of end-stage renal disease who is currently on home hemodialysis presents to the hospital with large pericardial effusion, volume overload, progressive shortness of breath, and large diffuse lymphadenopathy and lung mass in the setting of recent COVID-19.    Discussion/plan:  -Plan is for hemodialysis today to address hypervolemia.  Nephrology managing.    -Thoracic surgery consulted to  assess for lung and mediastinal mass and lymphadenopathy.  Plan to follow-up on the recommendations    -Monitor anemia no bleeding    -Continue home carvedilol for blood pressure.  Monitor pressure.    -Supportive care and symptom treatment with careful monitoring.    -No current symptoms of tamponade, monitor    DVT Prophylaxis: Mechanical    Disposition: Likely home when improved    CODE STATUS:   Code Status and Medical Interventions:   Ordered at: 03/12/21 1716     Code Status:    CPR     Medical Interventions (Level of Support Prior to Arrest):    Full       Willem Crespo MD  03/13/21

## 2021-03-13 NOTE — PROGRESS NOTES
NEPHROLOGY PROGRESS NOTE    PATIENT IDENTIFICATION:   Name:  Chandler Buchanan      MRN:  [id number]     36 y.o.  male             Reason for visit: ESRD    SUBJECTIVE:   Seen and examined on HD; breathing is more comfortable but still has orthopnea; leg swelling improving; has had cramping on HD just recently, and so ultrafiltration stopped for the moment    OBJECTIVE:  Vitals:    03/12/21 2345 03/13/21 0554 03/13/21 0752 03/13/21 0905   BP: 117/79  121/83 123/83   BP Location:   Right arm Right arm   Patient Position:   Lying Lying   Pulse: 81   79   Resp: 18  18 16   Temp: 98.6 °F (37 °C)  98.5 °F (36.9 °C) 97.3 °F (36.3 °C)   TempSrc: Oral  Oral Temporal   SpO2: 93%      Weight:  82.2 kg (181 lb 4.8 oz)     Height:               Body mass index is 26.77 kg/m².    Intake/Output Summary (Last 24 hours) at 3/13/2021 1235  Last data filed at 3/12/2021 2346  Gross per 24 hour   Intake 330 ml   Output --   Net 330 ml     Wt Readings from Last 1 Encounters:   03/13/21 0554 82.2 kg (181 lb 4.8 oz)   03/12/21 1726 81.7 kg (180 lb 3.2 oz)     Wt Readings from Last 3 Encounters:   03/13/21 82.2 kg (181 lb 4.8 oz)   03/12/21 83.5 kg (184 lb)   03/08/21 80.3 kg (177 lb 0.5 oz)         Exam:  NAD; pleasant; oriented; looks older than stated age  Qb 400, 124/83, HR 78, ultrafiltration goal 3.6 L  Chronically ill-appearing; pale  MMM; AT/NC   No eye discharge; no scleral icterus  +JVD; no carotid bruits  Crackles lower fields bilat; not labored on room air  RRR, 2/6M, no rub  Soft, +T but no G or R, +D, BS+, abdominal wall edema present  +1 edema  Left arm AV fistula patent  No clubbing  No asterixis  Moves all extremities   Flat affect; depressed mood    Scheduled meds:    carvedilol, 25 mg, Oral, Q12H  sodium chloride, 10 mL, Intravenous, Q12H      IV meds:                           Data Review:    Results from last 7 days   Lab Units 03/13/21  0535 03/12/21  1220 03/08/21  1240   SODIUM mmol/L 136 138 140   POTASSIUM  mmol/L 5.7* 5.6* 5.1   CHLORIDE mmol/L 90* 91* 96*   CO2 mmol/L 27.6 30.9* 32.6*   BUN mg/dL 72* 57* 53*   CREATININE mg/dL 8.26* 7.71* 7.95*   CALCIUM mg/dL 8.8 9.3 9.0   BILIRUBIN mg/dL  --  0.8  --    ALK PHOS U/L  --  133*  --    ALT (SGPT) U/L  --  137*  --    AST (SGOT) U/L  --  157*  --    GLUCOSE mg/dL 112* 121* 99     Estimated Creatinine Clearance: 14.2 mL/min (A) (by C-G formula based on SCr of 8.26 mg/dL (H)).      Results from last 7 days   Lab Units 03/13/21  0535   MAGNESIUM mg/dL 2.8*   PHOSPHORUS mg/dL 8.6*       Results from last 7 days   Lab Units 03/13/21  0535 03/12/21  1220   WBC 10*3/mm3 10.31 10.99*   HEMOGLOBIN g/dL 8.1* 8.5*   PLATELETS 10*3/mm3 233 254                   ASSESSMENT:     Pericardial effusion    ESRD on dialysis (CMS/[facility])    Idiopathic peripheral neuropathy    HTN (hypertension)    History of COVID-19    NICM (nonischemic cardiomyopathy) (CMS/[facility])    Anemia in ESRD (end-stage renal disease) (CMS/[facility])    Elevated brain natriuretic peptide (BNP) level    1.  ESRD: volume excess, centrally and peripherally.  Mild hyperkalemia and hyperphosphatemia raise question about compliance with home hemodialysis; hypervolemic hyponatremia. Underlying renal disease likely autoimmune, which may have bearing on other disease processes in play now  2.  Large pericardial effusion (> 2 cm circumferentially):  no tamponade noted by echocardiogram 3/12  3.  Anemia, with likely dilutional component  4.  Mediastinal and bilateral hilar lymphadenopathy as well as large mass-like opacity right lower lobe with central cavitation  5.  Abdominal distention with ascites and transaminitis  6.  Covid-19 infection, December' 20        PLAN:  1.  HD underway now; will plan additional HD treatment tomorrow and possibly an iUF the day after that as well for continued removal of waste and volume  2.  Thoracic surgery evaluation pending      Dre Samano MD  3/13/2021    12:35 EST

## 2021-03-13 NOTE — PLAN OF CARE
Problem: Adult Inpatient Plan of Care  Goal: Plan of Care Review  Flowsheets (Taken 3/13/2021 1620)  Progress: improving  Plan of Care Reviewed With: patient  Outcome Summary: Denies pain, nausea, or SOA. Jeannine HD well. Fistula to LUE w/ bruit/thrill. HD again in am. Plan for biopsy of lung nodule/mass on 3/15 per thoracic.  Goal: Patient-Specific Goal (Individualized)  Outcome: Ongoing, Progressing  Goal: Absence of Hospital-Acquired Illness or Injury  Outcome: Ongoing, Progressing  Intervention: Identify and Manage Fall Risk  Recent Flowsheet Documentation  Taken 3/13/2021 1609 by Danielle Vega RN  Safety Promotion/Fall Prevention:   fall prevention program maintained   safety round/check completed  Taken 3/13/2021 1441 by Danielle Vega RN  Safety Promotion/Fall Prevention:   fall prevention program maintained   safety round/check completed  Taken 3/13/2021 1200 by Danielle Vega RN  Safety Promotion/Fall Prevention: patient off unit  Taken 3/13/2021 1030 by Danielle Vega RN  Safety Promotion/Fall Prevention: patient off unit  Taken 3/13/2021 0800 by Danielle Vega RN  Safety Promotion/Fall Prevention:   fall prevention program maintained   safety round/check completed  Goal: Optimal Comfort and Wellbeing  Outcome: Ongoing, Progressing  Goal: Readiness for Transition of Care  Outcome: Ongoing, Progressing     Problem: Pain Acute  Goal: Optimal Pain Control  Outcome: Ongoing, Progressing     Problem: Device-Related Complication Risk (Hemodialysis)  Goal: Safe, Effective Therapy Delivery  Outcome: Ongoing, Progressing     Problem: Hemodynamic Instability (Hemodialysis)  Goal: Vital Signs Remain in Desired Range  Outcome: Ongoing, Progressing     Problem: Infection (Hemodialysis)  Goal: Absence of Infection Signs and Symptoms  Outcome: Ongoing, Progressing     Problem: Balance Impairment (Functional Deficit)  Goal: Improved Balance and Postural Control  Outcome: Ongoing,  Progressing  Intervention: Optimize Balance and Safe Activity  Recent Flowsheet Documentation  Taken 3/13/2021 1609 by Danielle Vega, RN  Safety Promotion/Fall Prevention:   fall prevention program maintained   safety round/check completed  Taken 3/13/2021 1441 by Danielle Vega RN  Safety Promotion/Fall Prevention:   fall prevention program maintained   safety round/check completed  Taken 3/13/2021 1200 by Danielle Vega RN  Safety Promotion/Fall Prevention: patient off unit  Taken 3/13/2021 1030 by Danielle Vega RN  Safety Promotion/Fall Prevention: patient off unit  Taken 3/13/2021 0800 by Danielle Vega, RN  Safety Promotion/Fall Prevention:   fall prevention program maintained   safety round/check completed     Problem: Cognitive Impairment (Functional Deficit)  Goal: Optimal Functional [place]  Outcome: Ongoing, Progressing     Problem: Coordination Impairment (Functional Deficit)  Goal: Optimal Coordination  Outcome: Ongoing, Progressing     Problem: Muscle Strength Impairment (Functional Deficit)  Goal: Improved Muscle Strength  Outcome: Ongoing, Progressing     Problem: Muscle Tone Impairment (Functional Deficit)  Goal: Improved Muscle Tone  Outcome: Ongoing, Progressing     Problem: Range of Motion Impairment (Functional Deficit)  Goal: Optimal Range of Motion  Outcome: Ongoing, Progressing     Problem: Sensory Impairment (Functional Deficit)  Goal: Compensation for Sensory Deficit  Outcome: Ongoing, Progressing   Goal Outcome Evaluation:  Plan of Care Reviewed With: patient  Progress: improving  Outcome Summary: Denies pain, nausea, or SOA. Jeannine HD well. Fistula to LUE w/ bruit/thrill. HD again in am. Plan for biopsy of lung nodule/mass on 3/15 per thoracic.

## 2021-03-13 NOTE — CONSULTS
Patient Name: Chandler Buchanan  :1985  36 y.o.    Date of Admission: 3/12/2021  Date of Consultation:  21  Encounter Provider: Marlen Javier RN  Place of Service: [facility] CARDIOLOGY  Referring Provider: Juanito Millan Jr., MD  Patient Care Team:  Provider, No Known as PCP - General      Chief complaint:    History of Present Illness:  Anjum Buchanan is a 36 y.o. male patient with a history of nonischemic cardiomyopathy, idiopathic peripheral neuropathy, hypertension, COVID-19 2020, ESRD on home hemodialysis and anemia.     He was seen in [facility] by Dr. Millan on 3/12/2021 with complaints of increased diffuse pain, exertional dyspnea and lower extremity edema. The patient reported removed 2.5 L the night before. CT chest showed tumor-like findings in the base of the left lung with concern for cavitary lesion versus tumor. Echo showed EF 41-45%, global longitudinal LV strain, grade II diastolic dysfunction, IVC dilated and large (>2cm) circumferential pericardial effusion with no evidence of cardiac tamponade.     He was directly admitted to the hospitalist service. Thoracic surgery consulted for questionable lung mass. Nephrology was consulted who ordered HD for both last night and today.    He had dialysis today with 6 removed.  He feels much better.  He is sitting up, he has eaten his whole plate of food.  He has no complaints.  Denies shortness of breath, weight loss, night sweats, hemoptysis or cough.  No lower extremity edema.  His weight has been stable.  Blood pressure and heart rate are stable.  No complaints thus far.         Troponin 0.083. ProBNP >70,000 and BUN/Cr 72/8.26. Ddimer 3.8 and CTA showed no evidence for pulmonary embolism.    Echo 3/12/2021  · Left ventricular ejection fraction appears to be 41 - 45%. Left ventricular systolic function is mildly decreased. Abnormal global longitudinal LV strain (GLS) = -16.2%. Left ventricle strain data was  reviewed by the physician and found to be accurate. Normal left ventricular cavity size noted. Left ventricular wall thickness is consistent with moderate concentric hypertrophy. There is left ventricular global hypokinesis noted.  · Left ventricular diastolic function is consistent with (grade II w/high LAP) pseudonormalization.  · Calculated right ventricular systolic pressure from tricuspid regurgitation is 39.2 mmHg.  · The inferior vena cava is dilated. IVC inspiratory collapse is absent.  · There is a large (>2cm) circumferential pericardial effusion. The effusion is fluid filled. There is no evidence of cardiac tamponade. There is evidence of ascites present.    Past Medical History:   Diagnosis Date   • A-V fistula (CMS/HCC)     LEFT ARM   • Acute renal failure (CMS/HCC)     ON DIALYSIS MON,WED AND FRI AT HOME   • Anemia     HX   • Cardiomyopathy (CMS/HCC)     New onset systolic PER NOTE ON ADMISSION 1/2019, PT SAW CARDIO IN THE HOSPITAL BUT STATES HE DOES NOT SEE CARDIO NOW   • Dialysis patient (CMS/HCC)     AT HOME - TUESDAY, THURSDAY, SATURDAY, SUNDAY   • History of COVID-19     12/2020   • History of transfusion 02/2021    NO REACTION    • Hypertension    • Pericardial effusion    • Stage 4 chronic kidney disease (CMS/HCC)        Past Surgical History:   Procedure Laterality Date   • ARTERIOVENOUS FISTULA/SHUNT SURGERY Left 1/23/2019    Procedure: LEFT ARM FISTULA CREATION;  Surgeon: Bogdan Velásquez MD;  Location: [facility] OR;  Service: Vascular   • ARTERIOVENOUS FISTULA/SHUNT SURGERY Left 3/8/2021    Procedure: LEFT ARM ARTERIAL VENOUS FISTULA REVISION;  Surgeon: Cuong Pedraza Jr., MD;  Location: [facility] MAIN OR;  Service: Vascular;  Laterality: Left;   • COLONOSCOPY N/A 3/12/2019    Procedure: COLONOSCOPY wwith Biiopsies;  Surgeon: Jered Obrien MD;  Location: [facility] ENDOSCOPY;  Service: General   • ENDOSCOPY N/A 3/12/2019    Procedure: ESOPHAGOGASTRODUODENOSCOPY WITH  BIOPSY;  Surgeon: Jered Obrien MD;  [facility];  Service: General   • INSERTION HEMODIALYSIS CATHETER N/A 1/21/2019    Procedure: HEMODIALYSIS CATHETER INSERTION;  Surgeon: Cuong Pedraza Jr., MD;  [facility];  Service: Vascular   • INSERTION PERITONEAL DIALYSIS CATHETER N/A 3/14/2019    Procedure: INSERTION PERITONEAL DIALYSIS CATHETER LAPAROSCOPIC AND OMENTOPEXY;  Surgeon: Jered Obrien MD;  Location:  [facility] MAIN OR;  Service: General   • REMOVAL PERITONEAL DIALYSIS CATHETER N/A 10/31/2019    Procedure: REMOVAL PERITONEAL DIALYSIS CATHETER;  Surgeon: Jered Obrien MD;  Location:  [facility] MAIN OR;  Service: General         Prior to Admission medications    Medication Sig Start Date End Date Taking? Authorizing Provider   Auryxia 1  MG(Fe) tablet Take 2 tablets by mouth 3 (Three) Times a Day With Meals. 1/7/21  Yes Sadnra Holt MD   carvedilol (COREG) 25 MG tablet Take 1 tablet by mouth Every 12 (Twelve) Hours. 1/25/19  Yes Abdi Lucas MD   metoclopramide (REGLAN) 5 MG tablet Take 1 tablet by mouth 3 (Three) Times a Day With Meals for 10 days. 3/8/21 3/18/21 Yes Cuong Pedraza Jr., MD   sodium bicarbonate 650 MG tablet Take 1,300 mg by mouth 2 (Two) Times a Day. 1/9/21  Yes ProviderSandra MD   traMADol (Ultram) 50 MG tablet Take 1 tablet by mouth Every 8 (Eight) Hours As Needed for Moderate Pain . 3/8/21 3/8/22  Cuong Pedraza Jr., MD       No Known Allergies    Social History     Socioeconomic History   • Marital status: Single     Spouse name: Not on file   • Number of children: Not on file   • Years of education: Not on file   • Highest education level: Not on file   Tobacco Use   • Smoking status: Never Smoker   • Smokeless tobacco: Never Used   Substance and Sexual Activity   • Alcohol use: No   • Drug use: No   • Sexual activity: Defer       Family History   Problem Relation Age of Onset   •  Colon cancer Mother    • Seizures Maternal Grandmother    • Malig Hyperthermia Neg Hx        REVIEW OF SYSTEMS:   All systems reviewed.  Pertinent positives identified in HPI.  All other systems are negative.      Objective:     Vitals:    03/12/21 1952 03/12/21 2345 03/13/21 0554 03/13/21 0752   BP: 122/89 117/79  121/83   BP Location:    Right arm   Patient Position:    Lying   Pulse: 81 81     Resp: 18 18  18   Temp: 98.7 °F (37.1 °C) 98.6 °F (37 °C)  98.5 °F (36.9 °C)   TempSrc: Oral Oral  Oral   SpO2: 92% 93%     Weight:   82.2 kg (181 lb 4.8 oz)    Height:         Body mass index is 26.77 kg/m².    General Appearance:    Alert, cooperative, in no acute distress   Head:    Normocephalic, without obvious abnormality, atraumatic   Eyes:            Lids and lashes normal, conjunctivae and sclerae normal, no icterus, no pallor, corneas clear, PERRLA   Ears:    Ears appear intact with no abnormalities noted   Throat:   No oral lesions, no thrush, oral mucosa moist   Neck:   No adenopathy, supple, trachea midline, no thyromegaly, no carotid bruit, no JVD   Back:     No kyphosis present, no scoliosis present, no skin lesions, erythema or scars, no tenderness to percussion or palpation, range of motion normal   Lungs:     Clear to auscultation, respirations regular, even and unlabored    Heart:    Regular rhythm and normal rate, normal S1 and S2, no murmur, no gallop, no rub, no click   Chest Wall:    No abnormalities observed   Abdomen:     Normal bowel sounds, no masses, no organomegaly, soft, nontender, nondistended, no guarding, no rebound  tenderness   Extremities:   Moves all extremities well, no edema, no cyanosis, no redness fistula with thrill and bruit   Pulses:   Pulses palpable and equal bilaterally. Normal radial, carotid, femoral, dorsalis pedis and posterior tibial pulses bilaterally. Normal abdominal aorta   Skin:  Psychiatric:   No bleeding, bruising or rash    Alert and oriented x 3, normal mood and  affect   Lab Review:     Results from last 7 days   Lab Units 03/13/21  0535 03/12/21  1220   SODIUM mmol/L 136 138   POTASSIUM mmol/L 5.7* 5.6*   CHLORIDE mmol/L 90* 91*   CO2 mmol/L 27.6 30.9*   BUN mg/dL 72* 57*   CREATININE mg/dL 8.26* 7.71*   CALCIUM mg/dL 8.8 9.3   BILIRUBIN mg/dL  --  0.8   ALK PHOS U/L  --  133*   ALT (SGPT) U/L  --  137*   AST (SGOT) U/L  --  157*   GLUCOSE mg/dL 112* 121*     Results from last 7 days   Lab Units 03/12/21  1220   TROPONIN T ng/mL 0.083*     Results from last 7 days   Lab Units 03/13/21  0535   WBC 10*3/mm3 10.31   HEMOGLOBIN g/dL 8.1*   HEMATOCRIT % 25.2*   PLATELETS 10*3/mm3 233         Results from last 7 days   Lab Units 03/13/21  0535   MAGNESIUM mg/dL 2.8*                   EKG 12/4/2020        Telemetry reviewed.  No EKG on this admission    EKG yesterday in office shows normal sinus rhythm.    Assessment and Plan:         1.  Pericardial effusion, large without tamponade  2.  Cavitary lung mass  3.  ESRD on dialysis    No signs of tamponade clinically.  Agree with aggressive dialysis schedule to remove fluid.  Seems like there is plan for CT-guided biopsy of the lung mass on Monday.  Discussed with the patient the etiology of pericardial fluid could be simple volume overload from decreased volume removal, related to his Covid infection in December, or potentially malignant.    Mesha Bernstein MD  [facility]  03/13/21

## 2021-03-14 PROBLEM — R59.0 HILAR ADENOPATHY: Status: ACTIVE | Noted: 2021-03-14

## 2021-03-14 PROCEDURE — 5A1D70Z PERFORMANCE OF URINARY FILTRATION, INTERMITTENT, LESS THAN 6 HOURS PER DAY: ICD-10-PCS | Performed by: INTERNAL MEDICINE

## 2021-03-14 PROCEDURE — 99254 IP/OBS CNSLTJ NEW/EST MOD 60: CPT | Performed by: THORACIC SURGERY (CARDIOTHORACIC VASCULAR SURGERY)

## 2021-03-14 RX ADMIN — CARVEDILOL 25 MG: 25 TABLET, FILM COATED ORAL at 12:31

## 2021-03-14 RX ADMIN — CALCIUM ACETATE 2001 MG: 667 CAPSULE ORAL at 11:45

## 2021-03-14 RX ADMIN — SODIUM CHLORIDE, PRESERVATIVE FREE 10 ML: 5 INJECTION INTRAVENOUS at 21:00

## 2021-03-14 RX ADMIN — CARVEDILOL 25 MG: 25 TABLET, FILM COATED ORAL at 20:17

## 2021-03-14 RX ADMIN — CALCIUM ACETATE 2001 MG: 667 CAPSULE ORAL at 17:24

## 2021-03-14 RX ADMIN — CALCIUM ACETATE 2001 MG: 667 CAPSULE ORAL at 08:20

## 2021-03-14 NOTE — PLAN OF CARE
Goal Outcome Evaluation:     Progress: no change  Outcome Summary: Pt awake watching tv, no c/o voiced, has hs lunch, up to br ad tonya, edema to ble imrpoving, ble not as tight, fistula to L arm with dressing d/i. nad, sr on monitor.

## 2021-03-14 NOTE — PROGRESS NOTES
NEPHROLOGY PROGRESS NOTE    PATIENT IDENTIFICATION:   Name:  Chandler Buchanan      MRN:  [id number]     36 y.o.  male             Reason for visit: ESRD    SUBJECTIVE:   Had HD earlier today with 3 L removed; tolerated well; feels less puffy; no shortness of breath and no orthopnea.  Thoracic Surgery and Pulmonary planning bronchoscopy with EBUS    OBJECTIVE:  Vitals:    03/14/21 0500 03/14/21 0600 03/14/21 0713 03/14/21 1337   BP:   143/83 127/94   BP Location:   Right arm Right arm   Patient Position:   Lying Sitting   Pulse:   79 83   Resp:   20 20   Temp:   98.3 °F (36.8 °C) 98.1 °F (36.7 °C)   TempSrc:   Oral Tympanic   SpO2:    92%   Weight: 81.2 kg (179 lb 1.6 oz) 81.2 kg (179 lb 0.2 oz)     Height:               Body mass index is 26.44 kg/m².    Intake/Output Summary (Last 24 hours) at 3/14/2021 1813  Last data filed at 3/14/2021 1337  Gross per 24 hour   Intake 240 ml   Output --   Net 240 ml     Wt Readings from Last 1 Encounters:   03/14/21 0600 81.2 kg (179 lb 0.2 oz)   03/14/21 0500 81.2 kg (179 lb 1.6 oz)   03/13/21 0554 82.2 kg (181 lb 4.8 oz)   03/12/21 1726 81.7 kg (180 lb 3.2 oz)     Wt Readings from Last 3 Encounters:   03/14/21 81.2 kg (179 lb 0.2 oz)   03/12/21 83.5 kg (184 lb)   03/08/21 80.3 kg (177 lb 0.5 oz)         Exam:  NAD; pleasant; oriented; looks older than stated age  Chronically ill-appearing; pale  MMM; AT/NC   No eye discharge; no scleral icterus  No JVD; no carotid bruits  Fairly clear; not labored on room air  RRR, 2/6M, no rub  Soft,  not tender, +D, BS+, abdominal wall edema present  Trace edema  Left arm AV fistula patent  No clubbing  No asterixis  Moves all extremities   Flat affect; depressed mood    Scheduled meds:    calcium acetate, 2,001 mg, Oral, TID With Meals  carvedilol, 25 mg, Oral, Q12H  sodium chloride, 10 mL, Intravenous, Q12H      IV meds:                           Data Review:    Results from last 7 days   Lab Units 03/13/21  0535 03/12/21  3281  03/08/21  1240   SODIUM mmol/L 136 138 140   POTASSIUM mmol/L 5.7* 5.6* 5.1   CHLORIDE mmol/L 90* 91* 96*   CO2 mmol/L 27.6 30.9* 32.6*   BUN mg/dL 72* 57* 53*   CREATININE mg/dL 8.26* 7.71* 7.95*   CALCIUM mg/dL 8.8 9.3 9.0   BILIRUBIN mg/dL  --  0.8  --    ALK PHOS U/L  --  133*  --    ALT (SGPT) U/L  --  137*  --    AST (SGOT) U/L  --  157*  --    GLUCOSE mg/dL 112* 121* 99     Estimated Creatinine Clearance: 14.1 mL/min (A) (by C-G formula based on SCr of 8.26 mg/dL (H)).      Results from last 7 days   Lab Units 03/13/21  0535   MAGNESIUM mg/dL 2.8*   PHOSPHORUS mg/dL 8.6*       Results from last 7 days   Lab Units 03/13/21  0535 03/12/21  1220   WBC 10*3/mm3 10.31 10.99*   HEMOGLOBIN g/dL 8.1* 8.5*   PLATELETS 10*3/mm3 233 254                   ASSESSMENT:     Pericardial effusion    ESRD on dialysis (CMS/[facility])    Idiopathic peripheral neuropathy    HTN (hypertension)    History of COVID-19    NICM (nonischemic cardiomyopathy) (CMS/[facility])    Anemia in ESRD (end-stage renal disease) (CMS/[facility])    Elevated brain natriuretic peptide (BNP) level    Hilar adenopathy    1.  ESRD: volume excess, centrally and peripherally, improving.  Mild hyperkalemia and hyperphosphatemia raise question about compliance with home hemodialysis; hypervolemic hyponatremia. Underlying renal disease likely autoimmune, which may have bearing on other disease processes in play now.   2.  Large pericardial effusion (> 2 cm circumferentially):  no tamponade noted by echocardiogram 3/12  3.  Anemia, with likely dilutional component  4.  Mediastinal and bilateral hilar lymphadenopathy as well as large mass-like opacity right lower lobe with central cavitation  5.  Abdominal distention with ascites and transaminitis  6.  Covid-19 infection, December' 20        PLAN:  1.  Daily hemodialysis, with treatment #3 planned tomorrow  2.  Thoracic surgery evaluation underway      Dre Samano MD  3/14/2021    18:13 EDT

## 2021-03-14 NOTE — CONSULTS
Initial Hospital Consult    Reason for consult: Bilateral lung consolidation, hilar and mediastinal lymphadenopathy  Requesting physician: Dr. Sanchez    Chief complaint: Shortness of breath    Subjective     History of Present Illness  Mr. Buchanan is a 36-year-old gentleman who presented to the emergency department on 3/12/2021 with complaints of worsening shortness of breath, a large pericardial effusion and volume overload after a recent AV fistula revision on 3/8/2021.  He states that he has been progressively more dyspneic with activities over the past 2 weeks.  He denies any cough, fevers, chills.  He was recently treated for a COVID-19 infection in December 2020 which resulted in hospitalization.      Past Medical History:   Diagnosis Date   • A-V fistula (CMS/HCC)     LEFT ARM   • Acute renal failure (CMS/HCC)     ON DIALYSIS MON,WED AND FRI AT HOME   • Anemia     HX   • Cardiomyopathy (CMS/HCC)     New onset systolic PER NOTE ON ADMISSION 1/2019, PT SAW CARDIO IN THE HOSPITAL BUT STATES HE DOES NOT SEE CARDIO NOW   • Dialysis patient (CMS/HCC)     AT HOME - TUESDAY, THURSDAY, SATURDAY, SUNDAY   • History of COVID-19     12/2020   • History of transfusion 02/2021    NO REACTION    • Hypertension    • Pericardial effusion    • Stage 4 chronic kidney disease (CMS/HCC)    ,   Past Surgical History:   Procedure Laterality Date   • ARTERIOVENOUS FISTULA/SHUNT SURGERY Left 1/23/2019    Procedure: LEFT ARM FISTULA CREATION;  Surgeon: Bogdan Velásquez MD;  Location: [facility] OR;  Service: Vascular   • ARTERIOVENOUS FISTULA/SHUNT SURGERY Left 3/8/2021    Procedure: LEFT ARM ARTERIAL VENOUS FISTULA REVISION;  Surgeon: Cuong Pedraza Jr., MD;  Location: [facility] MAIN OR;  Service: Vascular;  Laterality: Left;   • COLONOSCOPY N/A 3/12/2019    Procedure: COLONOSCOPY wwith Biiopsies;  Surgeon: Jered Obrien MD;  Location: [facility] ENDOSCOPY;  Service: General   • ENDOSCOPY N/A 3/12/2019     Procedure: ESOPHAGOGASTRODUODENOSCOPY WITH BIOPSY;  Surgeon: Jered Obrien MD;  Location: [facility] ENDOSCOPY;  Service: General   • INSERTION HEMODIALYSIS CATHETER N/A 1/21/2019    Procedure: HEMODIALYSIS CATHETER INSERTION;  Surgeon: Cuong Pedraza Jr., MD;  Location: [facility] MAIN OR;  Service: Vascular   • INSERTION PERITONEAL DIALYSIS CATHETER N/A 3/14/2019    Procedure: INSERTION PERITONEAL DIALYSIS CATHETER LAPAROSCOPIC AND OMENTOPEXY;  Surgeon: Jered Obrien MD;  [facility];  Service: General   • REMOVAL PERITONEAL DIALYSIS CATHETER N/A 10/31/2019    Procedure: REMOVAL PERITONEAL DIALYSIS CATHETER;  Surgeon: Jered Obrien MD;  Location: [facility] MAIN OR;  Service: General   ,   Family History   Problem Relation Age of Onset   • Colon cancer Mother    • Seizures Maternal Grandmother    • Malig Hyperthermia Neg Hx    ,   Social History     Socioeconomic History   • Marital status: Single     Spouse name: Not on file   • Number of children: Not on file   • Years of education: Not on file   • Highest education level: Not on file   Tobacco Use   • Smoking status: Never Smoker   • Smokeless tobacco: Never Used   Substance and Sexual Activity   • Alcohol use: No   • Drug use: No   • Sexual activity: Defer     E-cigarette/Vaping     E-cigarette/Vaping Substances     E-cigarette/Vaping Devices       ,   Facility-Administered Medications Prior to Admission   Medication Dose Route Frequency Provider Last Rate Last Admin   • nitroglycerin (NITROSTAT) SL tablet 0.4 mg  0.4 mg Sublingual Q5 Min PRN Juanito Millan Jr., MD       • sodium chloride 0.9 % flush 10 mL  10 mL Intravenous PRN Juanito Millan Jr., MD         Medications Prior to Admission   Medication Sig Dispense Refill Last Dose   • Auryxia 1  MG(Fe) tablet Take 2 tablets by mouth 3 (Three) Times a Day With Meals.      • carvedilol (COREG) 25 MG tablet Take 1 tablet by mouth Every 12 (Twelve) Hours. 60  tablet 0 3/12/2021 at Unknown time   • metoclopramide (REGLAN) 5 MG tablet Take 1 tablet by mouth 3 (Three) Times a Day With Meals for 10 days. 30 tablet 1 3/12/2021 at Unknown time   • sodium bicarbonate 650 MG tablet Take 1,300 mg by mouth 2 (Two) Times a Day.   3/12/2021 at Unknown time   • traMADol (Ultram) 50 MG tablet Take 1 tablet by mouth Every 8 (Eight) Hours As Needed for Moderate Pain . 20 tablet 0     and Allergies:  Patient has no known allergies.    Objective      Vital Signs  Temp:  [97.8 °F (36.6 °C)-98.5 °F (36.9 °C)] 98.1 °F (36.7 °C)  Heart Rate:  [79-87] 83  Resp:  [16-20] 20  BP: (127-143)/(83-94) 127/94    Physical Exam  Constitutional:       Appearance: Normal appearance.   HENT:      Mouth/Throat:      Mouth: Mucous membranes are moist.   Cardiovascular:      Rate and Rhythm: Normal rate.      Heart sounds: Heart sounds are distant.   Pulmonary:      Effort: Pulmonary effort is normal.      Breath sounds: Examination of the right-lower field reveals decreased breath sounds. Examination of the left-lower field reveals decreased breath sounds. Decreased breath sounds present.   Neurological:      Mental Status: He is alert.         Results Review:    I reviewed the patient's new clinical results.  I reviewed the patient's new imaging results and agree with the interpretation.  I reviewed the patient's other test results and agree with the interpretation          Assessment/Plan       Pericardial effusion    ESRD on dialysis (CMS/HCC)    Idiopathic peripheral neuropathy    HTN (hypertension)    History of COVID-19    NICM (nonischemic cardiomyopathy) (CMS/HCC)    Anemia in ESRD (end-stage renal disease) (CMS/HCC)    Elevated brain natriuretic peptide (BNP) level    Hilar adenopathy      Assessment & Plan  Mr. Buchanan is a 36-year-old gentleman with bilateral lung consolidation, right greater than left with some cavitation.  He also has mediastinal and hilar lymphadenopathy.  Given his age and  history, this is likely related to his recent COVID-19 infection, but, he will need a biopsy to rule out malignancy and to obtain a diagnosis.  After review of his imaging and discussion with Dr. Gudino, I think the best way to go about establishing a diagnosis would be with bronchoscopy with EBUS and navigation.  I have also discussed this case with Dr. Tilley who will plan bronchoscopy in the near future.    I discussed the patients findings and my recommendations with patient.    Thank you for this consult and allowing us to participate in the care of your patient.  We will follow along with you during this hospitalization.       Symone Dumont MD  [facility]  03/14/21  16:00 EDT

## 2021-03-14 NOTE — PROGRESS NOTES
[facility] Medicine Services  PROGRESS NOTE    Patient Name: Chandler Buchanan  : 1985  MRN: [id number]    Date of Admission: 3/12/2021  Primary Care Physician: Provider, No Known    Subjective   Subjective     CC:  Follow-up swelling and lung mass    HPI:  No new complaints.  No events reported.    Review of Systems  No current fevers or chills  No current shortness of breath or cough  No current nausea, vomiting, or diarrhea  No current chest pain or palpitations      Objective   Objective     Vital Signs:   Temp:  [97.3 °F (36.3 °C)-98.5 °F (36.9 °C)] 98.3 °F (36.8 °C)  Heart Rate:  [76-87] 79  Resp:  [16-20] 20  BP: (121-143)/(83-94) 143/83        Physical Exam:  Constitutional:Awake, alert  HENT: NCAT, mucous membranes moist, neck supple  Respiratory: Clear to auscultation bilaterally, respiratory effort normal, nonlabored breathing   Cardiovascular: RRR, normal radial pulses  Gastrointestinal: Positive bowel sounds, soft, nontender, nondistended  Musculoskeletal: Normal musculature for age, BMI is 26, mild lower extremity edema  Psychiatric: Appropriate affect, cooperative, conversational  Neurologic: No slurred speech or facial droop, follows commands  Skin: No rashes or jaundice, warm      Results Reviewed:  Results from last 7 days   Lab Units 21  0535 21  1220   WBC 10*3/mm3 10.31 10.99*   HEMOGLOBIN g/dL 8.1* 8.5*   HEMATOCRIT % 25.2* 26.7*   PLATELETS 10*3/mm3 233 254     Results from last 7 days   Lab Units 21  0535 21  1220 21  1240   SODIUM mmol/L 136 138 140   POTASSIUM mmol/L 5.7* 5.6* 5.1   CHLORIDE mmol/L 90* 91* 96*   CO2 mmol/L 27.6 30.9* 32.6*   BUN mg/dL 72* 57* 53*   CREATININE mg/dL 8.26* 7.71* 7.95*   GLUCOSE mg/dL 112* 121* 99   CALCIUM mg/dL 8.8 9.3 9.0   ALT (SGPT) U/L  --  137*  --    AST (SGOT) U/L  --  157*  --    TROPONIN T ng/mL  --  0.083*  --    PROBNP pg/mL  --  >70,000.0*  --      Estimated Creatinine Clearance: 14.1 mL/min (A) (by C-G  formula based on SCr of 8.26 mg/dL (H)).    Microbiology Results Abnormal     Procedure Component Value - Date/Time    COVID PRE-OP / PRE-PROCEDURE SCREENING ORDER (NO ISOLATION) - Swab, Nasopharynx [[id number]]  (Normal) Collected: 03/12/21 1738    Lab Status: Final result Specimen: Swab from Nasopharynx Updated: 03/12/21 2205    Narrative:      The following orders were created for panel order COVID PRE-OP / PRE-PROCEDURE SCREENING ORDER (NO ISOLATION) - Swab, Nasopharynx.  Procedure                               Abnormality         Status                     ---------                               -----------         ------                     COVID-19,APTIMA PANTHER,...[[id number]]  Normal              Final result                 Please view results for these tests on the individual orders.    COVID-19,APTIMA PANTHER,ART IN-HOUSE, NP/OP SWAB IN UTM/VTM/SALINE TRANSPORT MEDIA,24 HR TAT - Swab, Nasopharynx [[id number]]  (Normal) Collected: 03/12/21 1738    Lab Status: Final result Specimen: Swab from Nasopharynx Updated: 03/12/21 2205     COVID19 Not Detected    Narrative:      Fact sheet for providers: https://www.fda.gov/media/352127/download     Fact sheet for patients: https://www.fda.gov/media/844430/download    Test performed by RT PCR.          Imaging Results (Last 24 Hours)     ** No results found for the last 24 hours. **          Results for orders placed during the hospital encounter of 03/12/21    Adult Transthoracic Echo Complete W/ Cont if Necessary Per Protocol    Interpretation Summary  · Left ventricular ejection fraction appears to be 41 - 45%. Left ventricular systolic function is mildly decreased. Abnormal global longitudinal LV strain (GLS) = -16.2%. Left ventricle strain data was reviewed by the physician and found to be accurate. Normal left ventricular cavity size noted. Left ventricular wall thickness is consistent with moderate concentric hypertrophy. There is left ventricular global  hypokinesis noted.  · Left ventricular diastolic function is consistent with (grade II w/high LAP) pseudonormalization.  · Calculated right ventricular systolic pressure from tricuspid regurgitation is 39.2 mmHg.  · The inferior vena cava is dilated. IVC inspiratory collapse is absent.  · There is a large (>2cm) circumferential pericardial effusion. The effusion is fluid filled. There is no evidence of cardiac tamponade. There is evidence of ascites present.      I have reviewed the medications:  Scheduled Meds:calcium acetate, 2,001 mg, Oral, TID With Meals  carvedilol, 25 mg, Oral, Q12H  sodium chloride, 10 mL, Intravenous, Q12H      Continuous Infusions:   PRN Meds:.•  acetaminophen **OR** acetaminophen **OR** acetaminophen  •  docusate sodium  •  famotidine  •  ondansetron **OR** ondansetron  •  sodium chloride  •  traMADol    Assessment/Plan   Assessment & Plan     Active Hospital Problems    Diagnosis  POA   • **Pericardial effusion [I31.3]  Yes   • Hilar adenopathy [R59.0]  Yes   • Elevated brain natriuretic peptide (BNP) level [R79.89]  Yes   • Anemia in ESRD (end-stage renal disease) (CMS/HCC) [N18.6, D63.1]  Yes   • NICM (nonischemic cardiomyopathy) (CMS/HCC) [I42.8]  Yes   • HTN (hypertension) [I10]  Yes   • History of COVID-19 [Z86.16]  Yes   • Idiopathic peripheral neuropathy [G60.9]  Yes   • ESRD on dialysis (CMS/HCC) [N18.6, Z99.2]  Not Applicable      Resolved Hospital Problems   No resolved problems to display.        Brief Hospital Course to date:  Chandler Buchanan is a 36 y.o. male with past medical history of end-stage renal disease who is currently on home hemodialysis presents to the hospital with large pericardial effusion, volume overload, progressive shortness of breath, and large diffuse lymphadenopathy and lung mass in the setting of recent COVID-19.    Discussion/plan:  -Hemodialysis improving hypervolemia.  Nephrology managing.    -Thoracic surgery consulted to assess for lung and  mediastinal mass and lymphadenopathy.  Reported preliminary plan is for possible biopsy by interventional radiology Monday.  Plan to follow-up on further recommendations.    -Monitor anemia no bleeding    -Continue home carvedilol for blood pressure.  Monitor pressure.    -Supportive care and symptom treatment with careful monitoring.    -No current symptoms of tamponade, monitor, cardiology appreciated    DVT Prophylaxis: Mechanical    Disposition: Likely home when improved    CODE STATUS:   Code Status and Medical Interventions:   Ordered at: 03/12/21 1716     Code Status:    CPR     Medical Interventions (Level of Support Prior to Arrest):    Full       Willem Crespo MD  03/14/21

## 2021-03-14 NOTE — PLAN OF CARE
Problem: Adult Inpatient Plan of Care  Goal: Plan of Care Review  Outcome: Ongoing, Progressing  Flowsheets (Taken 3/14/2021 1702)  Progress: improving  Plan of Care Reviewed With: patient  Outcome Summary: Decreasing edema to BLE and abd. Jeannine HD well today. Denies pain or SOA. Pulm consult noted. Plan possible bronchoscopy.  Goal: Patient-Specific Goal (Individualized)  Outcome: Ongoing, Progressing  Goal: Absence of Hospital-Acquired Illness or Injury  Outcome: Ongoing, Progressing  Intervention: Identify and Manage Fall Risk  Recent Flowsheet Documentation  Taken 3/14/2021 1622 by Danielle Vega RN  Safety Promotion/Fall Prevention:   fall prevention program maintained   safety round/check completed  Taken 3/14/2021 1450 by Danielle Vega RN  Safety Promotion/Fall Prevention:   fall prevention program maintained   safety round/check completed  Taken 3/14/2021 1208 by Danielle Vega RN  Safety Promotion/Fall Prevention:   fall prevention program maintained   patient off unit  Taken 3/14/2021 1035 by Danielle Vega RN  Safety Promotion/Fall Prevention:   fall prevention program maintained   safety round/check completed  Taken 3/14/2021 0820 by Danielle Vega RN  Safety Promotion/Fall Prevention:   fall prevention program maintained   safety round/check completed  Goal: Optimal Comfort and Wellbeing  Outcome: Ongoing, Progressing  Goal: Readiness for Transition of Care  Outcome: Ongoing, Progressing     Problem: Pain Acute  Goal: Optimal Pain Control  Outcome: Ongoing, Progressing     Problem: Device-Related Complication Risk (Hemodialysis)  Goal: Safe, Effective Therapy Delivery  Outcome: Ongoing, Progressing     Problem: Hemodynamic Instability (Hemodialysis)  Goal: Vital Signs Remain in Desired Range  Outcome: Ongoing, Progressing     Problem: Infection (Hemodialysis)  Goal: Absence of Infection Signs and Symptoms  Outcome: Ongoing, Progressing     Problem: Balance Impairment (Functional  Deficit)  Goal: Improved Balance and Postural Control  Outcome: Met  Intervention: Optimize Balance and Safe Activity  Recent Flowsheet Documentation  Taken 3/14/2021 1622 by Danielle Vega RN  Safety Promotion/Fall Prevention:   fall prevention program maintained   safety round/check completed  Taken 3/14/2021 1450 by Danielle Vega RN  Safety Promotion/Fall Prevention:   fall prevention program maintained   safety round/check completed  Taken 3/14/2021 1208 by Danielle Vega RN  Safety Promotion/Fall Prevention:   fall prevention program maintained   patient off unit  Taken 3/14/2021 1035 by Danielle Vega RN  Safety Promotion/Fall Prevention:   fall prevention program maintained   safety round/check completed  Taken 3/14/2021 0820 by Danielle Vega RN  Safety Promotion/Fall Prevention:   fall prevention program maintained   safety round/check completed     Problem: Cognitive Impairment (Functional Deficit)  Goal: Optimal Functional [place]  Outcome: Met     Problem: Coordination Impairment (Functional Deficit)  Goal: Optimal Coordination  Outcome: Met     Problem: Muscle Strength Impairment (Functional Deficit)  Goal: Improved Muscle Strength  Outcome: Met     Problem: Muscle Tone Impairment (Functional Deficit)  Goal: Improved Muscle Tone  Outcome: Met     Problem: Range of Motion Impairment (Functional Deficit)  Goal: Optimal Range of Motion  Outcome: Met     Problem: Sensory Impairment (Functional Deficit)  Goal: Compensation for Sensory Deficit  Outcome: Met   Goal Outcome Evaluation:  Plan of Care Reviewed With: patient  Progress: improving  Outcome Summary: Decreasing edema to BLE and abd. Jeannine HD well today. Denies pain or SOA. Pulm consult noted. Plan possible bronchoscopy.

## 2021-03-14 NOTE — PLAN OF CARE
Chart reviewed. Vitals stable. No signs of tamponade.  Plan for CT guided lung biopsy tomorrow per Thoracic surgery note.   Will see formally again tomorrow .    Mesha Bernstein MD.  [facility]  03/14/21

## 2021-03-14 NOTE — CONSULTS
Referring Provider: Dr. Dumont  Reason for Consultation: Mediastinal/hilar adenopathies and pulmonary consolidations    Patient Care Team:  Provider, No Known as PCP - General    Chief complaint:   Fluid around the heart     History of present illness:    Subjective     this is a 36-year-old male patient, with hx of ESRD (unlear etiology) on home dialysis, lifelong non-smoker, known to our practice from prior hospitalization in 2019 for severe anemia and pulmonary filtrates which at that time brought the suspicion of interstitial lung disease and vasculitis.  I reviewed the prior records extensively.    At that time, patient had autoimmune work-up with immunoglobulin levels, C3, C4 RA, RNP and anti-Smith antibodies which all were negative with the exception of borderline low IgG level.  Since then, he has contracted Covid infection on 12/4/2020.    Patient also has a history of cardiomyopathy and follows with [facility].  During his follow-up appointment recently, he was noted to have pericardial effusion on echocardiogram along with symptoms of shortness of breath and legs edema and therefore he was sent to the hospital for admission.  D-dimer level was elevated prompting a CTA of the chest which revealed large pericardial effusion, mediastinal and hilar adenopathy and masslike opacity in the right lower lobe.  No PE noted.  There was mild ascites.  Patient previously had peritoneal dialysis catheter which was removed in October 2019 and he is currently undergoing home dialysis via left arm fistula.    On questioning, patient indicated shortness of breath, mild and has been going on for the last couple of months but seems to be worse recently and has actually improved after dialysis sessions which he underwent over here.  He also reported mild dry cough since he contracted Covid infection in December 2020 which was very mild and did not require hospitalization.  He denies prior history of  autoimmune disease or family history of such.  No rash.  No hemoptysis or arthralgia.    Labs reviewed:  proBNP >70,000; bicarb 30; hemoglobin 8.5; platelets 254.    Echo on 3/12/2021 showed EF 41-45%; grade 2 diastolic dysfunction; RVSP 39; greater than 2 cm pericardial effusion without tamponade.      Review of Systems  Constitutional: No fever or chills.   ENMT: No sinus congestion or postnasal drip  Cardiovascular: No chest pain, palpitation or legs swelling.    Respiratory: See above  Gastrointestinal: No constipation, diarrhea or abdominal pain   Neurology: No headache, weakness, numbness or dizziness.   Musculoskeletal: No joints pain, stiffness or swelling.   Psychiatry: No depression.  Genitourinary: No dysuria or frequent urination  Endo: No weight changes. No cold or warm intolerance.  Lymphatic: No swollen glands.  Integumentary: No rash.    History  Past Medical History:   Diagnosis Date   • A-V fistula (CMS/HCC)     LEFT ARM   • Acute renal failure (CMS/HCC)     ON DIALYSIS MON,WED AND FRI AT HOME   • Anemia     HX   • Cardiomyopathy (CMS/HCC)     New onset systolic PER NOTE ON ADMISSION 1/2019, PT SAW CARDIO IN THE HOSPITAL BUT STATES HE DOES NOT SEE CARDIO NOW   • Dialysis patient (CMS/HCC)     AT HOME - TUESDAY, THURSDAY, SATURDAY, SUNDAY   • History of COVID-19     12/2020   • History of transfusion 02/2021    NO REACTION    • Hypertension    • Pericardial effusion    • Stage 4 chronic kidney disease (CMS/HCC)    ,   Past Surgical History:   Procedure Laterality Date   • ARTERIOVENOUS FISTULA/SHUNT SURGERY Left 1/23/2019    Procedure: LEFT ARM FISTULA CREATION;  Surgeon: Bogdan Velásquez MD;  Location: [facility] OR;  Service: Vascular   • ARTERIOVENOUS FISTULA/SHUNT SURGERY Left 3/8/2021    Procedure: LEFT ARM ARTERIAL VENOUS FISTULA REVISION;  Surgeon: Cuong Pedraza Jr., MD;  Location: [facility] OR;  Service: Vascular;  Laterality: Left;   • COLONOSCOPY N/A 3/12/2019     Procedure: COLONOSCOPY wwith Biiopsies;  Surgeon: Jered Obrien MD;  [facility];  Service: General   • ENDOSCOPY N/A 3/12/2019    Procedure: ESOPHAGOGASTRODUODENOSCOPY WITH BIOPSY;  Surgeon: Jered Obrien MD;  [facility];  Service: General   • INSERTION HEMODIALYSIS CATHETER N/A 1/21/2019    Procedure: HEMODIALYSIS CATHETER INSERTION;  Surgeon: Cuong Pedraza Jr., MD;  [facility];  Service: Vascular   • INSERTION PERITONEAL DIALYSIS CATHETER N/A 3/14/2019    Procedure: INSERTION PERITONEAL DIALYSIS CATHETER LAPAROSCOPIC AND OMENTOPEXY;  Surgeon: Jered Obrien MD;  [facility];  Service: General   • REMOVAL PERITONEAL DIALYSIS CATHETER N/A 10/31/2019    Procedure: REMOVAL PERITONEAL DIALYSIS CATHETER;  Surgeon: Jered Obrien MD;  [facility];  Service: General   ,   Family History   Problem Relation Age of Onset   • Colon cancer Mother    • Seizures Maternal Grandmother    • Malig Hyperthermia Neg Hx    ,   Social History     Socioeconomic History   • Marital status: Single     Spouse name: Not on file   • Number of children: Not on file   • Years of education: Not on file   • Highest education level: Not on file   Tobacco Use   • Smoking status: Never Smoker   • Smokeless tobacco: Never Used   Substance and Sexual Activity   • Alcohol use: No   • Drug use: No   • Sexual activity: Defer       ,   Facility-Administered Medications Prior to Admission   Medication Dose Route Frequency Provider Last Rate Last Admin   • nitroglycerin (NITROSTAT) SL tablet 0.4 mg  0.4 mg Sublingual Q5 Min PRN Juanito Millan Jr., MD       • sodium chloride 0.9 % flush 10 mL  10 mL Intravenous PRN Juanito Millan Jr., MD         Medications Prior to Admission   Medication Sig Dispense Refill Last Dose   • Auryxia 1  MG(Fe) tablet Take 2 tablets by mouth 3 (Three) Times a Day With Meals.      • carvedilol (COREG)  25 MG tablet Take 1 tablet by mouth Every 12 (Twelve) Hours. 60 tablet 0 3/12/2021 at Unknown time   • metoclopramide (REGLAN) 5 MG tablet Take 1 tablet by mouth 3 (Three) Times a Day With Meals for 10 days. 30 tablet 1 3/12/2021 at Unknown time   • sodium bicarbonate 650 MG tablet Take 1,300 mg by mouth 2 (Two) Times a Day.   3/12/2021 at Unknown time   • traMADol (Ultram) 50 MG tablet Take 1 tablet by mouth Every 8 (Eight) Hours As Needed for Moderate Pain . 20 tablet 0    , Scheduled Meds:  calcium acetate, 2,001 mg, Oral, TID With Meals  carvedilol, 25 mg, Oral, Q12H  sodium chloride, 10 mL, Intravenous, Q12H     and Allergies:  Patient has no known allergies.    Objective     Vital Signs   Temp:  [97.8 °F (36.6 °C)-98.5 °F (36.9 °C)] 98.1 °F (36.7 °C)  Heart Rate:  [79-87] 83  Resp:  [16-20] 20  BP: (127-143)/(83-94) 127/94    Physical Exam:  Constitutional: Not in acute distress.  Eyes: Injected conjunctivae, EOMI. pupils equal reactive to light.  ENMT: Bañuelos 3. No oral thrush.  Moist tongue  Neck:  Trachea midline. No thyromegaly  Heart: RRR, no murmur  Lungs: Good and equal air entry bilaterally.  Non labored breathing.   Abdomen: Soft. No tenderness or dullness. No HSM.  Extremities: No cyanosis, clubbing or pitting edema.  Warm extremities and well-perfused.  Neuro: Conscious, alert, oriented x3.  Strength 5/5 in arms.  Psych: Appropriate mood and affect.    Integumentary: No rash.  Normal skin turgor  Lymphatic: No palpable cervical or supraclavicular lymph nodes.      Diagnostic imaging:  I personally and independently reviewed the following images:     CTA chest 3/12/2021: Masslike pulmonary consolidation distribution of the superior subsegment of the right lower lobe.  Another consolidation noted in the left upper lobe.  Mild atelectasis left lower lobe.  Mediastinal and hilar adenopathies, notably station 4L, station 7, 10 R posterior and 11 R (after the bifurcation of the RUL segment) and left  hilar         CT 2019: Diffuse bilateral pulmonary infiltrates.      Assessment   1. Masslike consolidation in the right lower lobe: Not explained by recent Covid infection which usually represent as bilateral groundglass infiltrates initially which may later progressed into fibrosis but not a discrete masslike consolidation.  In addition he was not severely ill with pneumonia and stayed at home throughout prior Covid illness  2. Mediastinal and hilar adenopathies.  Could be reactive, inflammatory or simply related to cardiomyopathy and ESRD (i.e. fluid overload)  3. Pericardial effusion and ascites: Anasarca  4. Systolic cardiomyopathy  5. ESRD, on HD  6. COVID-19 URI December 2020    The findings above are concerning for primary lung malignancy or inflammatory disorders such as sarcoidosis.  This is not entirely explained by recent Covid infection which would at this stage cause fibrotic changes rather than discrete masses and mediastinal/hilar adenopathies.    Recommendations:    · Proceed with EBUS bronchoscopy and transbronchial biopsy of the masslike consolidation located in the right lower lobe superior subsegment.  The mass is mostly central and extend to the periphery but would be amenable for bronchoscopy with generally less risks of complications compared to CT-guided biopsy.  This would also allow sampling of the mediastinal/hilar adenopathies  · Coags in a.m.  · Hemodialysis per nephrology    Discussed with Dr. Dumont  Discussed with the patient's mother at bedside    Brian Tilley MD  03/14/21  17:11 EDT

## 2021-03-15 ENCOUNTER — ANESTHESIA EVENT (OUTPATIENT)
Dept: GASTROENTEROLOGY | Facility: HOSPITAL | Age: 36
End: 2021-03-15

## 2021-03-15 ENCOUNTER — APPOINTMENT (OUTPATIENT)
Dept: GENERAL RADIOLOGY | Facility: HOSPITAL | Age: 36
End: 2021-03-15

## 2021-03-15 ENCOUNTER — ANESTHESIA (OUTPATIENT)
Dept: GASTROENTEROLOGY | Facility: HOSPITAL | Age: 36
End: 2021-03-15

## 2021-03-15 ENCOUNTER — READMISSION MANAGEMENT (OUTPATIENT)
Dept: CALL CENTER | Facility: HOSPITAL | Age: 36
End: 2021-03-15

## 2021-03-15 PROBLEM — R59.0 MEDIASTINAL ADENOPATHY: Status: ACTIVE | Noted: 2021-03-12

## 2021-03-15 PROBLEM — R91.8 RIGHT LOWER LOBE LUNG MASS: Status: ACTIVE | Noted: 2021-03-12

## 2021-03-15 LAB
ALBUMIN SERPL-MCNC: 3.1 G/DL (ref 3.5–5.2)
ANION GAP SERPL CALCULATED.3IONS-SCNC: 16 MMOL/L (ref 5–15)
AORTIC ARCH: 2.7 CM
APTT PPP: 33.5 SECONDS (ref 22.7–35.4)
ASCENDING AORTA: 3.3 CM
BASOPHILS # BLD AUTO: 0.06 10*3/MM3 (ref 0–0.2)
BASOPHILS NFR BLD AUTO: 0.6 % (ref 0–1.5)
BH CV ECHO MEAS - ACS: 2.3 CM
BH CV ECHO MEAS - AO MAX PG (FULL): 2.6 MMHG
BH CV ECHO MEAS - AO MAX PG: 6.6 MMHG
BH CV ECHO MEAS - AO MEAN PG (FULL): 0 MMHG
BH CV ECHO MEAS - AO MEAN PG: 3 MMHG
BH CV ECHO MEAS - AO ROOT AREA (BSA CORRECTED): 1.7
BH CV ECHO MEAS - AO ROOT AREA: 8.6 CM^2
BH CV ECHO MEAS - AO ROOT DIAM: 3.3 CM
BH CV ECHO MEAS - AO V2 MAX: 128 CM/SEC
BH CV ECHO MEAS - AO V2 MEAN: 83.5 CM/SEC
BH CV ECHO MEAS - AO V2 VTI: 20.5 CM
BH CV ECHO MEAS - ASC AORTA: 3.3 CM
BH CV ECHO MEAS - AVA(I,A): 2.7 CM^2
BH CV ECHO MEAS - AVA(I,D): 2.7 CM^2
BH CV ECHO MEAS - AVA(V,A): 2.7 CM^2
BH CV ECHO MEAS - AVA(V,D): 2.7 CM^2
BH CV ECHO MEAS - BSA(HAYCOCK): 2 M^2
BH CV ECHO MEAS - BSA: 2 M^2
BH CV ECHO MEAS - BZI_BMI: 28 KILOGRAMS/M^2
BH CV ECHO MEAS - BZI_METRIC_HEIGHT: 172.7 CM
BH CV ECHO MEAS - BZI_METRIC_WEIGHT: 83.5 KG
BH CV ECHO MEAS - EDV(MOD-SP2): 209 ML
BH CV ECHO MEAS - EDV(MOD-SP4): 160 ML
BH CV ECHO MEAS - EDV(TEICH): 107.5 ML
BH CV ECHO MEAS - EF(CUBED): 54.2 %
BH CV ECHO MEAS - EF(MOD-BP): 47 %
BH CV ECHO MEAS - EF(MOD-SP2): 45 %
BH CV ECHO MEAS - EF(MOD-SP4): 53.1 %
BH CV ECHO MEAS - EF(TEICH): 45.9 %
BH CV ECHO MEAS - ESV(MOD-SP2): 115 ML
BH CV ECHO MEAS - ESV(MOD-SP4): 75 ML
BH CV ECHO MEAS - ESV(TEICH): 58.1 ML
BH CV ECHO MEAS - FS: 22.9 %
BH CV ECHO MEAS - IVS/LVPW: 1
BH CV ECHO MEAS - IVSD: 1.6 CM
BH CV ECHO MEAS - LAT PEAK E' VEL: 7.5 CM/SEC
BH CV ECHO MEAS - LV DIASTOLIC VOL/BSA (35-75): 81.1 ML/M^2
BH CV ECHO MEAS - LV MASS(C)D: 334.6 GRAMS
BH CV ECHO MEAS - LV MASS(C)DI: 169.6 GRAMS/M^2
BH CV ECHO MEAS - LV MAX PG: 4 MMHG
BH CV ECHO MEAS - LV MEAN PG: 3 MMHG
BH CV ECHO MEAS - LV SYSTOLIC VOL/BSA (12-30): 38 ML/M^2
BH CV ECHO MEAS - LV V1 MAX: 99.9 CM/SEC
BH CV ECHO MEAS - LV V1 MEAN: 75.6 CM/SEC
BH CV ECHO MEAS - LV V1 VTI: 15.8 CM
BH CV ECHO MEAS - LVIDD: 4.8 CM
BH CV ECHO MEAS - LVIDS: 3.7 CM
BH CV ECHO MEAS - LVLD AP2: 9.3 CM
BH CV ECHO MEAS - LVLD AP4: 9.8 CM
BH CV ECHO MEAS - LVLS AP2: 8.8 CM
BH CV ECHO MEAS - LVLS AP4: 7.4 CM
BH CV ECHO MEAS - LVOT AREA (M): 3.5 CM^2
BH CV ECHO MEAS - LVOT AREA: 3.5 CM^2
BH CV ECHO MEAS - LVOT DIAM: 2.1 CM
BH CV ECHO MEAS - LVPWD: 1.6 CM
BH CV ECHO MEAS - MED PEAK E' VEL: 5.6 CM/SEC
BH CV ECHO MEAS - MR MAX PG: 17.3 MMHG
BH CV ECHO MEAS - MR MAX VEL: 208 CM/SEC
BH CV ECHO MEAS - MV A DUR: 0.12 SEC
BH CV ECHO MEAS - MV A MAX VEL: 71.1 CM/SEC
BH CV ECHO MEAS - MV DEC SLOPE: 326 CM/SEC^2
BH CV ECHO MEAS - MV DEC TIME: 0.24 SEC
BH CV ECHO MEAS - MV E MAX VEL: 75.4 CM/SEC
BH CV ECHO MEAS - MV E/A: 1.1
BH CV ECHO MEAS - MV MAX PG: 2.5 MMHG
BH CV ECHO MEAS - MV MEAN PG: 1 MMHG
BH CV ECHO MEAS - MV P1/2T MAX VEL: 78.7 CM/SEC
BH CV ECHO MEAS - MV P1/2T: 70.7 MSEC
BH CV ECHO MEAS - MV V2 MAX: 78.9 CM/SEC
BH CV ECHO MEAS - MV V2 MEAN: 53.7 CM/SEC
BH CV ECHO MEAS - MV V2 VTI: 20 CM
BH CV ECHO MEAS - MVA P1/2T LCG: 2.8 CM^2
BH CV ECHO MEAS - MVA(P1/2T): 3.1 CM^2
BH CV ECHO MEAS - MVA(VTI): 2.7 CM^2
BH CV ECHO MEAS - PA ACC TIME: 0.12 SEC
BH CV ECHO MEAS - PA MAX PG (FULL): 0.73 MMHG
BH CV ECHO MEAS - PA MAX PG: 2.8 MMHG
BH CV ECHO MEAS - PA PR(ACCEL): 26.8 MMHG
BH CV ECHO MEAS - PA V2 MAX: 84.2 CM/SEC
BH CV ECHO MEAS - PI END-D VEL: 90.9 CM/SEC
BH CV ECHO MEAS - PULM A REVS DUR: 0.11 SEC
BH CV ECHO MEAS - PULM A REVS VEL: 16.4 CM/SEC
BH CV ECHO MEAS - PULM DIAS VEL: 27 CM/SEC
BH CV ECHO MEAS - PULM S/D: 1.7
BH CV ECHO MEAS - PULM SYS VEL: 44.8 CM/SEC
BH CV ECHO MEAS - PVA(V,A): 3 CM^2
BH CV ECHO MEAS - PVA(V,D): 3 CM^2
BH CV ECHO MEAS - QP/QS: 0.97
BH CV ECHO MEAS - RAP SYSTOLE: 15 MMHG
BH CV ECHO MEAS - RV MAX PG: 2.1 MMHG
BH CV ECHO MEAS - RV MEAN PG: 1 MMHG
BH CV ECHO MEAS - RV V1 MAX: 72.5 CM/SEC
BH CV ECHO MEAS - RV V1 MEAN: 51.3 CM/SEC
BH CV ECHO MEAS - RV V1 VTI: 15.3 CM
BH CV ECHO MEAS - RVOT AREA: 3.5 CM^2
BH CV ECHO MEAS - RVOT DIAM: 2.1 CM
BH CV ECHO MEAS - RVSP: 39.2 MMHG
BH CV ECHO MEAS - SI(AO): 88.9 ML/M^2
BH CV ECHO MEAS - SI(CUBED): 30.4 ML/M^2
BH CV ECHO MEAS - SI(LVOT): 27.7 ML/M^2
BH CV ECHO MEAS - SI(MOD-SP2): 47.6 ML/M^2
BH CV ECHO MEAS - SI(MOD-SP4): 43.1 ML/M^2
BH CV ECHO MEAS - SI(TEICH): 25 ML/M^2
BH CV ECHO MEAS - SUP REN AO DIAM: 2.1 CM
BH CV ECHO MEAS - SV(AO): 175.3 ML
BH CV ECHO MEAS - SV(CUBED): 59.9 ML
BH CV ECHO MEAS - SV(LVOT): 54.7 ML
BH CV ECHO MEAS - SV(MOD-SP2): 94 ML
BH CV ECHO MEAS - SV(MOD-SP4): 85 ML
BH CV ECHO MEAS - SV(RVOT): 53 ML
BH CV ECHO MEAS - SV(TEICH): 49.4 ML
BH CV ECHO MEAS - TAPSE (>1.6): 1.1 CM
BH CV ECHO MEAS - TR MAX VEL: 246 CM/SEC
BH CV ECHO MEASUREMENTS AVERAGE E/E' RATIO: 11.51
BH CV XLRA - RV BASE: 2.9 CM
BH CV XLRA - RV LENGTH: 8.1 CM
BH CV XLRA - RV MID: 3.6 CM
BH CV XLRA - TDI S': 8.1 CM/SEC
BUN SERPL-MCNC: 43 MG/DL (ref 6–20)
BUN/CREAT SERPL: 7.5 (ref 7–25)
CALCIUM SPEC-SCNC: 8.7 MG/DL (ref 8.6–10.5)
CHLORIDE SERPL-SCNC: 99 MMOL/L (ref 98–107)
CO2 SERPL-SCNC: 23 MMOL/L (ref 22–29)
CREAT SERPL-MCNC: 5.76 MG/DL (ref 0.76–1.27)
DEPRECATED RDW RBC AUTO: 51.6 FL (ref 37–54)
EOSINOPHIL # BLD AUTO: 0 10*3/MM3 (ref 0–0.4)
EOSINOPHIL NFR BLD AUTO: 0 % (ref 0.3–6.2)
ERYTHROCYTE [DISTWIDTH] IN BLOOD BY AUTOMATED COUNT: 15.5 % (ref 12.3–15.4)
GFR SERPL CREATININE-BSD FRML MDRD: 11 ML/MIN/1.73
GFR SERPL CREATININE-BSD FRML MDRD: ABNORMAL ML/MIN/{1.73_M2}
GLUCOSE SERPL-MCNC: 92 MG/DL (ref 65–99)
HCT VFR BLD AUTO: 24.9 % (ref 37.5–51)
HGB BLD-MCNC: 7.9 G/DL (ref 13–17.7)
IMM GRANULOCYTES # BLD AUTO: 0.23 10*3/MM3 (ref 0–0.05)
IMM GRANULOCYTES NFR BLD AUTO: 2.3 % (ref 0–0.5)
INR PPP: 1.3 (ref 0.9–1.1)
LEFT ATRIUM VOLUME INDEX: 29 ML/M2
LYMPHOCYTES # BLD AUTO: 1.28 10*3/MM3 (ref 0.7–3.1)
LYMPHOCYTES NFR BLD AUTO: 12.9 % (ref 19.6–45.3)
MAXIMAL PREDICTED HEART RATE: 183 BPM
MCH RBC QN AUTO: 29.8 PG (ref 26.6–33)
MCHC RBC AUTO-ENTMCNC: 31.7 G/DL (ref 31.5–35.7)
MCV RBC AUTO: 94 FL (ref 79–97)
MONOCYTES # BLD AUTO: 1 10*3/MM3 (ref 0.1–0.9)
MONOCYTES NFR BLD AUTO: 10.1 % (ref 5–12)
NEUTROPHILS NFR BLD AUTO: 7.36 10*3/MM3 (ref 1.7–7)
NEUTROPHILS NFR BLD AUTO: 74.1 % (ref 42.7–76)
NRBC BLD AUTO-RTO: 0.8 /100 WBC (ref 0–0.2)
PHOSPHATE SERPL-MCNC: 5.6 MG/DL (ref 2.5–4.5)
PLATELET # BLD AUTO: 206 10*3/MM3 (ref 140–450)
PMV BLD AUTO: 9.8 FL (ref 6–12)
POTASSIUM SERPL-SCNC: 5.2 MMOL/L (ref 3.5–5.2)
PROTHROMBIN TIME: 16 SECONDS (ref 11.7–14.2)
RBC # BLD AUTO: 2.65 10*6/MM3 (ref 4.14–5.8)
SINUS: 3.1 CM
SODIUM SERPL-SCNC: 138 MMOL/L (ref 136–145)
STJ: 3.1 CM
STRESS TARGET HR: 156 BPM
WBC # BLD AUTO: 9.93 10*3/MM3 (ref 3.4–10.8)

## 2021-03-15 PROCEDURE — 87071 CULTURE AEROBIC QUANT OTHER: CPT | Performed by: INTERNAL MEDICINE

## 2021-03-15 PROCEDURE — 87206 SMEAR FLUORESCENT/ACID STAI: CPT | Performed by: INTERNAL MEDICINE

## 2021-03-15 PROCEDURE — 87070 CULTURE OTHR SPECIMN AEROBIC: CPT | Performed by: INTERNAL MEDICINE

## 2021-03-15 PROCEDURE — 87176 TISSUE HOMOGENIZATION CULTR: CPT | Performed by: INTERNAL MEDICINE

## 2021-03-15 PROCEDURE — 25010000002 PROPOFOL 10 MG/ML EMULSION: Performed by: ANESTHESIOLOGY

## 2021-03-15 PROCEDURE — 88312 SPECIAL STAINS GROUP 1: CPT | Performed by: INTERNAL MEDICINE

## 2021-03-15 PROCEDURE — 76000 FLUOROSCOPY <1 HR PHYS/QHP: CPT

## 2021-03-15 PROCEDURE — 85025 COMPLETE CBC W/AUTO DIFF WBC: CPT | Performed by: INTERNAL MEDICINE

## 2021-03-15 PROCEDURE — 87102 FUNGUS ISOLATION CULTURE: CPT | Performed by: INTERNAL MEDICINE

## 2021-03-15 PROCEDURE — 0BBF8ZX EXCISION OF RIGHT LOWER LUNG LOBE, VIA NATURAL OR ARTIFICIAL OPENING ENDOSCOPIC, DIAGNOSTIC: ICD-10-PCS | Performed by: INTERNAL MEDICINE

## 2021-03-15 PROCEDURE — 88341 IMHCHEM/IMCYTCHM EA ADD ANTB: CPT | Performed by: INTERNAL MEDICINE

## 2021-03-15 PROCEDURE — 5A1D70Z PERFORMANCE OF URINARY FILTRATION, INTERMITTENT, LESS THAN 6 HOURS PER DAY: ICD-10-PCS | Performed by: INTERNAL MEDICINE

## 2021-03-15 PROCEDURE — 88305 TISSUE EXAM BY PATHOLOGIST: CPT | Performed by: INTERNAL MEDICINE

## 2021-03-15 PROCEDURE — 71045 X-RAY EXAM CHEST 1 VIEW: CPT

## 2021-03-15 PROCEDURE — 87205 SMEAR GRAM STAIN: CPT | Performed by: INTERNAL MEDICINE

## 2021-03-15 PROCEDURE — 87116 MYCOBACTERIA CULTURE: CPT | Performed by: INTERNAL MEDICINE

## 2021-03-15 PROCEDURE — 88360 TUMOR IMMUNOHISTOCHEM/MANUAL: CPT | Performed by: INTERNAL MEDICINE

## 2021-03-15 PROCEDURE — 88112 CYTOPATH CELL ENHANCE TECH: CPT | Performed by: INTERNAL MEDICINE

## 2021-03-15 PROCEDURE — C1726 CATH, BAL DIL, NON-VASCULAR: HCPCS | Performed by: INTERNAL MEDICINE

## 2021-03-15 PROCEDURE — 89051 BODY FLUID CELL COUNT: CPT | Performed by: INTERNAL MEDICINE

## 2021-03-15 PROCEDURE — 88342 IMHCHEM/IMCYTCHM 1ST ANTB: CPT | Performed by: INTERNAL MEDICINE

## 2021-03-15 PROCEDURE — 80069 RENAL FUNCTION PANEL: CPT | Performed by: INTERNAL MEDICINE

## 2021-03-15 PROCEDURE — 99024 POSTOP FOLLOW-UP VISIT: CPT | Performed by: NURSE PRACTITIONER

## 2021-03-15 PROCEDURE — 99232 SBSQ HOSP IP/OBS MODERATE 35: CPT | Performed by: INTERNAL MEDICINE

## 2021-03-15 PROCEDURE — 85610 PROTHROMBIN TIME: CPT | Performed by: INTERNAL MEDICINE

## 2021-03-15 PROCEDURE — 88104 CYTOPATH FL NONGYN SMEARS: CPT | Performed by: INTERNAL MEDICINE

## 2021-03-15 PROCEDURE — 85730 THROMBOPLASTIN TIME PARTIAL: CPT | Performed by: INTERNAL MEDICINE

## 2021-03-15 PROCEDURE — 88173 CYTOPATH EVAL FNA REPORT: CPT | Performed by: INTERNAL MEDICINE

## 2021-03-15 RX ORDER — SODIUM CHLORIDE, SODIUM LACTATE, POTASSIUM CHLORIDE, CALCIUM CHLORIDE 600; 310; 30; 20 MG/100ML; MG/100ML; MG/100ML; MG/100ML
INJECTION, SOLUTION INTRAVENOUS CONTINUOUS PRN
Status: DISCONTINUED | OUTPATIENT
Start: 2021-03-15 | End: 2021-03-15 | Stop reason: SURG

## 2021-03-15 RX ORDER — LIDOCAINE HYDROCHLORIDE 10 MG/ML
INJECTION, SOLUTION EPIDURAL; INFILTRATION; INTRACAUDAL; PERINEURAL AS NEEDED
Status: DISCONTINUED | OUTPATIENT
Start: 2021-03-15 | End: 2021-03-15 | Stop reason: HOSPADM

## 2021-03-15 RX ORDER — SODIUM CHLORIDE 9 MG/ML
30 INJECTION, SOLUTION INTRAVENOUS CONTINUOUS
Status: DISCONTINUED | OUTPATIENT
Start: 2021-03-15 | End: 2021-03-18

## 2021-03-15 RX ORDER — PROPOFOL 10 MG/ML
VIAL (ML) INTRAVENOUS AS NEEDED
Status: DISCONTINUED | OUTPATIENT
Start: 2021-03-15 | End: 2021-03-15 | Stop reason: SURG

## 2021-03-15 RX ORDER — LIDOCAINE HYDROCHLORIDE 20 MG/ML
INJECTION, SOLUTION INFILTRATION; PERINEURAL AS NEEDED
Status: DISCONTINUED | OUTPATIENT
Start: 2021-03-15 | End: 2021-03-15 | Stop reason: SURG

## 2021-03-15 RX ORDER — ALBUMIN (HUMAN) 12.5 G/50ML
12.5 SOLUTION INTRAVENOUS AS NEEDED
Status: ACTIVE | OUTPATIENT
Start: 2021-03-15 | End: 2021-03-15

## 2021-03-15 RX ADMIN — PROPOFOL 50 MG: 10 INJECTION, EMULSION INTRAVENOUS at 10:30

## 2021-03-15 RX ADMIN — PROPOFOL 50 MG: 10 INJECTION, EMULSION INTRAVENOUS at 10:35

## 2021-03-15 RX ADMIN — SODIUM CHLORIDE, PRESERVATIVE FREE 10 ML: 5 INJECTION INTRAVENOUS at 08:15

## 2021-03-15 RX ADMIN — PROPOFOL 50 MG: 10 INJECTION, EMULSION INTRAVENOUS at 10:00

## 2021-03-15 RX ADMIN — LIDOCAINE HYDROCHLORIDE 60 MG: 20 INJECTION, SOLUTION INFILTRATION; PERINEURAL at 09:45

## 2021-03-15 RX ADMIN — CARVEDILOL 25 MG: 25 TABLET, FILM COATED ORAL at 08:42

## 2021-03-15 RX ADMIN — SODIUM CHLORIDE 30 ML/HR: 9 INJECTION, SOLUTION INTRAVENOUS at 09:08

## 2021-03-15 RX ADMIN — SODIUM CHLORIDE, POTASSIUM CHLORIDE, SODIUM LACTATE AND CALCIUM CHLORIDE: 600; 310; 30; 20 INJECTION, SOLUTION INTRAVENOUS at 09:39

## 2021-03-15 RX ADMIN — CALCIUM ACETATE 2001 MG: 667 CAPSULE ORAL at 17:05

## 2021-03-15 RX ADMIN — PROPOFOL 50 MG: 10 INJECTION, EMULSION INTRAVENOUS at 10:05

## 2021-03-15 RX ADMIN — PROPOFOL 50 MG: 10 INJECTION, EMULSION INTRAVENOUS at 10:25

## 2021-03-15 RX ADMIN — PROPOFOL 200 MG: 10 INJECTION, EMULSION INTRAVENOUS at 09:45

## 2021-03-15 RX ADMIN — PROPOFOL 50 MG: 10 INJECTION, EMULSION INTRAVENOUS at 10:15

## 2021-03-15 RX ADMIN — PROPOFOL 50 MG: 10 INJECTION, EMULSION INTRAVENOUS at 09:55

## 2021-03-15 RX ADMIN — PROPOFOL 50 MG: 10 INJECTION, EMULSION INTRAVENOUS at 10:10

## 2021-03-15 RX ADMIN — CALCIUM ACETATE 2001 MG: 667 CAPSULE ORAL at 13:07

## 2021-03-15 RX ADMIN — PROPOFOL 50 MG: 10 INJECTION, EMULSION INTRAVENOUS at 10:20

## 2021-03-15 RX ADMIN — PROPOFOL 50 MG: 10 INJECTION, EMULSION INTRAVENOUS at 09:50

## 2021-03-15 NOTE — PROGRESS NOTES
NEPHROLOGY PROGRESS NOTE    PATIENT IDENTIFICATION:   Name:  Chandler Buchanan      MRN:  [id number]     36 y.o.  male             Reason for visit: ESRD    SUBJECTIVE:   Pulmonary performed bronchoscopy with EBUS earlier today; breathing is comfortable, though he does have cough at the moment; lying flat; feels much less swollen than when he arrived; appetite fine    OBJECTIVE:  Vitals:    03/15/21 1142 03/15/21 1152 03/15/21 1225 03/15/21 1600   BP: 105/71 117/86 122/92 138/90   BP Location:   Right arm Right arm   Patient Position:   Sitting Sitting   Pulse: 76 73 72 86   Resp: 20 20 20 16   Temp:   97.7 °F (36.5 °C)    TempSrc:   Oral    SpO2: 95% 93% 95% 91%   Weight:       Height:               Body mass index is 26.63 kg/m².    Intake/Output Summary (Last 24 hours) at 3/15/2021 1729  Last data filed at 3/15/2021 1712  Gross per 24 hour   Intake 1212 ml   Output --   Net 1212 ml     Wt Readings from Last 1 Encounters:   03/15/21 0520 81.8 kg (180 lb 5.4 oz)   03/14/21 0600 81.2 kg (179 lb 0.2 oz)   03/14/21 0500 81.2 kg (179 lb 1.6 oz)   03/13/21 0554 82.2 kg (181 lb 4.8 oz)   03/12/21 1726 81.7 kg (180 lb 3.2 oz)     Wt Readings from Last 3 Encounters:   03/15/21 81.8 kg (180 lb 5.4 oz)   03/12/21 83.5 kg (184 lb)   03/08/21 80.3 kg (177 lb 0.5 oz)         Exam:  NAD; pleasant; oriented; looks older than stated age  Flat affect; depressed mood  Chronically ill-appearing; pale  MMM; AT/NC   No eye discharge; no scleral icterus  No JVD; no carotid bruits  A few crackles on the right; not labored   RRR, 2/6M, no rub  Soft, not tender, +D, BS+, abdominal wall edema present  Trace edema  Left arm AV fistula patent  No clubbing  No asterixis  Moves all extremities       Scheduled meds:    calcium acetate, 2,001 mg, Oral, TID With Meals  carvedilol, 25 mg, Oral, Q12H  sodium chloride, 10 mL, Intravenous, Q12H      IV meds:                        sodium chloride, 30 mL/hr, Last Rate: Stopped (03/15/21  1126)        Data Review:    Results from last 7 days   Lab Units 03/15/21  0534 03/13/21  0535 03/12/21  1220   SODIUM mmol/L 138 136 138   POTASSIUM mmol/L 5.2 5.7* 5.6*   CHLORIDE mmol/L 99 90* 91*   CO2 mmol/L 23.0 27.6 30.9*   BUN mg/dL 43* 72* 57*   CREATININE mg/dL 5.76* 8.26* 7.71*   CALCIUM mg/dL 8.7 8.8 9.3   BILIRUBIN mg/dL  --   --  0.8   ALK PHOS U/L  --   --  133*   ALT (SGPT) U/L  --   --  137*   AST (SGOT) U/L  --   --  157*   GLUCOSE mg/dL 92 112* 121*     Estimated Creatinine Clearance: 20.3 mL/min (A) (by C-G formula based on SCr of 5.76 mg/dL (H)).      Results from last 7 days   Lab Units 03/15/21  0534 03/13/21  0535   MAGNESIUM mg/dL  --  2.8*   PHOSPHORUS mg/dL 5.6* 8.6*       Results from last 7 days   Lab Units 03/15/21  0534 03/13/21  0535 03/12/21  1220   WBC 10*3/mm3 9.93 10.31 10.99*   HEMOGLOBIN g/dL 7.9* 8.1* 8.5*   PLATELETS 10*3/mm3 206 233 254       Results from last 7 days   Lab Units 03/15/21  0534   INR  1.30*             ASSESSMENT:     Pericardial effusion    ESRD on dialysis (CMS/[facility])    Idiopathic peripheral neuropathy    HTN (hypertension)    History of COVID-19    NICM (nonischemic cardiomyopathy) (CMS/[facility])    Anemia in ESRD (end-stage renal disease) (CMS/[facility])    Elevated brain natriuretic peptide (BNP) level    Hilar adenopathy    Right lower lobe lung mass    Mediastinal adenopathy    1.  ESRD: volume excess, centrally and peripherally, improving.  Mild hyperkalemia and hyperphosphatemia on arrival raise question about compliance with his home hemodialysis; hypervolemic hyponatremia, resolved. 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   2.  Large pericardial effusion (> 2 cm circumferentially):  no tamponade noted by echocardiogram 3/12  3.  Anemia of ESRD: not at  goal  4.  Mediastinal and bilateral hilar lymphadenopathy as well as large mass-like opacity right lower lobe with central cavitation  5.  Abdominal distention with ascites and transaminitis  6.  Covid-19 infection, December' 20        PLAN:  1.  Third consecutive HD today.  He usually performs 4 treatments per week (he is on home hemodialysis; he self-cannulates).  Likely next HD on 3/17  2.  Follow-up results of bronchoscopy/EBUS with biopsy  3.  Check iron studies      Dre Samano MD  3/15/2021    17:29 EDT

## 2021-03-15 NOTE — ANESTHESIA POSTPROCEDURE EVALUATION
"Patient: Chandler Buchanan    Procedure Summary     Date: 03/15/21 Room / Location:  [facility] /  [facility]    Anesthesia Start: 0932 Anesthesia Stop: 1106    Procedure: BRONCHOSCOPY WITH ENDOBRONCHIAL ULTRASOUND, TRANSBRONCHIAL NEEDLE ASPIRATION, BIOPSIES, BRUSHINGS AND LAVAGE UNDER FLUOROSCOPY (Bilateral Bronchus) Diagnosis:       Hilar adenopathy      Right lower lobe lung mass      Mediastinal adenopathy      (Hilar adenopathy [R59.0])      (Right lower lobe lung mass [R91.8])      (Mediastinal adenopathy [R59.0])    Surgeons: Brian Tilley MD Provider: Carlin Umanzor MD    Anesthesia Type: general ASA Status: 3          Anesthesia Type: general    Vitals  Vitals Value Taken Time   /86 03/15/21 1152   Temp     Pulse 73 03/15/21 1152   Resp 20 03/15/21 1152   SpO2 93 % 03/15/21 1152           Post Anesthesia Care and Evaluation    Patient location during evaluation: bedside  Patient participation: complete - patient participated  Level of consciousness: sleepy but conscious  Pain score: 0  Pain management: adequate  Airway patency: patent  Anesthetic complications: No anesthetic complications    Cardiovascular status: acceptable  Respiratory status: acceptable  Hydration status: acceptable    Comments: /86   Pulse 73   Temp 36.9 °C (98.4 °F) (Oral)   Resp 20   Ht 175.3 cm (69\")   Wt 81.8 kg (180 lb 5.4 oz)   SpO2 93%   BMI 26.63 kg/m²         "

## 2021-03-15 NOTE — PROGRESS NOTES
[facility] Medicine Services  PROGRESS NOTE    Patient Name: Chandler Buchanan  : 1985  MRN: [id number]    Date of Admission: 3/12/2021  Primary Care Physician: Provider, No Known    Subjective   Subjective     CC:  Follow-up swelling and lung mass    HPI:  Patient awaiting biopsy today.  Denies complaints.  Feels volume status much improved.    Review of Systems  No current fevers or chills  No current shortness of breath or cough  No current nausea, vomiting, or diarrhea  No current chest pain or palpitations      Objective   Objective     Vital Signs:   Temp:  [97.7 °F (36.5 °C)-98.5 °F (36.9 °C)] 98.4 °F (36.9 °C)  Heart Rate:  [78-84] 84  Resp:  [18-20] 18  BP: (126-146)/() 146/102        Physical Exam:  Constitutional:Awake, alert  HENT: NCAT, mucous membranes moist, neck supple  Respiratory: Clear to auscultation bilaterally, respiratory effort normal, nonlabored breathing   Cardiovascular: RRR, normal radial pulses  Gastrointestinal: Positive bowel sounds, soft, nontender, nondistended  Musculoskeletal: Normal musculature for age, BMI is 26, minimal lower extremity edema  Psychiatric: Appropriate affect, cooperative, conversational  Neurologic: No slurred speech or facial droop, follows commands  Skin: No rashes or jaundice, warm      Results Reviewed:  Results from last 7 days   Lab Units 03/15/21  0534 21  0535 21  1220   WBC 10*3/mm3 9.93 10.31 10.99*   HEMOGLOBIN g/dL 7.9* 8.1* 8.5*   HEMATOCRIT % 24.9* 25.2* 26.7*   PLATELETS 10*3/mm3 206 233 254   INR  1.30*  --   --      Results from last 7 days   Lab Units 03/15/21  0534 21  0535 21  1220   SODIUM mmol/L 138 136 138   POTASSIUM mmol/L 5.2 5.7* 5.6*   CHLORIDE mmol/L 99 90* 91*   CO2 mmol/L 23.0 27.6 30.9*   BUN mg/dL 43* 72* 57*   CREATININE mg/dL 5.76* 8.26* 7.71*   GLUCOSE mg/dL 92 112* 121*   CALCIUM mg/dL 8.7 8.8 9.3   ALT (SGPT) U/L  --   --  137*   AST (SGOT) U/L  --   --  157*   TROPONIN T ng/mL  --    --  0.083*   PROBNP pg/mL  --   --  >70,000.0*     Estimated Creatinine Clearance: 20.3 mL/min (A) (by C-G formula based on SCr of 5.76 mg/dL (H)).    Microbiology Results Abnormal     Procedure Component Value - Date/Time    COVID PRE-OP / PRE-PROCEDURE SCREENING ORDER (NO ISOLATION) - Swab, Nasopharynx [[id number]]  (Normal) Collected: 03/12/21 1738    Lab Status: Final result Specimen: Swab from Nasopharynx Updated: 03/12/21 2205    Narrative:      The following orders were created for panel order COVID PRE-OP / PRE-PROCEDURE SCREENING ORDER (NO ISOLATION) - Swab, Nasopharynx.  Procedure                               Abnormality         Status                     ---------                               -----------         ------                     COVID-19,APTIMA PANTHER,...[[id number]]  Normal              Final result                 Please view results for these tests on the individual orders.    COVID-19,APTIMA PANTHER,[facility] IN-HOUSE, NP/OP SWAB IN UTM/VTM/[facility]ALINE TRANSPORT MEDIA,24 HR TAT - Swab, Nasopharynx [[id number]]  (Normal) Collected: 03/12/21 1738    Lab Status: Final result Specimen: Swab from Nasopharynx Updated: 03/12/21 2205     COVID19 Not Detected    Narrative:      Fact sheet for providers: https://www.fda.gov/media/417462/download     Fact sheet for patients: https://www.fda.gov/media/959947/download    Test performed by RT PCR.          Imaging Results (Last 24 Hours)     ** No results found for the last 24 hours. **          Results for orders placed during the hospital encounter of 03/12/21    Adult Transthoracic Echo Complete W/ Cont if Necessary Per Protocol    Interpretation Summary  · Left ventricular ejection fraction appears to be 41 - 45%. Left ventricular systolic function is mildly decreased. Abnormal global longitudinal LV strain (GLS) = -16.2%. Left ventricle strain data was reviewed by the physician and found to be accurate. Normal left ventricular cavity size noted. Left  ventricular wall thickness is consistent with moderate concentric hypertrophy. There is left ventricular global hypokinesis noted.  · Left ventricular diastolic function is consistent with (grade II w/high LAP) pseudonormalization.  · Calculated right ventricular systolic pressure from tricuspid regurgitation is 39.2 mmHg.  · The inferior vena cava is dilated. IVC inspiratory collapse is absent.  · There is a large (>2cm) circumferential pericardial effusion. The effusion is fluid filled. There is no evidence of cardiac tamponade. There is evidence of ascites present.      I have reviewed the medications:  Scheduled Meds:calcium acetate, 2,001 mg, Oral, TID With Meals  carvedilol, 25 mg, Oral, Q12H  sodium chloride, 10 mL, Intravenous, Q12H      Continuous Infusions:   PRN Meds:.•  acetaminophen **OR** acetaminophen **OR** acetaminophen  •  docusate sodium  •  famotidine  •  ondansetron **OR** ondansetron  •  sodium chloride  •  traMADol    Assessment/Plan   Assessment & Plan     Active Hospital Problems    Diagnosis  POA   • **Pericardial effusion [I31.3]  Yes   • Hilar adenopathy [R59.0]  Yes   • Elevated brain natriuretic peptide (BNP) level [R79.89]  Yes   • Right lower lobe lung mass [R91.8]  Unknown   • Mediastinal adenopathy [R59.0]  Unknown   • Anemia in ESRD (end-stage renal disease) (CMS/[facility]) [N18.6, D63.1]  Yes   • NICM (nonischemic cardiomyopathy) (CMS/[facility]) [I42.8]  Yes   • HTN (hypertension) [I10]  Yes   • History of COVID-19 [Z86.16]  Yes   • Idiopathic peripheral neuropathy [G60.9]  Yes   • ESRD on dialysis (CMS/[facility]) [N18.6, Z99.2]  Not Applicable      Resolved Hospital Problems   No resolved problems to display.        Brief Hospital Course to date:  Chandler Buchanan is a 36 y.o. male with past medical history of end-stage renal disease who is currently on home hemodialysis presents to the hospital with large pericardial effusion, volume overload, progressive shortness of breath, and large diffuse  lymphadenopathy and lung mass in the setting of recent COVID-19.    Discussion/plan:  -Hemodialysis has drastically improved hypervolemia.  Nephrology managing.    -Thoracic surgery consulted to assess for lung and mediastinal mass and lymphadenopathy.  Plan is for biopsy of abnormality per their recommendations by interventional radiology.    -Monitor anemia no bleeding    -Continue home carvedilol for blood pressure.  Monitor pressure.    -Supportive care and symptom treatment with careful monitoring.    -No current symptoms of tamponade, monitor, cardiology appreciated, plan to improve volume as pericardial effusion thought to possibly be related to severe hypervolemia per cardiology.    DVT Prophylaxis: Mechanical    Disposition: Likely home when improved    CODE STATUS:   Code Status and Medical Interventions:   Ordered at: 03/12/21 1716     Code Status:    CPR     Medical Interventions (Level of Support Prior to Arrest):    Full       Willem Crespo MD  03/15/21

## 2021-03-15 NOTE — PROGRESS NOTES
Continued Stay Note  [facility]     Patient Name: Chnadler Buchanan  MRN: [id number]  Today's Date: 3/15/2021    Admit Date: 3/12/2021    Discharge Plan     Row Name 03/15/21 1302       Plan    Plan  Plan home with parents.   ENEDINA Dejesus RN    Patient/Family in Agreement with Plan  yes    Plan Comments  FACE SHEET VERIFIED.  Spoke with pt at bedside.  Pt denies having a PCP.   Pt lives in a single story house with mother (Angie Stoodt [phone]) and father  ( Kevin Stoodt [phone]).  Pt is independent with ADLs.  Pt does home HD.   Pt gets his prescriptions from [facility]  ([place]).  Pt denies any issues affording medications.  Pt is not current with HH.  Pt has not been in SNF.   Pt denies any discharge needs.  Pt states mother will transport him home.  Plan home with parents.   ENEDINA Dejesus RN        Discharge Codes    No documentation.             Leia Dejesus, RN

## 2021-03-15 NOTE — ANESTHESIA PROCEDURE NOTES
Airway  Urgency: elective    Date/Time: 3/15/2021 9:46 AM    General Information and Staff    Patient location during procedure: OR  Anesthesiologist: Carlin Umanzor MD    Indications and Patient Condition  Indications for airway management: airway protection    Preoxygenated: yes  MILS maintained throughout  Mask difficulty assessment: 1 - vent by mask    Final Airway Details  Final airway type: supraglottic airway      Successful airway: unique  Size 4    Number of attempts at approach: 1  Assessment: lips, teeth, and gum same as pre-op

## 2021-03-15 NOTE — PLAN OF CARE
Goal Outcome Evaluation:      Did well through night, no new events. Dialysis scheduled for this AM. NPO since midnight for possible Bronch. VSS

## 2021-03-15 NOTE — OUTREACH NOTE
Medical Week 4 Survey      Responses   [facility] patient discharged from?  [facility]   Does the patient have one of the following disease processes/diagnoses(primary or secondary)?  Other   Week 4 attempt successful?  No   Revoke  Readmitted          Sari Bush RN

## 2021-03-15 NOTE — PLAN OF CARE
Goal Outcome Evaluation:     Progress: no change  Outcome Summary: Pt had brochoscopy done today, mary ellen well, returned to floor about 1230, vss, ate lunch 100%, mary ellen well, re-empasized fluid restriction, 1500ml, voiced understanding. Denies pain, mother at bedside, O2 2l right now r/t destas in mid 80's but denies sob. HD called, per Nelia, full and very busy , will do hd around 6-7, Nelia to call orders, pt informed.

## 2021-03-15 NOTE — PROGRESS NOTES
"                                              LOS: 3 days   Patient Care Team:  Provider, No Known as PCP - General    Chief Complaint:  F/up abnormal CT chest with lung mass and mediastinal adenopathies.    Subjective   Interval History  Doing well since yesterday.  Denies shortness of breath or cough.  On RA.    REVIEW OF SYSTEMS:   Constitutional: No fever or chills    Ventilator/Non-Invasive Ventilation Settings (From admission, onward) Comment    None                Physical Exam:     Vital Signs  Temp:  [97.7 °F (36.5 °C)-98.5 °F (36.9 °C)] 98.4 °F (36.9 °C)  Heart Rate:  [76-84] 76  Resp:  [16-20] 16  BP: (126-146)/() 136/97    Intake/Output Summary (Last 24 hours) at 3/15/2021 1045  Last data filed at 3/14/2021 2120  Gross per 24 hour   Intake 490 ml   Output --   Net 490 ml     Flowsheet Rows      First Filed Value   Admission Height  175.3 cm (69\") Documented at 03/12/2021 1726   Admission Weight  81.7 kg (180 lb 3.2 oz) Documented at 03/12/2021 1726          General Appearance:   Alert, cooperative, in no acute distress   ENMT:  Mallampati score 3, moist mucous membrane   Eyes:  Pupils equal and reactive to light. EOMI   Neck:   Trachea midline. No thyromegaly.   Lungs:    Clear to auscultation anteriorly,respirations regular, even and nonlabored    Heart:   Regular rhythm and normal rate, normal S1 and S2, no         murmur   Skin:   No abnormalities observed   Abdomen:    Soft. No tenderness. No HSM.   Neuro:  Conscious, alert, oriented x3. Strength 5/5 in upper and lower  ext   Extremities:  No cyanosis, clubbing or edema.  Warm extremities and well-perfused          Results Review:        Results from last 7 days   Lab Units 03/15/21  0534 03/13/21  0535 03/12/21  1220   SODIUM mmol/L 138 136 138   POTASSIUM mmol/L 5.2 5.7* 5.6*   CHLORIDE mmol/L 99 90* 91*   CO2 mmol/L 23.0 27.6 30.9*   BUN mg/dL 43* 72* 57*   CREATININE mg/dL 5.76* 8.26* 7.71*   GLUCOSE mg/dL 92 112* 121*   CALCIUM mg/dL 8.7 " 8.8 9.3     Results from last 7 days   Lab Units 03/12/21  1220   TROPONIN T ng/mL 0.083*     Results from last 7 days   Lab Units 03/15/21  0534 03/13/21  0535 03/12/21  1220   WBC 10*3/mm3 9.93 10.31 10.99*   HEMOGLOBIN g/dL 7.9* 8.1* 8.5*   HEMATOCRIT % 24.9* 25.2* 26.7*   PLATELETS 10*3/mm3 206 233 254     Results from last 7 days   Lab Units 03/15/21  0534   INR  1.30*   APTT seconds 33.5     Results from last 7 days   Lab Units 03/12/21  1220   PROBNP pg/mL >70,000.0*       I reviewed the patient's new clinical results.        Medication Review:   calcium acetate, 2,001 mg, Oral, TID With Meals  carvedilol, 25 mg, Oral, Q12H  sodium chloride, 10 mL, Intravenous, Q12H        sodium chloride, 30 mL/hr, Last Rate: 30 mL/hr (03/15/21 0908)        Diagnostic imaging: Already reviewed.  CTA chest 3/12/2021: Masslike pulmonary consolidation distribution of the superior subsegment of the right lower lobe.  Another consolidation noted in the left upper lobe.  Mild atelectasis left lower lobe.  Mediastinal and hilar adenopathies, notably station 4L, station 7, 10 R posterior and 11 R (after the bifurcation of the RUL segment) and left hilar          CT 2019: Diffuse bilateral pulmonary infiltrates.        Assessment   1. Masslike consolidation in the right lower lobe: Not explained by recent Covid infection which usually represent as bilateral groundglass infiltrates initially which may later progressed into fibrosis but not a discrete masslike consolidation.  In addition he was not severely ill with pneumonia and stayed at home throughout prior Covid illness  2. Mediastinal and hilar adenopathies.  Could be reactive, inflammatory or simply related to cardiomyopathy and ESRD (i.e. fluid overload)  3. Pericardial effusion and ascites: Anasarca  4. Systolic cardiomyopathy  5. ESRD, on HD  6. COVID-19 URI December 2020     The findings above are concerning for primary lung malignancy or inflammatory disorders such as  sarcoidosis.  This is not entirely explained by recent Covid infection which would at this stage cause fibrotic changes rather than discrete masses and mediastinal/hilar adenopathies.    Plan   S/p bronch with EBUS TBNA Staion 11 R and 7. Sent for cytology and culture (bacterial and fungal). BAL RLL sup seg sent for Cytology, culture (AFB, fungal, bacterial), cell count. Brushing RLL sup seg sent for Cytology and culture (bacterail, AFB, fungal). TBB sup seg RLL sent for path.     Awaiting labs. If not revealing then CT guided FNA +/- mediastinoscopy.     Discussed with his mother over phone and endo staff.      Time>25 min face to face and on the alex >1/2 directed toward counseling and coordination of care        Brian Tilley MD  03/15/21  10:45 EDT        This note was dictated utilizing Dragon dictation

## 2021-03-15 NOTE — PROGRESS NOTES
"[facility]    Patient Name: Chandler Buchanan  :1985  36 y.o.  LOS: 3  Encounter Provider: Juanito Millan Jr, MD      Patient Care Team:  Provider, No Known as PCP - General    Chief Complaint: Follow-up large pericardial effusion, lung mass, ESRD    Interval History: No acute issues overnight.  Lung mass biopsy this morning.       Objective   Vital Signs  Temp:  [97.7 °F (36.5 °C)-98.5 °F (36.9 °C)] 97.7 °F (36.5 °C)  Heart Rate:  [72-84] 72  Resp:  [16-20] 20  BP: (100-146)/() 122/92    Intake/Output Summary (Last 24 hours) at 3/15/2021 1600  Last data filed at 3/15/2021 1126  Gross per 24 hour   Intake 750 ml   Output --   Net 750 ml     Flowsheet Rows      First Filed Value   Admission Height  175.3 cm (69\") Documented at 2021 1726   Admission Weight  81.7 kg (180 lb 3.2 oz) Documented at 2021 1726            Vitals reviewed.   Constitutional:       Appearance: Healthy appearance. Not in distress.   Neck:      Vascular: No JVR. JVD normal.   Pulmonary:      Effort: Pulmonary effort is normal.      Breath sounds: Normal breath sounds. No wheezing. No rhonchi. No rales.   Chest:      Chest wall: Not tender to palpatation.   Cardiovascular:      PMI at left midclavicular line. Normal rate. Regular rhythm. Normal S1. Normal S2.      Murmurs: There is no murmur.      No gallop. No click. No rub.   Pulses:     Intact distal pulses.   Edema:     Thigh: bilateral trace edema of the thigh.     Pretibial: bilateral trace edema of the pretibial area.     Ankle: bilateral trace edema of the ankle.     Feet: bilateral trace edema of the feet.  Abdominal:      General: Bowel sounds are normal.      Palpations: Abdomen is soft.      Tenderness: There is no abdominal tenderness.   Musculoskeletal: Normal range of motion.         General: No tenderness. Skin:     General: Skin is warm and dry.   Neurological:      General: No focal deficit present.      Mental Status: Alert and " oriented to person, place and time.           Pertinent Test Results:  Results from last 7 days   Lab Units 03/15/21  0534 03/13/21  0535 03/12/21  1220   SODIUM mmol/L 138 136 138   POTASSIUM mmol/L 5.2 5.7* 5.6*   CHLORIDE mmol/L 99 90* 91*   CO2 mmol/L 23.0 27.6 30.9*   BUN mg/dL 43* 72* 57*   CREATININE mg/dL 5.76* 8.26* 7.71*   GLUCOSE mg/dL 92 112* 121*   CALCIUM mg/dL 8.7 8.8 9.3   AST (SGOT) U/L  --   --  157*   ALT (SGPT) U/L  --   --  137*     Results from last 7 days   Lab Units 03/12/21  1220   TROPONIN T ng/mL 0.083*     Results from last 7 days   Lab Units 03/15/21  0534 03/13/21  0535 03/12/21  1220   WBC 10*3/mm3 9.93 10.31 10.99*   HEMOGLOBIN g/dL 7.9* 8.1* 8.5*   HEMATOCRIT % 24.9* 25.2* 26.7*   PLATELETS 10*3/mm3 206 233 254     Results from last 7 days   Lab Units 03/15/21  0534   INR  1.30*   APTT seconds 33.5     Results from last 7 days   Lab Units 03/13/21  0535   MAGNESIUM mg/dL 2.8*           Invalid input(s): LDLCALC  Results from last 7 days   Lab Units 03/12/21  1220   PROBNP pg/mL >70,000.0*               Medication Review:   calcium acetate, 2,001 mg, Oral, TID With Meals  carvedilol, 25 mg, Oral, Q12H  sodium chloride, 10 mL, Intravenous, Q12H         sodium chloride, 30 mL/hr, Last Rate: Stopped (03/15/21 1126)        Assessment/Plan   1. Pericardial effusion, large without tamponade  2.  Cavitary lung mass  3.  ESRD on dialysis    Significant reduction in volume since Friday.  We will repeat limited echocardiogram in the morning.  Lung mass biopsied today.  If clinically stable and pericardial effusion improved may consider discharge tomorrow from a cardiovascular standpoint.    Juanito Millan Jr, MD  [facility]  03/15/21  16:00 EDT

## 2021-03-15 NOTE — ANESTHESIA PREPROCEDURE EVALUATION
Anesthesia Evaluation     Patient summary reviewed                Airway   No difficulty expected  Dental      Pulmonary    Cardiovascular     ECG reviewed  Rhythm: regular    (+) hypertension, pericardial effusion,       Neuro/Psych  GI/Hepatic/Renal/Endo    (+)   renal disease ESRD,     Musculoskeletal     Abdominal    Substance History      OB/GYN          Other          Other Comment: Hb 7.9  k 5.4                  Anesthesia Plan    ASA 3     general       Anesthetic plan, all risks, benefits, and alternatives have been provided, discussed and informed consent has been obtained with: patient.

## 2021-03-15 NOTE — PROGRESS NOTES
Patient was off floor for endobronchial ultrasound during rounds today.  We will follow up tomorrow.

## 2021-03-15 NOTE — PROGRESS NOTES
Discharge Planning Assessment  [facility]     Patient Name: Chandler Buchanan  MRN: [id number]  Today's Date: 3/15/2021    Admit Date: 3/12/2021    Discharge Needs Assessment     Row Name 03/15/21 1256       Living Environment    Lives With  parent(s)    Name(s) of Who Lives With Patient  Mother  ( Angie Voss  [phone] ) and Father ( Kevin Woodsodelier  [phone])    Current Living Arrangements  home/apartment/condo    Primary Care Provided by  self    Provides Primary Care For  no one    Family Caregiver if Needed  parent(s)    Family Caregiver Names  Mother  ( Angie Woodsodt  [phone] ) and Father ( Kevin Stoodt  [phone])    Quality of Family Relationships  helpful;involved;supportive    Able to Return to Prior Arrangements  yes    Living Arrangement Comments  Pt lives in a single story house with his mother  ( Angie Woodsodt  [phone] ) and father ( Kevin Woodsodt  [phone]).       Resource/Environmental Concerns    Resource/Environmental Concerns  none    Transportation Concerns  car, none       Transition Planning    Patient/Family Anticipates Transition to  home with family    Patient/Family Anticipated Services at Transition  none    Transportation Anticipated  family or friend will provide       Discharge Needs Assessment    Equipment Currently Used at Home  -- Dialysis at Home    Concerns to be Addressed  denies needs/concerns at this time    Anticipated Changes Related to Illness  none    Equipment Needed After Discharge  -- Dialysis at Home        Discharge Plan     Row Name 03/15/21 1307       Plan    Plan  Plan home with parents.   ENEDINA Dejesus RN    Patient/Family in Agreement with Plan  yes    Plan Comments  FACE SHEET VERIFIED.  Spoke with pt at bedside.  Pt denies having a PCP.   Pt lives in a single story house with mother (Angie Woodsodt [phone]) and father  ( Kevin Woodsodt [phone]).  Pt is independent with ADLs.  Pt does home HD.   Pt gets his prescriptions from [facility]   ([facility]).  Pt denies any issues affording medications.  Pt is not current with HH.  Pt has not been in SNF.   Pt denies any discharge needs.  Pt states mother will transport him home.  Plan home with parents.   ENEDINA Dejesus RN        Continued Care and Services - Admitted Since 3/12/2021    Coordination has not been started for this encounter.         Demographic Summary     Row Name 03/15/21 1258       General Information    Admission Type  inpatient    Arrived From  home    Referral Source  admission list    Reason for Consult  discharge planning    Preferred Language  English     Used During This Interaction  no        Functional Status     Row Name 03/15/21 1258       Functional Status    Usual Activity Tolerance  good    Current Activity Tolerance  fair       Functional Status, IADL    Medications  independent    Meal Preparation  assistive person    Housekeeping  assistive person    Laundry  assistive person    Shopping  assistive person       Mental Status    General Appearance WDL  WDL       Mental Status Summary    Recent Changes in Mental Status/Cognitive Functioning  no changes        Psychosocial    No documentation.       Abuse/Neglect    No documentation.       Legal    No documentation.       Substance Abuse    No documentation.       Patient Forms    No documentation.           Leia Dejesus, RN

## 2021-03-16 ENCOUNTER — APPOINTMENT (OUTPATIENT)
Dept: CARDIOLOGY | Facility: HOSPITAL | Age: 36
End: 2021-03-16

## 2021-03-16 LAB
APPEARANCE FLD: ABNORMAL
BH CV ECHO MEAS - BSA(HAYCOCK): 2 M^2
BH CV ECHO MEAS - BSA: 1.9 M^2
BH CV ECHO MEAS - BZI_BMI: 25.4 KILOGRAMS/M^2
BH CV ECHO MEAS - BZI_METRIC_HEIGHT: 175.3 CM
BH CV ECHO MEAS - BZI_METRIC_WEIGHT: 78 KG
C-ANCA TITR SER IF: NORMAL TITER
COLOR FLD: ABNORMAL
CYTO UR: NORMAL
CYTO UR: NORMAL
CYTOLOGY INITIAL INTERPRETATION: NORMAL
FERRITIN SERPL-MCNC: 1679 NG/ML (ref 30–400)
IRON 24H UR-MRATE: 31 MCG/DL (ref 59–158)
IRON SATN MFR SERPL: 12 % (ref 20–50)
LAB AP CASE REPORT: NORMAL
LAB AP CLINICAL INFORMATION: NORMAL
LAB AP NON-GYN SPECIMEN ADEQUACY: NORMAL
LAB AP SPECIAL STAINS: NORMAL
LAB AP SPECIAL STAINS: NORMAL
LYMPHOCYTES NFR FLD MANUAL: 26 %
MAXIMAL PREDICTED HEART RATE: 183 BPM
METHOD: ABNORMAL
MONOCYTES NFR FLD: 8 %
MONOS+MACROS NFR FLD: 9 %
MYELOPEROXIDASE AB SER IA-ACNC: <9 U/ML (ref 0–9)
NEUTROPHILS NFR FLD MANUAL: 57 %
NUC CELL # FLD: 55 /MM3
P-ANCA ATYPICAL TITR SER IF: NORMAL TITER
P-ANCA TITR SER IF: NORMAL TITER
PATH REPORT.FINAL DX SPEC: NORMAL
PATH REPORT.GROSS SPEC: NORMAL
PROTEINASE3 AB SER IA-ACNC: <3.5 U/ML (ref 0–3.5)
RBC # FLD AUTO: ABNORMAL /MM3
STRESS TARGET HR: 156 BPM
TIBC SERPL-MCNC: 268 MCG/DL (ref 298–536)
TRANSFERRIN SERPL-MCNC: 180 MG/DL (ref 200–360)

## 2021-03-16 PROCEDURE — 83540 ASSAY OF IRON: CPT | Performed by: INTERNAL MEDICINE

## 2021-03-16 PROCEDURE — 99232 SBSQ HOSP IP/OBS MODERATE 35: CPT | Performed by: INTERNAL MEDICINE

## 2021-03-16 PROCEDURE — 84466 ASSAY OF TRANSFERRIN: CPT | Performed by: INTERNAL MEDICINE

## 2021-03-16 PROCEDURE — 93321 DOPPLER ECHO F-UP/LMTD STD: CPT

## 2021-03-16 PROCEDURE — 99231 SBSQ HOSP IP/OBS SF/LOW 25: CPT | Performed by: NURSE PRACTITIONER

## 2021-03-16 PROCEDURE — 82728 ASSAY OF FERRITIN: CPT | Performed by: INTERNAL MEDICINE

## 2021-03-16 PROCEDURE — 93325 DOPPLER ECHO COLOR FLOW MAPG: CPT | Performed by: INTERNAL MEDICINE

## 2021-03-16 PROCEDURE — 93325 DOPPLER ECHO COLOR FLOW MAPG: CPT

## 2021-03-16 PROCEDURE — 93321 DOPPLER ECHO F-UP/LMTD STD: CPT | Performed by: INTERNAL MEDICINE

## 2021-03-16 PROCEDURE — 93308 TTE F-UP OR LMTD: CPT | Performed by: INTERNAL MEDICINE

## 2021-03-16 PROCEDURE — 93308 TTE F-UP OR LMTD: CPT

## 2021-03-16 RX ORDER — LISINOPRIL 5 MG/1
5 TABLET ORAL
Status: DISCONTINUED | OUTPATIENT
Start: 2021-03-16 | End: 2021-03-17

## 2021-03-16 RX ORDER — FAMOTIDINE 20 MG/1
20 TABLET, FILM COATED ORAL DAILY PRN
Status: DISCONTINUED | OUTPATIENT
Start: 2021-03-16 | End: 2021-03-24 | Stop reason: HOSPADM

## 2021-03-16 RX ORDER — ALBUMIN (HUMAN) 12.5 G/50ML
12.5 SOLUTION INTRAVENOUS AS NEEDED
Status: CANCELLED | OUTPATIENT
Start: 2021-03-17 | End: 2021-03-17

## 2021-03-16 RX ORDER — TRAMADOL HYDROCHLORIDE 50 MG/1
50 TABLET ORAL EVERY 12 HOURS PRN
Status: DISCONTINUED | OUTPATIENT
Start: 2021-03-16 | End: 2021-03-24 | Stop reason: HOSPADM

## 2021-03-16 RX ADMIN — CALCIUM ACETATE 2001 MG: 667 CAPSULE ORAL at 17:16

## 2021-03-16 RX ADMIN — CALCIUM ACETATE 2001 MG: 667 CAPSULE ORAL at 11:50

## 2021-03-16 RX ADMIN — SODIUM CHLORIDE, PRESERVATIVE FREE 10 ML: 5 INJECTION INTRAVENOUS at 20:49

## 2021-03-16 RX ADMIN — SODIUM CHLORIDE, PRESERVATIVE FREE 10 ML: 5 INJECTION INTRAVENOUS at 00:06

## 2021-03-16 RX ADMIN — LISINOPRIL 5 MG: 5 TABLET ORAL at 15:47

## 2021-03-16 RX ADMIN — CALCIUM ACETATE 2001 MG: 667 CAPSULE ORAL at 09:23

## 2021-03-16 RX ADMIN — CARVEDILOL 25 MG: 25 TABLET, FILM COATED ORAL at 09:24

## 2021-03-16 RX ADMIN — CARVEDILOL 25 MG: 25 TABLET, FILM COATED ORAL at 20:49

## 2021-03-16 RX ADMIN — CARVEDILOL 25 MG: 25 TABLET, FILM COATED ORAL at 00:06

## 2021-03-16 NOTE — PROGRESS NOTES
[facility] Medicine Services  PROGRESS NOTE    Patient Name: Chandler Buchanan  : 1985  MRN: [id number]    Date of Admission: 3/12/2021  Primary Care Physician: Provider, No Known    Subjective   Subjective     CC:  Follow-up swelling and lung mass    HPI:  Patient says he feels fine today.  He is awaiting results from his biopsy yesterday.  He reported he tolerated the procedure without issues and denies any new complaints.    Review of Systems  No current fevers or chills  No current shortness of breath or cough  No current nausea, vomiting, or diarrhea  No current chest pain or palpitations      Objective   Objective     Vital Signs:   Temp:  [97.7 °F (36.5 °C)-98.4 °F (36.9 °C)] 98.1 °F (36.7 °C)  Heart Rate:  [72-88] 80  Resp:  [16-20] 18  BP: (100-172)/() 144/103        Physical Exam:  Constitutional:Awake, alert  HENT: NCAT, mucous membranes moist, neck supple  Respiratory: Clear to auscultation bilaterally, respiratory effort normal, nonlabored breathing   Cardiovascular: RRR, normal radial pulses  Gastrointestinal: Positive bowel sounds, soft, nontender, nondistended  Musculoskeletal: Normal musculature for age, BMI is 26, minimal lower extremity edema  Psychiatric: Appropriate affect, cooperative, conversational  Neurologic: No slurred speech or facial droop, follows commands  Skin: No rashes or jaundice, warm      Results Reviewed:  Results from last 7 days   Lab Units 03/15/21  0534 21  0535 21  1220   WBC 10*3/mm3 9.93 10.31 10.99*   HEMOGLOBIN g/dL 7.9* 8.1* 8.5*   HEMATOCRIT % 24.9* 25.2* 26.7*   PLATELETS 10*3/mm3 206 233 254   INR  1.30*  --   --      Results from last 7 days   Lab Units 03/15/21  0534 21  0535 21  1220   SODIUM mmol/L 138 136 138   POTASSIUM mmol/L 5.2 5.7* 5.6*   CHLORIDE mmol/L 99 90* 91*   CO2 mmol/L 23.0 27.6 30.9*   BUN mg/dL 43* 72* 57*   CREATININE mg/dL 5.76* 8.26* 7.71*   GLUCOSE mg/dL 92 112* 121*   CALCIUM mg/dL 8.7 8.8 9.3   ALT  (SGPT) U/L  --   --  137*   AST (SGOT) U/L  --   --  157*   TROPONIN T ng/mL  --   --  0.083*   PROBNP pg/mL  --   --  >70,000.0*     Estimated Creatinine Clearance: 19.4 mL/min (A) (by C-G formula based on SCr of 5.76 mg/dL (H)).    Microbiology Results Abnormal     Procedure Component Value - Date/Time    Respiratory Culture - Brushing, Lung, Right Lower Lobe [[id number]] Collected: 03/15/21 1040    Lab Status: Preliminary result Specimen: Brushing from Lung, Right Lower Lobe Updated: 03/16/21 0915     Respiratory Culture No growth     Gram Stain Rare (1+) WBCs seen      Occasional Epithelial cells per low power field      No organisms seen    BAL Culture, Quantitative - Lavage, Lung, Right Lower Lobe [[id number]] Collected: 03/15/21 1026    Lab Status: Preliminary result Specimen: Lavage from Lung, Right Lower Lobe Updated: 03/16/21 0915     BAL Culture 25,000 CFU/mL Normal Respiratory Thalia: NO S.aureus/MRSA or Pseudomonas aeruginosa     Gram Stain Rare (1+) WBCs seen      Rare (1+) Epithelial cells per low power field      No organisms seen    Tissue / Bone Culture - Lymph Node, Mediastinum [[id number]] Collected: 03/15/21 1005    Lab Status: Preliminary result Specimen: Lymph Node from Mediastinum Updated: 03/16/21 0909     Tissue Culture Scant growth (1+) Normal Respiratory Thalia     Gram Stain Occasional WBCs seen      No organisms seen    COVID PRE-OP / PRE-PROCEDURE SCREENING ORDER (NO ISOLATION) - Swab, Nasopharynx [[id number]]  (Normal) Collected: 03/12/21 1738    Lab Status: Final result Specimen: Swab from Nasopharynx Updated: 03/12/21 2205    Narrative:      The following orders were created for panel order COVID PRE-OP / PRE-PROCEDURE SCREENING ORDER (NO ISOLATION) - Swab, Nasopharynx.  Procedure                               Abnormality         Status                     ---------                               -----------         ------                     COVID-19,APTIMA PANTHER,...[[id number]]  Normal               Final result                 Please view results for these tests on the individual orders.    COVID-19,APTIMA PANTHER,ART IN-HOUSE, NP/OP SWAB IN UTM/VTM/SALINE TRANSPORT MEDIA,24 HR TAT - Swab, Nasopharynx [[id number]]  (Normal) Collected: 03/12/21 1738    Lab Status: Final result Specimen: Swab from Nasopharynx Updated: 03/12/21 2205     COVID19 Not Detected    Narrative:      Fact sheet for providers: https://www.fda.gov/media/461056/download     Fact sheet for patients: https://www.fda.gov/media/027235/download    Test performed by RT PCR.          Imaging Results (Last 24 Hours)     Procedure Component Value Units Date/Time    XR Chest 1 View [[id number]] Collected: 03/15/21 1144     Updated: 03/15/21 1700    Narrative:      XR CHEST 1 VW-  INTRAOPERATIVE VIEWS 03/15/2021     HISTORY: Bronchoscopy.     Limited field-of-view image of the chest was used during bronchoscopy.  The bronchoscope is seen overlying the right lower lung. No unexpected  findings are noted.     Fluoroscopy time 1 minute 7 seconds, 1 image.     This report was finalized on 3/15/2021 4:57 PM by Dr. Santana Stahl M.D.             Results for orders placed during the hospital encounter of 03/12/21    Adult Transthoracic Echo Limited W/ Cont if Necessary Per Protocol    Interpretation Summary  · There is a large (>2cm) circumferential pericardial effusion(more fluid localized posteriorly) with evidence of cardiac tamponade( early diastolic RV and RA collapse) noted. IVC is dilated.  · Visually estimated LVEF seems to be significantly reduced.      I have reviewed the medications:  Scheduled Meds:calcium acetate, 2,001 mg, Oral, TID With Meals  carvedilol, 25 mg, Oral, Q12H  sodium chloride, 10 mL, Intravenous, Q12H      Continuous Infusions:sodium chloride, 30 mL/hr, Last Rate: Stopped (03/15/21 1126)      PRN Meds:.•  acetaminophen **OR** acetaminophen **OR** acetaminophen  •  docusate sodium  •  famotidine  •  ondansetron  **OR** ondansetron  •  sodium chloride  •  traMADol    Assessment/Plan   Assessment & Plan     Active Hospital Problems    Diagnosis  POA   • **Pericardial effusion [I31.3]  Yes   • Hilar adenopathy [R59.0]  Yes   • Elevated brain natriuretic peptide (BNP) level [R79.89]  Yes   • Right lower lobe lung mass [R91.8]  Unknown   • Mediastinal adenopathy [R59.0]  Unknown   • Anemia in ESRD (end-stage renal disease) (CMS/[facility]) [N18.6, D63.1]  Yes   • NICM (nonischemic cardiomyopathy) (CMS/[facility]ly [facility]) [I42.8]  Yes   • HTN (hypertension) [I10]  Yes   • History of COVID-19 [Z86.16]  Yes   • Idiopathic peripheral neuropathy [G60.9]  Yes   • ESRD on dialysis (CMS/[facility]) [N18.6, Z99.2]  Not Applicable      Resolved Hospital Problems   No resolved problems to display.        Brief Hospital Course to date:  Chandler Buchanan is a 36 y.o. male with past medical history of end-stage renal disease who is currently on home hemodialysis presents to the hospital with large pericardial effusion, volume overload, progressive shortness of breath, and large diffuse lymphadenopathy and lung mass in the setting of recent COVID-19.    Discussion/plan:  -No clinical sign of tamponade on my examination.  I do not see any clear JVD.  Patient appears he medically stable and overall improved since admission.    -Hemodialysis has drastically improved hypervolemia.  Nephrology managing.    -Thoracic surgery consulted and following to assess for lung and mediastinal mass and lymphadenopathy.  Now status post EBUS awaiting pathology.    -Monitor anemia no bleeding    -Continue home carvedilol for blood pressure.  Monitor pressure.    -Supportive care and symptom treatment with careful monitoring.    -Case discussed with cardiology they are going to discuss repeat echocardiogram with interventional team.  We both agree patient clinically does not look to be in tamponade at this point.    DVT Prophylaxis: Mechanical    Disposition: Likely home when  improved    CODE STATUS:   Code Status and Medical Interventions:   Ordered at: 03/12/21 1716     Code Status:    CPR     Medical Interventions (Level of Support Prior to Arrest):    Full       Willem Crespo MD  03/16/21

## 2021-03-16 NOTE — PLAN OF CARE
Goal Outcome Evaluation:  Plan of Care Reviewed With: patient  Progress: improving  Outcome Summary: Patient resting  at  this  time.  Returned  from dialysis     at  2350.  Blood  pressure  elevated.  Had  scheduled   Coreg  with  much  improvement.  Denies  pain.  Up  ad  lb.   AV  fistula  with  positive  thrill  and  bruit.   SR  on monitor. Nursing  will  continue to monitor.

## 2021-03-16 NOTE — PLAN OF CARE
Goal Outcome Evaluation:  Plan of Care Reviewed With: patient  Progress: no change  Outcome Summary: Pt alert , up ad tonya, denies any discomfort, eating very well, vss, lisonoplril added today per nephro. Has HD orders for tomorrow, called into [facility]. Mother visited today. Fluid restrictions followed . nad

## 2021-03-16 NOTE — PROGRESS NOTES
"[facility]    Patient Name: Chandler Buchanan  :1985  36 y.o.  LOS: 4  Encounter Provider: Juanito Millan Jr, MD      Patient Care Team:  Provider, No Known as PCP - General    Chief Complaint: Follow-up large pericardial effusion, lung mass, ESRD    Interval History: No acute issues overnight.  Lung mass biopsy was performed yesterday.  No bleeding complications.  Pulmonology following.       Objective   Vital Signs  Temp:  [97.7 °F (36.5 °C)-98.4 °F (36.9 °C)] 98.1 °F (36.7 °C)  Heart Rate:  [72-88] 80  Resp:  [16-20] 18  BP: (100-172)/() 144/103    Intake/Output Summary (Last 24 hours) at 3/16/2021 1125  Last data filed at 3/16/2021 0900  Gross per 24 hour   Intake 1940 ml   Output 4000 ml   Net -2060 ml     Flowsheet Rows      First Filed Value   Admission Height  175.3 cm (69\") Documented at 2021 1726   Admission Weight  81.7 kg (180 lb 3.2 oz) Documented at 2021 1726            Physical Exam  Constitutional:       Appearance: Healthy appearance. Not in distress.   Neck:      Vascular: No JVR. JVD normal.   Pulmonary:      Effort: Pulmonary effort is normal.      Breath sounds: Normal breath sounds. No wheezing. No rhonchi. No rales.   Chest:      Chest wall: Not tender to palpatation.   Cardiovascular:      PMI at left midclavicular line. Normal rate. Regular rhythm. Normal S1. Normal S2.      Murmurs: There is no murmur.      No gallop. No click. No rub.   Pulses:     Intact distal pulses.   Edema:     Thigh: bilateral trace edema of the thigh.     Pretibial: bilateral trace edema of the pretibial area.     Ankle: bilateral trace edema of the ankle.     Feet: bilateral trace edema of the feet.  Abdominal:      General: Bowel sounds are normal.      Palpations: Abdomen is soft.      Tenderness: There is no abdominal tenderness.   Musculoskeletal: Normal range of motion.         General: No tenderness. Skin:     General: Skin is warm and dry.   Neurological:      " General: No focal deficit present.      Mental Status: Alert and oriented to person, place and time.     Physical exam was reviewed, updated and amended when necessary.    Pertinent Test Results:  Results from last 7 days   Lab Units 03/15/21  0534 03/13/21  0535 03/12/21  1220   SODIUM mmol/L 138 136 138   POTASSIUM mmol/L 5.2 5.7* 5.6*   CHLORIDE mmol/L 99 90* 91*   CO2 mmol/L 23.0 27.6 30.9*   BUN mg/dL 43* 72* 57*   CREATININE mg/dL 5.76* 8.26* 7.71*   GLUCOSE mg/dL 92 112* 121*   CALCIUM mg/dL 8.7 8.8 9.3   AST (SGOT) U/L  --   --  157*   ALT (SGPT) U/L  --   --  137*     Results from last 7 days   Lab Units 03/12/21  1220   TROPONIN T ng/mL 0.083*     Results from last 7 days   Lab Units 03/15/21  0534 03/13/21  0535 03/12/21  1220   WBC 10*3/mm3 9.93 10.31 10.99*   HEMOGLOBIN g/dL 7.9* 8.1* 8.5*   HEMATOCRIT % 24.9* 25.2* 26.7*   PLATELETS 10*3/mm3 206 233 254     Results from last 7 days   Lab Units 03/15/21  0534   INR  1.30*   APTT seconds 33.5     Results from last 7 days   Lab Units 03/13/21  0535   MAGNESIUM mg/dL 2.8*           Invalid input(s): LDLCALC  Results from last 7 days   Lab Units 03/12/21  1220   PROBNP pg/mL >70,000.0*               Medication Review:   calcium acetate, 2,001 mg, Oral, TID With Meals  carvedilol, 25 mg, Oral, Q12H  sodium chloride, 10 mL, Intravenous, Q12H         sodium chloride, 30 mL/hr, Last Rate: Stopped (03/15/21 1126)        Assessment/Plan   1. Pericardial effusion, large without tamponade  2.  Cavitary lung mass  3.  ESRD on dialysis     Significant reduction in volume since Friday.    Limited echocardiogram noted.  There is evidence of intrapericardial pressure but I do not think there is evidence for tamponade and clinically he is not in tamponade.  Lung mass biopsied yesterday.  He will be kept for ongoing dialysis.  I discussed the case with interventional cardiology who agrees that there is no utility to pericardiocentesis at this time.  Cardiovascular issues  are stable and I will see the patient in clinic in 1 week for follow-up visit and repeat limited echocardiogram.    Juanito Millan Jr, MD  [facility]  03/16/21  11:25 EDT

## 2021-03-16 NOTE — PROGRESS NOTES
NEPHROLOGY PROGRESS NOTE    PATIENT IDENTIFICATION:   Name:  Chandler Buchanan      MRN:  [id number]     36 y.o.  male             Reason for visit: ESRD    SUBJECTIVE:   Pulmonary performed bronchoscopy with EBUS done 3/15; cough has improved and his breathing is comfortable; improving orthopnea; appetite fair    OBJECTIVE:  Vitals:    03/15/21 2346 03/16/21 0215 03/16/21 0437 03/16/21 0814   BP: (!) 150/105 128/82  (!) 144/103   BP Location: Right arm   Right arm   Patient Position: Sitting   Lying   Pulse: 84 88  80   Resp: 16 18 18   Temp: 98.1 °F (36.7 °C)      TempSrc: Oral   Oral   SpO2: 97% 95%     Weight:   78 kg (172 lb)    Height:               Body mass index is 25.4 kg/m².    Intake/Output Summary (Last 24 hours) at 3/16/2021 1313  Last data filed at 3/16/2021 0900  Gross per 24 hour   Intake 1200 ml   Output 4000 ml   Net -2800 ml     Wt Readings from Last 1 Encounters:   03/16/21 0437 78 kg (172 lb)   03/15/21 0520 81.8 kg (180 lb 5.4 oz)   03/14/21 0600 81.2 kg (179 lb 0.2 oz)   03/14/21 0500 81.2 kg (179 lb 1.6 oz)   03/13/21 0554 82.2 kg (181 lb 4.8 oz)   03/12/21 1726 81.7 kg (180 lb 3.2 oz)     Wt Readings from Last 3 Encounters:   03/16/21 78 kg (172 lb)   03/12/21 83.5 kg (184 lb)   03/08/21 80.3 kg (177 lb 0.5 oz)         Exam:  NAD; pleasant; oriented; looks older than stated age  Flat affect; depressed mood  Chronically ill-appearing; pale  MMM; AT/NC   No eye discharge; no scleral icterus  No JVD; no carotid bruits  Decreased breath sounds both bases; not labored  supplemental O2  RRR, 2/6M, no rub  Soft, not tender, +D, BS+  No lower extremity edema  Left arm AV fistula patent  No clubbing  No asterixis  Moves all extremities       Scheduled meds:    calcium acetate, 2,001 mg, Oral, TID With Meals  carvedilol, 25 mg, Oral, Q12H  sodium chloride, 10 mL, Intravenous, Q12H      IV meds:                        sodium chloride, 30 mL/hr, Last Rate: Stopped (03/15/21 1126)        Data  Review:    Results from last 7 days   Lab Units 03/15/21  0534 03/13/21  0535 03/12/21  1220   SODIUM mmol/L 138 136 138   POTASSIUM mmol/L 5.2 5.7* 5.6*   CHLORIDE mmol/L 99 90* 91*   CO2 mmol/L 23.0 27.6 30.9*   BUN mg/dL 43* 72* 57*   CREATININE mg/dL 5.76* 8.26* 7.71*   CALCIUM mg/dL 8.7 8.8 9.3   BILIRUBIN mg/dL  --   --  0.8   ALK PHOS U/L  --   --  133*   ALT (SGPT) U/L  --   --  137*   AST (SGOT) U/L  --   --  157*   GLUCOSE mg/dL 92 112* 121*     Estimated Creatinine Clearance: 19.4 mL/min (A) (by C-G formula based on SCr of 5.76 mg/dL (H)).      Results from last 7 days   Lab Units 03/15/21  0534 03/13/21  0535   MAGNESIUM mg/dL  --  2.8*   PHOSPHORUS mg/dL 5.6* 8.6*       Results from last 7 days   Lab Units 03/15/21  0534 03/13/21  0535 03/12/21  1220   WBC 10*3/mm3 9.93 10.31 10.99*   HEMOGLOBIN g/dL 7.9* 8.1* 8.5*   PLATELETS 10*3/mm3 206 233 254       Results from last 7 days   Lab Units 03/15/21  0534   INR  1.30*             ASSESSMENT:     Pericardial effusion    ESRD on dialysis (CMS/[facility])    Idiopathic peripheral neuropathy    HTN (hypertension)    History of COVID-19    NICM (nonischemic cardiomyopathy) (CMS/[facility])    Anemia in ESRD (end-stage renal disease) (CMS/[facility])    Elevated brain natriuretic peptide (BNP) level    Hilar adenopathy    Right lower lobe lung mass    Mediastinal adenopathy    1.  ESRD: volume excess, centrally and peripherally, improving.  Electrolytes are stable following 3 days of second of dialysis.  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   2.  Large pericardial effusion (> 2 cm circumferentially):  no tamponade noted by echocardiogram 3/12  3.  Anemia of ESRD: not at goal.  Very low iron saturation, but ferritin nearly 1700  4.  Mediastinal and bilateral hilar  lymphadenopathy as well as large mass-like opacity right lower lobe with central cavitation  5.  Abdominal distention with ascites and transaminitis  6.  Covid-19 infection, December' 20  7.  Hypertension      PLAN:  1.  Next HD 3/17 with additional volume removal  2.  Follow-up results of bronchoscopy/EBUS with biopsy  3.  Add lisinopril 5 mg daily  4.  Discussed with patient's mother at bedside  5.  Discussed with Serenity Crespo and Yana Samano MD  3/16/2021    13:13 EDT

## 2021-03-16 NOTE — PROGRESS NOTES
"    Chief Complaint: Pericardial effusion, lung mass, ESRD  S/P: Bronchoscopy with EBUS and biopsy  POD # 1    Subjective:  Symptoms:  Improved.    Diet:  Adequate intake.  No nausea or vomiting.    Activity level: Returning to normal.    Pain:  He reports no pain.        Vital Signs:  Temp:  [98 °F (36.7 °C)-98.4 °F (36.9 °C)] 98.1 °F (36.7 °C)  Heart Rate:  [75-88] 80  Resp:  [16-18] 18  BP: (128-172)/() 144/103    Intake & Output (last day)       03/15 0701 - 03/16 0700 03/16 0701 - 03/17 0700    P.O. 940 500    I.V. (mL/kg) 500 (6.4)     Total Intake(mL/kg) 1440 (18.5) 500 (6.4)    Other 4000     Total Output 4000     Net -2560 +500                Objective:  General Appearance:  Comfortable and in no acute distress.    Vital signs: (most recent): Blood pressure (!) 144/103, pulse 80, temperature 98.1 °F (36.7 °C), temperature source Oral, resp. rate 18, height 175.3 cm (69\"), weight 78 kg (172 lb), SpO2 95 %.  Vital signs are normal.    Lungs:  Normal effort and normal respiratory rate.    Heart: Normal rate.  Regular rhythm.    Chest: No chest wall tenderness.    Abdomen: Abdomen is soft.  Bowel sounds are normal.     Extremities: Normal range of motion.    Pulses: Distal pulses are intact.    Neurological: Patient is alert and oriented to person, place and time.            Results Review:     I reviewed the patient's new clinical results.  I reviewed the patient's new imaging results and agree with the interpretation.  I reviewed the patient's other test results and agree with the interpretation  Discussed with patient, RN and Dr. Billingsley.    Imaging Results (Last 24 Hours)     Procedure Component Value Units Date/Time    XR Chest 1 View [[id number]] Collected: 03/15/21 1144     Updated: 03/15/21 1700    Narrative:      XR CHEST 1 VW-  INTRAOPERATIVE VIEWS 03/15/2021     HISTORY: Bronchoscopy.     Limited field-of-view image of the chest was used during bronchoscopy.  The bronchoscope is seen overlying " the right lower lung. No unexpected  findings are noted.     Fluoroscopy time 1 minute 7 seconds, 1 image.     This report was finalized on 3/15/2021 4:57 PM by Dr. Santana Stahl M.D.             Lab Results:     Lab Results (last 24 hours)     Procedure Component Value Units Date/Time    AFB Culture - Lavage, Lung, Right Lower Lobe [[id number]] Collected: 03/15/21 1026    Specimen: Lavage from Lung, Right Lower Lobe Updated: 03/16/21 1233     AFB Stain No acid fast bacilli seen on concentrated smear    Body Fluid Cell Count With Differential - Lavage, Lung, Right Lower Lobe [[id number]]  (Abnormal) Collected: 03/15/21 1026    Specimen: Lavage from Lung, Right Lower Lobe Updated: 03/16/21 1154    Narrative:      The following orders were created for panel order Body Fluid Cell Count With Differential - Lavage, Lung, Right Lower Lobe.  Procedure                               Abnormality         Status                     ---------                               -----------         ------                     Body fluid cell count - ...[[id number]]  Abnormal            Edited Result - FINAL      Body fluid differential ...[[id number]]                      Edited Result - FINAL        Please view results for these tests on the individual orders.    Body fluid differential - Lavage, Lung, Right Lower Lobe [[id number]] Collected: 03/15/21 1026    Specimen: Lavage from Lung, Right Lower Lobe Updated: 03/16/21 1154     Neutrophils, Fluid 57 %      Lymphocytes, Fluid 26 %      Monocytes, Fluid 8 %      Mononuclear, Fluid 9 %     Body fluid cell count - Lavage, Lung, Right Lower Lobe [[id number]]  (Abnormal) Collected: 03/15/21 1026    Specimen: Lavage from Lung, Right Lower Lobe Updated: 03/16/21 1154     Color, Fluid Red     Appearance, Fluid Cloudy     RBC, Fluid 32,500 /mm3      Comment: Estimated count due to clots present in specimen.   Corrected result. Previous result was 325,000 /mm3 on 3/15/2021 at 1221 EDT.         Nucleated Cells, Fluid 55 /mm3      Comment: Estimated count due to clots present in specimen.         Method: Hemacytometer Method    Narrative:      No reference range established. Physician to interpret results with clinical findings.    Fine Needle Aspiration [[id number]] Collected: 03/15/21 0956    Specimen: Lymph Node from Mediastinum, Lymph Node from Mediastinum Updated: 03/16/21 1123     Case Report --     Medical Cytology Report                           Case: [id number]                                 Authorizing Provider:  Brian Tilley MD          Collected:           03/15/2021 09:56 AM          Ordering Location:     [facility]  Received:            03/15/2021 11:27 AM                                 ENDO SUITES                                                                  Pathologist:           Fran Blanton MD                                                         Specimens:   1) - Mediastinum, TBNA 11R                                                                          2) - Mediastinum, TBNA 7                                                                    Final Diagnosis --     1. Mediastinal Lymph Node, Station 11R, Fine Needle Aspiration (FNA):   A. Negative for malignant cells.   B. Benign bronchial cells (in cellblock), histiocytes, multinucleated histiocytes, and acute necrotizing inflammation.   C. No fungal organisms identified by GMS stain.  2. Mediastinal Lymph Node, Station 7, Fine Needle Aspiration (FNA):   A. Negative for malignant cells.   B. Numerous benign bronchial cells, cartilage fragment, and mixed inflammatory cells.   C. No fungal organisms identified by GMS stain.       Cytology Initial Interpretation --     Adequacy interpretation applies to both sites. Results reported to Dr. Tilley in Endoscopy.  1. 1 pass, 2 slides, cellblock, & GMS.  2. 1 pass, 2 slides, cellblock, & GMS.       Specimen Adequacy FNA immediate cytologic evaluation,  unsatisfactory     Gross Description --     1. 2 smears for cytology, cellblock, & GMS stain for fungus.    2. 2 smears for cytology, cellblock, & GMS stain for fungus.         Microscopic Description --     1. Benign bronchial cells (in cellblock), histiocytes, multinucleated histiocytes, and acute necrotizing inflammation.  2. Numerous benign bronchial cells, cartilage fragment, and mixed inflammatory cells.    Screened by SILKE Bhatia        Respiratory Culture - Brushing, Lung, Right Lower Lobe [[id number]] Collected: 03/15/21 1040    Specimen: Brushing from Lung, Right Lower Lobe Updated: 03/16/21 0915     Respiratory Culture No growth     Gram Stain Rare (1+) WBCs seen      Occasional Epithelial cells per low power field      No organisms seen    BAL Culture, Quantitative - Lavage, Lung, Right Lower Lobe [[id number]] Collected: 03/15/21 1026    Specimen: Lavage from Lung, Right Lower Lobe Updated: 03/16/21 0915     BAL Culture 25,000 CFU/mL Normal Respiratory Thalia: NO S.aureus/MRSA or Pseudomonas aeruginosa     Gram Stain Rare (1+) WBCs seen      Rare (1+) Epithelial cells per low power field      No organisms seen    Tissue / Bone Culture - Lymph Node, Mediastinum [[id number]] Collected: 03/15/21 1005    Specimen: Lymph Node from Mediastinum Updated: 03/16/21 0909     Tissue Culture Scant growth (1+) Normal Respiratory Thalia     Gram Stain Occasional WBCs seen      No organisms seen    Ferritin [[id number]]  (Abnormal) Collected: 03/16/21 0718    Specimen: Blood Updated: 03/16/21 0909     Ferritin 1,679.00 ng/mL     Narrative:      Results may be falsely decreased if patient taking Biotin.      Iron Profile [[id number]]  (Abnormal) Collected: 03/16/21 0718    Specimen: Blood Updated: 03/16/21 0900     Iron 31 mcg/dL      Iron Saturation 12 %      Transferrin 180 mg/dL      TIBC 268 mcg/dL            Assessment/Plan       Pericardial effusion    ESRD on dialysis (CMS/HCC)    Idiopathic peripheral neuropathy    HTN  (hypertension)    History of COVID-19    NICM (nonischemic cardiomyopathy) (CMS/HCC)    Anemia in ESRD (end-stage renal disease) (CMS/HCC)    Elevated brain natriuretic peptide (BNP) level    Hilar adenopathy    Right lower lobe lung mass    Mediastinal adenopathy       Assessment & Plan     Mr. Buchanan is a pleasant 36-year-old gentleman with bilateral lung consolidation, right greater than left with some cavitation.  He also has mediastinal and hilar lymphadenopathy.  Given his age and history, this is suspected to be related to his recent COVID-19 infection.  He underwent bronchoscopy with EBUS yesterday to rule out malignancy.  Pathology is pending.  Large pericardial effusion is present.  Patient does have ESRD.  IR plans to hold off on intervention.  We could perform a robot-assisted pericardial window if necessary.  Discussed with Dr. Billingsley.    CATIA White  [facility]  03/16/21  12:35 EDT    Patient was seen and assessed while wearing personal protective equipment including facemask, protective eyewear and gloves.  Hand hygiene performed prior to entering the room and upon exiting with doffing of gloves.

## 2021-03-17 LAB
ALBUMIN SERPL-MCNC: 3.4 G/DL (ref 3.5–5.2)
ANION GAP SERPL CALCULATED.3IONS-SCNC: 13.7 MMOL/L (ref 5–15)
BACTERIA SPEC AEROBE CULT: NORMAL
BACTERIA SPEC AEROBE CULT: NORMAL
BACTERIA SPEC RESP CULT: NO GROWTH
BASOPHILS # BLD AUTO: 0.09 10*3/MM3 (ref 0–0.2)
BASOPHILS NFR BLD AUTO: 0.9 % (ref 0–1.5)
BUN SERPL-MCNC: 48 MG/DL (ref 6–20)
BUN/CREAT SERPL: 6.4 (ref 7–25)
CALCIUM SPEC-SCNC: 8.5 MG/DL (ref 8.6–10.5)
CHLORIDE SERPL-SCNC: 101 MMOL/L (ref 98–107)
CO2 SERPL-SCNC: 22.3 MMOL/L (ref 22–29)
CREAT SERPL-MCNC: 7.53 MG/DL (ref 0.76–1.27)
DEPRECATED RDW RBC AUTO: 50.5 FL (ref 37–54)
EOSINOPHIL # BLD AUTO: 0 10*3/MM3 (ref 0–0.4)
EOSINOPHIL NFR BLD AUTO: 0 % (ref 0.3–6.2)
ERYTHROCYTE [DISTWIDTH] IN BLOOD BY AUTOMATED COUNT: 15.8 % (ref 12.3–15.4)
GFR SERPL CREATININE-BSD FRML MDRD: 8 ML/MIN/1.73
GFR SERPL CREATININE-BSD FRML MDRD: ABNORMAL ML/MIN/{1.73_M2}
GLUCOSE SERPL-MCNC: 85 MG/DL (ref 65–99)
GRAM STN SPEC: NORMAL
HCT VFR BLD AUTO: 23.9 % (ref 37.5–51)
HGB BLD-MCNC: 7.6 G/DL (ref 13–17.7)
IMM GRANULOCYTES # BLD AUTO: 0.46 10*3/MM3 (ref 0–0.05)
IMM GRANULOCYTES NFR BLD AUTO: 4.7 % (ref 0–0.5)
LYMPHOCYTES # BLD AUTO: 1.33 10*3/MM3 (ref 0.7–3.1)
LYMPHOCYTES NFR BLD AUTO: 13.5 % (ref 19.6–45.3)
MCH RBC QN AUTO: 29.3 PG (ref 26.6–33)
MCHC RBC AUTO-ENTMCNC: 31.8 G/DL (ref 31.5–35.7)
MCV RBC AUTO: 92.3 FL (ref 79–97)
MONOCYTES # BLD AUTO: 0.71 10*3/MM3 (ref 0.1–0.9)
MONOCYTES NFR BLD AUTO: 7.2 % (ref 5–12)
NEUTROPHILS NFR BLD AUTO: 7.25 10*3/MM3 (ref 1.7–7)
NEUTROPHILS NFR BLD AUTO: 73.7 % (ref 42.7–76)
NRBC BLD AUTO-RTO: 0.2 /100 WBC (ref 0–0.2)
PHOSPHATE SERPL-MCNC: 4.8 MG/DL (ref 2.5–4.5)
PLATELET # BLD AUTO: 203 10*3/MM3 (ref 140–450)
PMV BLD AUTO: 9.5 FL (ref 6–12)
POTASSIUM SERPL-SCNC: 5.2 MMOL/L (ref 3.5–5.2)
RBC # BLD AUTO: 2.59 10*6/MM3 (ref 4.14–5.8)
SODIUM SERPL-SCNC: 137 MMOL/L (ref 136–145)
WBC # BLD AUTO: 9.84 10*3/MM3 (ref 3.4–10.8)

## 2021-03-17 PROCEDURE — 5A1D70Z PERFORMANCE OF URINARY FILTRATION, INTERMITTENT, LESS THAN 6 HOURS PER DAY: ICD-10-PCS | Performed by: INTERNAL MEDICINE

## 2021-03-17 PROCEDURE — 99231 SBSQ HOSP IP/OBS SF/LOW 25: CPT | Performed by: NURSE PRACTITIONER

## 2021-03-17 PROCEDURE — 80069 RENAL FUNCTION PANEL: CPT | Performed by: INTERNAL MEDICINE

## 2021-03-17 PROCEDURE — 85025 COMPLETE CBC W/AUTO DIFF WBC: CPT | Performed by: INTERNAL MEDICINE

## 2021-03-17 RX ORDER — LISINOPRIL 10 MG/1
10 TABLET ORAL
Status: DISCONTINUED | OUTPATIENT
Start: 2021-03-18 | End: 2021-03-18

## 2021-03-17 RX ADMIN — CALCIUM ACETATE 2001 MG: 667 CAPSULE ORAL at 12:02

## 2021-03-17 RX ADMIN — SODIUM CHLORIDE, PRESERVATIVE FREE 10 ML: 5 INJECTION INTRAVENOUS at 20:37

## 2021-03-17 RX ADMIN — CALCIUM ACETATE 2001 MG: 667 CAPSULE ORAL at 07:46

## 2021-03-17 RX ADMIN — SODIUM CHLORIDE, PRESERVATIVE FREE 10 ML: 5 INJECTION INTRAVENOUS at 09:37

## 2021-03-17 RX ADMIN — CARVEDILOL 25 MG: 25 TABLET, FILM COATED ORAL at 09:36

## 2021-03-17 RX ADMIN — CARVEDILOL 25 MG: 25 TABLET, FILM COATED ORAL at 20:37

## 2021-03-17 RX ADMIN — LISINOPRIL 5 MG: 5 TABLET ORAL at 10:50

## 2021-03-17 RX ADMIN — CALCIUM ACETATE 2001 MG: 667 CAPSULE ORAL at 18:08

## 2021-03-17 NOTE — PROGRESS NOTES
"                                              LOS: 4 days   Patient Care Team:  Provider, No Known as PCP - General    Chief Complaint:  F/up lung mass and mediastinal adenopathies.    Subjective   Interval History  Had minimal sore throat after the bronchoscopy yesterday.  No cough or sputum production.  On RA.  Denies shortness of breath.  Improved overall.    REVIEW OF SYSTEMS:   Constitutional: No fever or chills.  Cardiovascular: No chest pain, palpitation and swelling in legs.    Ventilator/Non-Invasive Ventilation Settings (From admission, onward) Comment    None                Physical Exam:     Vital Signs  Temp:  [97.8 °F (36.6 °C)-98.4 °F (36.9 °C)] 98.4 °F (36.9 °C)  Heart Rate:  [80-88] 81  Resp:  [16-18] 18  BP: (128-150)/() 144/98    Intake/Output Summary (Last 24 hours) at 3/16/2021 2102  Last data filed at 3/16/2021 1843  Gross per 24 hour   Intake 1440 ml   Output 4000 ml   Net -2560 ml     Flowsheet Rows      First Filed Value   Admission Height  175.3 cm (69\") Documented at 03/12/2021 1726   Admission Weight  81.7 kg (180 lb 3.2 oz) Documented at 03/12/2021 1726          General Appearance:   Alert, cooperative, in no acute distress   ENMT:  Mallampati score 3, moist mucous membrane   Eyes:  Pupils equal and reactive to light. EOMI   Neck:   Trachea midline. No thyromegaly.   Lungs:    Minimal crackles at the bases but otherwise clear.  No wheezing.  Nonlabored breathing.    Heart:   Regular rhythm and normal rate, normal S1 and S2, no         murmur   Skin:   No rash   Abdomen:     Soft. No tenderness. No HSM.   Neuro:  Conscious, alert, oriented x3. Strength 5/5 in upper and lower  ext   Extremities:  No cyanosis, clubbing or edema.  Warm extremities and well-perfused.  Fistula left arm.          Results Review:        Results from last 7 days   Lab Units 03/15/21  0534 03/13/21  0535 03/12/21  1220   SODIUM mmol/L 138 136 138   POTASSIUM mmol/L 5.2 5.7* 5.6*   CHLORIDE mmol/L 99 90* 91* "   CO2 mmol/L 23.0 27.6 30.9*   BUN mg/dL 43* 72* 57*   CREATININE mg/dL 5.76* 8.26* 7.71*   GLUCOSE mg/dL 92 112* 121*   CALCIUM mg/dL 8.7 8.8 9.3     Results from last 7 days   Lab Units 03/12/21  1220   TROPONIN T ng/mL 0.083*     Results from last 7 days   Lab Units 03/15/21  0534 03/13/21  0535 03/12/21  1220   WBC 10*3/mm3 9.93 10.31 10.99*   HEMOGLOBIN g/dL 7.9* 8.1* 8.5*   HEMATOCRIT % 24.9* 25.2* 26.7*   PLATELETS 10*3/mm3 206 233 254     Results from last 7 days   Lab Units 03/15/21  0534   INR  1.30*   APTT seconds 33.5     Results from last 7 days   Lab Units 03/12/21  1220   PROBNP pg/mL >70,000.0*       I reviewed the patient's new clinical results.        Medication Review:   calcium acetate, 2,001 mg, Oral, TID With Meals  carvedilol, 25 mg, Oral, Q12H  lisinopril, 5 mg, Oral, Q24H  sodium chloride, 10 mL, Intravenous, Q12H        sodium chloride, 30 mL/hr, Last Rate: Stopped (03/15/21 1126)        Diagnostic imaging:  I personally and independently reviewed the following images:      Assessment   1. Masslike consolidation in the right lower lobe: Not explained by recent Covid infection which usually represent as bilateral groundglass infiltrates initially which may later progressed into fibrosis but not a discrete masslike consolidation.  In addition he was not severely ill with pneumonia and stayed at home throughout prior Covid illness  2. Mediastinal and hilar adenopathies.  Could be reactive, inflammatory or simply related to cardiomyopathy and ESRD (i.e. fluid overload)  3. Pericardial effusion and ascites: Anasarca  4. Systolic cardiomyopathy  5. ESRD, on HD  6. COVID-19 URI December 2020     S/p TB NA station 7 and station 11 R.  Multi nucleated histiocytes and necrotizing inflammation.  Negative for malignancy.  Transbronchial biopsies from the right lower lobe lung mass negative for malignancy.  BAL unrevealing but its more lymphocytic.    Overall picture is suggestive of inflammatory  disorders such as sarcoidosis but not definitive.  Vasculitis is also possible though this was previously evaluated by serology and was negative.  Inflammatory condition could explain pericardial effusion and could also explain his kidney disease.  Infection with fungus/yeast is possible as well but less likely since patient is not immunocompromised.        Plan     Discussed the finding on the bronchoscopy with the patient.  I think at this point we should proceed with more tissue diagnosis.  Unfortunately the yield of TBNA and transbronchial biopsies is not great(for nonmalignant conditions) but it was the least invasive procedure.  Malignancy appears to be very unlikely but we need to establish a diagnosis.  From here, hold is the need CT-guided biopsy or mediastinoscopy +/-VATS biopsy of the right lower lobe mass.  Will discuss that with thoracic surgery tomorrow.    Time>25 min face to face and on the alex >1/2 directed toward counseling and coordination of care    Brian Tilley MD  03/16/21  21:02 EDT          This note was dictated utilizing [employer] dictation

## 2021-03-17 NOTE — PROGRESS NOTES
[facility] Medicine Services  PROGRESS NOTE    Patient Name: Chandler Buchanan  : 1985  MRN: [id number]    Date of Admission: 3/12/2021  Primary Care Physician: Provider, No Known    Subjective   Subjective     CC:  Follow-up swelling and lung mass    HPI:  Resting calmly this morning.  No new complaints.    Review of Systems  No current fevers or chills  No current shortness of breath or cough  No current nausea, vomiting, or diarrhea  No current chest pain or palpitations      Objective   Objective     Vital Signs:   Temp:  [98 °F (36.7 °C)-98.5 °F (36.9 °C)] 98.5 °F (36.9 °C)  Heart Rate:  [77-85] 77  Resp:  [18] 18  BP: (139-154)/() 143/105        Physical Exam:  Constitutional:Awake, alert  HENT: NCAT, mucous membranes moist, neck supple  Respiratory: Clear to auscultation bilaterally, respiratory effort normal, nonlabored breathing   Cardiovascular: RRR, normal radial pulses  Gastrointestinal: Positive bowel sounds, soft, nontender, nondistended  Musculoskeletal: Normal musculature for age, BMI is 26, resolving lower extremity edema  Psychiatric: Appropriate affect, cooperative, conversational  Neurologic: No slurred speech or facial droop, follows commands  Skin: No rashes or jaundice, warm      Results Reviewed:  Results from last 7 days   Lab Units 03/17/21  0454 03/15/21  0534 21  0535   WBC 10*3/mm3 9.84 9.93 10.31   HEMOGLOBIN g/dL 7.6* 7.9* 8.1*   HEMATOCRIT % 23.9* 24.9* 25.2*   PLATELETS 10*3/mm3 203 206 233   INR   --  1.30*  --      Results from last 7 days   Lab Units 03/17/21  0454 03/15/21  0534 21  0535 21  1220   SODIUM mmol/L 137 138 136 138   POTASSIUM mmol/L 5.2 5.2 5.7* 5.6*   CHLORIDE mmol/L 101 99 90* 91*   CO2 mmol/L 22.3 23.0 27.6 30.9*   BUN mg/dL 48* 43* 72* 57*   CREATININE mg/dL 7.53* 5.76* 8.26* 7.71*   GLUCOSE mg/dL 85 92 112* 121*   CALCIUM mg/dL 8.5* 8.7 8.8 9.3   ALT (SGPT) U/L  --   --   --  137*   AST (SGOT) U/L  --   --   --  157*    TROPONIN T ng/mL  --   --   --  0.083*   PROBNP pg/mL  --   --   --  >70,000.0*     Estimated Creatinine Clearance: 15 mL/min (A) (by C-G formula based on SCr of 7.53 mg/dL (H)).    Microbiology Results Abnormal     Procedure Component Value - Date/Time    BAL Culture, Quantitative - Lavage, Lung, Right Lower Lobe [[id number]] Collected: 03/15/21 1026    Lab Status: Final result Specimen: Lavage from Lung, Right Lower Lobe Updated: 03/17/21 0931     BAL Culture 25,000 CFU/mL Normal Respiratory Thalia: NO S.aureus/MRSA or Pseudomonas aeruginosa     Gram Stain Rare (1+) WBCs seen      Rare (1+) Epithelial cells per low power field      No organisms seen    Respiratory Culture - Brushing, Lung, Right Lower Lobe [[id number]] Collected: 03/15/21 1040    Lab Status: Final result Specimen: Brushing from Lung, Right Lower Lobe Updated: 03/17/21 0922     Respiratory Culture No growth     Gram Stain Rare (1+) WBCs seen      Occasional Epithelial cells per low power field      No organisms seen    Tissue / Bone Culture - Lymph Node, Mediastinum [[id number]] Collected: 03/15/21 1005    Lab Status: Final result Specimen: Lymph Node from Mediastinum Updated: 03/17/21 0722     Tissue Culture Scant growth (1+) Normal Respiratory Thalia     Gram Stain Occasional WBCs seen      No organisms seen    AFB Culture - Brushing, Lung, Right Lower Lobe [[id number]] Collected: 03/15/21 1040    Lab Status: Preliminary result Specimen: Brushing from Lung, Right Lower Lobe Updated: 03/16/21 1302     AFB Stain No acid fast bacilli seen on direct smear    AFB Culture - Lavage, Lung, Right Lower Lobe [[id number]] Collected: 03/15/21 1026    Lab Status: Preliminary result Specimen: Lavage from Lung, Right Lower Lobe Updated: 03/16/21 1233     AFB Stain No acid fast bacilli seen on concentrated smear    COVID PRE-OP / PRE-PROCEDURE SCREENING ORDER (NO ISOLATION) - Swab, Nasopharynx [[id number]]  (Normal) Collected: 03/12/21 3918    Lab Status: Final result  Specimen: Swab from Nasopharynx Updated: 03/12/21 2205    Narrative:      The following orders were created for panel order COVID PRE-OP / PRE-PROCEDURE SCREENING ORDER (NO ISOLATION) - Swab, Nasopharynx.  Procedure                               Abnormality         Status                     ---------                               -----------         ------                     COVID-19,APTIMA PANTHER,...[[id number]]  Normal              Final result                 Please view results for these tests on the individual orders.    COVID-19,APTIMA PANTHER,ART IN-HOUSE, NP/OP SWAB IN UTM/VTM/SALINE TRANSPORT MEDIA,24 HR TAT - Swab, Nasopharynx [[id number]]  (Normal) Collected: 03/12/21 1738    Lab Status: Final result Specimen: Swab from Nasopharynx Updated: 03/12/21 2205     COVID19 Not Detected    Narrative:      Fact sheet for providers: https://www.fda.gov/media/305508/download     Fact sheet for patients: https://www.fda.gov/media/928177/download    Test performed by RT PCR.          Imaging Results (Last 24 Hours)     ** No results found for the last 24 hours. **          Results for orders placed during the hospital encounter of 03/12/21    Adult Transthoracic Echo Limited W/ Cont if Necessary Per Protocol    Interpretation Summary  · There is a large (>2cm) circumferential pericardial effusion(more fluid localized posteriorly) with evidence of cardiac tamponade( early diastolic RV and RA collapse) noted. IVC is dilated.  · Visually estimated LVEF seems to be significantly reduced.      I have reviewed the medications:  Scheduled Meds:calcium acetate, 2,001 mg, Oral, TID With Meals  carvedilol, 25 mg, Oral, Q12H  lisinopril, 5 mg, Oral, Q24H  sodium chloride, 10 mL, Intravenous, Q12H      Continuous Infusions:sodium chloride, 30 mL/hr, Last Rate: Stopped (03/15/21 1126)      PRN Meds:.•  acetaminophen **OR** acetaminophen **OR** acetaminophen  •  docusate sodium  •  famotidine  •  ondansetron **OR** ondansetron  •   sodium chloride  •  traMADol    Assessment/Plan   Assessment & Plan     Active Hospital Problems    Diagnosis  POA   • **Pericardial effusion [I31.3]  Yes   • Hilar adenopathy [R59.0]  Yes   • Elevated brain natriuretic peptide (BNP) level [R79.89]  Yes   • Right lower lobe lung mass [R91.8]  Unknown   • Mediastinal adenopathy [R59.0]  Unknown   • Anemia in ESRD (end-stage renal disease) (CMS/[facility]) [N18.6, D63.1]  Yes   • NICM (nonischemic cardiomyopathy) (CMS/[facility]) [I42.8]  Yes   • HTN (hypertension) [I10]  Yes   • History of COVID-19 [Z86.16]  Yes   • Idiopathic peripheral neuropathy [G60.9]  Yes   • ESRD on dialysis (CMS/[facility]) [N18.6, Z99.2]  Not Applicable      Resolved Hospital Problems   No resolved problems to display.        Brief Hospital Course to date:  Chandler Buchanan is a 36 y.o. male with past medical history of end-stage renal disease who is currently on home hemodialysis presents to the hospital with large pericardial effusion, volume overload, progressive shortness of breath, and large diffuse lymphadenopathy and lung mass in the setting of recent COVID-19.    Discussion/plan:  -Case discussed with cardiology at length.  Patient is not felt to be in tamponade.  No JVD on exam.  Continues to be hemodynamically stable.      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  Pulmonology recommending further tissue biopsy.  Plan to follow-up on thoracic surgery recommendations from today.    -Hemodialysis has drastically improved hypervolemia.  Nephrology managing.  Discussed with dialysis they are going to review need for EPO.    -Monitor anemia no bleeding.  No indication for transfusion currently.    -Continue home carvedilol for blood pressure, lisinopril added per cardiology.  Monitor pressure.    -Supportive care and symptom treatment with careful monitoring.    DVT Prophylaxis: Mechanical    Disposition: Likely home when work-up of chest mass complete    CODE STATUS:   Code  Status and Medical Interventions:   Ordered at: 03/12/21 1716     Code Status:    CPR     Medical Interventions (Level of Support Prior to Arrest):    Full       Willem Crespo MD  03/17/21

## 2021-03-17 NOTE — PROGRESS NOTES
NEPHROLOGY PROGRESS NOTE    PATIENT IDENTIFICATION:   Name:  Chandler Buchanan      MRN:  [id number]     36 y.o.  male             Reason for visit: ESRD    SUBJECTIVE:   Breathing is comfortable, with resolving cough.  Headed to HD shortly.  Blood pressures remain generous.  Long-acting LASHON, Mircera, given 1 week ago (dosed every 2 weeks).  Pulmonary and thoracic surgery discussing possible mediastinoscopy +/- VATS     OBJECTIVE:  Vitals:    03/16/21 2349 03/17/21 0100 03/17/21 0406 03/17/21 0700   BP: (!) 146/106 140/98 (!) 154/106 (!) 143/105   BP Location: Right arm Right arm Right arm Right arm   Patient Position: Sitting  Lying Sitting   Pulse: 78  77    Resp: 18  18 18   Temp: 98.5 °F (36.9 °C)  98.5 °F (36.9 °C)    TempSrc: Oral  Oral Oral   SpO2: 100%  95%    Weight:   79.2 kg (174 lb 11.2 oz)    Height:               Body mass index is 25.8 kg/m².    Intake/Output Summary (Last 24 hours) at 3/17/2021 1320  Last data filed at 3/17/2021 1238  Gross per 24 hour   Intake 960 ml   Output --   Net 960 ml     Wt Readings from Last 1 Encounters:   03/17/21 0406 79.2 kg (174 lb 11.2 oz)   03/16/21 0437 78 kg (172 lb)   03/15/21 0520 81.8 kg (180 lb 5.4 oz)   03/14/21 0600 81.2 kg (179 lb 0.2 oz)   03/14/21 0500 81.2 kg (179 lb 1.6 oz)   03/13/21 0554 82.2 kg (181 lb 4.8 oz)   03/12/21 1726 81.7 kg (180 lb 3.2 oz)     Wt Readings from Last 3 Encounters:   03/17/21 79.2 kg (174 lb 11.2 oz)   03/12/21 83.5 kg (184 lb)   03/08/21 80.3 kg (177 lb 0.5 oz)         Exam:  NAD; pleasant; oriented; gaunt  Flat affect  Chronically ill-appearing; pale  MMM; AT/NC   No eye discharge; no scleral icterus  No JVD; no carotid bruits  Decreased breath sounds both bases; not labored on room air  RRR, 2/6M, no rub  Soft, not tender, +D, BS+  No lower extremity edema  Left arm AV fistula patent  No clubbing  No asterixis  Moves all extremities       Scheduled meds:    calcium acetate, 2,001 mg, Oral, TID With Meals  carvedilol, 25  mg, Oral, Q12H  lisinopril, 5 mg, Oral, Q24H  sodium chloride, 10 mL, Intravenous, Q12H      IV meds:                        sodium chloride, 30 mL/hr, Last Rate: Stopped (03/15/21 1126)        Data Review:    Results from last 7 days   Lab Units 03/17/21  0454 03/15/21  0534 03/13/21  0535 03/12/21  1220   SODIUM mmol/L 137 138 136 138   POTASSIUM mmol/L 5.2 5.2 5.7* 5.6*   CHLORIDE mmol/L 101 99 90* 91*   CO2 mmol/L 22.3 23.0 27.6 30.9*   BUN mg/dL 48* 43* 72* 57*   CREATININE mg/dL 7.53* 5.76* 8.26* 7.71*   CALCIUM mg/dL 8.5* 8.7 8.8 9.3   BILIRUBIN mg/dL  --   --   --  0.8   ALK PHOS U/L  --   --   --  133*   ALT (SGPT) U/L  --   --   --  137*   AST (SGOT) U/L  --   --   --  157*   GLUCOSE mg/dL 85 92 112* 121*     Estimated Creatinine Clearance: 15 mL/min (A) (by C-G formula based on SCr of 7.53 mg/dL (H)).      Results from last 7 days   Lab Units 03/17/21  0454 03/15/21  0534 03/13/21  0535   MAGNESIUM mg/dL  --   --  2.8*   PHOSPHORUS mg/dL 4.8* 5.6* 8.6*       Results from last 7 days   Lab Units 03/17/21  0454 03/15/21  0534 03/13/21  0535 03/12/21  1220   WBC 10*3/mm3 9.84 9.93 10.31 10.99*   HEMOGLOBIN g/dL 7.6* 7.9* 8.1* 8.5*   PLATELETS 10*3/mm3 203 206 233 254       Results from last 7 days   Lab Units 03/15/21  0534   INR  1.30*             ASSESSMENT:     Pericardial effusion    ESRD on dialysis (CMS/HCC)    Idiopathic peripheral neuropathy    HTN (hypertension)    History of COVID-19    NICM (nonischemic cardiomyopathy) (CMS/HCC)    Anemia in ESRD (end-stage renal disease) (CMS/HCC)    Elevated brain natriuretic peptide (BNP) level    Hilar adenopathy    Right lower lobe lung mass    Mediastinal adenopathy    1.  ESRD: volume excess, centrally and peripherally, improving.  Electrolytes are stable following 3 days of second of dialysis.  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   2.  Large pericardial effusion (> 2 cm circumferentially):  no tamponade noted by echocardiogram 3/12  3.  Anemia of ESRD: not at goal; on long-acting Mircera, with next dose scheduled 1 week from now.  Very low iron saturation, but ferritin nearly 1700  4.  Mediastinal and bilateral hilar lymphadenopathy as well as large mass-like opacity right lower lobe with central cavitation  5.  Abdominal distention with ascites and transaminitis  6.  Covid-19 infection, December' 20  7.  Hypertension      PLAN:  1.  HD today with additional volume removal  2.  Pulmonary and thoracic surgery considering possible mediastinoscopy +/- VATS  3.  Increase lisinopril to 10 mg daily  4.  Next dose of LASHON in one week  5.  Discussed with Dr. Zak Samano MD  3/17/2021    13:20 EDT

## 2021-03-17 NOTE — PAYOR COMM NOTE
"Chandler Buchanan (36 y.o. Male)     ATTN: CONTINUED STAY CLINICALS FOR REVIEW:  [phone]     DEPT: NEYDA DIAZ RN,[facility]; [phone],  [phone]        Date of Birth Social Security Number Address Home Phone MRN    1985  [id number] [address] [phone] [id number]    Orthodox Marital Status          None Single       Admission Date Admission Type Admitting Provider Attending Provider Department, Room/Bed    3/12/21 Urgent Willem Sanchez MD Furlow, Stephen Matthew, MD [address], [address]663/1    Discharge Date Discharge Disposition Discharge Destination                       Attending Provider: Willem Crespo MD    Allergies: No Known Allergies    Isolation: None   Infection: None   Code Status: CPR    Ht: 175.3 cm (69\")   Wt: 79.2 kg (174 lb 11.2 oz)    Admission Cmt: None   Principal Problem: Pericardial effusion [I31.3]                 Active Insurance as of 3/12/2021     Primary Coverage     Payor Plan Insurance Group Employer/Plan Group    [place] [id number]     Payor Plan Address Payor Plan Phone Number Payor Plan Fax Number Effective Dates    [employer] [address] [address] [phone]  1/1/2020 - None Entered    Formerly [facility] [address]       Subscriber Name Subscriber Birth Date Member ID       CHANDLER BUCHANAN 1985 [id number]                 Emergency Contacts      (Rel.) Home Phone Work Phone Mobile Phone    JALIL CAMPOS (Father) [phone] -- --    GISELLA CAMPOS (Mother) -- -- [phone]    Brionna Dave (Sister) -- -- [phone]            Vital Signs (last 2 days)     Date/Time   Temp   Temp src   Pulse   Resp   BP   Patient Position   SpO2    03/17/21 0700   --   Oral   --   18   (!) 143/105   Sitting   --    03/17/21 0406   98.5 (36.9)   Oral   77   18   (!) 154/106   Lying   95    03/17/21 0100   --   --   --   --   140/98   --   --    03/16/21 2349   98.5 (36.9)   Oral   78  "  18   (!) 146/106   Sitting   100    03/16/21 1926   98.4 (36.9)   Oral   81   18   144/98   Sitting   91    03/16/21 1345   98 (36.7)   Oral   85   18   139/91   Lying   93    03/16/21 0814   97.8 (36.6)   Oral   80   18   (!) 144/103   Lying   --    03/16/21 0215   --   --   88   18   128/82   --   95    03/15/21 2346   98.1 (36.7)   Oral   84   16   (!) 150/105   Sitting   97    03/15/21 2251   98 (36.7)   Temporal   83   16   139/90   Lying   --    03/15/21 1905   98.4 (36.9)   Temporal   87   16   (!) 172/102   Lying   --    03/15/21 1600   --   --   86   16   138/90   Sitting   91    03/15/21 1400   --   --   75   18   (!) 139/102   Sitting   92    03/15/21 1303   --   --   80   18   128/96   Sitting   92    03/15/21 1235   --   --   82   20   128/89   Sitting   91    03/15/21 1225   97.7 (36.5)   Oral   72   20   122/92   Sitting   95    03/15/21 1152   --   --   73   20   117/86   --   93    03/15/21 1142   --   --   76   20   105/71   --   95    03/15/21 1132   --   --   78   20   100/70   --   93    03/15/21 1122   --   --   78   20   118/72   --   95    03/15/21 1117   --   --   78   20   114/74   --   93    03/15/21 1102   --   --   78   20   112/75   --   98    03/15/21 0905   --   --   76   16   136/97   Lying   95    03/15/21 0757   98.4 (36.9)   Oral   --   18   (!) 146/102   Lying   --            Oxygen Therapy (last 2 days)     Date/Time   SpO2   Device (Oxygen Therapy)   Flow (L/min)   Oxygen Concentration (%)   ETCO2 (mmHg)    03/17/21 0937   --   room air   --   --   --    03/17/21 0700   --   room air   --   --   --    03/17/21 0406   95   room air   --   --   --    03/17/21 0002   --   room air   --   --   --    03/16/21 2349   100   room air   --   --   --    03/16/21 1950   --   nasal cannula   2   --   --    03/16/21 1926   91   nasal cannula   2   --   --    03/16/21 1345   93   room air   --   --   --    03/16/21 0830   --   nasal cannula   2   --   --    03/16/21 0215   95   --   2   --    --    03/16/21 0000   --   nasal cannula   2   --   --    03/15/21 2346   97   --   --   --   --    03/15/21 1600   91   nasal cannula   2   --   --    03/15/21 1400   92   nasal cannula   2   --   --    03/15/21 1303   92   nasal cannula   2   --   --    03/15/21 1235   91   nasal cannula   2   --   --    03/15/21 1225   95   nasal cannula   2   --   --    03/15/21 1152   93   nasal cannula   3   --   --    03/15/21 1142   95   nasal cannula   3   --   --    03/15/21 1132   93   nasal cannula   4   --   --    03/15/21 1122   95   nasal cannula   4   --   --    03/15/21 1117   93   nasal cannula   2   --   --    03/15/21 1102   98   nasal cannula   2   --   --    03/15/21 0905   95   room air   --   --   --    03/15/21 0800   --   room air   --   --   --            Intake & Output (last 2 days)       03/15 0701 - 03/16 0700 03/16 0701 - 03/17 0700 03/17 0701 - 03/18 0700    P.O. 940 980 720    I.V. (mL/kg) 500 (6.4)      Total Intake(mL/kg) 1440 (18.5) 980 (12.4) 720 (9.1)    Other 4000      Total Output 4000      Net -2560 +980 +720               Lines, Drains & Airways    Active LDAs     Name:   Placement date:   Placement time:   Site:   Days:    Peripheral IV 03/16/21 1600 Right Forearm   03/16/21    1600    Forearm   less than 1           Lab Results (last 48 hours)     Procedure Component Value Units Date/Time    BAL Culture, Quantitative - Lavage, Lung, Right Lower Lobe [[id number]] Collected: 03/15/21 1026    Specimen: Lavage from Lung, Right Lower Lobe Updated: 03/17/21 0931     BAL Culture 25,000 CFU/mL Normal Respiratory Thalia: NO S.aureus/MRSA or Pseudomonas aeruginosa     Gram Stain Rare (1+) WBCs seen      Rare (1+) Epithelial cells per low power field      No organisms seen    Respiratory Culture - Brushing, Lung, Right Lower Lobe [[id number]] Collected: 03/15/21 1040    Specimen: Brushing from Lung, Right Lower Lobe Updated: 03/17/21 0922     Respiratory Culture No growth     Gram Stain Rare (1+) WBCs  seen      Occasional Epithelial cells per low power field      No organisms seen    Tissue / Bone Culture - Lymph Node, Mediastinum [[id number]] Collected: 03/15/21 1005    Specimen: Lymph Node from Mediastinum Updated: 03/17/21 0722     Tissue Culture Scant growth (1+) Normal Respiratory Thalia     Gram Stain Occasional WBCs seen      No organisms seen    Renal Function Panel [[id number]]  (Abnormal) Collected: 03/17/21 0454    Specimen: Blood Updated: 03/17/21 0635     Glucose 85 mg/dL      BUN 48 mg/dL      Creatinine 7.53 mg/dL      Sodium 137 mmol/L      Potassium 5.2 mmol/L      Chloride 101 mmol/L      CO2 22.3 mmol/L      Calcium 8.5 mg/dL      Albumin 3.40 g/dL      Phosphorus 4.8 mg/dL      Anion Gap 13.7 mmol/L      BUN/Creatinine Ratio 6.4     eGFR Non African Amer 8 mL/min/1.73      Comment: <15 Indicative of kidney failure.        eGFR   Amer --     Comment: <15 Indicative of kidney failure.       Narrative:      GFR Normal >60  Chronic Kidney Disease <60  Kidney Failure <15      CBC & Differential [[id number]]  (Abnormal) Collected: 03/17/21 0454    Specimen: Blood Updated: 03/17/21 0601    Narrative:      The following orders were created for panel order CBC & Differential.  Procedure                               Abnormality         Status                     ---------                               -----------         ------                     CBC Auto Differential[[id number]]        Abnormal            Final result                 Please view results for these tests on the individual orders.    CBC Auto Differential [[id number]]  (Abnormal) Collected: 03/17/21 0454    Specimen: Blood Updated: 03/17/21 0601     WBC 9.84 10*3/mm3      RBC 2.59 10*6/mm3      Hemoglobin 7.6 g/dL      Hematocrit 23.9 %      MCV 92.3 fL      MCH 29.3 pg      MCHC 31.8 g/dL      RDW 15.8 %      RDW-SD 50.5 fl      MPV 9.5 fL      Platelets 203 10*3/mm3      Neutrophil % 73.7 %      Lymphocyte % 13.5 %      Monocyte %  7.2 %      Eosinophil % 0.0 %      Basophil % 0.9 %      Immature Grans % 4.7 %      Neutrophils, Absolute 7.25 10*3/mm3      Lymphocytes, Absolute 1.33 10*3/mm3      Monocytes, Absolute 0.71 10*3/mm3      Eosinophils, Absolute 0.00 10*3/mm3      Basophils, Absolute 0.09 10*3/mm3      Immature Grans, Absolute 0.46 10*3/mm3      nRBC 0.2 /100 WBC     Non-gynecologic Cytology [[id number]] Collected: 03/15/21 1026    Specimen: Lavage from Lung, Right Lower Lobe; Lavage from Lung, Right Lower Lobe Updated: 03/16/21 1543     Case Report --     Non-gynecologic Cytology                          Case: [id number]                                  Authorizing Provider:  Brian Tilley MD          Collected:           03/15/2021 10:26 AM          Ordering Location:     [facility]  Received:            03/15/2021 02:01 PM                                 ENDO SUITES                                                                  Pathologist:           Fran Blanton MD                                                         Specimens:   1) - Lung, Right Lower Lobe, BAL RLL                                                                2) - Lung, Right Lower Lobe, RLL BRUSHING                                                   Final Diagnosis --     1. Lung, Right Lower Lobe, Bronchoalveolar Lavage (BAL):   A. Negative for malignant cells.   B. Pulmonary macrophages, benign bronchial cells, squamous cells, acute inflammation, and blood.   C. No fungal organisms identified by GMS stain.  2. Lung, Right Lower Lobe, Brushing:   A. Negative for malignant cells.   B. Pulmonary macrophages, benign bronchial cells, neutrophils, and blood.       Clinical Information --     CYTOLOGY: FUNGUS       Gross Description --     1. 1 vial received containing 5 ml of blood tinged fluid w/ small amount of mucus. 1 thin prep, cellblock, & GMS stain for fungus. No remaining fluid.    2. 1 thin prep vial received w/ brush included,  "along with 2 air-dried smears. 1 thin prep, 2 Diff-Quik smears, & cellblock.       Microscopic Description --     1. Pulmonary macrophages, benign bronchial cells, squamous cells, acute inflammation, and blood.  2. Pulmonary macrophages, benign bronchial cells, neutrophils, and blood.    Screened by SILKE Lazaro.         Special Stains --     Several special stains are performed on part 1 including Ki67, CD56, Synaptophysin and Cytokeratin AE1/AE3.  Cytokeratin is positive.  CD56 and Synaptophysin were negative.  Ki67 was positive with approximately 1%.  The staining pattern helps confirm benign bronchial tissue.   melvi/jse       Tissue Pathology Exam [[id number]] Collected: 03/15/21 1040    Specimen: Tissue from Lung, Right Lower Lobe Updated: 03/16/21 1511     Case Report --     Surgical Pathology Report                         Case: [id number]                                  Authorizing Provider:  Brian Tilley MD          Collected:           03/15/2021 10:40 AM          Ordering Location:     [facility]  Received:            03/15/2021 12:50 PM                                 ENDO SUITES                                                                  Pathologist:           Fran Blanton MD                                                         Specimen:    Lung, Right Lower Lobe, RLL BXS                                                             Final Diagnosis --     1.  Lung,  Right Lower Lobe, Biopsy:   Benign alveolated pulmonary parenchyma with    A.  Hemorrhage likely related to biopsy procedure.   B.  Reactive bronchial epithelial cells and rare cells suggestive of reactive squamous metaplasia       melvi/brb        Gross Description --     1.  Received in formalin labeled with the patient's name and designated \"Right lower lung lobe biopsy.\" The specimen consists of multiple irregular pink to red soft tissue fragments measuring 1.1 x 0.3 x 0.1 cm in total aggregate dimension. The specimen " is entirely submitted in cassette 1A.    Total blocks: 1    Mb/uso/melvi/tkd       Special Stains --     Several special stains are performed including P40, CK5/6, TTF1, Synaptophysin, CD56 and Ki67.  Synaptophysin and CD56 are negative.  Ki67 has a very low positivity.  TTF1, CK5/6 and P40 do stain cells.  This suggests that there is reactive bronchial cells and possibly an area of squamous metaplasia.  melvi/jse       ANCA Panel [[id number]] Collected: 03/13/21 0535    Specimen: Blood Updated: 03/16/21 1412     Myeloperoxidase Ab <9.0 U/mL      Antiproteinase 3 (KY-3) <3.5 U/mL      C-ANCA <1:20 titer      P-ANCA <1:20 titer      Comment: The presence of positive fluorescence exhibiting P-ANCA or C-ANCA  patterns alone is not specific for the diagnosis of Wegener's  Granulomatosis (WG) or microscopic polyangiitis. Decisions about  treatment should not be based solely on ANCA IFA results.  The  International ANCA Group Consensus recommends follow up testing of  positive sera with both KY-3 and MPO-ANCA enzyme immunoassays. As  many as 5% serum samples are positive only by EIA.  Ref. AM J Clin Pathol 1999;111:507-513.        Atypical pANCA <1:20 titer      Comment: The atypical pANCA pattern has been observed in a significant  percentage of patients with ulcerative colitis, primary sclerosing  cholangitis and autoimmune hepatitis.       Narrative:      Performed at:  [address]  [id number]  : Sreedhar Solorzano MD, Phone:  [phone]  Performed at:  02 - [address]  [id number]  : Tito Laurent PhD, Phone:  [phone]    AFB Culture - Brushing, Lung, Right Lower Lobe [[id number]] Collected: 03/15/21 1040    Specimen: Brushing from Lung, Right Lower Lobe Updated: 03/16/21 1302     AFB Stain No acid fast bacilli seen on direct smear    AFB Culture - Lavage, Lung, Right Lower Lobe [[id number]] Collected: 03/15/21 1026     Specimen: Lavage from Lung, Right Lower Lobe Updated: 03/16/21 1233     AFB Stain No acid fast bacilli seen on concentrated smear    Body Fluid Cell Count With Differential - Lavage, Lung, Right Lower Lobe [[id number]]  (Abnormal) Collected: 03/15/21 1026    Specimen: Lavage from Lung, Right Lower Lobe Updated: 03/16/21 1154    Narrative:      The following orders were created for panel order Body Fluid Cell Count With Differential - Lavage, Lung, Right Lower Lobe.  Procedure                               Abnormality         Status                     ---------                               -----------         ------                     Body fluid cell count - ...[[id number]]  Abnormal            Edited Result - FINAL      Body fluid differential ...[[id number]]                      Edited Result - FINAL        Please view results for these tests on the individual orders.    Body fluid differential - Lavage, Lung, Right Lower Lobe [[id number]] Collected: 03/15/21 1026    Specimen: Lavage from Lung, Right Lower Lobe Updated: 03/16/21 1154     Neutrophils, Fluid 57 %      Lymphocytes, Fluid 26 %      Monocytes, Fluid 8 %      Mononuclear, Fluid 9 %     Body fluid cell count - Lavage, Lung, Right Lower Lobe [[id number]]  (Abnormal) Collected: 03/15/21 1026    Specimen: Lavage from Lung, Right Lower Lobe Updated: 03/16/21 1154     Color, Fluid Red     Appearance, Fluid Cloudy     RBC, Fluid 32,500 /mm3      Comment: Estimated count due to clots present in specimen.   Corrected result. Previous result was 325,000 /mm3 on 3/15/2021 at 1221 EDT.        Nucleated Cells, Fluid 55 /mm3      Comment: Estimated count due to clots present in specimen.         Method: Hemacytometer Method    Narrative:      No reference range established. Physician to interpret results with clinical findings.    Fine Needle Aspiration [[id number]] Collected: 03/15/21 0956    Specimen: Lymph Node from Mediastinum, Lymph Node from Mediastinum Updated:  03/16/21 1123     Case Report --     Medical Cytology Report                           Case: [id number]                                 Authorizing Provider:  Brian Tilley MD          Collected:           03/15/2021 09:56 AM          Ordering Location:     [facility]  Received:            03/15/2021 11:27 AM                                 ENDO SUITES                                                                  Pathologist:           Fran Blanton MD                                                         Specimens:   1) - Mediastinum, TBNA 11R                                                                          2) - Mediastinum, TBNA 7                                                                    Final Diagnosis --     1. Mediastinal Lymph Node, Station 11R, Fine Needle Aspiration (FNA):   A. Negative for malignant cells.   B. Benign bronchial cells (in cellblock), histiocytes, multinucleated histiocytes, and acute necrotizing inflammation.   C. No fungal organisms identified by GMS stain.  2. Mediastinal Lymph Node, Station 7, Fine Needle Aspiration (FNA):   A. Negative for malignant cells.   B. Numerous benign bronchial cells, cartilage fragment, and mixed inflammatory cells.   C. No fungal organisms identified by GMS stain.       Cytology Initial Interpretation --     Adequacy interpretation applies to both sites. Results reported to Dr. Tilley in Endoscopy.  1. 1 pass, 2 slides, cellblock, & GMS.  2. 1 pass, 2 slides, cellblock, & GMS.       Specimen Adequacy FNA immediate cytologic evaluation, unsatisfactory     Gross Description --     1. 2 smears for cytology, cellblock, & GMS stain for fungus.    2. 2 smears for cytology, cellblock, & GMS stain for fungus.         Microscopic Description --     1. Benign bronchial cells (in cellblock), histiocytes, multinucleated histiocytes, and acute necrotizing inflammation.  2. Numerous benign bronchial cells, cartilage fragment, and  mixed inflammatory cells.    Screened by SILKE Lazaro.        Ferritin [[id number]]  (Abnormal) Collected: 03/16/21 0718    Specimen: Blood Updated: 03/16/21 0909     Ferritin 1,679.00 ng/mL     Narrative:      Results may be falsely decreased if patient taking Biotin.      Iron Profile [[id number]]  (Abnormal) Collected: 03/16/21 0718    Specimen: Blood Updated: 03/16/21 0900     Iron 31 mcg/dL      Iron Saturation 12 %      Transferrin 180 mg/dL      TIBC 268 mcg/dL         ECG/EMG Results (last 48 hours)     Procedure Component Value Units Date/Time    Adult Transthoracic Echo Limited W/ Cont if Necessary Per Protocol [[id number]] Collected: 03/16/21 0728     Updated: 03/16/21 0833     BSA 1.9 m^2       CV ECHO DONALD - BZI_BMI 25.4 kilograms/m^2       CV ECHO DONALD - BSA(HAYCOCK) 2.0 m^2       CV ECHO DONALD - BZI_METRIC_WEIGHT 78.0 kg       CV ECHO DONALD - BZI_METRIC_HEIGHT 175.3 cm      Target HR (85%) 156 bpm      Max. Pred. HR (100%) 183 bpm     Narrative:      · There is a large (>2cm) circumferential pericardial effusion(more fluid   localized posteriorly) with evidence of cardiac tamponade( early diastolic   RV and RA collapse) noted. IVC is dilated.  · Visually estimated LVEF seems to be significantly reduced.             Orders (last 48 hrs)      Start     Ordered    03/18/21 0900  lisinopril (PRINIVIL,ZESTRIL) tablet 10 mg  Every 24 Hours Scheduled      03/17/21 1327    03/17/21 0600  CBC & Differential  Morning Draw      03/16/21 1319    03/17/21 0600  Renal Function Panel  Morning Draw      03/16/21 1319    03/17/21 0600  CBC Auto Differential  PROCEDURE ONCE      03/17/21 0005    03/17/21 0000  Hemodialysis Inpatient  Once     Comments: No heparin    03/16/21 1320    03/16/21 1415  lisinopril (PRINIVIL,ZESTRIL) tablet 5 mg  Every 24 Hours Scheduled,   Status:  Discontinued      03/16/21 1319    03/16/21 1303  famotidine (PEPCID) tablet 20 mg  Daily PRN      03/16/21 1304    03/16/21 1302  traMADol  (ULTRAM) tablet 50 mg  Every 12 Hours PRN      03/16/21 1304    03/16/21 0934  Case Request  Once      03/16/21 0933    03/16/21 0600  Ferritin  Morning Draw      03/15/21 1736 03/16/21 0600  Iron Profile  Morning Draw      03/15/21 1736    03/15/21 2029  albumin human 25 % IV SOLN 12.5 g  As Needed      03/15/21 2029    03/15/21 2024  Hemodialysis Inpatient  Once     Comments: No heparin    03/15/21 2028    03/15/21 1603  Adult Transthoracic Echo Limited W/ Cont if Necessary Per Protocol  Once      03/15/21 1602    03/15/21 0910  sodium chloride 0.9 % infusion  Continuous      03/15/21 0908    03/14/21 0800  calcium acetate (PHOS BINDER)) capsule 2,001 mg  3 Times Daily With Meals      03/13/21 2016 03/13/21 0902  traMADol (ULTRAM) tablet 50 mg  Every 6 Hours PRN,   Status:  Discontinued      03/13/21 0902    03/13/21 0902  docusate sodium (COLACE) capsule 100 mg  2 Times Daily PRN      03/13/21 0902    03/13/21 0902  famotidine (PEPCID) tablet 20 mg  2 Times Daily PRN,   Status:  Discontinued      03/13/21 0902    03/12/21 2100  sodium chloride 0.9 % flush 10 mL  Every 12 Hours Scheduled      03/12/21 1716 03/12/21 2100  carvedilol (COREG) tablet 25 mg  Every 12 Hours Scheduled      03/12/21 1911 03/12/21 2000  Vital Signs  Every 4 Hours      03/12/21 1716 03/12/21 1717  Daily Weights  Daily      03/12/21 1716 03/12/21 1716  ondansetron (ZOFRAN) tablet 4 mg  Every 6 Hours PRN      03/12/21 1716    03/12/21 1716  ondansetron (ZOFRAN) injection 4 mg  Every 6 Hours PRN      03/12/21 1716    03/12/21 1716  acetaminophen (TYLENOL) tablet 650 mg  Every 4 Hours PRN      03/12/21 1716    03/12/21 1716  acetaminophen (TYLENOL) 160 MG/5ML solution 650 mg  Every 4 Hours PRN      03/12/21 1716 03/12/21 1716  acetaminophen (TYLENOL) suppository 650 mg  Every 4 Hours PRN      03/12/21 1716 03/12/21 1715  Intake & Output  Every Shift      03/12/21 1716 03/12/21 1714  sodium chloride 0.9 % flush 10 mL   As Needed      03/12/21 1716    Unscheduled  Up with assistance  As Needed      03/12/21 1716    Signed and Held  albumin human 25 % IV SOLN 12.5 g  As Needed      Signed and Held    Signed and Held  albumin human 25 % IV SOLN 12.5 g  As Needed      Signed and Held    Signed and Held  albumin human 25 % IV SOLN 12.5 g  As Needed      Signed and Held    --  BRONCHOSCOPY      03/15/21 0846    Signed and Held  albumin human 25 % IV SOLN 12.5 g  As Needed      Signed and Held                 Physician Progress Notes (last 48 hours) (Notes from 03/15/21 1340 through 03/17/21 1340)      Dre Samano MD at 03/17/21 1320              NEPHROLOGY PROGRESS NOTE    PATIENT IDENTIFICATION:   Name:  Chandler Buchanan      MRN:  [id number]     36 y.o.  male             Reason for visit: ESRD    SUBJECTIVE:   Breathing is comfortable, with resolving cough.  Headed to HD shortly.  Blood pressures remain generous.  Long-acting LASHON, Mircera, given 1 week ago (dosed every 2 weeks).  Pulmonary and thoracic surgery discussing possible mediastinoscopy +/- VATS     OBJECTIVE:  Vitals:    03/16/21 2349 03/17/21 0100 03/17/21 0406 03/17/21 0700   BP: (!) 146/106 140/98 (!) 154/106 (!) 143/105   BP Location: Right arm Right arm Right arm Right arm   Patient Position: Sitting  Lying Sitting   Pulse: 78  77    Resp: 18  18 18   Temp: 98.5 °F (36.9 °C)  98.5 °F (36.9 °C)    TempSrc: Oral  Oral Oral   SpO2: 100%  95%    Weight:   79.2 kg (174 lb 11.2 oz)    Height:               Body mass index is 25.8 kg/m².    Intake/Output Summary (Last 24 hours) at 3/17/2021 1320  Last data filed at 3/17/2021 1238  Gross per 24 hour   Intake 960 ml   Output --   Net 960 ml     Wt Readings from Last 1 Encounters:   03/17/21 0406 79.2 kg (174 lb 11.2 oz)   03/16/21 0437 78 kg (172 lb)   03/15/21 0520 81.8 kg (180 lb 5.4 oz)   03/14/21 0600 81.2 kg (179 lb 0.2 oz)   03/14/21 0500 81.2 kg (179 lb 1.6 oz)   03/13/21 0554 82.2 kg (181 lb 4.8 oz)    03/12/21 1726 81.7 kg (180 lb 3.2 oz)     Wt Readings from Last 3 Encounters:   03/17/21 79.2 kg (174 lb 11.2 oz)   03/12/21 83.5 kg (184 lb)   03/08/21 80.3 kg (177 lb 0.5 oz)       Exam:  NAD; pleasant; oriented; gaunt  Flat affect  Chronically ill-appearing; pale  MMM; AT/NC   No eye discharge; no scleral icterus  No JVD; no carotid bruits  Decreased breath sounds both bases; not labored on room air  RRR, 2/6M, no rub  Soft, not tender, +D, BS+  No lower extremity edema  Left arm AV fistula patent  No clubbing  No asterixis  Moves all extremities       Scheduled meds:    calcium acetate, 2,001 mg, Oral, TID With Meals  carvedilol, 25 mg, Oral, Q12H  lisinopril, 5 mg, Oral, Q24H  sodium chloride, 10 mL, Intravenous, Q12H      IV meds:                        sodium chloride, 30 mL/hr, Last Rate: Stopped (03/15/21 1126)      Data Review:    Results from last 7 days   Lab Units 03/17/21  0454 03/15/21  0534 03/13/21  0535 03/12/21  1220   SODIUM mmol/L 137 138 136 138   POTASSIUM mmol/L 5.2 5.2 5.7* 5.6*   CHLORIDE mmol/L 101 99 90* 91*   CO2 mmol/L 22.3 23.0 27.6 30.9*   BUN mg/dL 48* 43* 72* 57*   CREATININE mg/dL 7.53* 5.76* 8.26* 7.71*   CALCIUM mg/dL 8.5* 8.7 8.8 9.3   BILIRUBIN mg/dL  --   --   --  0.8   ALK PHOS U/L  --   --   --  133*   ALT (SGPT) U/L  --   --   --  137*   AST (SGOT) U/L  --   --   --  157*   GLUCOSE mg/dL 85 92 112* 121*     Estimated Creatinine Clearance: 15 mL/min (A) (by C-G formula based on SCr of 7.53 mg/dL (H)).      Results from last 7 days   Lab Units 03/17/21  0454 03/15/21  0534 03/13/21  0535   MAGNESIUM mg/dL  --   --  2.8*   PHOSPHORUS mg/dL 4.8* 5.6* 8.6*       Results from last 7 days   Lab Units 03/17/21  0454 03/15/21  0534 03/13/21  0535 03/12/21  1220   WBC 10*3/mm3 9.84 9.93 10.31 10.99*   HEMOGLOBIN g/dL 7.6* 7.9* 8.1* 8.5*   PLATELETS 10*3/mm3 203 206 233 254       Results from last 7 days   Lab Units 03/15/21  0534   INR  1.30*         ASSESSMENT:     Pericardial  effusion    ESRD on dialysis (CMS/HCC)    Idiopathic peripheral neuropathy    HTN (hypertension)    History of COVID-19    NICM (nonischemic cardiomyopathy) (CMS/HCC)    Anemia in ESRD (end-stage renal disease) (CMS/HCC)    Elevated brain natriuretic peptide (BNP) level    Hilar adenopathy    Right lower lobe lung mass    Mediastinal adenopathy    1.  ESRD: volume excess, centrally and peripherally, improving.  Electrolytes are stable following 3 days of second of dialysis.  Underlying renal disease likely autoimmune (renal bx in  showed severe glomerular and tubulo-inbsteritial fibrosis, with collapsing variant FSGS or C3 GN as possible considerations), which may have bearing on other disease processes in play now.   2.  Large pericardial effusion (> 2 cm circumferentially):  no tamponade noted by echocardiogram 3/12  3.  Anemia of ESRD: not at goal; on long-acting Mircera, with next dose scheduled 1 week from now.  Very low iron saturation, but ferritin nearly 1700  4.  Mediastinal and bilateral hilar lymphadenopathy as well as large mass-like opacity right lower lobe with central cavitation  5.  Abdominal distention with ascites and transaminitis  6.  Covid-19 infection,   7.  Hypertension      PLAN:  1.  HD today with additional volume removal  2.  Pulmonary and thoracic surgery considering possible mediastinoscopy +/- VATS  3.  Increase lisinopril to 10 mg daily  4.  Next dose of LASHON in one week  5.  Discussed with Dr. Zak Samano MD  3/17/2021    13:20 EDT     Electronically signed by Dre Samano MD at 21 1326     Willem Crespo MD at 21 1052              [facility] Medicine Services  PROGRESS NOTE    Patient Name: Chandler Buchanan  : 1985  MRN: [id number]    Date of Admission: 3/12/2021  Primary Care Physician: Provider, No Known    Subjective   Subjective     CC:  Follow-up swelling and lung mass    HPI:  Resting calmly this  morning.  No new complaints.    Review of Systems  No current fevers or chills  No current shortness of breath or cough  No current nausea, vomiting, or diarrhea  No current chest pain or palpitations      Objective   Objective     Vital Signs:   Temp:  [98 °F (36.7 °C)-98.5 °F (36.9 °C)] 98.5 °F (36.9 °C)  Heart Rate:  [77-85] 77  Resp:  [18] 18  BP: (139-154)/() 143/105        Physical Exam:  Constitutional:Awake, alert  HENT: NCAT, mucous membranes moist, neck supple  Respiratory: Clear to auscultation bilaterally, respiratory effort normal, nonlabored breathing   Cardiovascular: RRR, normal radial pulses  Gastrointestinal: Positive bowel sounds, soft, nontender, nondistended  Musculoskeletal: Normal musculature for age, BMI is 26, resolving lower extremity edema  Psychiatric: Appropriate affect, cooperative, conversational  Neurologic: No slurred speech or facial droop, follows commands  Skin: No rashes or jaundice, warm      Results Reviewed:  Results from last 7 days   Lab Units 03/17/21  0454 03/15/21  0534 03/13/21  0535   WBC 10*3/mm3 9.84 9.93 10.31   HEMOGLOBIN g/dL 7.6* 7.9* 8.1*   HEMATOCRIT % 23.9* 24.9* 25.2*   PLATELETS 10*3/mm3 203 206 233   INR   --  1.30*  --      Results from last 7 days   Lab Units 03/17/21  0454 03/15/21  0534 03/13/21  0535 03/12/21  1220   SODIUM mmol/L 137 138 136 138   POTASSIUM mmol/L 5.2 5.2 5.7* 5.6*   CHLORIDE mmol/L 101 99 90* 91*   CO2 mmol/L 22.3 23.0 27.6 30.9*   BUN mg/dL 48* 43* 72* 57*   CREATININE mg/dL 7.53* 5.76* 8.26* 7.71*   GLUCOSE mg/dL 85 92 112* 121*   CALCIUM mg/dL 8.5* 8.7 8.8 9.3   ALT (SGPT) U/L  --   --   --  137*   AST (SGOT) U/L  --   --   --  157*   TROPONIN T ng/mL  --   --   --  0.083*   PROBNP pg/mL  --   --   --  >70,000.0*     Estimated Creatinine Clearance: 15 mL/min (A) (by C-G formula based on SCr of 7.53 mg/dL (H)).    Microbiology Results Abnormal     Procedure Component Value - Date/Time    BAL Culture, Quantitative - Lavage,  Lung, Right Lower Lobe [[id number]] Collected: 03/15/21 1026    Lab Status: Final result Specimen: Lavage from Lung, Right Lower Lobe Updated: 03/17/21 0931     BAL Culture 25,000 CFU/mL Normal Respiratory Thalia: NO S.aureus/MRSA or Pseudomonas aeruginosa     Gram Stain Rare (1+) WBCs seen      Rare (1+) Epithelial cells per low power field      No organisms seen    Respiratory Culture - Brushing, Lung, Right Lower Lobe [[id number]] Collected: 03/15/21 1040    Lab Status: Final result Specimen: Brushing from Lung, Right Lower Lobe Updated: 03/17/21 0922     Respiratory Culture No growth     Gram Stain Rare (1+) WBCs seen      Occasional Epithelial cells per low power field      No organisms seen    Tissue / Bone Culture - Lymph Node, Mediastinum [[id number]] Collected: 03/15/21 1005    Lab Status: Final result Specimen: Lymph Node from Mediastinum Updated: 03/17/21 0722     Tissue Culture Scant growth (1+) Normal Respiratory Thalia     Gram Stain Occasional WBCs seen      No organisms seen    AFB Culture - Brushing, Lung, Right Lower Lobe [[id number]] Collected: 03/15/21 1040    Lab Status: Preliminary result Specimen: Brushing from Lung, Right Lower Lobe Updated: 03/16/21 1302     AFB Stain No acid fast bacilli seen on direct smear    AFB Culture - Lavage, Lung, Right Lower Lobe [[id number]] Collected: 03/15/21 1026    Lab Status: Preliminary result Specimen: Lavage from Lung, Right Lower Lobe Updated: 03/16/21 1233     AFB Stain No acid fast bacilli seen on concentrated smear    COVID PRE-OP / PRE-PROCEDURE SCREENING ORDER (NO ISOLATION) - Swab, Nasopharynx [[id number]]  (Normal) Collected: 03/12/21 1738    Lab Status: Final result Specimen: Swab from Nasopharynx Updated: 03/12/21 2205    Narrative:      The following orders were created for panel order COVID PRE-OP / PRE-PROCEDURE SCREENING ORDER (NO ISOLATION) - Swab, Nasopharynx.  Procedure                               Abnormality         Status                      ---------                               -----------         ------                     COVID-19,APTIMA PANTHER,...[[id number]]  Normal              Final result                 Please view results for these tests on the individual orders.    COVID-19,APTIMA PANTHER,ART IN-HOUSE, NP/OP SWAB IN UTM/VTM/SALINE TRANSPORT MEDIA,24 HR TAT - Swab, Nasopharynx [[id number]]  (Normal) Collected: 03/12/21 1738    Lab Status: Final result Specimen: Swab from Nasopharynx Updated: 03/12/21 2205     COVID19 Not Detected    Narrative:      Fact sheet for providers: https://www.fda.gov/media/927392/download     Fact sheet for patients: https://www.fda.gov/media/406830/download    Test performed by RT PCR.        Results for orders placed during the hospital encounter of 03/12/21    Adult Transthoracic Echo Limited W/ Cont if Necessary Per Protocol    Interpretation Summary  · There is a large (>2cm) circumferential pericardial effusion(more fluid localized posteriorly) with evidence of cardiac tamponade( early diastolic RV and RA collapse) noted. IVC is dilated.  · Visually estimated LVEF seems to be significantly reduced.      I have reviewed the medications:  Scheduled Meds:calcium acetate, 2,001 mg, Oral, TID With Meals  carvedilol, 25 mg, Oral, Q12H  lisinopril, 5 mg, Oral, Q24H  sodium chloride, 10 mL, Intravenous, Q12H      Continuous Infusions:sodium chloride, 30 mL/hr, Last Rate: Stopped (03/15/21 1126)      PRN Meds:.•  acetaminophen **OR** acetaminophen **OR** acetaminophen  •  docusate sodium  •  famotidine  •  ondansetron **OR** ondansetron  •  sodium chloride  •  traMADol    Assessment/Plan   Assessment & Plan     Active Hospital Problems    Diagnosis  POA   • **Pericardial effusion [I31.3]  Yes   • Hilar adenopathy [R59.0]  Yes   • Elevated brain natriuretic peptide (BNP) level [R79.89]  Yes   • Right lower lobe lung mass [R91.8]  Unknown   • Mediastinal adenopathy [R59.0]  Unknown   • Anemia in ESRD (end-stage renal  disease) (CMS/[facility]) [N18.6, D63.1]  Yes   • NICM (nonischemic cardiomyopathy) (CMS/[facility]) [I42.8]  Yes   • HTN (hypertension) [I10]  Yes   • History of COVID-19 [Z86.16]  Yes   • Idiopathic peripheral neuropathy [G60.9]  Yes   • ESRD on dialysis (CMS/[facility]) [N18.6, Z99.2]  Not Applicable      Resolved Hospital Problems   No resolved problems to display.        Brief Hospital Course to date:  Chandler Buchanan is a 36 y.o. male with past medical history of end-stage renal disease who is currently on home hemodialysis presents to the hospital with large pericardial effusion, volume overload, progressive shortness of breath, and large diffuse lymphadenopathy and lung mass in the setting of recent COVID-19.    Discussion/plan:  -Case discussed with cardiology at length.  Patient is not felt to be in tamponade.  No JVD on exam.  Continues to be hemodynamically stable.      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  Pulmonology recommending further tissue biopsy.  Plan to follow-up on thoracic surgery recommendations from today.    -Hemodialysis has drastically improved hypervolemia.  Nephrology managing.  Discussed with dialysis they are going to review need for EPO.    -Monitor anemia no bleeding.  No indication for transfusion currently.    -Continue home carvedilol for blood pressure, lisinopril added per cardiology.  Monitor pressure.    -Supportive care and symptom treatment with careful monitoring.    DVT Prophylaxis: Mechanical    Disposition: Likely home when work-up of chest mass complete    CODE STATUS:   Code Status and Medical Interventions:   Ordered at: 03/12/21 9498     Code Status:    CPR     Medical Interventions (Level of Support Prior to Arrest):    Full       Willem Crespo MD  03/17/21       Electronically signed by Willem Crespo MD at 03/17/21 7402     Brian Tilley MD at 03/16/21 5665                                                        LOS: 4 days  "  Patient Care Team:  Provider, No Known as PCP - General    Chief Complaint:  F/up lung mass and mediastinal adenopathies.    Subjective   Interval History  Had minimal sore throat after the bronchoscopy yesterday.  No cough or sputum production.  On RA.  Denies shortness of breath.  Improved overall.    REVIEW OF SYSTEMS:   Constitutional: No fever or chills.  Cardiovascular: No chest pain, palpitation and swelling in legs.    Ventilator/Non-Invasive Ventilation Settings (From admission, onward) Comment    None                Physical Exam:     Vital Signs  Temp:  [97.8 °F (36.6 °C)-98.4 °F (36.9 °C)] 98.4 °F (36.9 °C)  Heart Rate:  [80-88] 81  Resp:  [16-18] 18  BP: (128-150)/() 144/98    Intake/Output Summary (Last 24 hours) at 3/16/2021 2102  Last data filed at 3/16/2021 1843  Gross per 24 hour   Intake 1440 ml   Output 4000 ml   Net -2560 ml     Flowsheet Rows      First Filed Value   Admission Height  175.3 cm (69\") Documented at 03/12/2021 1726   Admission Weight  81.7 kg (180 lb 3.2 oz) Documented at 03/12/2021 1726          General Appearance:   Alert, cooperative, in no acute distress   ENMT:  Mallampati score 3, moist mucous membrane   Eyes:  Pupils equal and reactive to light. EOMI   Neck:   Trachea midline. No thyromegaly.   Lungs:    Minimal crackles at the bases but otherwise clear.  No wheezing.  Nonlabored breathing.    Heart:   Regular rhythm and normal rate, normal S1 and S2, no         murmur   Skin:   No rash   Abdomen:     Soft. No tenderness. No HSM.   Neuro:  Conscious, alert, oriented x3. Strength 5/5 in upper and lower  ext   Extremities:  No cyanosis, clubbing or edema.  Warm extremities and well-perfused.  Fistula left arm.          Results Review:        Results from last 7 days   Lab Units 03/15/21  0534 03/13/21  0535 03/12/21  1220   SODIUM mmol/L 138 136 138   POTASSIUM mmol/L 5.2 5.7* 5.6*   CHLORIDE mmol/L 99 90* 91*   CO2 mmol/L 23.0 27.6 30.9*   BUN mg/dL 43* 72* 57* "   CREATININE mg/dL 5.76* 8.26* 7.71*   GLUCOSE mg/dL 92 112* 121*   CALCIUM mg/dL 8.7 8.8 9.3     Results from last 7 days   Lab Units 03/12/21  1220   TROPONIN T ng/mL 0.083*     Results from last 7 days   Lab Units 03/15/21  0534 03/13/21  0535 03/12/21  1220   WBC 10*3/mm3 9.93 10.31 10.99*   HEMOGLOBIN g/dL 7.9* 8.1* 8.5*   HEMATOCRIT % 24.9* 25.2* 26.7*   PLATELETS 10*3/mm3 206 233 254     Results from last 7 days   Lab Units 03/15/21  0534   INR  1.30*   APTT seconds 33.5     Results from last 7 days   Lab Units 03/12/21  1220   PROBNP pg/mL >70,000.0*       I reviewed the patient's new clinical results.        Medication Review:   calcium acetate, 2,001 mg, Oral, TID With Meals  carvedilol, 25 mg, Oral, Q12H  lisinopril, 5 mg, Oral, Q24H  sodium chloride, 10 mL, Intravenous, Q12H        sodium chloride, 30 mL/hr, Last Rate: Stopped (03/15/21 1126)        Diagnostic imaging:  I personally and independently reviewed the following images:      Assessment   1. Masslike consolidation in the right lower lobe: Not explained by recent Covid infection which usually represent as bilateral groundglass infiltrates initially which may later progressed into fibrosis but not a discrete masslike consolidation.  In addition he was not severely ill with pneumonia and stayed at home throughout prior Covid illness  2. Mediastinal and hilar adenopathies.  Could be reactive, inflammatory or simply related to cardiomyopathy and ESRD (i.e. fluid overload)  3. Pericardial effusion and ascites: Anasarca  4. Systolic cardiomyopathy  5. ESRD, on HD  6. COVID-19 URI December 2020     S/p TB NA station 7 and station 11 R.  Multi nucleated histiocytes and necrotizing inflammation.  Negative for malignancy.  Transbronchial biopsies from the right lower lobe lung mass negative for malignancy.  BAL unrevealing but its more lymphocytic.    Overall picture is suggestive of inflammatory disorders such as sarcoidosis but not definitive.   Vasculitis is also possible though this was previously evaluated by serology and was negative.  Inflammatory condition could explain pericardial effusion and could also explain his kidney disease.  Infection with fungus/yeast is possible as well but less likely since patient is not immunocompromised.        Plan     Discussed the finding on the bronchoscopy with the patient.  I think at this point we should proceed with more tissue diagnosis.  Unfortunately the yield of TBNA and transbronchial biopsies is not great(for nonmalignant conditions) but it was the least invasive procedure.  Malignancy appears to be very unlikely but we need to establish a diagnosis.  From here, hold is the need CT-guided biopsy or mediastinoscopy +/-VATS biopsy of the right lower lobe mass.  Will discuss that with thoracic surgery tomorrow.    Time>25 min face to face and on the alex >1/2 directed toward counseling and coordination of care    Brian Tilley MD  03/16/21  21:02 EDT          This note was dictated utilizing Dragon dictation    Electronically signed by Brian Tilley MD at 03/16/21 2108     Dre Samano MD at 03/16/21 1313              NEPHROLOGY PROGRESS NOTE    PATIENT IDENTIFICATION:   Name:  Chandler Buchanan      MRN:  [id number]     36 y.o.  male             Reason for visit: ESRD    SUBJECTIVE:   Pulmonary performed bronchoscopy with EBUS done 3/15; cough has improved and his breathing is comfortable; improving orthopnea; appetite fair    OBJECTIVE:  Vitals:    03/15/21 2346 03/16/21 0215 03/16/21 0437 03/16/21 0814   BP: (!) 150/105 128/82  (!) 144/103   BP Location: Right arm   Right arm   Patient Position: Sitting   Lying   Pulse: 84 88  80   Resp: 16 18  18   Temp: 98.1 °F (36.7 °C)      TempSrc: Oral   Oral   SpO2: 97% 95%     Weight:   78 kg (172 lb)    Height:               Body mass index is 25.4 kg/m².    Intake/Output Summary (Last 24 hours) at 3/16/2021 1313  Last data filed at 3/16/2021  0900  Gross per 24 hour   Intake 1200 ml   Output 4000 ml   Net -2800 ml     Wt Readings from Last 1 Encounters:   03/16/21 0437 78 kg (172 lb)   03/15/21 0520 81.8 kg (180 lb 5.4 oz)   03/14/21 0600 81.2 kg (179 lb 0.2 oz)   03/14/21 0500 81.2 kg (179 lb 1.6 oz)   03/13/21 0554 82.2 kg (181 lb 4.8 oz)   03/12/21 1726 81.7 kg (180 lb 3.2 oz)     Wt Readings from Last 3 Encounters:   03/16/21 78 kg (172 lb)   03/12/21 83.5 kg (184 lb)   03/08/21 80.3 kg (177 lb 0.5 oz)         Exam:  NAD; pleasant; oriented; looks older than stated age  Flat affect; depressed mood  Chronically ill-appearing; pale  MMM; AT/NC   No eye discharge; no scleral icterus  No JVD; no carotid bruits  Decreased breath sounds both bases; not labored  supplemental O2  RRR, 2/6M, no rub  Soft, not tender, +D, BS+  No lower extremity edema  Left arm AV fistula patent  No clubbing  No asterixis  Moves all extremities       Scheduled meds:    calcium acetate, 2,001 mg, Oral, TID With Meals  carvedilol, 25 mg, Oral, Q12H  sodium chloride, 10 mL, Intravenous, Q12H      IV meds:                        sodium chloride, 30 mL/hr, Last Rate: Stopped (03/15/21 1126)        Data Review:    Results from last 7 days   Lab Units 03/15/21  0534 03/13/21  0535 03/12/21  1220   SODIUM mmol/L 138 136 138   POTASSIUM mmol/L 5.2 5.7* 5.6*   CHLORIDE mmol/L 99 90* 91*   CO2 mmol/L 23.0 27.6 30.9*   BUN mg/dL 43* 72* 57*   CREATININE mg/dL 5.76* 8.26* 7.71*   CALCIUM mg/dL 8.7 8.8 9.3   BILIRUBIN mg/dL  --   --  0.8   ALK PHOS U/L  --   --  133*   ALT (SGPT) U/L  --   --  137*   AST (SGOT) U/L  --   --  157*   GLUCOSE mg/dL 92 112* 121*     Estimated Creatinine Clearance: 19.4 mL/min (A) (by C-G formula based on SCr of 5.76 mg/dL (H)).      Results from last 7 days   Lab Units 03/15/21  0534 03/13/21  0535   MAGNESIUM mg/dL  --  2.8*   PHOSPHORUS mg/dL 5.6* 8.6*       Results from last 7 days   Lab Units 03/15/21  0534 03/13/21  0535 03/12/21  1220   WBC 10*3/mm3 9.93  10.31 10.99*   HEMOGLOBIN g/dL 7.9* 8.1* 8.5*   PLATELETS 10*3/mm3 206 233 254       Results from last 7 days   Lab Units 03/15/21  0534   INR  1.30*             ASSESSMENT:     Pericardial effusion    ESRD on dialysis (CMS/HCC)    Idiopathic peripheral neuropathy    HTN (hypertension)    History of COVID-19    NICM (nonischemic cardiomyopathy) (CMS/HCC)    Anemia in ESRD (end-stage renal disease) (CMS/HCC)    Elevated brain natriuretic peptide (BNP) level    Hilar adenopathy    Right lower lobe lung mass    Mediastinal adenopathy    1.  ESRD: volume excess, centrally and peripherally, improving.  Electrolytes are stable following 3 days of second of dialysis.  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   2.  Large pericardial effusion (> 2 cm circumferentially):  no tamponade noted by echocardiogram 3/12  3.  Anemia of ESRD: not at goal.  Very low iron saturation, but ferritin nearly 1700  4.  Mediastinal and bilateral hilar lymphadenopathy as well as large mass-like opacity right lower lobe with central cavitation  5.  Abdominal distention with ascites and transaminitis  6.  Covid-19 infection, December' 20  7.  Hypertension      PLAN:  1.  Next HD 3/17 with additional volume removal  2.  Follow-up results of bronchoscopy/EBUS with biopsy  3.  Add lisinopril 5 mg daily  4.  Discussed with patient's mother at bedside  5.  Discussed with Serenity Samano MD  3/16/2021    13:13 EDT     Electronically signed by Dre Samano MD at 03/16/21 1318     Maritza Pedraza APRN at 03/16/21 1235     Attestation signed by Fran Billingsley III, MD at 03/16/21 1706    Case discussed with  of cardiology.  Plan is to try to dialyze down the pericardial effusion.  He plans  "to repeat the echo as an outpatient in about a week or 2.  If the effusion is still present they will do a percutaneous drainage.  If the fluid recurs after a percutaneous drainage then he will consult us for pericardial window.  We are still waiting for the results of the lymph node biopsies.    I have seen and examined the patient. I have reviewed the chart and the xrays. The plan of care was discussed with the APRN and the patient/family.                         Chief Complaint: Pericardial effusion, lung mass, ESRD  S/P: Bronchoscopy with EBUS and biopsy  POD # 1    Subjective:  Symptoms:  Improved.    Diet:  Adequate intake.  No nausea or vomiting.    Activity level: Returning to normal.    Pain:  He reports no pain.        Vital Signs:  Temp:  [98 °F (36.7 °C)-98.4 °F (36.9 °C)] 98.1 °F (36.7 °C)  Heart Rate:  [75-88] 80  Resp:  [16-18] 18  BP: (128-172)/() 144/103    Intake & Output (last day)       03/15 0701 - 03/16 0700 03/16 0701 - 03/17 0700    P.O. 940 500    I.V. (mL/kg) 500 (6.4)     Total Intake(mL/kg) 1440 (18.5) 500 (6.4)    Other 4000     Total Output 4000     Net -2560 +500                Objective:  General Appearance:  Comfortable and in no acute distress.    Vital signs: (most recent): Blood pressure (!) 144/103, pulse 80, temperature 98.1 °F (36.7 °C), temperature source Oral, resp. rate 18, height 175.3 cm (69\"), weight 78 kg (172 lb), SpO2 95 %.  Vital signs are normal.    Lungs:  Normal effort and normal respiratory rate.    Heart: Normal rate.  Regular rhythm.    Chest: No chest wall tenderness.    Abdomen: Abdomen is soft.  Bowel sounds are normal.     Extremities: Normal range of motion.    Pulses: Distal pulses are intact.    Neurological: Patient is alert and oriented to person, place and time.            Results Review:     I reviewed the patient's new clinical results.  I reviewed the patient's new imaging results and agree with the interpretation.  I reviewed the patient's " other test results and agree with the interpretation  Discussed with patient, RN and Dr. Billingsley.    Imaging Results (Last 24 Hours)     Procedure Component Value Units Date/Time    XR Chest 1 View [[id number]] Collected: 03/15/21 1144     Updated: 03/15/21 1700    Narrative:      XR CHEST 1 VW-  INTRAOPERATIVE VIEWS 03/15/2021     HISTORY: Bronchoscopy.     Limited field-of-view image of the chest was used during bronchoscopy.  The bronchoscope is seen overlying the right lower lung. No unexpected  findings are noted.     Fluoroscopy time 1 minute 7 seconds, 1 image.     This report was finalized on 3/15/2021 4:57 PM by Dr. Santana Stahl M.D.             Lab Results:     Lab Results (last 24 hours)     Procedure Component Value Units Date/Time    AFB Culture - Lavage, Lung, Right Lower Lobe [[id number]] Collected: 03/15/21 1026    Specimen: Lavage from Lung, Right Lower Lobe Updated: 03/16/21 1233     AFB Stain No acid fast bacilli seen on concentrated smear    Body Fluid Cell Count With Differential - Lavage, Lung, Right Lower Lobe [[id number]]  (Abnormal) Collected: 03/15/21 1026    Specimen: Lavage from Lung, Right Lower Lobe Updated: 03/16/21 1154    Narrative:      The following orders were created for panel order Body Fluid Cell Count With Differential - Lavage, Lung, Right Lower Lobe.  Procedure                               Abnormality         Status                     ---------                               -----------         ------                     Body fluid cell count - ...[[id number]]  Abnormal            Edited Result - FINAL      Body fluid differential ...[[id number]]                      Edited Result - FINAL        Please view results for these tests on the individual orders.    Body fluid differential - Lavage, Lung, Right Lower Lobe [[id number]] Collected: 03/15/21 1026    Specimen: Lavage from Lung, Right Lower Lobe Updated: 03/16/21 1154     Neutrophils, Fluid 57 %      Lymphocytes, Fluid  26 %      Monocytes, Fluid 8 %      Mononuclear, Fluid 9 %     Body fluid cell count - Lavage, Lung, Right Lower Lobe [[id number]]  (Abnormal) Collected: 03/15/21 1026    Specimen: Lavage from Lung, Right Lower Lobe Updated: 03/16/21 1154     Color, Fluid Red     Appearance, Fluid Cloudy     RBC, Fluid 32,500 /mm3      Comment: Estimated count due to clots present in specimen.   Corrected result. Previous result was 325,000 /mm3 on 3/15/2021 at 1221 EDT.        Nucleated Cells, Fluid 55 /mm3      Comment: Estimated count due to clots present in specimen.         Method: Hemacytometer Method    Narrative:      No reference range established. Physician to interpret results with clinical findings.    Fine Needle Aspiration [[id number]] Collected: 03/15/21 0956    Specimen: Lymph Node from Mediastinum, Lymph Node from Mediastinum Updated: 03/16/21 1123     Case Report --     Medical Cytology Report                           Case: [id number]                                 Authorizing Provider:  Brian Tilley MD          Collected:           03/15/2021 09:56 AM          Ordering Location:     [facility]  Received:            03/15/2021 11:27 AM                                 ENDO SUITES                                                                  Pathologist:           Fran Blanton MD                                                         Specimens:   1) - Mediastinum, TBNA 11R                                                                          2) - Mediastinum, TBNA 7                                                                    Final Diagnosis --     1. Mediastinal Lymph Node, Station 11R, Fine Needle Aspiration (FNA):   A. Negative for malignant cells.   B. Benign bronchial cells (in cellblock), histiocytes, multinucleated histiocytes, and acute necrotizing inflammation.   C. No fungal organisms identified by GMS stain.  2. Mediastinal Lymph Node, Station 7, Fine Needle Aspiration  (FNA):   A. Negative for malignant cells.   B. Numerous benign bronchial cells, cartilage fragment, and mixed inflammatory cells.   C. No fungal organisms identified by GMS stain.       Cytology Initial Interpretation --     Adequacy interpretation applies to both sites. Results reported to Dr. Tilley in Endoscopy.  1. 1 pass, 2 slides, cellblock, & GMS.  2. 1 pass, 2 slides, cellblock, & GMS.       Specimen Adequacy FNA immediate cytologic evaluation, unsatisfactory     Gross Description --     1. 2 smears for cytology, cellblock, & GMS stain for fungus.    2. 2 smears for cytology, cellblock, & GMS stain for fungus.         Microscopic Description --     1. Benign bronchial cells (in cellblock), histiocytes, multinucleated histiocytes, and acute necrotizing inflammation.  2. Numerous benign bronchial cells, cartilage fragment, and mixed inflammatory cells.    Screened by SILKE Lazaro.        Respiratory Culture - Brushing, Lung, Right Lower Lobe [[id number]] Collected: 03/15/21 1040    Specimen: Brushing from Lung, Right Lower Lobe Updated: 03/16/21 0915     Respiratory Culture No growth     Gram Stain Rare (1+) WBCs seen      Occasional Epithelial cells per low power field      No organisms seen    BAL Culture, Quantitative - Lavage, Lung, Right Lower Lobe [[id number]] Collected: 03/15/21 1026    Specimen: Lavage from Lung, Right Lower Lobe Updated: 03/16/21 0915     BAL Culture 25,000 CFU/mL Normal Respiratory Thalia: NO S.aureus/MRSA or Pseudomonas aeruginosa     Gram Stain Rare (1+) WBCs seen      Rare (1+) Epithelial cells per low power field      No organisms seen    Tissue / Bone Culture - Lymph Node, Mediastinum [[id number]] Collected: 03/15/21 1005    Specimen: Lymph Node from Mediastinum Updated: 03/16/21 0909     Tissue Culture Scant growth (1+) Normal Respiratory Thalia     Gram Stain Occasional WBCs seen      No organisms seen    Ferritin [[id number]]  (Abnormal) Collected: 03/16/21 0718    Specimen: Blood  Updated: 21     Ferritin 1,679.00 ng/mL     Narrative:      Results may be falsely decreased if patient taking Biotin.      Iron Profile [[id number]]  (Abnormal) Collected: 21    Specimen: Blood Updated: 21     Iron 31 mcg/dL      Iron Saturation 12 %      Transferrin 180 mg/dL      TIBC 268 mcg/dL            Assessment/Plan       Pericardial effusion    ESRD on dialysis (CMS/HCC)    Idiopathic peripheral neuropathy    HTN (hypertension)    History of COVID-19    NICM (nonischemic cardiomyopathy) (CMS/HCC)    Anemia in ESRD (end-stage renal disease) (CMS/HCC)    Elevated brain natriuretic peptide (BNP) level    Hilar adenopathy    Right lower lobe lung mass    Mediastinal adenopathy       Assessment & Plan     Mr. Buchanan is a pleasant 36-year-old gentleman with bilateral lung consolidation, right greater than left with some cavitation.  He also has mediastinal and hilar lymphadenopathy.  Given his age and history, this is suspected to be related to his recent COVID-19 infection.  He underwent bronchoscopy with EBUS yesterday to rule out malignancy.  Pathology is pending.  Large pericardial effusion is present.  Patient does have ESRD.  IR plans to hold off on intervention.  We could perform a robot-assisted pericardial window if necessary.  Discussed with Dr. Billingsley.    CATIA White  [facility]  21  12:35 EDT    Patient was seen and assessed while wearing personal protective equipment including facemask, protective eyewear and gloves.  Hand hygiene performed prior to entering the room and upon exiting with doffing of gloves.          Electronically signed by Fran Billingsley III, MD at 21 8040     Juanito Millan Jr., MD at 21 1124                [facility]    Patient Name: Chandler Buchanan  :1985  36 y.o.  LOS: 4  Encounter Provider: Juanito Millan Jr, MD      Patient Care Team:  Provider, No Known as PCP -  "General    Chief Complaint: Follow-up large pericardial effusion, lung mass, ESRD    Interval History: No acute issues overnight.  Lung mass biopsy was performed yesterday.  No bleeding complications.  Pulmonology following.       Objective   Vital Signs  Temp:  [97.7 °F (36.5 °C)-98.4 °F (36.9 °C)] 98.1 °F (36.7 °C)  Heart Rate:  [72-88] 80  Resp:  [16-20] 18  BP: (100-172)/() 144/103    Intake/Output Summary (Last 24 hours) at 3/16/2021 1125  Last data filed at 3/16/2021 0900  Gross per 24 hour   Intake 1940 ml   Output 4000 ml   Net -2060 ml     Flowsheet Rows      First Filed Value   Admission Height  175.3 cm (69\") Documented at 03/12/2021 1726   Admission Weight  81.7 kg (180 lb 3.2 oz) Documented at 03/12/2021 1726            Physical Exam  Constitutional:       Appearance: Healthy appearance. Not in distress.   Neck:      Vascular: No JVR. JVD normal.   Pulmonary:      Effort: Pulmonary effort is normal.      Breath sounds: Normal breath sounds. No wheezing. No rhonchi. No rales.   Chest:      Chest wall: Not tender to palpatation.   Cardiovascular:      PMI at left midclavicular line. Normal rate. Regular rhythm. Normal S1. Normal S2.      Murmurs: There is no murmur.      No gallop. No click. No rub.   Pulses:     Intact distal pulses.   Edema:     Thigh: bilateral trace edema of the thigh.     Pretibial: bilateral trace edema of the pretibial area.     Ankle: bilateral trace edema of the ankle.     Feet: bilateral trace edema of the feet.  Abdominal:      General: Bowel sounds are normal.      Palpations: Abdomen is soft.      Tenderness: There is no abdominal tenderness.   Musculoskeletal: Normal range of motion.         General: No tenderness. Skin:     General: Skin is warm and dry.   Neurological:      General: No focal deficit present.      Mental Status: Alert and oriented to person, place and time.     Physical exam was reviewed, updated and amended when necessary.    Pertinent Test " Results:  Results from last 7 days   Lab Units 03/15/21  0534 03/13/21  0535 03/12/21  1220   SODIUM mmol/L 138 136 138   POTASSIUM mmol/L 5.2 5.7* 5.6*   CHLORIDE mmol/L 99 90* 91*   CO2 mmol/L 23.0 27.6 30.9*   BUN mg/dL 43* 72* 57*   CREATININE mg/dL 5.76* 8.26* 7.71*   GLUCOSE mg/dL 92 112* 121*   CALCIUM mg/dL 8.7 8.8 9.3   AST (SGOT) U/L  --   --  157*   ALT (SGPT) U/L  --   --  137*     Results from last 7 days   Lab Units 03/12/21  1220   TROPONIN T ng/mL 0.083*     Results from last 7 days   Lab Units 03/15/21  0534 03/13/21  0535 03/12/21  1220   WBC 10*3/mm3 9.93 10.31 10.99*   HEMOGLOBIN g/dL 7.9* 8.1* 8.5*   HEMATOCRIT % 24.9* 25.2* 26.7*   PLATELETS 10*3/mm3 206 233 254     Results from last 7 days   Lab Units 03/15/21  0534   INR  1.30*   APTT seconds 33.5     Results from last 7 days   Lab Units 03/13/21  0535   MAGNESIUM mg/dL 2.8*           Invalid input(s): LDLCALC  Results from last 7 days   Lab Units 03/12/21  1220   PROBNP pg/mL >70,000.0*               Medication Review:   calcium acetate, 2,001 mg, Oral, TID With Meals  carvedilol, 25 mg, Oral, Q12H  sodium chloride, 10 mL, Intravenous, Q12H         sodium chloride, 30 mL/hr, Last Rate: Stopped (03/15/21 1126)        Assessment/Plan   1. Pericardial effusion, large without tamponade  2.  Cavitary lung mass  3.  ESRD on dialysis     Significant reduction in volume since Friday.    Limited echocardiogram noted.  There is evidence of intrapericardial pressure but I do not think there is evidence for tamponade and clinically he is not in tamponade.  Lung mass biopsied yesterday.  He will be kept for ongoing dialysis.  I discussed the case with interventional cardiology who agrees that there is no utility to pericardiocentesis at this time.  Cardiovascular issues are stable and I will see the patient in clinic in 1 week for follow-up visit and repeat limited echocardiogram.    Juanito Millan Jr, MD  [facility]  03/16/21  11:25  EDT        Electronically signed by Juanito Millan Jr., MD at 21 1127     Willem Crespo MD at 21 0907              [facility] Medicine Services  PROGRESS NOTE    Patient Name: Chandler Buchanan  : 1985  MRN: [id number]    Date of Admission: 3/12/2021  Primary Care Physician: Provider, No Known    Subjective   Subjective     CC:  Follow-up swelling and lung mass    HPI:  Patient says he feels fine today.  He is awaiting results from his biopsy yesterday.  He reported he tolerated the procedure without issues and denies any new complaints.    Review of Systems  No current fevers or chills  No current shortness of breath or cough  No current nausea, vomiting, or diarrhea  No current chest pain or palpitations      Objective   Objective     Vital Signs:   Temp:  [97.7 °F (36.5 °C)-98.4 °F (36.9 °C)] 98.1 °F (36.7 °C)  Heart Rate:  [72-88] 80  Resp:  [16-20] 18  BP: (100-172)/() 144/103        Physical Exam:  Constitutional:Awake, alert  HENT: NCAT, mucous membranes moist, neck supple  Respiratory: Clear to auscultation bilaterally, respiratory effort normal, nonlabored breathing   Cardiovascular: RRR, normal radial pulses  Gastrointestinal: Positive bowel sounds, soft, nontender, nondistended  Musculoskeletal: Normal musculature for age, BMI is 26, minimal lower extremity edema  Psychiatric: Appropriate affect, cooperative, conversational  Neurologic: No slurred speech or facial droop, follows commands  Skin: No rashes or jaundice, warm      Results Reviewed:  Results from last 7 days   Lab Units 03/15/21  0534 21  0535 21  1220   WBC 10*3/mm3 9.93 10.31 10.99*   HEMOGLOBIN g/dL 7.9* 8.1* 8.5*   HEMATOCRIT % 24.9* 25.2* 26.7*   PLATELETS 10*3/mm3 206 233 254   INR  1.30*  --   --      Results from last 7 days   Lab Units 03/15/21  0534 21  0535 21  1220   SODIUM mmol/L 138 136 138   POTASSIUM mmol/L 5.2 5.7* 5.6*   CHLORIDE mmol/L 99 90* 91*   CO2 mmol/L 23.0  27.6 30.9*   BUN mg/dL 43* 72* 57*   CREATININE mg/dL 5.76* 8.26* 7.71*   GLUCOSE mg/dL 92 112* 121*   CALCIUM mg/dL 8.7 8.8 9.3   ALT (SGPT) U/L  --   --  137*   AST (SGOT) U/L  --   --  157*   TROPONIN T ng/mL  --   --  0.083*   PROBNP pg/mL  --   --  >70,000.0*     Estimated Creatinine Clearance: 19.4 mL/min (A) (by C-G formula based on SCr of 5.76 mg/dL (H)).    Microbiology Results Abnormal     Procedure Component Value - Date/Time    Respiratory Culture - Brushing, Lung, Right Lower Lobe [[id number]] Collected: 03/15/21 1040    Lab Status: Preliminary result Specimen: Brushing from Lung, Right Lower Lobe Updated: 03/16/21 0915     Respiratory Culture No growth     Gram Stain Rare (1+) WBCs seen      Occasional Epithelial cells per low power field      No organisms seen    BAL Culture, Quantitative - Lavage, Lung, Right Lower Lobe [[id number]] Collected: 03/15/21 1026    Lab Status: Preliminary result Specimen: Lavage from Lung, Right Lower Lobe Updated: 03/16/21 0915     BAL Culture 25,000 CFU/mL Normal Respiratory Thalia: NO S.aureus/MRSA or Pseudomonas aeruginosa     Gram Stain Rare (1+) WBCs seen      Rare (1+) Epithelial cells per low power field      No organisms seen    Tissue / Bone Culture - Lymph Node, Mediastinum [[id number]] Collected: 03/15/21 1005    Lab Status: Preliminary result Specimen: Lymph Node from Mediastinum Updated: 03/16/21 0909     Tissue Culture Scant growth (1+) Normal Respiratory Thalia     Gram Stain Occasional WBCs seen      No organisms seen    COVID PRE-OP / PRE-PROCEDURE SCREENING ORDER (NO ISOLATION) - Swab, Nasopharynx [[id number]]  (Normal) Collected: 03/12/21 1738    Lab Status: Final result Specimen: Swab from Nasopharynx Updated: 03/12/21 2205    Narrative:      The following orders were created for panel order COVID PRE-OP / PRE-PROCEDURE SCREENING ORDER (NO ISOLATION) - Swab, Nasopharynx.  Procedure                               Abnormality         Status                      ---------                               -----------         ------                     COVID-19,APTIMA PANTHER,...[[id number]]  Normal              Final result                 Please view results for these tests on the individual orders.    COVID-19,APTIMA PANTHER,ART IN-HOUSE, NP/OP SWAB IN UTM/VTM/SALINE TRANSPORT MEDIA,24 HR TAT - Swab, Nasopharynx [[id number]]  (Normal) Collected: 03/12/21 1738    Lab Status: Final result Specimen: Swab from Nasopharynx Updated: 03/12/21 2205     COVID19 Not Detected    Narrative:      Fact sheet for providers: https://www.fda.gov/media/690990/download     Fact sheet for patients: https://www.fda.gov/media/985087/download    Test performed by RT PCR.          Imaging Results (Last 24 Hours)     Procedure Component Value Units Date/Time    XR Chest 1 View [[id number]] Collected: 03/15/21 1144     Updated: 03/15/21 1700    Narrative:      XR CHEST 1 VW-  INTRAOPERATIVE VIEWS 03/15/2021     HISTORY: Bronchoscopy.     Limited field-of-view image of the chest was used during bronchoscopy.  The bronchoscope is seen overlying the right lower lung. No unexpected  findings are noted.     Fluoroscopy time 1 minute 7 seconds, 1 image.     This report was finalized on 3/15/2021 4:57 PM by Dr. Santana Stahl M.D.             Results for orders placed during the hospital encounter of 03/12/21    Adult Transthoracic Echo Limited W/ Cont if Necessary Per Protocol    Interpretation Summary  · There is a large (>2cm) circumferential pericardial effusion(more fluid localized posteriorly) with evidence of cardiac tamponade( early diastolic RV and RA collapse) noted. IVC is dilated.  · Visually estimated LVEF seems to be significantly reduced.      I have reviewed the medications:  Scheduled Meds:calcium acetate, 2,001 mg, Oral, TID With Meals  carvedilol, 25 mg, Oral, Q12H  sodium chloride, 10 mL, Intravenous, Q12H      Continuous Infusions:sodium chloride, 30 mL/hr, Last Rate: Stopped  (03/15/21 1126)      PRN Meds:.•  acetaminophen **OR** acetaminophen **OR** acetaminophen  •  docusate sodium  •  famotidine  •  ondansetron **OR** ondansetron  •  sodium chloride  •  traMADol    Assessment/Plan   Assessment & Plan     Active Hospital Problems    Diagnosis  POA   • **Pericardial effusion [I31.3]  Yes   • Hilar adenopathy [R59.0]  Yes   • Elevated brain natriuretic peptide (BNP) level [R79.89]  Yes   • Right lower lobe lung mass [R91.8]  Unknown   • Mediastinal adenopathy [R59.0]  Unknown   • Anemia in ESRD (end-stage renal disease) (CMS/[facility]) [N18.6, D63.1]  Yes   • NICM (nonischemic cardiomyopathy) (CMS/[facility]) [I42.8]  Yes   • HTN (hypertension) [I10]  Yes   • History of COVID-19 [Z86.16]  Yes   • Idiopathic peripheral neuropathy [G60.9]  Yes   • ESRD on dialysis (CMS/[facility]) [N18.6, Z99.2]  Not Applicable      Resolved Hospital Problems   No resolved problems to display.        Brief Hospital Course to date:  Chandler Buchanan is a 36 y.o. male with past medical history of end-stage renal disease who is currently on home hemodialysis presents to the hospital with large pericardial effusion, volume overload, progressive shortness of breath, and large diffuse lymphadenopathy and lung mass in the setting of recent COVID-19.    Discussion/plan:  -No clinical sign of tamponade on my examination.  I do not see any clear JVD.  Patient appears he medically stable and overall improved since admission.    -Hemodialysis has drastically improved hypervolemia.  Nephrology managing.    -Thoracic surgery consulted and following to assess for lung and mediastinal mass and lymphadenopathy.  Now status post EBUS awaiting pathology.    -Monitor anemia no bleeding    -Continue home carvedilol for blood pressure.  Monitor pressure.    -Supportive care and symptom treatment with careful monitoring.    -Case discussed with cardiology they are going to discuss repeat echocardiogram with interventional team.  We both agree  patient clinically does not look to be in tamponade at this point.    DVT Prophylaxis: Mechanical    Disposition: Likely home when improved    CODE STATUS:   Code Status and Medical Interventions:   Ordered at: 03/12/21 1716     Code Status:    CPR     Medical Interventions (Level of Support Prior to Arrest):    Full       Willem Crespo MD  03/16/21       Electronically signed by Willem Crespo MD at 03/16/21 0953     Dre Samano MD at 03/15/21 1729              NEPHROLOGY PROGRESS NOTE    PATIENT IDENTIFICATION:   Name:  Chandler Buchanan      MRN:  [id number]     36 y.o.  male             Reason for visit: ESRD    SUBJECTIVE:   Pulmonary performed bronchoscopy with EBUS earlier today; breathing is comfortable, though he does have cough at the moment; lying flat; feels much less swollen than when he arrived; appetite fine    OBJECTIVE:  Vitals:    03/15/21 1142 03/15/21 1152 03/15/21 1225 03/15/21 1600   BP: 105/71 117/86 122/92 138/90   BP Location:   Right arm Right arm   Patient Position:   Sitting Sitting   Pulse: 76 73 72 86   Resp: 20 20 20 16   Temp:   97.7 °F (36.5 °C)    TempSrc:   Oral    SpO2: 95% 93% 95% 91%   Weight:       Height:               Body mass index is 26.63 kg/m².    Intake/Output Summary (Last 24 hours) at 3/15/2021 1729  Last data filed at 3/15/2021 1712  Gross per 24 hour   Intake 1212 ml   Output --   Net 1212 ml     Wt Readings from Last 1 Encounters:   03/15/21 0520 81.8 kg (180 lb 5.4 oz)   03/14/21 0600 81.2 kg (179 lb 0.2 oz)   03/14/21 0500 81.2 kg (179 lb 1.6 oz)   03/13/21 0554 82.2 kg (181 lb 4.8 oz)   03/12/21 1726 81.7 kg (180 lb 3.2 oz)     Wt Readings from Last 3 Encounters:   03/15/21 81.8 kg (180 lb 5.4 oz)   03/12/21 83.5 kg (184 lb)   03/08/21 80.3 kg (177 lb 0.5 oz)         Exam:  NAD; pleasant; oriented; looks older than stated age  Flat affect; depressed mood  Chronically ill-appearing; pale  MMM; AT/NC   No eye discharge; no scleral  icterus  No JVD; no carotid bruits  A few crackles on the right; not labored   RRR, 2/6M, no rub  Soft, not tender, +D, BS+, abdominal wall edema present  Trace edema  Left arm AV fistula patent  No clubbing  No asterixis  Moves all extremities       Scheduled meds:    calcium acetate, 2,001 mg, Oral, TID With Meals  carvedilol, 25 mg, Oral, Q12H  sodium chloride, 10 mL, Intravenous, Q12H      IV meds:                        sodium chloride, 30 mL/hr, Last Rate: Stopped (03/15/21 1126)        Data Review:    Results from last 7 days   Lab Units 03/15/21  0534 03/13/21  0535 03/12/21  1220   SODIUM mmol/L 138 136 138   POTASSIUM mmol/L 5.2 5.7* 5.6*   CHLORIDE mmol/L 99 90* 91*   CO2 mmol/L 23.0 27.6 30.9*   BUN mg/dL 43* 72* 57*   CREATININE mg/dL 5.76* 8.26* 7.71*   CALCIUM mg/dL 8.7 8.8 9.3   BILIRUBIN mg/dL  --   --  0.8   ALK PHOS U/L  --   --  133*   ALT (SGPT) U/L  --   --  137*   AST (SGOT) U/L  --   --  157*   GLUCOSE mg/dL 92 112* 121*     Estimated Creatinine Clearance: 20.3 mL/min (A) (by C-G formula based on SCr of 5.76 mg/dL (H)).      Results from last 7 days   Lab Units 03/15/21  0534 03/13/21  0535   MAGNESIUM mg/dL  --  2.8*   PHOSPHORUS mg/dL 5.6* 8.6*       Results from last 7 days   Lab Units 03/15/21  0534 03/13/21  0535 03/12/21  1220   WBC 10*3/mm3 9.93 10.31 10.99*   HEMOGLOBIN g/dL 7.9* 8.1* 8.5*   PLATELETS 10*3/mm3 206 233 254       Results from last 7 days   Lab Units 03/15/21  0534   INR  1.30*             ASSESSMENT:     Pericardial effusion    ESRD on dialysis (CMS/HCC)    Idiopathic peripheral neuropathy    HTN (hypertension)    History of COVID-19    NICM (nonischemic cardiomyopathy) (CMS/HCC)    Anemia in ESRD (end-stage renal disease) (CMS/HCC)    Elevated brain natriuretic peptide (BNP) level    Hilar adenopathy    Right lower lobe lung mass    Mediastinal adenopathy    1.  ESRD: volume excess, centrally and peripherally, improving.  Mild hyperkalemia and hyperphosphatemia on  "arrival raise question about compliance with his home hemodialysis; hypervolemic hyponatremia, resolved. Underlying renal disease likely autoimmune (renal bx in  showed severe glomerular and tubulo-inbsteritial fibrosis, with collapsing variant FSGS or C3 GN as possible considerations), which may have bearing on other disease processes in play now.   2.  Large pericardial effusion (> 2 cm circumferentially):  no tamponade noted by echocardiogram 3/12  3.  Anemia of ESRD: not at goal  4.  Mediastinal and bilateral hilar lymphadenopathy as well as large mass-like opacity right lower lobe with central cavitation  5.  Abdominal distention with ascites and transaminitis  6.  Covid-19 infection,         PLAN:  1.  Third consecutive HD today.  He usually performs 4 treatments per week (he is on home hemodialysis; he self-cannulates).  Likely next HD on 3/17  2.  Follow-up results of bronchoscopy/EBUS with biopsy  3.  Check iron studies      Dre Samano MD  3/15/2021    17:29 EDT     Electronically signed by Dre Samano MD at 03/15/21 1735     Juanito Millan Jr., MD at 03/15/21 1600                [facility]    Patient Name: Chandler Buchanan  :1985  36 y.o.  LOS: 3  Encounter Provider: Juanito Millan Jr, MD      Patient Care Team:  Provider, No Known as PCP - General    Chief Complaint: Follow-up large pericardial effusion, lung mass, ESRD    Interval History: No acute issues overnight.  Lung mass biopsy this morning.       Objective   Vital Signs  Temp:  [97.7 °F (36.5 °C)-98.5 °F (36.9 °C)] 97.7 °F (36.5 °C)  Heart Rate:  [72-84] 72  Resp:  [16-20] 20  BP: (100-146)/() 122/92    Intake/Output Summary (Last 24 hours) at 3/15/2021 1600  Last data filed at 3/15/2021 1126  Gross per 24 hour   Intake 750 ml   Output --   Net 750 ml     Flowsheet Rows      First Filed Value   Admission Height  175.3 cm (69\") Documented at 2021 1726   Admission Weight  81.7 " kg (180 lb 3.2 oz) Documented at 03/12/2021 1726            Vitals reviewed.   Constitutional:       Appearance: Healthy appearance. Not in distress.   Neck:      Vascular: No JVR. JVD normal.   Pulmonary:      Effort: Pulmonary effort is normal.      Breath sounds: Normal breath sounds. No wheezing. No rhonchi. No rales.   Chest:      Chest wall: Not tender to palpatation.   Cardiovascular:      PMI at left midclavicular line. Normal rate. Regular rhythm. Normal S1. Normal S2.      Murmurs: There is no murmur.      No gallop. No click. No rub.   Pulses:     Intact distal pulses.   Edema:     Thigh: bilateral trace edema of the thigh.     Pretibial: bilateral trace edema of the pretibial area.     Ankle: bilateral trace edema of the ankle.     Feet: bilateral trace edema of the feet.  Abdominal:      General: Bowel sounds are normal.      Palpations: Abdomen is soft.      Tenderness: There is no abdominal tenderness.   Musculoskeletal: Normal range of motion.         General: No tenderness. Skin:     General: Skin is warm and dry.   Neurological:      General: No focal deficit present.      Mental Status: Alert and oriented to person, place and time.           Pertinent Test Results:  Results from last 7 days   Lab Units 03/15/21  0534 03/13/21  0535 03/12/21  1220   SODIUM mmol/L 138 136 138   POTASSIUM mmol/L 5.2 5.7* 5.6*   CHLORIDE mmol/L 99 90* 91*   CO2 mmol/L 23.0 27.6 30.9*   BUN mg/dL 43* 72* 57*   CREATININE mg/dL 5.76* 8.26* 7.71*   GLUCOSE mg/dL 92 112* 121*   CALCIUM mg/dL 8.7 8.8 9.3   AST (SGOT) U/L  --   --  157*   ALT (SGPT) U/L  --   --  137*     Results from last 7 days   Lab Units 03/12/21  1220   TROPONIN T ng/mL 0.083*     Results from last 7 days   Lab Units 03/15/21  0534 03/13/21  0535 03/12/21  1220   WBC 10*3/mm3 9.93 10.31 10.99*   HEMOGLOBIN g/dL 7.9* 8.1* 8.5*   HEMATOCRIT % 24.9* 25.2* 26.7*   PLATELETS 10*3/mm3 206 233 254     Results from last 7 days   Lab Units 03/15/21  0534   INR   1.30*   APTT seconds 33.5     Results from last 7 days   Lab Units 03/13/21  0535   MAGNESIUM mg/dL 2.8*           Invalid input(s): LDLCALC  Results from last 7 days   Lab Units 03/12/21  1220   PROBNP pg/mL >70,000.0*               Medication Review:   calcium acetate, 2,001 mg, Oral, TID With Meals  carvedilol, 25 mg, Oral, Q12H  sodium chloride, 10 mL, Intravenous, Q12H         sodium chloride, 30 mL/hr, Last Rate: Stopped (03/15/21 1126)        Assessment/Plan   7. Pericardial effusion, large without tamponade  2.  Cavitary lung mass  3.  ESRD on dialysis    Significant reduction in volume since Friday.  We will repeat limited echocardiogram in the morning.  Lung mass biopsied today.  If clinically stable and pericardial effusion improved may consider discharge tomorrow from a cardiovascular standpoint.    Juanito Millan Jr, MD  [facility]  03/15/21  16:00 EDT        Electronically signed by Juanito Millan Jr., MD at 03/15/21 1602     Nidia Koch, DNP, APRN at 03/15/21 1401     Attestation signed by Fran Billingsley III, MD at 03/15/21 1502    I have reviewed this documentation and agree.                  Patient was off floor for endobronchial ultrasound during rounds today.  We will follow up tomorrow.    Electronically signed by Fran Billingsley III, MD at 03/15/21 8669

## 2021-03-17 NOTE — PROGRESS NOTES
"    Chief Complaint: Pericardial effusion, lung mass, ESRD  S/P: Bronchoscopy with EBUS and biopsy  POD # 2    Subjective:  Symptoms:  Stable.  No shortness of breath, malaise, cough, chest pain, weakness, chest pressure or anorexia.    Diet:  Adequate intake.  No nausea or vomiting.    Activity level: Returning to normal.    Pain:  He reports no pain.      Resting in bed today without any new complaints.    Vital Signs:  Temp:  [98.4 °F (36.9 °C)-98.5 °F (36.9 °C)] 98.5 °F (36.9 °C)  Heart Rate:  [77-81] 77  Resp:  [18] 18  BP: (140-154)/() 143/105    Intake & Output (last day)       03/16 0701 - 03/17 0700 03/17 0701 - 03/18 0700    P.O. 980 720    I.V. (mL/kg)      Total Intake(mL/kg) 980 (12.4) 720 (9.1)    Other      Total Output      Net +980 +720                Objective:  General Appearance:  Comfortable and in no acute distress.    Vital signs: (most recent): Blood pressure (!) 143/105, pulse 77, temperature 98.5 °F (36.9 °C), temperature source Oral, resp. rate 18, height 175.3 cm (69\"), weight 79.2 kg (174 lb 11.2 oz), SpO2 95 %.  Vital signs are normal.    Lungs:  Normal effort and normal respiratory rate.    Heart: Normal rate.  Regular rhythm.    Chest: No chest wall tenderness.    Abdomen: Abdomen is soft.  Bowel sounds are normal.     Extremities: Normal range of motion.    Pulses: Distal pulses are intact.    Neurological: Patient is alert and oriented to person, place and time.            I assessed the patient and exam remains unchanged.    Results Review:     I reviewed the patient's new clinical results.  I reviewed the patient's new imaging results and agree with the interpretation.  I reviewed the patient's other test results and agree with the interpretation  Discussed with patient, RN and Dr. Billingsley.    Imaging Results (Last 24 Hours)     ** No results found for the last 24 hours. **          Lab Results:     Lab Results (last 24 hours)     Procedure Component Value Units Date/Time    " BAL Culture, Quantitative - Lavage, Lung, Right Lower Lobe [[id number]] Collected: 03/15/21 1026    Specimen: Lavage from Lung, Right Lower Lobe Updated: 03/17/21 0931     BAL Culture 25,000 CFU/mL Normal Respiratory Thalia: NO S.aureus/MRSA or Pseudomonas aeruginosa     Gram Stain Rare (1+) WBCs seen      Rare (1+) Epithelial cells per low power field      No organisms seen    Respiratory Culture - Brushing, Lung, Right Lower Lobe [[id number]] Collected: 03/15/21 1040    Specimen: Brushing from Lung, Right Lower Lobe Updated: 03/17/21 0922     Respiratory Culture No growth     Gram Stain Rare (1+) WBCs seen      Occasional Epithelial cells per low power field      No organisms seen    Tissue / Bone Culture - Lymph Node, Mediastinum [[id number]] Collected: 03/15/21 1005    Specimen: Lymph Node from Mediastinum Updated: 03/17/21 0722     Tissue Culture Scant growth (1+) Normal Respiratory Thalia     Gram Stain Occasional WBCs seen      No organisms seen    Renal Function Panel [[id number]]  (Abnormal) Collected: 03/17/21 0454    Specimen: Blood Updated: 03/17/21 0635     Glucose 85 mg/dL      BUN 48 mg/dL      Creatinine 7.53 mg/dL      Sodium 137 mmol/L      Potassium 5.2 mmol/L      Chloride 101 mmol/L      CO2 22.3 mmol/L      Calcium 8.5 mg/dL      Albumin 3.40 g/dL      Phosphorus 4.8 mg/dL      Anion Gap 13.7 mmol/L      BUN/Creatinine Ratio 6.4     eGFR Non African Amer 8 mL/min/1.73      Comment: <15 Indicative of kidney failure.        eGFR   Amer --     Comment: <15 Indicative of kidney failure.       Narrative:      GFR Normal >60  Chronic Kidney Disease <60  Kidney Failure <15      CBC & Differential [[id number]]  (Abnormal) Collected: 03/17/21 0454    Specimen: Blood Updated: 03/17/21 0601    Narrative:      The following orders were created for panel order CBC & Differential.  Procedure                               Abnormality         Status                     ---------                                -----------         ------                     CBC Auto Differential[[id number]]        Abnormal            Final result                 Please view results for these tests on the individual orders.    CBC Auto Differential [[id number]]  (Abnormal) Collected: 03/17/21 0454    Specimen: Blood Updated: 03/17/21 0601     WBC 9.84 10*3/mm3      RBC 2.59 10*6/mm3      Hemoglobin 7.6 g/dL      Hematocrit 23.9 %      MCV 92.3 fL      MCH 29.3 pg      MCHC 31.8 g/dL      RDW 15.8 %      RDW-SD 50.5 fl      MPV 9.5 fL      Platelets 203 10*3/mm3      Neutrophil % 73.7 %      Lymphocyte % 13.5 %      Monocyte % 7.2 %      Eosinophil % 0.0 %      Basophil % 0.9 %      Immature Grans % 4.7 %      Neutrophils, Absolute 7.25 10*3/mm3      Lymphocytes, Absolute 1.33 10*3/mm3      Monocytes, Absolute 0.71 10*3/mm3      Eosinophils, Absolute 0.00 10*3/mm3      Basophils, Absolute 0.09 10*3/mm3      Immature Grans, Absolute 0.46 10*3/mm3      nRBC 0.2 /100 WBC            Assessment/Plan       Pericardial effusion    ESRD on dialysis (CMS/[facility])    Idiopathic peripheral neuropathy    HTN (hypertension)    History of COVID-19    NICM (nonischemic cardiomyopathy) (CMS/[facility])    Anemia in ESRD (end-stage renal disease) (CMS/[facility])    Elevated brain natriuretic peptide (BNP) level    Hilar adenopathy    Right lower lobe lung mass    Mediastinal adenopathy       Assessment & Plan     Mr. Buchanan is a pleasant 36-year-old gentleman with bilateral lung consolidation, right greater than left with some cavitation.  He also has mediastinal and hilar lymphadenopathy.  Pathology from his EBUS on 3/16/2021 was inconclusive.  Sarcoidosis is on the differential, but more tissue is needed for biopsy.  Dr. Billingsley is planning to perform a robot-assisted pericardial window on Friday. Discussed with patient and he is agreeable.    Nidia Koch, AFSHIN, APRN  [facility]  03/17/21  16:37 EDT    Patient was seen and assessed while wearing personal  protective equipment including facemask, protective eyewear and gloves.  Hand hygiene performed prior to entering the room and upon exiting with doffing of gloves.

## 2021-03-17 NOTE — PLAN OF CARE
Goal Outcome Evaluation:  Plan of Care Reviewed With: patient  Progress: improving  Outcome Summary: Patient   esting.  Denies  any  pain.  Blood  pressure  remain elevated.  Patient  to  receive  dialysis  this  morning.   Has  been SR on monitor.  Up  ad  tonya.  AV  shunt positive thrill  and  bruitt.  Nursing will  continue to monitor.

## 2021-03-17 NOTE — PLAN OF CARE
Goal Outcome Evaluation:  Plan of Care Reviewed With: patient  Progress: no change  Outcome Summary: Patient had 4L removed at dialysis today. Makes needs known. Mother at bedside. A & O x4. No c/o pain. No s/s of distress noted.

## 2021-03-18 LAB
ABO GROUP BLD: NORMAL
BLD GP AB SCN SERPL QL: NEGATIVE
RH BLD: POSITIVE
SARS-COV-2 ORF1AB RESP QL NAA+PROBE: NOT DETECTED
T&S EXPIRATION DATE: NORMAL

## 2021-03-18 PROCEDURE — 99024 POSTOP FOLLOW-UP VISIT: CPT | Performed by: NURSE PRACTITIONER

## 2021-03-18 PROCEDURE — 86901 BLOOD TYPING SEROLOGIC RH(D): CPT | Performed by: NURSE PRACTITIONER

## 2021-03-18 PROCEDURE — 36430 TRANSFUSION BLD/BLD COMPNT: CPT

## 2021-03-18 PROCEDURE — P9016 RBC LEUKOCYTES REDUCED: HCPCS

## 2021-03-18 PROCEDURE — 82164 ANGIOTENSIN I ENZYME TEST: CPT | Performed by: INTERNAL MEDICINE

## 2021-03-18 PROCEDURE — 86900 BLOOD TYPING SEROLOGIC ABO: CPT | Performed by: NURSE PRACTITIONER

## 2021-03-18 PROCEDURE — 86850 RBC ANTIBODY SCREEN: CPT | Performed by: NURSE PRACTITIONER

## 2021-03-18 PROCEDURE — 86900 BLOOD TYPING SEROLOGIC ABO: CPT

## 2021-03-18 PROCEDURE — 86923 COMPATIBILITY TEST ELECTRIC: CPT

## 2021-03-18 PROCEDURE — U0004 COV-19 TEST NON-CDC HGH THRU: HCPCS | Performed by: NURSE PRACTITIONER

## 2021-03-18 RX ORDER — SODIUM CHLORIDE 9 MG/ML
75 INJECTION, SOLUTION INTRAVENOUS ONCE
Status: DISCONTINUED | OUTPATIENT
Start: 2021-03-19 | End: 2021-03-18

## 2021-03-18 RX ORDER — AMLODIPINE BESYLATE 5 MG/1
5 TABLET ORAL
Status: DISCONTINUED | OUTPATIENT
Start: 2021-03-18 | End: 2021-03-19

## 2021-03-18 RX ADMIN — CARVEDILOL 25 MG: 25 TABLET, FILM COATED ORAL at 20:19

## 2021-03-18 RX ADMIN — LISINOPRIL 10 MG: 10 TABLET ORAL at 08:12

## 2021-03-18 RX ADMIN — CALCIUM ACETATE 2001 MG: 667 CAPSULE ORAL at 17:10

## 2021-03-18 RX ADMIN — CARVEDILOL 25 MG: 25 TABLET, FILM COATED ORAL at 08:12

## 2021-03-18 RX ADMIN — SODIUM CHLORIDE, PRESERVATIVE FREE 10 ML: 5 INJECTION INTRAVENOUS at 08:12

## 2021-03-18 RX ADMIN — SODIUM CHLORIDE, PRESERVATIVE FREE 10 ML: 5 INJECTION INTRAVENOUS at 20:20

## 2021-03-18 RX ADMIN — AMLODIPINE BESYLATE 5 MG: 5 TABLET ORAL at 13:35

## 2021-03-18 RX ADMIN — CALCIUM ACETATE 2001 MG: 667 CAPSULE ORAL at 08:11

## 2021-03-18 RX ADMIN — CALCIUM ACETATE 2001 MG: 667 CAPSULE ORAL at 12:07

## 2021-03-18 NOTE — PROGRESS NOTES
NEPHROLOGY PROGRESS NOTE    PATIENT IDENTIFICATION:   Name:  Chandler Buchanan      MRN:  [id number]     36 y.o.  male             Reason for visit: ESRD    SUBJECTIVE:   Still complains of cough, which seemed to worsen after bronchoscopy, but cough also concurrent with start of lisinopril; breathing is comfortable; had HD yesterday with 4 L removed; mediastinoscopy and drainage of pericardial effusion planned tomorrow    OBJECTIVE:  Vitals:    03/17/21 1939 03/17/21 2317 03/18/21 0639 03/18/21 0756   BP: 137/83 138/91  133/99   BP Location:    Right arm   Patient Position:    Sitting   Pulse: 93 93  82   Resp: 16 16 18   Temp: 98.4 °F (36.9 °C) 98 °F (36.7 °C)  98 °F (36.7 °C)   TempSrc: Oral Oral  Oral   SpO2:  93%  96%   Weight:   76.2 kg (168 lb)    Height:               Body mass index is 24.81 kg/m².    Intake/Output Summary (Last 24 hours) at 3/18/2021 1120  Last data filed at 3/18/2021 0919  Gross per 24 hour   Intake 930 ml   Output --   Net 930 ml     Wt Readings from Last 1 Encounters:   03/18/21 0639 76.2 kg (168 lb)   03/17/21 0406 79.2 kg (174 lb 11.2 oz)   03/16/21 0437 78 kg (172 lb)   03/15/21 0520 81.8 kg (180 lb 5.4 oz)   03/14/21 0600 81.2 kg (179 lb 0.2 oz)   03/14/21 0500 81.2 kg (179 lb 1.6 oz)   03/13/21 0554 82.2 kg (181 lb 4.8 oz)   03/12/21 1726 81.7 kg (180 lb 3.2 oz)     Wt Readings from Last 3 Encounters:   03/18/21 76.2 kg (168 lb)   03/12/21 83.5 kg (184 lb)   03/08/21 80.3 kg (177 lb 0.5 oz)         Exam:  NAD; pleasant; oriented; gaunt  Flat affect  Chronically ill-appearing; pale  MMM; AT/NC   No eye discharge; no scleral icterus  No JVD; no carotid bruits  Decreased breath sounds both bases, left more than right; not labored on room air  RRR, 2/6M, no rub  Soft, not tender, +D, BS+  No lower extremity edema  Left arm AV fistula patent  No clubbing  No asterixis  Moves all extremities       Scheduled meds:    calcium acetate, 2,001 mg, Oral, TID With Meals  carvedilol, 25 mg,  Oral, Q12H  lisinopril, 10 mg, Oral, Q24H  sodium chloride, 10 mL, Intravenous, Q12H      IV meds:                        sodium chloride, 30 mL/hr, Last Rate: Stopped (03/15/21 1126)        Data Review:    Results from last 7 days   Lab Units 03/17/21  0454 03/15/21  0534 03/13/21  0535 03/12/21  1220   SODIUM mmol/L 137 138 136 138   POTASSIUM mmol/L 5.2 5.2 5.7* 5.6*   CHLORIDE mmol/L 101 99 90* 91*   CO2 mmol/L 22.3 23.0 27.6 30.9*   BUN mg/dL 48* 43* 72* 57*   CREATININE mg/dL 7.53* 5.76* 8.26* 7.71*   CALCIUM mg/dL 8.5* 8.7 8.8 9.3   BILIRUBIN mg/dL  --   --   --  0.8   ALK PHOS U/L  --   --   --  133*   ALT (SGPT) U/L  --   --   --  137*   AST (SGOT) U/L  --   --   --  157*   GLUCOSE mg/dL 85 92 112* 121*     Estimated Creatinine Clearance: 14.5 mL/min (A) (by C-G formula based on SCr of 7.53 mg/dL (H)).      Results from last 7 days   Lab Units 03/17/21  0454 03/15/21  0534 03/13/21  0535   MAGNESIUM mg/dL  --   --  2.8*   PHOSPHORUS mg/dL 4.8* 5.6* 8.6*       Results from last 7 days   Lab Units 03/17/21  0454 03/15/21  0534 03/13/21  0535 03/12/21  1220   WBC 10*3/mm3 9.84 9.93 10.31 10.99*   HEMOGLOBIN g/dL 7.6* 7.9* 8.1* 8.5*   PLATELETS 10*3/mm3 203 206 233 254       Results from last 7 days   Lab Units 03/15/21  0534   INR  1.30*             ASSESSMENT:     Pericardial effusion    ESRD on dialysis (CMS/HCC)    Idiopathic peripheral neuropathy    HTN (hypertension)    History of COVID-19    NICM (nonischemic cardiomyopathy) (CMS/HCC)    Anemia in ESRD (end-stage renal disease) (CMS/HCC)    Elevated brain natriuretic peptide (BNP) level    Hilar adenopathy    Right lower lobe lung mass    Mediastinal adenopathy    1.  ESRD: volume excess resolved.  Normal potassium yesterday.  Underlying renal disease likely autoimmune (renal bx in '19 showed severe glomerular and tubulo-inbsteritial fibrosis, with collapsing variant FSGS or C3 GN as possible considerations), which may have bearing on other disease  processes in play now.   2.  Large pericardial effusion (> 2 cm circumferentially):  no tamponade noted by echocardiogram 3/12  3.  Anemia of ESRD: not at goal; on long-acting Mircera, with next dose scheduled 1 week from now.  Very low iron saturation, but ferritin nearly 1700  4.  Mediastinal and bilateral hilar lymphadenopathy as well as large mass-like opacity right lower lobe with central cavitation  5.  Abdominal distention with ascites and transaminitis  6.  Covid-19 infection, December' 20  7.  Hypertension  8.  Cough: this may represent an allergy to lisinopril      PLAN:  1.  Stop lisinopril in event it is causing cough.  Will add amlodipine 5 mg daily  2.  Pulmonary and thoracic surgery considering tomorrow possible mediastinoscopy +/- VATS, +/- drainage of pericardial effusion  3.  Barring any surprises in labs tomorrow, will perform HD next 3/20  4.  Next dose of LASHON in one week        Dre Samano MD  3/18/2021    11:20 EDT

## 2021-03-18 NOTE — PROGRESS NOTES
"                                              LOS: 5 days   Patient Care Team:  Provider, No Known as PCP - General    Chief Complaint:  F/up lung mass and mediastinal adenopathies.    Subjective   Interval History  No cough or dyspnea. Feels well. Seen after dialysis today    REVIEW OF SYSTEMS:   Constitutional: No fever or chills.  Cardiovascular: No chest pain, palpitation and swelling in legs.    Ventilator/Non-Invasive Ventilation Settings (From admission, onward) Comment    None                Physical Exam:     Vital Signs  Temp:  [98 °F (36.7 °C)-98.5 °F (36.9 °C)] 98 °F (36.7 °C)  Heart Rate:  [77-93] 93  Resp:  [16-18] 16  BP: (137-154)/() 138/91    Intake/Output Summary (Last 24 hours) at 3/17/2021 2321  Last data filed at 3/17/2021 1238  Gross per 24 hour   Intake 720 ml   Output --   Net 720 ml     Flowsheet Rows      First Filed Value   Admission Height  175.3 cm (69\") Documented at 03/12/2021 1726   Admission Weight  81.7 kg (180 lb 3.2 oz) Documented at 03/12/2021 1726          General Appearance:   Alert, cooperative, in no acute distress   ENMT:  Mallampati score 3, moist mucous membrane   Eyes:  Pupils equal and reactive to light. EOMI   Neck:   Trachea midline. No thyromegaly.   Lungs:    Minimal crackles at the bases but otherwise clear.  No wheezing.  Nonlabored breathing.    Heart:   Regular rhythm and normal rate, normal S1 and S2, no         murmur   Skin:   No rash   Abdomen:     Soft. No tenderness. No HSM.   Neuro:  Conscious, alert, oriented x3. Strength 5/5 in upper and lower  ext   Extremities:  No cyanosis, clubbing or edema.  Warm extremities and well-perfused.  Fistula left arm.          Results Review:        Results from last 7 days   Lab Units 03/17/21  0454 03/15/21  0534 03/13/21  0535   SODIUM mmol/L 137 138 136   POTASSIUM mmol/L 5.2 5.2 5.7*   CHLORIDE mmol/L 101 99 90*   CO2 mmol/L 22.3 23.0 27.6   BUN mg/dL 48* 43* 72*   CREATININE mg/dL 7.53* 5.76* 8.26*   GLUCOSE " mg/dL 85 92 112*   CALCIUM mg/dL 8.5* 8.7 8.8     Results from last 7 days   Lab Units 03/12/21  1220   TROPONIN T ng/mL 0.083*     Results from last 7 days   Lab Units 03/17/21  0454 03/15/21  0534 03/13/21  0535   WBC 10*3/mm3 9.84 9.93 10.31   HEMOGLOBIN g/dL 7.6* 7.9* 8.1*   HEMATOCRIT % 23.9* 24.9* 25.2*   PLATELETS 10*3/mm3 203 206 233     Results from last 7 days   Lab Units 03/15/21  0534   INR  1.30*   APTT seconds 33.5     Results from last 7 days   Lab Units 03/12/21  1220   PROBNP pg/mL >70,000.0*       I reviewed the patient's new clinical results.        Medication Review:   calcium acetate, 2,001 mg, Oral, TID With Meals  carvedilol, 25 mg, Oral, Q12H  [START ON 3/18/2021] lisinopril, 10 mg, Oral, Q24H  sodium chloride, 10 mL, Intravenous, Q12H        sodium chloride, 30 mL/hr, Last Rate: Stopped (03/15/21 1126)        Diagnostic imaging:  I personally and independently reviewed the following images:      Assessment   1. Masslike consolidation in the right lower lobe: Not explained by recent Covid infection which usually represent as bilateral groundglass infiltrates initially which may later progressed into fibrosis but not a discrete masslike consolidation.  In addition he was not severely ill with pneumonia and stayed at home throughout prior Covid illness  2. Mediastinal and hilar adenopathies.  Could be reactive, inflammatory or simply related to cardiomyopathy and ESRD (i.e. fluid overload)  3. Pericardial effusion and ascites: Anasarca  4. Systolic cardiomyopathy  5. ESRD, on HD  6. COVID-19 URI December 2020     S/p TB NA station 7 and station 11 R.  Multi nucleated histiocytes and necrotizing inflammation.  Negative for malignancy.  Transbronchial biopsies from the right lower lobe lung mass negative for malignancy.  BAL unrevealing but its more lymphocytic.    Overall picture is suggestive of inflammatory disorders such as sarcoidosis but not definitive.  Vasculitis is also possible though  this was previously evaluated by serology and was negative.  Inflammatory condition could explain pericardial effusion and could also explain his kidney disease.  Granulomatous infection with fungus/yeast is possible as well but less likely since patient is not immunocompromised.        Plan     Check ACE level   Discussed  with thoracic surgery. Noted plan for pericardial window. Not sure if mediastinoscopy is planned.   Will follow- No urgency in the workup of the mass like consolidation and adenopathies. I do not thinsk he's symptomatic for either of those.     Discussed the patient's mother separately.   Discussed with Dr. Millan    Time>25 min face to face and on the alex >1/2 directed toward counseling and coordination of care    Brian Tilley MD  03/17/21  23:21 EDT          This note was dictated utilizing Dragon dictation

## 2021-03-18 NOTE — H&P (VIEW-ONLY)
"    Chief Complaint: Pericardial effusion, lung mass, ESRD  S/P: Bronchoscopy with EBUS and biopsy  POD # 3    Subjective:  Symptoms:  Stable.  No shortness of breath, malaise, cough, chest pain, weakness, chest pressure or anorexia.    Diet:  Adequate intake.  No nausea or vomiting.    Activity level: Returning to normal.    Pain:  He reports no pain.        Vital Signs:  Temp:  [98 °F (36.7 °C)-98.4 °F (36.9 °C)] 98.1 °F (36.7 °C)  Heart Rate:  [82-93] 88  Resp:  [16-18] 18  BP: (133-145)/(83-99) 143/94    Intake & Output (last day)       03/17 0701 - 03/18 0700 03/18 0701 - 03/19 0700    P.O. 1050 480    Total Intake(mL/kg) 1050 (13.8) 480 (6.3)    Net +1050 +480          Urine Unmeasured Occurrence  3 x    Stool Unmeasured Occurrence  1 x          Objective:  General Appearance:  Comfortable and in no acute distress.    Vital signs: (most recent): Blood pressure 143/94, pulse 88, temperature 98.1 °F (36.7 °C), temperature source Oral, resp. rate 18, height 175.3 cm (69\"), weight 76.2 kg (168 lb), SpO2 91 %.  Vital signs are normal.    Lungs:  Normal effort and normal respiratory rate.    Heart: Normal rate.  Regular rhythm.    Chest: No chest wall tenderness.    Abdomen: Abdomen is soft.  Bowel sounds are normal.     Extremities: Normal range of motion.    Pulses: Distal pulses are intact.    Neurological: Patient is alert and oriented to person, place and time.            I assessed the patient and exam remains unchanged.    Results Review:     I reviewed the patient's new clinical results.  I reviewed the patient's new imaging results and agree with the interpretation.  I reviewed the patient's other test results and agree with the interpretation  Discussed with patient, RN and Dr. Billingsley.    Imaging Results (Last 24 Hours)     ** No results found for the last 24 hours. **          Lab Results:     Lab Results (last 24 hours)     Procedure Component Value Units Date/Time    Angiotensin Converting Enzyme " [[id number]] Collected: 03/18/21 0730    Specimen: Blood Updated: 03/18/21 0758           Assessment/Plan       Pericardial effusion    ESRD on dialysis (CMS/HCC)    Idiopathic peripheral neuropathy    HTN (hypertension)    History of COVID-19    NICM (nonischemic cardiomyopathy) (CMS/HCC)    Anemia in ESRD (end-stage renal disease) (CMS/HCC)    Elevated brain natriuretic peptide (BNP) level    Hilar adenopathy    Right lower lobe lung mass    Mediastinal adenopathy       Assessment & Plan     Mr. Buchanan is a 6-year-old gentleman with ESRD on dialysis who presented with bilateral lung consolidation, right greater than left with some cavitation as well as mediastinal and hilar lymphadenopathy.  Patient had recent COVID-19 pneumonia.  Underwent bronchoscopy with EBUS earlier in the week.  Pathology was not nondiagnostic.  Possible sarcoidosis.  Patient has large pericardial effusion on imaging.  Dr. Billingsley plans a robot-assisted pericardial window tomorrow with lymphadenectomy on 3/19/21.   Discussed at length with patient.  All of his questions were answered to his satisfaction he is eager to proceed with surgery tomorrow.  Patient has chronic anemia.  We will order 2 units PRBC to be on hold for OR tomorrow and I will discuss with nephrology concerning preoperative transfusion.    CATIA White  [facility]  03/18/21  15:03 EDT    Patient was seen and assessed while wearing personal protective equipment including facemask, protective eyewear and gloves.  Hand hygiene performed prior to entering the room and upon exiting with doffing of gloves.

## 2021-03-18 NOTE — PLAN OF CARE
Goal Outcome Evaluation:  Plan of Care Reviewed With: patient  Progress: no change  Outcome Summary: Pt currently has blood infusing per order, consent for blood and surgery in am per Dr. Billingsley, signed, went over preop orders with t, questions answered, voiced understanding. denies pain, nausea, up to br ad tonya, eating all meals well, valerie on tele. vss. Mother at bedside.

## 2021-03-18 NOTE — PROGRESS NOTES
Name: Chandler Buchanan ADMIT: 3/12/2021   : 1985  PCP: Provider, No Known    MRN: [id number] LOS: 6 days   AGE/SEX: 36 y.o. male  ROOM: [facility]/     Subjective   Subjective   NO new c/o    Review of Systems   All other systems reviewed and are negative.       Objective   Objective   Vital Signs  Temp:  [98 °F (36.7 °C)-98.6 °F (37 °C)] 98 °F (36.7 °C)  Heart Rate:  [82-93] 91  Resp:  [16-18] 16  BP: (133-156)/() 156/108  SpO2:  [91 %-96 %] 92 %  on   ;   Device (Oxygen Therapy): room air  Body mass index is 24.81 kg/m².  Physical Exam  Vitals reviewed.   Constitutional:       General: He is not in acute distress.     Appearance: He is well-developed. He is not ill-appearing.   HENT:      Head: Normocephalic and atraumatic.   Eyes:      General: No scleral icterus.  Neck:      Vascular: No JVD.   Cardiovascular:      Rate and Rhythm: Normal rate and regular rhythm.      Heart sounds: No murmur heard.   Friction rub present.   Pulmonary:      Effort: Pulmonary effort is normal. No respiratory distress.      Breath sounds: Normal breath sounds. No wheezing.   Abdominal:      General: Bowel sounds are normal. There is no distension.      Palpations: Abdomen is soft.      Tenderness: There is no abdominal tenderness.   Musculoskeletal:      Cervical back: Neck supple.   Skin:     General: Skin is warm and dry.      Findings: No rash.   Neurological:      Mental Status: He is alert and oriented to person, place, and time.         Results Review     I reviewed the patient's new clinical results.  Results from last 7 days   Lab Units 21  0454 03/15/21  0534 21  0535 21  1220   WBC 10*3/mm3 9.84 9.93 10.31 10.99*   HEMOGLOBIN g/dL 7.6* 7.9* 8.1* 8.5*   PLATELETS 10*3/mm3 203 206 233 254     Results from last 7 days   Lab Units 21  0454 03/15/21  0534 21  0535 21  1220   SODIUM mmol/L 137 138 136 138   POTASSIUM mmol/L 5.2 5.2 5.7* 5.6*   CHLORIDE mmol/L 101 99 90* 91*   CO2  mmol/L 22.3 23.0 27.6 30.9*   BUN mg/dL 48* 43* 72* 57*   CREATININE mg/dL 7.53* 5.76* 8.26* 7.71*   GLUCOSE mg/dL 85 92 112* 121*   Estimated Creatinine Clearance: 14.5 mL/min (A) (by C-G formula based on SCr of 7.53 mg/dL (H)).  Results from last 7 days   Lab Units 03/17/21  0454 03/15/21  0534 03/13/21  0535 03/12/21  1220   ALBUMIN g/dL 3.40* 3.10* 3.20* 3.70   BILIRUBIN mg/dL  --   --   --  0.8   ALK PHOS U/L  --   --   --  133*   AST (SGOT) U/L  --   --   --  157*   ALT (SGPT) U/L  --   --   --  137*     Results from last 7 days   Lab Units 03/17/21  0454 03/15/21  0534 03/13/21  0535 03/12/21  1220   CALCIUM mg/dL 8.5* 8.7 8.8 9.3   ALBUMIN g/dL 3.40* 3.10* 3.20* 3.70   MAGNESIUM mg/dL  --   --  2.8*  --    PHOSPHORUS mg/dL 4.8* 5.6* 8.6*  --        COVID19   Date Value Ref Range Status   03/12/2021 Not Detected Not Detected - Ref. Range Final     SARS-CoV-2 PCR   Date Value Ref Range Status   03/05/2021 Not Detected Not Detected Final     Comment:     Nucleic acid specific to SARS-CoV-2 (COVID-19) virus was not detected in  this sample by the Aptima (R) SARS-CoV-2 Assay.                SARS-CoV-2 (COVID-19) nucleic acid testing performed using [employer] Aptima (R) SARS-CoV-2 Assay or [employer] TaqPath (TM)  COVID-19 Combo Kit as indicated above under Emergency Use Authorization (EUA) from the FDA. Aptima (R) and TaqPath (TM) test performance  characteristics verified by [facility] in accordance with the FDAs Guidance Document (Policy for Diagnostic Tests for Coronavirus Disease-2019  during the Public Health Emergency) issued on March 16, 2020. The laboratory is regulated under CLIA as qualified to perform high-complexity testing  Unless otherwise noted, all testing was performed at [facility], CLIA No. [id number], [place] State Licensee No. [id number]     No results found for: HGBA1C, POCGLU    Adult Transthoracic Echo Limited W/ Cont if Necessary Per Protocol  · There is a large (>2cm)  circumferential pericardial effusion(more fluid   localized posteriorly) with evidence of cardiac tamponade( early diastolic   RV and RA collapse) noted. IVC is dilated.  · Visually estimated LVEF seems to be significantly reduced.       Scheduled Medications  amLODIPine, 5 mg, Oral, Q24H  calcium acetate, 2,001 mg, Oral, TID With Meals  carvedilol, 25 mg, Oral, Q12H  sodium chloride, 10 mL, Intravenous, Q12H    Infusions   Diet  Diet Regular; Renal, Daily Fluid Restriction; 1500 mL Fluid Per Day  NPO Diet NPO Except: Sips with meds       Assessment/Plan     Active Hospital Problems    Diagnosis  POA   • **Pericardial effusion [I31.3]  Yes   • Hilar adenopathy [R59.0]  Yes   • Elevated brain natriuretic peptide (BNP) level [R79.89]  Yes   • Right lower lobe lung mass [R91.8]  Unknown   • Mediastinal adenopathy [R59.0]  Unknown   • Anemia in ESRD (end-stage renal disease) (CMS/HCC) [N18.6, D63.1]  Yes   • NICM (nonischemic cardiomyopathy) (CMS/[facility]) [I42.8]  Yes   • HTN (hypertension) [I10]  Yes   • History of COVID-19 [Z86.16]  Yes   • Idiopathic peripheral neuropathy [G60.9]  Yes   • ESRD on dialysis (CMS/HCC) [N18.6, Z99.2]  Not Applicable      Resolved Hospital Problems   No resolved problems to display.       36 y.o. male with h/o ESRD admitted with Pericardial effusion, mediastinal LAD and YEVGENIY lung mass    · Plan for mediastinoscopy and LN Bx tomorrow with pericardial window. Prior biopsy inconclusive via bronch  · HD per nephrology  · Anemia- d/w patient. Suspect due to combo of ESRD and inflammatory process related to the pericardial and lung issues  · Transfuse pre-op  · BP meds adjusted by renal, will follow  · SCDs  · Dispo- home when post op care complete.      Brian Khanna MD  [facility]  03/18/21  22:43 EDT

## 2021-03-18 NOTE — PLAN OF CARE
Goal Outcome Evaluation:  Plan of Care Reviewed With: patient  Progress: improving  Outcome Summary: Patient  resting at this time. Denies  any  pain.  AV  shunt  with  positive  thrill  and  bruit.  Up  ad  tonya.  Blood  pressure  has  remained  stable  this  shift.  SR on monitor.  Nursing  will  continue to monitor.

## 2021-03-18 NOTE — PROGRESS NOTES
"    Chief Complaint: Pericardial effusion, lung mass, ESRD  S/P: Bronchoscopy with EBUS and biopsy  POD # 3    Subjective:  Symptoms:  Stable.  No shortness of breath, malaise, cough, chest pain, weakness, chest pressure or anorexia.    Diet:  Adequate intake.  No nausea or vomiting.    Activity level: Returning to normal.    Pain:  He reports no pain.        Vital Signs:  Temp:  [98 °F (36.7 °C)-98.4 °F (36.9 °C)] 98.1 °F (36.7 °C)  Heart Rate:  [82-93] 88  Resp:  [16-18] 18  BP: (133-145)/(83-99) 143/94    Intake & Output (last day)       03/17 0701 - 03/18 0700 03/18 0701 - 03/19 0700    P.O. 1050 480    Total Intake(mL/kg) 1050 (13.8) 480 (6.3)    Net +1050 +480          Urine Unmeasured Occurrence  3 x    Stool Unmeasured Occurrence  1 x          Objective:  General Appearance:  Comfortable and in no acute distress.    Vital signs: (most recent): Blood pressure 143/94, pulse 88, temperature 98.1 °F (36.7 °C), temperature source Oral, resp. rate 18, height 175.3 cm (69\"), weight 76.2 kg (168 lb), SpO2 91 %.  Vital signs are normal.    Lungs:  Normal effort and normal respiratory rate.    Heart: Normal rate.  Regular rhythm.    Chest: No chest wall tenderness.    Abdomen: Abdomen is soft.  Bowel sounds are normal.     Extremities: Normal range of motion.    Pulses: Distal pulses are intact.    Neurological: Patient is alert and oriented to person, place and time.            I assessed the patient and exam remains unchanged.    Results Review:     I reviewed the patient's new clinical results.  I reviewed the patient's new imaging results and agree with the interpretation.  I reviewed the patient's other test results and agree with the interpretation  Discussed with patient, RN and Dr. Billingsley.    Imaging Results (Last 24 Hours)     ** No results found for the last 24 hours. **          Lab Results:     Lab Results (last 24 hours)     Procedure Component Value Units Date/Time    Angiotensin Converting Enzyme " [[id number]] Collected: 03/18/21 0730    Specimen: Blood Updated: 03/18/21 0758           Assessment/Plan       Pericardial effusion    ESRD on dialysis (CMS/HCC)    Idiopathic peripheral neuropathy    HTN (hypertension)    History of COVID-19    NICM (nonischemic cardiomyopathy) (CMS/HCC)    Anemia in ESRD (end-stage renal disease) (CMS/HCC)    Elevated brain natriuretic peptide (BNP) level    Hilar adenopathy    Right lower lobe lung mass    Mediastinal adenopathy       Assessment & Plan     Mr. Buchanan is a 6-year-old gentleman with ESRD on dialysis who presented with bilateral lung consolidation, right greater than left with some cavitation as well as mediastinal and hilar lymphadenopathy.  Patient had recent COVID-19 pneumonia.  Underwent bronchoscopy with EBUS earlier in the week.  Pathology was not nondiagnostic.  Possible sarcoidosis.  Patient has large pericardial effusion on imaging.  Dr. Billingsley plans a robot-assisted pericardial window tomorrow with lymphadenectomy on 3/19/21.   Discussed at length with patient.  All of his questions were answered to his satisfaction he is eager to proceed with surgery tomorrow.  Patient has chronic anemia.  We will order 2 units PRBC to be on hold for OR tomorrow and I will discuss with nephrology concerning preoperative transfusion.    CATIA White  [facility]  03/18/21  15:03 EDT    Patient was seen and assessed while wearing personal protective equipment including facemask, protective eyewear and gloves.  Hand hygiene performed prior to entering the room and upon exiting with doffing of gloves.

## 2021-03-18 NOTE — PROGRESS NOTES
Continued Stay Note  [facility]     Patient Name: Chandler Buchanan  MRN: [id number]  Today's Date: 3/18/2021    Admit Date: 3/12/2021    Discharge Plan     Row Name 03/18/21 1438       Plan    Plan  Plan home with parents.   ENEDINA Dejesus RN    Patient/Family in Agreement with Plan  yes    Plan Comments  Dr. Billingsley is planning to perform a robot-assisted pericardial window on Friday.  Spoke with pt at bedside. Pt denies any discharge needs at this time.  CCP continues to follow.  Plan home with parents.   ENEDINA Dejesus RN        Discharge Codes    No documentation.             Leia Dejesus, RN

## 2021-03-19 ENCOUNTER — APPOINTMENT (OUTPATIENT)
Dept: GENERAL RADIOLOGY | Facility: HOSPITAL | Age: 36
End: 2021-03-19

## 2021-03-19 ENCOUNTER — ANESTHESIA EVENT (OUTPATIENT)
Dept: PERIOP | Facility: HOSPITAL | Age: 36
End: 2021-03-19

## 2021-03-19 ENCOUNTER — ANESTHESIA (OUTPATIENT)
Dept: PERIOP | Facility: HOSPITAL | Age: 36
End: 2021-03-19

## 2021-03-19 LAB
ACE SERPL-CCNC: <15 U/L (ref 14–82)
ALBUMIN SERPL-MCNC: 3.5 G/DL (ref 3.5–5.2)
ANION GAP SERPL CALCULATED.3IONS-SCNC: 11.1 MMOL/L (ref 5–15)
ANION GAP SERPL CALCULATED.3IONS-SCNC: 15.1 MMOL/L (ref 5–15)
APTT PPP: 36.3 SECONDS (ref 22.7–35.4)
BASOPHILS # BLD AUTO: 0.09 10*3/MM3 (ref 0–0.2)
BASOPHILS NFR BLD AUTO: 0.9 % (ref 0–1.5)
BUN SERPL-MCNC: 58 MG/DL (ref 6–20)
BUN SERPL-MCNC: 64 MG/DL (ref 6–20)
BUN/CREAT SERPL: 7.1 (ref 7–25)
BUN/CREAT SERPL: 7.5 (ref 7–25)
CALCIUM SPEC-SCNC: 8 MG/DL (ref 8.6–10.5)
CALCIUM SPEC-SCNC: 8.8 MG/DL (ref 8.6–10.5)
CHLORIDE SERPL-SCNC: 100 MMOL/L (ref 98–107)
CHLORIDE SERPL-SCNC: 104 MMOL/L (ref 98–107)
CO2 SERPL-SCNC: 20.9 MMOL/L (ref 22–29)
CO2 SERPL-SCNC: 23.9 MMOL/L (ref 22–29)
CREAT SERPL-MCNC: 8.18 MG/DL (ref 0.76–1.27)
CREAT SERPL-MCNC: 8.48 MG/DL (ref 0.76–1.27)
DEPRECATED RDW RBC AUTO: 50.9 FL (ref 37–54)
DEPRECATED RDW RBC AUTO: 51 FL (ref 37–54)
EOSINOPHIL # BLD AUTO: 0 10*3/MM3 (ref 0–0.4)
EOSINOPHIL NFR BLD AUTO: 0 % (ref 0.3–6.2)
ERYTHROCYTE [DISTWIDTH] IN BLOOD BY AUTOMATED COUNT: 15.7 % (ref 12.3–15.4)
ERYTHROCYTE [DISTWIDTH] IN BLOOD BY AUTOMATED COUNT: 15.8 % (ref 12.3–15.4)
GFR SERPL CREATININE-BSD FRML MDRD: 7 ML/MIN/1.73
GFR SERPL CREATININE-BSD FRML MDRD: 7 ML/MIN/1.73
GFR SERPL CREATININE-BSD FRML MDRD: ABNORMAL ML/MIN/{1.73_M2}
GFR SERPL CREATININE-BSD FRML MDRD: ABNORMAL ML/MIN/{1.73_M2}
GLUCOSE BLDC GLUCOMTR-MCNC: 113 MG/DL (ref 70–130)
GLUCOSE SERPL-MCNC: 135 MG/DL (ref 65–99)
GLUCOSE SERPL-MCNC: 91 MG/DL (ref 65–99)
HCT VFR BLD AUTO: 21 % (ref 37.5–51)
HCT VFR BLD AUTO: 27.5 % (ref 37.5–51)
HGB BLD-MCNC: 6.8 G/DL (ref 13–17.7)
HGB BLD-MCNC: 8.6 G/DL (ref 13–17.7)
IMM GRANULOCYTES # BLD AUTO: 0.27 10*3/MM3 (ref 0–0.05)
IMM GRANULOCYTES NFR BLD AUTO: 2.6 % (ref 0–0.5)
INR PPP: 1.25 (ref 0.9–1.1)
LYMPHOCYTES # BLD AUTO: 1.07 10*3/MM3 (ref 0.7–3.1)
LYMPHOCYTES NFR BLD AUTO: 10.1 % (ref 19.6–45.3)
MCH RBC QN AUTO: 28.8 PG (ref 26.6–33)
MCH RBC QN AUTO: 29.7 PG (ref 26.6–33)
MCHC RBC AUTO-ENTMCNC: 31.3 G/DL (ref 31.5–35.7)
MCHC RBC AUTO-ENTMCNC: 32.4 G/DL (ref 31.5–35.7)
MCV RBC AUTO: 91.7 FL (ref 79–97)
MCV RBC AUTO: 92 FL (ref 79–97)
MONOCYTES # BLD AUTO: 0.77 10*3/MM3 (ref 0.1–0.9)
MONOCYTES NFR BLD AUTO: 7.3 % (ref 5–12)
NEUTROPHILS NFR BLD AUTO: 79.1 % (ref 42.7–76)
NEUTROPHILS NFR BLD AUTO: 8.35 10*3/MM3 (ref 1.7–7)
NRBC BLD AUTO-RTO: 0 /100 WBC (ref 0–0.2)
PHOSPHATE SERPL-MCNC: 5.6 MG/DL (ref 2.5–4.5)
PLATELET # BLD AUTO: 183 10*3/MM3 (ref 140–450)
PLATELET # BLD AUTO: 193 10*3/MM3 (ref 140–450)
PMV BLD AUTO: 8.9 FL (ref 6–12)
PMV BLD AUTO: 9.1 FL (ref 6–12)
POTASSIUM SERPL-SCNC: 5.8 MMOL/L (ref 3.5–5.2)
POTASSIUM SERPL-SCNC: 6 MMOL/L (ref 3.5–5.2)
PROTHROMBIN TIME: 15.5 SECONDS (ref 11.7–14.2)
RBC # BLD AUTO: 2.29 10*6/MM3 (ref 4.14–5.8)
RBC # BLD AUTO: 2.99 10*6/MM3 (ref 4.14–5.8)
SODIUM SERPL-SCNC: 136 MMOL/L (ref 136–145)
SODIUM SERPL-SCNC: 139 MMOL/L (ref 136–145)
WBC # BLD AUTO: 10.55 10*3/MM3 (ref 3.4–10.8)
WBC # BLD AUTO: 15.07 10*3/MM3 (ref 3.4–10.8)

## 2021-03-19 PROCEDURE — 25010000002 PROPOFOL 10 MG/ML EMULSION: Performed by: NURSE ANESTHETIST, CERTIFIED REGISTERED

## 2021-03-19 PROCEDURE — 32674 THORACOSCOPY LYMPH NODE EXC: CPT | Performed by: THORACIC SURGERY (CARDIOTHORACIC VASCULAR SURGERY)

## 2021-03-19 PROCEDURE — 0B9F7ZX DRAINAGE OF RIGHT LOWER LUNG LOBE, VIA NATURAL OR ARTIFICIAL OPENING, DIAGNOSTIC: ICD-10-PCS | Performed by: THORACIC SURGERY (CARDIOTHORACIC VASCULAR SURGERY)

## 2021-03-19 PROCEDURE — 82947 ASSAY GLUCOSE BLOOD QUANT: CPT

## 2021-03-19 PROCEDURE — 87176 TISSUE HOMOGENIZATION CULTR: CPT | Performed by: THORACIC SURGERY (CARDIOTHORACIC VASCULAR SURGERY)

## 2021-03-19 PROCEDURE — 25010000003 BUPIVACAINE LIPOSOME 1.3 % SUSPENSION 20 ML VIAL: Performed by: THORACIC SURGERY (CARDIOTHORACIC VASCULAR SURGERY)

## 2021-03-19 PROCEDURE — 25010000002 EPINEPHRINE 5 MG/250 ML: Performed by: NURSE ANESTHETIST, CERTIFIED REGISTERED

## 2021-03-19 PROCEDURE — 85025 COMPLETE CBC W/AUTO DIFF WBC: CPT | Performed by: INTERNAL MEDICINE

## 2021-03-19 PROCEDURE — C9290 INJ, BUPIVACAINE LIPOSOME: HCPCS | Performed by: THORACIC SURGERY (CARDIOTHORACIC VASCULAR SURGERY)

## 2021-03-19 PROCEDURE — 88341 IMHCHEM/IMCYTCHM EA ADD ANTB: CPT | Performed by: THORACIC SURGERY (CARDIOTHORACIC VASCULAR SURGERY)

## 2021-03-19 PROCEDURE — 82803 BLOOD GASES ANY COMBINATION: CPT

## 2021-03-19 PROCEDURE — 88185 FLOWCYTOMETRY/TC ADD-ON: CPT

## 2021-03-19 PROCEDURE — 07B74ZX EXCISION OF THORAX LYMPHATIC, PERCUTANEOUS ENDOSCOPIC APPROACH, DIAGNOSTIC: ICD-10-PCS | Performed by: THORACIC SURGERY (CARDIOTHORACIC VASCULAR SURGERY)

## 2021-03-19 PROCEDURE — 71045 X-RAY EXAM CHEST 1 VIEW: CPT

## 2021-03-19 PROCEDURE — 85014 HEMATOCRIT: CPT

## 2021-03-19 PROCEDURE — 94799 UNLISTED PULMONARY SVC/PX: CPT

## 2021-03-19 PROCEDURE — 88342 IMHCHEM/IMCYTCHM 1ST ANTB: CPT | Performed by: THORACIC SURGERY (CARDIOTHORACIC VASCULAR SURGERY)

## 2021-03-19 PROCEDURE — 80069 RENAL FUNCTION PANEL: CPT | Performed by: INTERNAL MEDICINE

## 2021-03-19 PROCEDURE — 87102 FUNGUS ISOLATION CULTURE: CPT | Performed by: THORACIC SURGERY (CARDIOTHORACIC VASCULAR SURGERY)

## 2021-03-19 PROCEDURE — 80048 BASIC METABOLIC PNL TOTAL CA: CPT | Performed by: THORACIC SURGERY (CARDIOTHORACIC VASCULAR SURGERY)

## 2021-03-19 PROCEDURE — 8E0W4CZ ROBOTIC ASSISTED PROCEDURE OF TRUNK REGION, PERCUTANEOUS ENDOSCOPIC APPROACH: ICD-10-PCS | Performed by: THORACIC SURGERY (CARDIOTHORACIC VASCULAR SURGERY)

## 2021-03-19 PROCEDURE — 88360 TUMOR IMMUNOHISTOCHEM/MANUAL: CPT | Performed by: THORACIC SURGERY (CARDIOTHORACIC VASCULAR SURGERY)

## 2021-03-19 PROCEDURE — 88313 SPECIAL STAINS GROUP 2: CPT | Performed by: THORACIC SURGERY (CARDIOTHORACIC VASCULAR SURGERY)

## 2021-03-19 PROCEDURE — 87206 SMEAR FLUORESCENT/ACID STAI: CPT | Performed by: THORACIC SURGERY (CARDIOTHORACIC VASCULAR SURGERY)

## 2021-03-19 PROCEDURE — 3E0T3BZ INTRODUCTION OF ANESTHETIC AGENT INTO PERIPHERAL NERVES AND PLEXI, PERCUTANEOUS APPROACH: ICD-10-PCS | Performed by: THORACIC SURGERY (CARDIOTHORACIC VASCULAR SURGERY)

## 2021-03-19 PROCEDURE — 88334 PATH CONSLTJ SURG CYTO XM EA: CPT | Performed by: THORACIC SURGERY (CARDIOTHORACIC VASCULAR SURGERY)

## 2021-03-19 PROCEDURE — 88331 PATH CONSLTJ SURG 1 BLK 1SPC: CPT | Performed by: THORACIC SURGERY (CARDIOTHORACIC VASCULAR SURGERY)

## 2021-03-19 PROCEDURE — 87116 MYCOBACTERIA CULTURE: CPT | Performed by: THORACIC SURGERY (CARDIOTHORACIC VASCULAR SURGERY)

## 2021-03-19 PROCEDURE — 85610 PROTHROMBIN TIME: CPT | Performed by: NURSE PRACTITIONER

## 2021-03-19 PROCEDURE — 87205 SMEAR GRAM STAIN: CPT | Performed by: THORACIC SURGERY (CARDIOTHORACIC VASCULAR SURGERY)

## 2021-03-19 PROCEDURE — 25010000002 PHENYLEPHRINE PER 1 ML: Performed by: NURSE ANESTHETIST, CERTIFIED REGISTERED

## 2021-03-19 PROCEDURE — 25010000002 FENTANYL CITRATE (PF) 100 MCG/2ML SOLUTION: Performed by: ANESTHESIOLOGY

## 2021-03-19 PROCEDURE — 88305 TISSUE EXAM BY PATHOLOGIST: CPT | Performed by: THORACIC SURGERY (CARDIOTHORACIC VASCULAR SURGERY)

## 2021-03-19 PROCEDURE — 25010000002 MAGNESIUM SULFATE PER 500 MG OF MAGNESIUM: Performed by: NURSE ANESTHETIST, CERTIFIED REGISTERED

## 2021-03-19 PROCEDURE — 0W9D4ZZ DRAINAGE OF PERICARDIAL CAVITY, PERCUTANEOUS ENDOSCOPIC APPROACH: ICD-10-PCS | Performed by: THORACIC SURGERY (CARDIOTHORACIC VASCULAR SURGERY)

## 2021-03-19 PROCEDURE — 25010000002 DEXAMETHASONE PER 1 MG: Performed by: NURSE ANESTHETIST, CERTIFIED REGISTERED

## 2021-03-19 PROCEDURE — 63710000001 INSULIN REGULAR HUMAN PER 5 UNITS: Performed by: ANESTHESIOLOGY

## 2021-03-19 PROCEDURE — 5A1D70Z PERFORMANCE OF URINARY FILTRATION, INTERMITTENT, LESS THAN 6 HOURS PER DAY: ICD-10-PCS | Performed by: INTERNAL MEDICINE

## 2021-03-19 PROCEDURE — 25010000002 LIDOCAINE PER 10 MG: Performed by: NURSE ANESTHETIST, CERTIFIED REGISTERED

## 2021-03-19 PROCEDURE — 25010000003 BUPIVACAINE LIPOSOME 1.3 % SUSPENSION: Performed by: ANESTHESIOLOGY

## 2021-03-19 PROCEDURE — 25010000002 HYDROMORPHONE PER 4 MG: Performed by: THORACIC SURGERY (CARDIOTHORACIC VASCULAR SURGERY)

## 2021-03-19 PROCEDURE — 88184 FLOWCYTOMETRY/ TC 1 MARKER: CPT

## 2021-03-19 PROCEDURE — 76942 ECHO GUIDE FOR BIOPSY: CPT | Performed by: THORACIC SURGERY (CARDIOTHORACIC VASCULAR SURGERY)

## 2021-03-19 PROCEDURE — 82962 GLUCOSE BLOOD TEST: CPT

## 2021-03-19 PROCEDURE — 85018 HEMOGLOBIN: CPT

## 2021-03-19 PROCEDURE — 25010000003 CEFAZOLIN IN DEXTROSE 2-4 GM/100ML-% SOLUTION: Performed by: NURSE PRACTITIONER

## 2021-03-19 PROCEDURE — 85027 COMPLETE CBC AUTOMATED: CPT | Performed by: THORACIC SURGERY (CARDIOTHORACIC VASCULAR SURGERY)

## 2021-03-19 PROCEDURE — 32659 THORACOSCOPY W/SAC DRAINAGE: CPT | Performed by: THORACIC SURGERY (CARDIOTHORACIC VASCULAR SURGERY)

## 2021-03-19 PROCEDURE — 0BJ08ZZ INSPECTION OF TRACHEOBRONCHIAL TREE, VIA NATURAL OR ARTIFICIAL OPENING ENDOSCOPIC: ICD-10-PCS | Performed by: THORACIC SURGERY (CARDIOTHORACIC VASCULAR SURGERY)

## 2021-03-19 PROCEDURE — 85730 THROMBOPLASTIN TIME PARTIAL: CPT | Performed by: NURSE PRACTITIONER

## 2021-03-19 PROCEDURE — 25010000002 ONDANSETRON PER 1 MG: Performed by: NURSE ANESTHETIST, CERTIFIED REGISTERED

## 2021-03-19 PROCEDURE — 86900 BLOOD TYPING SEROLOGIC ABO: CPT

## 2021-03-19 PROCEDURE — P9016 RBC LEUKOCYTES REDUCED: HCPCS

## 2021-03-19 PROCEDURE — 25010000002 FENTANYL CITRATE (PF) 100 MCG/2ML SOLUTION: Performed by: NURSE ANESTHETIST, CERTIFIED REGISTERED

## 2021-03-19 PROCEDURE — 87070 CULTURE OTHR SPECIMN AEROBIC: CPT | Performed by: THORACIC SURGERY (CARDIOTHORACIC VASCULAR SURGERY)

## 2021-03-19 PROCEDURE — 25010000002 MIDAZOLAM PER 1 MG: Performed by: ANESTHESIOLOGY

## 2021-03-19 PROCEDURE — C9290 INJ, BUPIVACAINE LIPOSOME: HCPCS | Performed by: ANESTHESIOLOGY

## 2021-03-19 DEVICE — ABSORBABLE HEMOSTAT (OXIDIZED REGENERATED CELLULOSE, U.S.P.)
Type: IMPLANTABLE DEVICE | Site: CHEST | Status: FUNCTIONAL
Brand: SURGICEL

## 2021-03-19 RX ORDER — DIPHENHYDRAMINE HCL 25 MG
25 CAPSULE ORAL
Status: DISCONTINUED | OUTPATIENT
Start: 2021-03-19 | End: 2021-03-19

## 2021-03-19 RX ORDER — ALBUMIN (HUMAN) 12.5 G/50ML
12.5 SOLUTION INTRAVENOUS AS NEEDED
Status: CANCELLED | OUTPATIENT
Start: 2021-03-19 | End: 2021-03-20

## 2021-03-19 RX ORDER — ROCURONIUM BROMIDE 10 MG/ML
INJECTION, SOLUTION INTRAVENOUS AS NEEDED
Status: DISCONTINUED | OUTPATIENT
Start: 2021-03-19 | End: 2021-03-19 | Stop reason: SURG

## 2021-03-19 RX ORDER — PROPOFOL 10 MG/ML
VIAL (ML) INTRAVENOUS AS NEEDED
Status: DISCONTINUED | OUTPATIENT
Start: 2021-03-19 | End: 2021-03-19 | Stop reason: SURG

## 2021-03-19 RX ORDER — PROMETHAZINE HYDROCHLORIDE 25 MG/1
25 TABLET ORAL ONCE AS NEEDED
Status: DISCONTINUED | OUTPATIENT
Start: 2021-03-19 | End: 2021-03-19

## 2021-03-19 RX ORDER — DOCUSATE SODIUM 100 MG/1
100 CAPSULE, LIQUID FILLED ORAL DAILY
Status: DISCONTINUED | OUTPATIENT
Start: 2021-03-19 | End: 2021-03-24 | Stop reason: HOSPADM

## 2021-03-19 RX ORDER — MAGNESIUM SULFATE HEPTAHYDRATE 500 MG/ML
INJECTION, SOLUTION INTRAMUSCULAR; INTRAVENOUS AS NEEDED
Status: DISCONTINUED | OUTPATIENT
Start: 2021-03-19 | End: 2021-03-19 | Stop reason: SURG

## 2021-03-19 RX ORDER — BISACODYL 10 MG
10 SUPPOSITORY, RECTAL RECTAL DAILY PRN
Status: DISCONTINUED | OUTPATIENT
Start: 2021-03-19 | End: 2021-03-24 | Stop reason: HOSPADM

## 2021-03-19 RX ORDER — NITROGLYCERIN 0.4 MG/1
0.4 TABLET SUBLINGUAL
Status: DISCONTINUED | OUTPATIENT
Start: 2021-03-19 | End: 2021-03-24 | Stop reason: HOSPADM

## 2021-03-19 RX ORDER — GABAPENTIN 100 MG/1
100 CAPSULE ORAL EVERY 8 HOURS SCHEDULED
Status: DISCONTINUED | OUTPATIENT
Start: 2021-03-19 | End: 2021-03-23

## 2021-03-19 RX ORDER — GABAPENTIN 300 MG/1
300 CAPSULE ORAL ONCE
Status: COMPLETED | OUTPATIENT
Start: 2021-03-19 | End: 2021-03-19

## 2021-03-19 RX ORDER — BUPIVACAINE HYDROCHLORIDE 2.5 MG/ML
INJECTION, SOLUTION EPIDURAL; INFILTRATION; INTRACAUDAL
Status: COMPLETED | OUTPATIENT
Start: 2021-03-19 | End: 2021-03-19

## 2021-03-19 RX ORDER — MIDAZOLAM HYDROCHLORIDE 1 MG/ML
2 INJECTION INTRAMUSCULAR; INTRAVENOUS
Status: COMPLETED | OUTPATIENT
Start: 2021-03-19 | End: 2021-03-19

## 2021-03-19 RX ORDER — SODIUM CHLORIDE 9 MG/ML
INJECTION, SOLUTION INTRAVENOUS AS NEEDED
Status: DISCONTINUED | OUTPATIENT
Start: 2021-03-19 | End: 2021-03-19 | Stop reason: HOSPADM

## 2021-03-19 RX ORDER — FLUMAZENIL 0.1 MG/ML
0.2 INJECTION INTRAVENOUS AS NEEDED
Status: DISCONTINUED | OUTPATIENT
Start: 2021-03-19 | End: 2021-03-19

## 2021-03-19 RX ORDER — OXYCODONE HYDROCHLORIDE 5 MG/1
5 TABLET ORAL EVERY 4 HOURS PRN
Status: DISCONTINUED | OUTPATIENT
Start: 2021-03-19 | End: 2021-03-24 | Stop reason: HOSPADM

## 2021-03-19 RX ORDER — EPHEDRINE SULFATE 50 MG/ML
5 INJECTION, SOLUTION INTRAVENOUS ONCE AS NEEDED
Status: DISCONTINUED | OUTPATIENT
Start: 2021-03-19 | End: 2021-03-19

## 2021-03-19 RX ORDER — POLYETHYLENE GLYCOL 3350 17 G/17G
17 POWDER, FOR SOLUTION ORAL DAILY
Status: DISCONTINUED | OUTPATIENT
Start: 2021-03-19 | End: 2021-03-24 | Stop reason: HOSPADM

## 2021-03-19 RX ORDER — HYDRALAZINE HYDROCHLORIDE 20 MG/ML
5 INJECTION INTRAMUSCULAR; INTRAVENOUS
Status: DISCONTINUED | OUTPATIENT
Start: 2021-03-19 | End: 2021-03-19

## 2021-03-19 RX ORDER — FAMOTIDINE 10 MG/ML
20 INJECTION, SOLUTION INTRAVENOUS ONCE
Status: COMPLETED | OUTPATIENT
Start: 2021-03-19 | End: 2021-03-19

## 2021-03-19 RX ORDER — SODIUM CHLORIDE 9 MG/ML
INJECTION, SOLUTION INTRAVENOUS
Status: COMPLETED | OUTPATIENT
Start: 2021-03-19 | End: 2021-03-19

## 2021-03-19 RX ORDER — ACETAMINOPHEN 500 MG
1000 TABLET ORAL ONCE
Status: COMPLETED | OUTPATIENT
Start: 2021-03-19 | End: 2021-03-19

## 2021-03-19 RX ORDER — LIDOCAINE HYDROCHLORIDE ANHYDROUS AND DEXTROSE MONOHYDRATE 5; 400 G/100ML; MG/100ML
INJECTION, SOLUTION INTRAVENOUS CONTINUOUS PRN
Status: DISCONTINUED | OUTPATIENT
Start: 2021-03-19 | End: 2021-03-19 | Stop reason: SURG

## 2021-03-19 RX ORDER — ACETAMINOPHEN 500 MG
1000 TABLET ORAL 3 TIMES DAILY
Status: DISCONTINUED | OUTPATIENT
Start: 2021-03-19 | End: 2021-03-24 | Stop reason: HOSPADM

## 2021-03-19 RX ORDER — MIDAZOLAM HYDROCHLORIDE 1 MG/ML
1 INJECTION INTRAMUSCULAR; INTRAVENOUS
Status: COMPLETED | OUTPATIENT
Start: 2021-03-19 | End: 2021-03-19

## 2021-03-19 RX ORDER — HYDROMORPHONE HYDROCHLORIDE 1 MG/ML
0.5 INJECTION, SOLUTION INTRAMUSCULAR; INTRAVENOUS; SUBCUTANEOUS
Status: DISCONTINUED | OUTPATIENT
Start: 2021-03-19 | End: 2021-03-19

## 2021-03-19 RX ORDER — DEXAMETHASONE SODIUM PHOSPHATE 10 MG/ML
INJECTION INTRAMUSCULAR; INTRAVENOUS AS NEEDED
Status: DISCONTINUED | OUTPATIENT
Start: 2021-03-19 | End: 2021-03-19 | Stop reason: SURG

## 2021-03-19 RX ORDER — DIPHENHYDRAMINE HYDROCHLORIDE 50 MG/ML
12.5 INJECTION INTRAMUSCULAR; INTRAVENOUS
Status: DISCONTINUED | OUTPATIENT
Start: 2021-03-19 | End: 2021-03-19

## 2021-03-19 RX ORDER — SODIUM CHLORIDE 0.9 % (FLUSH) 0.9 %
3-10 SYRINGE (ML) INJECTION AS NEEDED
Status: DISCONTINUED | OUTPATIENT
Start: 2021-03-19 | End: 2021-03-19 | Stop reason: HOSPADM

## 2021-03-19 RX ORDER — SODIUM CHLORIDE, SODIUM LACTATE, POTASSIUM CHLORIDE, CALCIUM CHLORIDE 600; 310; 30; 20 MG/100ML; MG/100ML; MG/100ML; MG/100ML
9 INJECTION, SOLUTION INTRAVENOUS CONTINUOUS
Status: DISCONTINUED | OUTPATIENT
Start: 2021-03-19 | End: 2021-03-21

## 2021-03-19 RX ORDER — DEXTROSE MONOHYDRATE 25 G/50ML
50 INJECTION, SOLUTION INTRAVENOUS ONCE
Status: COMPLETED | OUTPATIENT
Start: 2021-03-19 | End: 2021-03-19

## 2021-03-19 RX ORDER — ONDANSETRON 2 MG/ML
INJECTION INTRAMUSCULAR; INTRAVENOUS AS NEEDED
Status: DISCONTINUED | OUTPATIENT
Start: 2021-03-19 | End: 2021-03-19 | Stop reason: SURG

## 2021-03-19 RX ORDER — ONDANSETRON 2 MG/ML
4 INJECTION INTRAMUSCULAR; INTRAVENOUS ONCE AS NEEDED
Status: DISCONTINUED | OUTPATIENT
Start: 2021-03-19 | End: 2021-03-19

## 2021-03-19 RX ORDER — FENTANYL CITRATE 50 UG/ML
50 INJECTION, SOLUTION INTRAMUSCULAR; INTRAVENOUS
Status: DISCONTINUED | OUTPATIENT
Start: 2021-03-19 | End: 2021-03-19

## 2021-03-19 RX ORDER — LIDOCAINE HYDROCHLORIDE 10 MG/ML
0.5 INJECTION, SOLUTION EPIDURAL; INFILTRATION; INTRACAUDAL; PERINEURAL ONCE AS NEEDED
Status: DISCONTINUED | OUTPATIENT
Start: 2021-03-19 | End: 2021-03-19 | Stop reason: HOSPADM

## 2021-03-19 RX ORDER — SODIUM CHLORIDE 0.9 % (FLUSH) 0.9 %
3 SYRINGE (ML) INJECTION EVERY 12 HOURS SCHEDULED
Status: DISCONTINUED | OUTPATIENT
Start: 2021-03-19 | End: 2021-03-19 | Stop reason: HOSPADM

## 2021-03-19 RX ORDER — MAGNESIUM HYDROXIDE 1200 MG/15ML
LIQUID ORAL AS NEEDED
Status: DISCONTINUED | OUTPATIENT
Start: 2021-03-19 | End: 2021-03-19 | Stop reason: HOSPADM

## 2021-03-19 RX ORDER — OXYCODONE AND ACETAMINOPHEN 7.5; 325 MG/1; MG/1
1 TABLET ORAL ONCE AS NEEDED
Status: DISCONTINUED | OUTPATIENT
Start: 2021-03-19 | End: 2021-03-19

## 2021-03-19 RX ORDER — CEFAZOLIN SODIUM 1 G/50ML
1 INJECTION, SOLUTION INTRAVENOUS EVERY 12 HOURS
Status: COMPLETED | OUTPATIENT
Start: 2021-03-19 | End: 2021-03-21

## 2021-03-19 RX ORDER — EPHEDRINE SULFATE 50 MG/ML
INJECTION, SOLUTION INTRAVENOUS AS NEEDED
Status: DISCONTINUED | OUTPATIENT
Start: 2021-03-19 | End: 2021-03-19 | Stop reason: SURG

## 2021-03-19 RX ORDER — NALOXONE HCL 0.4 MG/ML
0.2 VIAL (ML) INJECTION AS NEEDED
Status: DISCONTINUED | OUTPATIENT
Start: 2021-03-19 | End: 2021-03-19

## 2021-03-19 RX ORDER — LABETALOL HYDROCHLORIDE 5 MG/ML
5 INJECTION, SOLUTION INTRAVENOUS
Status: DISCONTINUED | OUTPATIENT
Start: 2021-03-19 | End: 2021-03-19

## 2021-03-19 RX ORDER — FENTANYL CITRATE 50 UG/ML
50 INJECTION, SOLUTION INTRAMUSCULAR; INTRAVENOUS
Status: DISCONTINUED | OUTPATIENT
Start: 2021-03-19 | End: 2021-03-19 | Stop reason: HOSPADM

## 2021-03-19 RX ORDER — HYDROCODONE BITARTRATE AND ACETAMINOPHEN 7.5; 325 MG/1; MG/1
1 TABLET ORAL ONCE AS NEEDED
Status: DISCONTINUED | OUTPATIENT
Start: 2021-03-19 | End: 2021-03-19

## 2021-03-19 RX ORDER — CARVEDILOL 6.25 MG/1
6.25 TABLET ORAL EVERY 12 HOURS SCHEDULED
Status: DISCONTINUED | OUTPATIENT
Start: 2021-03-19 | End: 2021-03-23

## 2021-03-19 RX ORDER — HYDROMORPHONE HYDROCHLORIDE 1 MG/ML
0.5 INJECTION, SOLUTION INTRAMUSCULAR; INTRAVENOUS; SUBCUTANEOUS
Status: DISCONTINUED | OUTPATIENT
Start: 2021-03-19 | End: 2021-03-24 | Stop reason: HOSPADM

## 2021-03-19 RX ORDER — AMOXICILLIN 250 MG
2 CAPSULE ORAL NIGHTLY
Status: DISCONTINUED | OUTPATIENT
Start: 2021-03-19 | End: 2021-03-24 | Stop reason: HOSPADM

## 2021-03-19 RX ORDER — LIDOCAINE HYDROCHLORIDE 20 MG/ML
INJECTION, SOLUTION INFILTRATION; PERINEURAL AS NEEDED
Status: DISCONTINUED | OUTPATIENT
Start: 2021-03-19 | End: 2021-03-19 | Stop reason: SURG

## 2021-03-19 RX ORDER — CEFAZOLIN SODIUM 2 G/100ML
2 INJECTION, SOLUTION INTRAVENOUS ONCE
Status: COMPLETED | OUTPATIENT
Start: 2021-03-19 | End: 2021-03-19

## 2021-03-19 RX ORDER — PROMETHAZINE HYDROCHLORIDE 25 MG/1
25 SUPPOSITORY RECTAL ONCE AS NEEDED
Status: DISCONTINUED | OUTPATIENT
Start: 2021-03-19 | End: 2021-03-19

## 2021-03-19 RX ADMIN — PROPOFOL 200 MG: 10 INJECTION, EMULSION INTRAVENOUS at 12:35

## 2021-03-19 RX ADMIN — GABAPENTIN 100 MG: 100 CAPSULE ORAL at 22:59

## 2021-03-19 RX ADMIN — MIDAZOLAM 1 MG: 1 INJECTION INTRAMUSCULAR; INTRAVENOUS at 11:52

## 2021-03-19 RX ADMIN — CEFAZOLIN SODIUM 2 G: 2 INJECTION, SOLUTION INTRAVENOUS at 13:01

## 2021-03-19 RX ADMIN — EPINEPHRINE 0.02 MCG/KG/MIN: 1 INJECTION PARENTERAL at 13:21

## 2021-03-19 RX ADMIN — SODIUM CHLORIDE, PRESERVATIVE FREE 10 ML: 5 INJECTION INTRAVENOUS at 20:39

## 2021-03-19 RX ADMIN — EPHEDRINE SULFATE 10 MG: 50 INJECTION INTRAVENOUS at 13:08

## 2021-03-19 RX ADMIN — SODIUM CHLORIDE 500 ML: 9 INJECTION, SOLUTION INTRAVENOUS at 13:18

## 2021-03-19 RX ADMIN — HYDROMORPHONE HYDROCHLORIDE 0.5 MG: 1 INJECTION, SOLUTION INTRAMUSCULAR; INTRAVENOUS; SUBCUTANEOUS at 17:36

## 2021-03-19 RX ADMIN — DEXAMETHASONE SODIUM PHOSPHATE 8 MG: 10 INJECTION INTRAMUSCULAR; INTRAVENOUS at 12:41

## 2021-03-19 RX ADMIN — PHENYLEPHRINE HYDROCHLORIDE 0.2 MCG/KG/MIN: 10 INJECTION, SOLUTION INTRAMUSCULAR; INTRAVENOUS; SUBCUTANEOUS at 12:34

## 2021-03-19 RX ADMIN — CARVEDILOL 25 MG: 25 TABLET, FILM COATED ORAL at 09:24

## 2021-03-19 RX ADMIN — DOCUSATE SODIUM 100 MG: 100 CAPSULE, LIQUID FILLED ORAL at 20:39

## 2021-03-19 RX ADMIN — MAGNESIUM SULFATE HEPTAHYDRATE 2 G: 500 INJECTION, SOLUTION INTRAMUSCULAR; INTRAVENOUS at 12:43

## 2021-03-19 RX ADMIN — CALCIUM ACETATE 2001 MG: 667 CAPSULE ORAL at 20:38

## 2021-03-19 RX ADMIN — INSULIN HUMAN 10 UNITS: 100 INJECTION, SOLUTION PARENTERAL at 15:07

## 2021-03-19 RX ADMIN — EPHEDRINE SULFATE 10 MG: 50 INJECTION INTRAVENOUS at 12:49

## 2021-03-19 RX ADMIN — BUPIVACAINE HYDROCHLORIDE 20 ML: 2.5 INJECTION, SOLUTION EPIDURAL; INFILTRATION; INTRACAUDAL; PERINEURAL at 12:25

## 2021-03-19 RX ADMIN — LIDOCAINE HYDROCHLORIDE 1.53 MG/KG/HR: 4 INJECTION, SOLUTION INTRAVENOUS at 12:34

## 2021-03-19 RX ADMIN — SUGAMMADEX 200 MG: 100 INJECTION, SOLUTION INTRAVENOUS at 14:33

## 2021-03-19 RX ADMIN — FENTANYL CITRATE 50 MCG: 50 INJECTION, SOLUTION INTRAMUSCULAR; INTRAVENOUS at 15:00

## 2021-03-19 RX ADMIN — GABAPENTIN 300 MG: 300 CAPSULE ORAL at 11:46

## 2021-03-19 RX ADMIN — BUPIVACAINE 10 ML: 13.3 INJECTION, SUSPENSION, LIPOSOMAL INFILTRATION at 12:25

## 2021-03-19 RX ADMIN — PHENYLEPHRINE HYDROCHLORIDE 100 MCG: 10 INJECTION INTRAVENOUS at 13:34

## 2021-03-19 RX ADMIN — EPHEDRINE SULFATE 10 MG: 50 INJECTION INTRAVENOUS at 12:56

## 2021-03-19 RX ADMIN — PHENYLEPHRINE HYDROCHLORIDE 100 MCG: 10 INJECTION INTRAVENOUS at 12:35

## 2021-03-19 RX ADMIN — PHENYLEPHRINE HYDROCHLORIDE 200 MCG: 10 INJECTION INTRAVENOUS at 13:14

## 2021-03-19 RX ADMIN — ONDANSETRON 4 MG: 2 INJECTION INTRAMUSCULAR; INTRAVENOUS at 14:06

## 2021-03-19 RX ADMIN — ACETAMINOPHEN 1000 MG: 500 TABLET, FILM COATED ORAL at 11:46

## 2021-03-19 RX ADMIN — ACETAMINOPHEN 1000 MG: 500 TABLET, FILM COATED ORAL at 18:55

## 2021-03-19 RX ADMIN — SODIUM CHLORIDE 9 ML/HR: 9 INJECTION, SOLUTION INTRAVENOUS at 11:52

## 2021-03-19 RX ADMIN — PHENYLEPHRINE HYDROCHLORIDE 200 MCG: 10 INJECTION INTRAVENOUS at 12:52

## 2021-03-19 RX ADMIN — FAMOTIDINE 20 MG: 10 INJECTION INTRAVENOUS at 11:52

## 2021-03-19 RX ADMIN — GABAPENTIN 100 MG: 100 CAPSULE ORAL at 18:10

## 2021-03-19 RX ADMIN — FENTANYL CITRATE 50 MCG: 50 INJECTION, SOLUTION INTRAMUSCULAR; INTRAVENOUS at 14:55

## 2021-03-19 RX ADMIN — DEXTROSE MONOHYDRATE 50 ML: 25 INJECTION, SOLUTION INTRAVENOUS at 15:06

## 2021-03-19 RX ADMIN — PHENYLEPHRINE HYDROCHLORIDE 200 MCG: 10 INJECTION INTRAVENOUS at 13:02

## 2021-03-19 RX ADMIN — SODIUM CHLORIDE, PRESERVATIVE FREE 10 ML: 5 INJECTION INTRAVENOUS at 09:25

## 2021-03-19 RX ADMIN — FENTANYL CITRATE 50 MCG: 50 INJECTION, SOLUTION INTRAMUSCULAR; INTRAVENOUS at 11:56

## 2021-03-19 RX ADMIN — LIDOCAINE HYDROCHLORIDE 100 MG: 20 INJECTION, SOLUTION INFILTRATION; PERINEURAL at 12:35

## 2021-03-19 RX ADMIN — MIDAZOLAM 1 MG: 1 INJECTION INTRAMUSCULAR; INTRAVENOUS at 11:58

## 2021-03-19 RX ADMIN — PHENYLEPHRINE HYDROCHLORIDE 200 MCG: 10 INJECTION INTRAVENOUS at 12:49

## 2021-03-19 RX ADMIN — ROCURONIUM BROMIDE 20 MG: 50 INJECTION INTRAVENOUS at 13:46

## 2021-03-19 RX ADMIN — ROCURONIUM BROMIDE 50 MG: 50 INJECTION INTRAVENOUS at 12:35

## 2021-03-19 RX ADMIN — FENTANYL CITRATE 50 MCG: 50 INJECTION, SOLUTION INTRAMUSCULAR; INTRAVENOUS at 11:51

## 2021-03-19 RX ADMIN — EPHEDRINE SULFATE 10 MG: 50 INJECTION INTRAVENOUS at 13:14

## 2021-03-19 RX ADMIN — ACETAMINOPHEN 1000 MG: 500 TABLET, FILM COATED ORAL at 20:37

## 2021-03-19 RX ADMIN — CARVEDILOL 6.25 MG: 6.25 TABLET, FILM COATED ORAL at 20:39

## 2021-03-19 NOTE — PROGRESS NOTES
"                                              LOS: 7 days   Patient Care Team:  Provider, No Known as PCP - General    Chief Complaint:  F/up lung mass and mediastinal adenopathies.    Subjective   Interval History  No new complaints.    Ventilator/Non-Invasive Ventilation Settings (From admission, onward) Comment    None                Physical Exam:     Vital Signs  Temp:  [98 °F (36.7 °C)-98.6 °F (37 °C)] 98 °F (36.7 °C)  Heart Rate:  [77-92] 78  Resp:  [16-18] 18  BP: (120-156)/() 120/88  Arterial Line BP: (218-258)/(102-254) 218/102    Intake/Output Summary (Last 24 hours) at 3/19/2021 1214  Last data filed at 3/18/2021 1943  Gross per 24 hour   Intake 1070 ml   Output --   Net 1070 ml     Flowsheet Rows      First Filed Value   Admission Height  175.3 cm (69\") Documented at 03/12/2021 1726   Admission Weight  81.7 kg (180 lb 3.2 oz) Documented at 03/12/2021 1726          General Appearance:   Alert, cooperative, in no acute distress       Lungs:  Bronchial sounds on the right lower lobe.                                 Results Review:        Results from last 7 days   Lab Units 03/19/21  0649 03/17/21  0454 03/15/21  0534   SODIUM mmol/L 139 137 138   POTASSIUM mmol/L 5.8* 5.2 5.2   CHLORIDE mmol/L 100 101 99   CO2 mmol/L 23.9 22.3 23.0   BUN mg/dL 58* 48* 43*   CREATININE mg/dL 8.18* 7.53* 5.76*   GLUCOSE mg/dL 91 85 92   CALCIUM mg/dL 8.8 8.5* 8.7     Results from last 7 days   Lab Units 03/12/21  1220   TROPONIN T ng/mL 0.083*     Results from last 7 days   Lab Units 03/19/21  0649 03/17/21  0454 03/15/21  0534   WBC 10*3/mm3 10.55 9.84 9.93   HEMOGLOBIN g/dL 8.6* 7.6* 7.9*   HEMATOCRIT % 27.5* 23.9* 24.9*   PLATELETS 10*3/mm3 193 203 206     Results from last 7 days   Lab Units 03/19/21  0649 03/15/21  0534   INR  1.25* 1.30*   APTT seconds 36.3* 33.5     Results from last 7 days   Lab Units 03/12/21  1220   PROBNP pg/mL >70,000.0*       I reviewed the patient's new clinical results.      "   Medication Review:   amLODIPine, 5 mg, Oral, Q24H  [MAR Hold] calcium acetate, 2,001 mg, Oral, TID With Meals  carvedilol, 25 mg, Oral, Q12H  ceFAZolin, 2 g, Intravenous, Once  [MAR Hold] sodium chloride, 10 mL, Intravenous, Q12H  sodium chloride, 3 mL, Intravenous, Q12H        lactated ringers, 9 mL/hr          Assessment   1. Masslike consolidation in the right lower lobe: Not explained by recent Covid infection which usually represent as bilateral groundglass infiltrates initially which may later progressed into fibrosis but not a discrete masslike consolidation.  In addition he was not severely ill with pneumonia and stayed at home throughout prior Covid illness  2. Mediastinal and hilar adenopathies.  Could be reactive, inflammatory or simply related to cardiomyopathy and ESRD (i.e. fluid overload)  3. Pericardial effusion and ascites: Anasarca  4. Systolic cardiomyopathy  5. ESRD, on HD  6. COVID-19 URI December 2020     S/p TB NA station 7 and station 11 R.  Multi nucleated histiocytes and necrotizing inflammation.  Negative for malignancy.  Transbronchial biopsies from the right lower lobe lung mass negative for malignancy.  BAL unrevealing but its more lymphocytic.    Overall picture is suggestive of inflammatory disorders such as sarcoidosis but not definitive.  Vasculitis is also possible though this was previously evaluated by serology and was negative.  Inflammatory condition could explain pericardial effusion and could also explain his kidney disease.  Granulomatous infection with fungus/yeast is possible as well but less likely since patient is not immunocompromised.        Plan     ACE level pending.  Plan for pericardial window today and lymphadenectomy by thoracic surgery.  Will follow pathology.    Brian Tilley MD  03/19/21  12:14 EDT          This note was dictated utilizing Dragon dictation

## 2021-03-19 NOTE — PROGRESS NOTES
NEPHROLOGY PROGRESS NOTE    PATIENT IDENTIFICATION:   Name:  Chandler Buchanan      MRN:  [id number]     36 y.o.  male             Reason for visit: ESRD    SUBJECTIVE:   Patient seen in the PACU; just had right VATS along with pericardial window and mediastinal lymphadenectomy (per Dr. Billingsley, frozen section reveals no evidence of malignancy in the pericardium); easily awakens; pain controlled; over 1200 mL drained from pericardial space; potassium level was 7 intraoperatively, and now 6 following treatment; postoperative hemoglobin 6.8    OBJECTIVE:  Vitals:    03/19/21 1515 03/19/21 1530 03/19/21 1545 03/19/21 1600   BP: 119/66 115/72 115/74 107/70   BP Location:       Patient Position:       Pulse: 74 79 77 78   Resp: 14 14 14 14   Temp:       TempSrc:       SpO2: 96% 90% 90% 90%   Weight:       Height:               Body mass index is 25.52 kg/m².    Intake/Output Summary (Last 24 hours) at 3/19/2021 1630  Last data filed at 3/18/2021 1943  Gross per 24 hour   Intake 830 ml   Output --   Net 830 ml     Wt Readings from Last 1 Encounters:   03/19/21 0537 78.4 kg (172 lb 12.8 oz)   03/18/21 0639 76.2 kg (168 lb)   03/17/21 0406 79.2 kg (174 lb 11.2 oz)   03/16/21 0437 78 kg (172 lb)   03/15/21 0520 81.8 kg (180 lb 5.4 oz)   03/14/21 0600 81.2 kg (179 lb 0.2 oz)   03/14/21 0500 81.2 kg (179 lb 1.6 oz)   03/13/21 0554 82.2 kg (181 lb 4.8 oz)   03/12/21 1726 81.7 kg (180 lb 3.2 oz)     Wt Readings from Last 3 Encounters:   03/19/21 78.4 kg (172 lb 12.8 oz)   03/12/21 83.5 kg (184 lb)   03/08/21 80.3 kg (177 lb 0.5 oz)         Exam:  NAD; pleasant; groggy; gaunt  Flat affect  Chronically ill-appearing; very pale  MMM; AT/NC   No eye discharge; no scleral icterus  No JVD; no carotid bruits  Decreased breath sounds both bases, crackles and rhonchi on right; not labored  RRR, 2/6M, no rub  Soft, not tender, +D, BS+  No lower extremity edema  Left arm AV fistula patent  No clubbing  No asterixis  Moves all extremities        Scheduled meds:    amLODIPine, 5 mg, Oral, Q24H  [MAR Hold] calcium acetate, 2,001 mg, Oral, TID With Meals  carvedilol, 25 mg, Oral, Q12H  gabapentin, 100 mg, Oral, Q8H  [MAR Hold] sodium chloride, 10 mL, Intravenous, Q12H      IV meds:                        lactated ringers, 9 mL/hr        Data Review:    Results from last 7 days   Lab Units 03/19/21  1548 03/19/21  0649 03/17/21  0454   SODIUM mmol/L 136 139 137   POTASSIUM mmol/L 6.0* 5.8* 5.2   CHLORIDE mmol/L 104 100 101   CO2 mmol/L 20.9* 23.9 22.3   BUN mg/dL 64* 58* 48*   CREATININE mg/dL 8.48* 8.18* 7.53*   CALCIUM mg/dL 8.0* 8.8 8.5*   GLUCOSE mg/dL 135* 91 85     Estimated Creatinine Clearance: 13.2 mL/min (A) (by C-G formula based on SCr of 8.48 mg/dL (H)).      Results from last 7 days   Lab Units 03/19/21  0649 03/17/21  0454 03/15/21  0534 03/13/21  0535   MAGNESIUM mg/dL  --   --   --  2.8*   PHOSPHORUS mg/dL 5.6* 4.8* 5.6* 8.6*       Results from last 7 days   Lab Units 03/19/21  1548 03/19/21  0649 03/17/21  0454 03/15/21  0534 03/13/21  0535   WBC 10*3/mm3 15.07* 10.55 9.84 9.93 10.31   HEMOGLOBIN g/dL 6.8* 8.6* 7.6* 7.9* 8.1*   PLATELETS 10*3/mm3 183 193 203 206 233       Results from last 7 days   Lab Units 03/19/21  0649 03/15/21  0534   INR  1.25* 1.30*             ASSESSMENT:     Pericardial effusion    ESRD on dialysis (CMS/HCC)    Idiopathic peripheral neuropathy    HTN (hypertension)    History of COVID-19    NICM (nonischemic cardiomyopathy) (CMS/HCC)    Anemia in ESRD (end-stage renal disease) (CMS/HCC)    Elevated brain natriuretic peptide (BNP) level    Hilar adenopathy    Right lower lobe lung mass    Mediastinal adenopathy    1.  ESRD: volume excess resolved.  Hyperkalemia intraoperatively, with most recent value now 6 postoperatively.  Underlying renal disease likely autoimmune (renal bx in '19 showed severe glomerular and tubulo-inbsteritial fibrosis, with collapsing variant FSGS or C3 GN as possible considerations), which  may have bearing on other disease processes in play now.   2.  Large pericardial effusion (> 2 cm circumferentially):  no tamponade noted by echocardiogram 3/12.  Pericardial window performed 3/19 along with right VATS and lymphadenectomy.  3.  Anemia of ESRD: significant drop in hemoglobin.  Very low iron saturation, but ferritin nearly 1700  4.  Mediastinal and bilateral hilar lymphadenopathy as well as large mass-like opacity right lower lobe with central cavitation.  Lymphadenectomy performed 3/19  5.  Abdominal distention with ascites and transaminitis  6.  Covid-19 infection, December' 20  7.  Hypertension: now has hypotension  8.  Cough: this may represent an allergy to lisinopril      PLAN:  1.  Given hypotension, will stop amlodipine and reduce carvedilol to 6.25 mg twice daily (with hold orders for SBP <120)  2.  Stat HD now with 2 units PRBCs and no net fluid removal  3.  Discussed with Dr. Billingsley  4.  Discussed with patient's mother earlier today  5.  Discussed with HD nursing team        Dre Samano MD  3/19/2021    16:30 EDT

## 2021-03-19 NOTE — ANESTHESIA PROCEDURE NOTES
Airway  Urgency: elective    Date/Time: 3/19/2021 12:38 PM    General Information and Staff    Patient location during procedure: OR  Anesthesiologist: Errol Red MD  CRNA: Ronak Monet CRNA    Indications and Patient Condition  Indications for airway management: airway protection    Preoxygenated: yes  MILS maintained throughout  Mask difficulty assessment: 1 - vent by mask    Final Airway Details  Final airway type: endotracheal airway      Successful airway: EBT - double lumen right  Cuffed: yes   Successful intubation technique: direct laryngoscopy  Facilitating devices/methods: intubating stylet  Endotracheal tube insertion site: oral  Blade: Layla  Blade size: 3  EBT DL size (fr): 39  Cormack-Lehane Classification: grade I - full view of glottis  Placement verified by: chest auscultation and capnometry   Measured from: teeth  ETT/EBT  to teeth (cm): 29  Number of attempts at approach: 2  Assessment: lips, teeth, and gum same as pre-op and atraumatic intubation

## 2021-03-19 NOTE — ANESTHESIA PROCEDURE NOTES
Peripheral Block      Patient location during procedure: holding area  Reason for block: at surgeon's request, post-op pain management and secondary anesthetic  Performed by  Anesthesiologist: Errol Red MD  Preanesthetic Checklist  Completed: patient identified, IV checked, site marked, risks and benefits discussed, surgical consent, monitors and equipment checked, pre-op evaluation and timeout performed  Prep:  Pt Position: sitting  Sterile barriers:cap, gloves, sterile barriers, mask and washed/disinfected hands  Prep: ChloraPrep  Patient monitoring: blood pressure monitoring, continuous pulse oximetry and EKG  Procedure  Sedation:yes  Performed under: local infiltration  Guidance:ultrasound guided  ULTRASOUND INTERPRETATION. Using ultrasound guidance a 21 G gauge needle was placed in close proximity to the nerve, at which point, under ultrasound guidance anesthetic was injected in the area of the nerve and spread of the anesthesia was seen on ultrasound in close proximity thereto.  There were no abnormalities seen on ultrasound; a digital image was taken; and the patient tolerated the procedure with no complications. Images:still images obtained, printed/placed on chart    Laterality:right  Block Type:erector spinae block  Injection Technique:single-shot  Needle Type:echogenic  Needle Gauge:21 G  Resistance on Injection: none    Medications Used: bupivacaine liposome (EXPAREL) 1.3 % injection, 10 mL  bupivacaine PF (MARCAINE) 0.25 % injection, 20 mL      Post Assessment  Injection Assessment: negative aspiration for heme, no paresthesia on injection and incremental injection  Patient Tolerance:comfortable throughout block  Complications:no

## 2021-03-19 NOTE — PLAN OF CARE
Goal Outcome Evaluation:  Plan of Care Reviewed With: patient  Progress: improving  Outcome Summary: Patient  NPO  for  Procedure  this  am.   Completed  one  unit  of  blood.  No  transfusion  reaction  noted.  Chest  x-ray  completed  this  am. .  Chlorehexidine  bath   given.  SR  on  monitor.  Denies  any  pain.  Nursing  will  continue to monitor.

## 2021-03-19 NOTE — OP NOTE
PERICARDIAL WINDOW WITH [employer]IN[employer] ROBOT  Progress Note    Chandler Buchanan  3/19/2021    Pre-op Diagnosis:   Pericardial effusion [I31.3]       Post-Op Diagnosis Codes:     * Pericardial effusion [I31.3]    Procedure/CPT® Codes:        Procedure(s):  BRONCHOSCOPY   RIGHT VIDEO ASSISTED THORACOSCOPY   ROBOT ASSISTED PERICARDIAL WINDOW   MEDIASTINAL LYMPHADENECTOMY   INTERCOSTAL NERVE BLOCKS    Surgeon(s):  Fran Billingsley III, MD    Anesthesia: General with Block    Staff:   Circulator: Juanita Middleton, YESSY; Willem Jackman RN  Scrub Person: Leonie Burris; Shannon Hunter CSA         Estimated Blood Loss: 100ml    Urine Voided: * No values recorded between 3/19/2021 12:19 PM and 3/19/2021  2:45 PM *    Specimens:                Specimens     ID Source Type Tests Collected By Collected At Frozen?    1 Pericardium Tissue · FUNGAL CULTURE  · TISSUE / BONE CULTURE  · AFB CULTURE   Fran Billingsley III, MD 3/19/21 1331     Description: PERICARDIUM FOR CULTURES    2 Lymph Node Tissue · FUNGAL CULTURE  · TISSUE / BONE CULTURE  · AFB CULTURE   Fran Billingsley III, MD 3/19/21 1346     Description: R7 LYMPH NODE FOR CULTURES    A Pericardium Tissue · TISSUE PATHOLOGY EXAM   Fran Billingsley III, MD 3/19/21 1331     Description: PERICARDIUM    B Pericardium Tissue · TISSUE PATHOLOGY EXAM   Fran Billingsley III, MD 3/19/21 1332 Yes    Description: PERICARDIUM, OR 8 PHONE #[phone]    C Lymph Node Tissue · TISSUE PATHOLOGY EXAM   Fran Billingsley III, MD 3/19/21 1346 Yes    Description: R7 LYMPH NODE, OR 8 PHONE# [phone]    D Lymph Node Tissue · TISSUE PATHOLOGY EXAM   Fran Billingsley III, MD 3/19/21 1349 No    Description: R7 LYMPH NODE #2    E Lymph Node Tissue · TISSUE PATHOLOGY EXAM   Fran Billingsley III, MD 3/19/21 1350 No    Description: R7 LYMPH NODE #3    F Pericardium Tissue · TISSUE PATHOLOGY EXAM   Fran Billingsley III, MD 3/19/21 1355 No    Description: EPICARDIUM    G Lymph Node Tissue · TISSUE PATHOLOGY EXAM    Fran Billingsley III, MD 3/19/21 1406 No    Description: R7 LYMPH NODE                Drains:   Chest Tube Right Midaxillary (Active)       Chest Tube 2 Right Midaxillary (Active)       Findings: 1200 cc of bloody fluid was aspirated from the pericardium.  Frozen section showed no evidence of malignancy in the pericardium.  Shaggy exudate over the entire epicardium.  Large engorged lymph nodes in the subcarinal space with some of the lymph nodes grossly calcified.  Frozen section showed no malignancy and no granulomas.    Complications: none    Summary procedure: Mr. Buchanan was brought to the operating room placed on the operating table in the supine position.  Following the induction of adequate general endotracheal anesthesia the flexible bronchoscope was passed down the endotracheal tube.  The distal trachea and tanisha appeared normal.  Tanisha was sharp and nondisplaced.  Right mainstem bronchus, right upper lobe, right middle lobe, right lower lobe bronchi showed no gross endobronchial lesions or mucosal abnormalities.  The bronchoscope was then passed down the left main bronchus.  The left upper lobe and left lower lobe bronchi showed no gross endobronchial lesions or mucosal abnormalities.  The bronchoscope was then used to position the double-lumen tube in the left main bronchus.  Once the tube was in good position the patient was turned into the left lateral decubitus position.  All pressure points were padded.  A roll was placed in the left axilla.  Patient was secured to the operating table with 3 inch adhesive tape and Velcro safety strap.  The right chest was then prepped and draped in usual sterile manner.  The right lung was deflated    A port site was created in the seventh intercostal space mid axillary line.  The scope was introduced into the pleural space.  No contraindication to proceeding with surgery.  4 other port sites were created.  The [employer] robot was brought into the operative field and  docked to the ports.  Instruments were passed into the pleural space under direct vision.  At this point I broke scrub and went to the [employer] robot to perform the surgery.  My assistant stated bedside to pass and manipulate instruments.    CO2 was insufflated into the chest.  At this point we began having difficulty with oxygenation.  I tried decreasing the amount of CO2 pressure but this did not seem to help.  From this point onward we had to intermittently ventilate the left lung while we stopped procedure so that the patient's saturations could be brought up above 88% proceeding onward I elected to do the pericardial window first hoping that this would help with his oxygenation.  A large window was cut in the pericardium.  A portion of this tissue was sent for frozen section.  A portion was sent for culture and sensitivity acid-fast smear and culture and fungal smear and culture.  The remainder was sent for permanent section.  Frozen section showed no granulomas and no malignancy.  1200 cc of bloody fluid was aspirated out of the pleural space.  The entire epicardium was covered with a shaggy exudate.  Some of this peeled off quite easily.  Next I divided the inferior pulmonary ligament and extended this dissection up posterior along the mediastinum up into the subcarinal space.  The subcarinal space was just jammed with a acute engorged lymph nodes.  Multiple lymph nodes were removed.  Some these lymph nodes were grossly calcified.  Frozen section showed no malignancy and no granulomas.  Tissue of these lymph nodes was sent for culture and sensitivity acid-fast smear and culture and fungal smear and culture.  Hemostasis was obtained.    A #28 Tajik Veto drain was now positioned in the pericardium and brought out through the working port site.  I scrubbed back into the operative field.  The instruments were removed under direct vision.  The robot was undocked from the ports.  Sponge instrument and needle  counts were correct.  The Veto drain was secured to the chest wall with a suture of 0 silk.  A single #28 chest tube was passed through the camera port site and directed the apex of the chest.  Tube was secured to the chest wall with a suture of 0 silk.  Intercostal nerve blocks were performed with Exparel.  All port sites were closed in layers with 2-0 Vicryl and the skin with 4-0 Vicryl.  Dry sterile dressings were applied.    The patient was awakened and extubated in the operating room and transported to the recovery room in satisfactory condition having tolerated procedure well.    .        Dictated utilizing Dragon dictation          Fran Billingsley III, MD     Date: 3/19/2021  Time: 14:45 EDT

## 2021-03-19 NOTE — PROGRESS NOTES
"                                              LOS: 6 days   Patient Care Team:  Provider, No Known as PCP - General    Chief Complaint:  F/up lung mass and mediastinal adenopathies.    Subjective   Interval History  No new complaints.    Ventilator/Non-Invasive Ventilation Settings (From admission, onward) Comment    None                Physical Exam:     Vital Signs  Temp:  [98 °F (36.7 °C)-98.6 °F (37 °C)] 98 °F (36.7 °C)  Heart Rate:  [82-93] 91  Resp:  [16-18] 16  BP: (133-156)/() 156/108    Intake/Output Summary (Last 24 hours) at 3/18/2021 2244  Last data filed at 3/18/2021 1943  Gross per 24 hour   Intake 1880 ml   Output --   Net 1880 ml     Flowsheet Rows      First Filed Value   Admission Height  175.3 cm (69\") Documented at 03/12/2021 1726   Admission Weight  81.7 kg (180 lb 3.2 oz) Documented at 03/12/2021 1726          General Appearance:   Alert, cooperative, in no acute distress                                         Results Review:        Results from last 7 days   Lab Units 03/17/21  0454 03/15/21  0534 03/13/21  0535   SODIUM mmol/L 137 138 136   POTASSIUM mmol/L 5.2 5.2 5.7*   CHLORIDE mmol/L 101 99 90*   CO2 mmol/L 22.3 23.0 27.6   BUN mg/dL 48* 43* 72*   CREATININE mg/dL 7.53* 5.76* 8.26*   GLUCOSE mg/dL 85 92 112*   CALCIUM mg/dL 8.5* 8.7 8.8     Results from last 7 days   Lab Units 03/12/21  1220   TROPONIN T ng/mL 0.083*     Results from last 7 days   Lab Units 03/17/21  0454 03/15/21  0534 03/13/21  0535   WBC 10*3/mm3 9.84 9.93 10.31   HEMOGLOBIN g/dL 7.6* 7.9* 8.1*   HEMATOCRIT % 23.9* 24.9* 25.2*   PLATELETS 10*3/mm3 203 206 233     Results from last 7 days   Lab Units 03/15/21  0534   INR  1.30*   APTT seconds 33.5     Results from last 7 days   Lab Units 03/12/21  1220   PROBNP pg/mL >70,000.0*       I reviewed the patient's new clinical results.        Medication Review:   amLODIPine, 5 mg, Oral, Q24H  calcium acetate, 2,001 mg, Oral, TID With Meals  carvedilol, 25 mg, Oral, " Q12H  sodium chloride, 10 mL, Intravenous, Q12H             Diagnostic imaging:  I personally and independently reviewed the following images:      Assessment   1. Masslike consolidation in the right lower lobe: Not explained by recent Covid infection which usually represent as bilateral groundglass infiltrates initially which may later progressed into fibrosis but not a discrete masslike consolidation.  In addition he was not severely ill with pneumonia and stayed at home throughout prior Covid illness  2. Mediastinal and hilar adenopathies.  Could be reactive, inflammatory or simply related to cardiomyopathy and ESRD (i.e. fluid overload)  3. Pericardial effusion and ascites: Anasarca  4. Systolic cardiomyopathy  5. ESRD, on HD  6. COVID-19 URI December 2020     S/p TB NA station 7 and station 11 R.  Multi nucleated histiocytes and necrotizing inflammation.  Negative for malignancy.  Transbronchial biopsies from the right lower lobe lung mass negative for malignancy.  BAL unrevealing but its more lymphocytic.    Overall picture is suggestive of inflammatory disorders such as sarcoidosis but not definitive.  Vasculitis is also possible though this was previously evaluated by serology and was negative.  Inflammatory condition could explain pericardial effusion and could also explain his kidney disease.  Granulomatous infection with fungus/yeast is possible as well but less likely since patient is not immunocompromised.        Plan     ACE level pending.  Plan for pericardial window tomorrow and mediastinal lymphadenectomy by thoracic surgery.  Will follow pathology.    Brian Tilley MD  03/18/21  22:44 EDT          This note was dictated utilizing [employer] dictation

## 2021-03-19 NOTE — PLAN OF CARE
Goal Outcome Evaluation:   Patient transferred to CCU post- right VAT and pericardial window r/t oxygen requirements. VSS. Patient currently weaned to 6L NC with oxygen saturations in mid 90%. Stat dialysis being completed now with plans for 2 units prbc to be infused during dialysis. Moderate sanguinous drainage at chest tube sites but minimal drainage noted in atriums. Pain controlled. Continuing to monitor.

## 2021-03-19 NOTE — NURSING NOTE
Not given ample time to do / chart assessment. As pt came into holding, the OR Nurse arrived and did his interview, then AA arrived and did their interview, then Surgeon arrived. After this Stem and JUDIE procedures were started. Orderly in here to take patient and waiting for completion of Julianna procedure.

## 2021-03-19 NOTE — NURSING NOTE
"Below Hemodialysis orders completed, tolerated treatment. @ units PRBC transfused during HD. Max BFR of 400 achieved without difficulty.   No fluid removal per MD order.  Patient self-Cannulated x2 w/o difficulty.  Needles pulled, applied pressure with 2x2s  hemostasis achieved within 10 minutes to Left Upper Arm AVF.  Applied 2x2s to access and secured with tape.  Patient stable, no distress reported/observed.  Verbal report to primary RN.    Pre Vitals  Post Vitals  BP: 109/61  BP: 117/51  HR: 86  HR: 78  RR:  16   RR:  16  Weight 78.3kg  Weight:  78.3kg    Total Fluid Removed:   No Fluid removal per MD order    BVP:  68.8    2 units PRBC transfused.      Rosio Pollack RN  [facility]  [phone]    Duration of Treatment 3.0 Hours    Access Site AVF    Dialyzer F180     mL/min    Dialysate Temperature (C) 36    Use Crit-Line? No    BFR-As tolerated to a maximum of: 400 mL/min    Prime Dialyzer With NS to Priming Volume? No    Dialysate Solution Bath: K+ = 2 mEq, Ca = 2.5mEq    Bicarb 33 mEq    Na+ 137 meq    Fluid Removal: no net fluid removal    Notify [facility]foming Department of order. Who did you speak with? s/w \"Tracie\" @#[phone] @[facility].MELANIE Nunes            "

## 2021-03-19 NOTE — ANESTHESIA PROCEDURE NOTES
Arterial Line      Patient reassessed immediately prior to procedure    Patient location during procedure: holding area   Line placed for hemodynamic monitoring and ABGs/Labs/ISTAT.  Performed By   Anesthesiologist: Errol Red MD  Preanesthetic Checklist  Completed: patient identified, IV checked, site marked, risks and benefits discussed, surgical consent, monitors and equipment checked, pre-op evaluation and timeout performed  Arterial Line Prep   Sterile Tech: cap, gloves, sterile barriers and mask  Prep: ChloraPrep  Patient monitoring: EKG, continuous pulse oximetry and blood pressure monitoring  Arterial Line Procedure   Laterality:right  Location:  radial artery  Catheter size: 20 G   Guidance: palpation technique  Number of attempts: 1  Successful placement: yes  Post Assessment   Dressing Type: wrist guard applied, secured with tape and occlusive dressing applied.   Complications no  Circ/Move/Sens Assessment: normal and unchanged.   Patient Tolerance: patient tolerated the procedure well with no apparent complications

## 2021-03-19 NOTE — ANESTHESIA PREPROCEDURE EVALUATION
Anesthesia Evaluation     Patient summary reviewed and Nursing notes reviewed   NPO Solid Status: > 8 hours  NPO Liquid Status: > 2 hours           Airway   Mallampati: I  TM distance: >3 FB  Neck ROM: full  No difficulty expected  Dental - normal exam     Pulmonary - normal exam   (+) pneumonia improving ,   Cardiovascular - normal exam  Exercise tolerance: poor (<4 METS)    ECG reviewed    (+) hypertension less than 2 medications, CHF Systolic <55%, pericardial effusion,       Neuro/Psych- negative ROS  GI/Hepatic/Renal/Endo    (+)   renal disease dialysis and ESRD,     Musculoskeletal (-) negative ROS    Abdominal  - normal exam    Bowel sounds: normal.   Substance History - negative use     OB/GYN negative ob/gyn ROS         Other        ROS/Med Hx Other: Last dialysis yesterday.      Phys Exam Other: AV fistula on left arm.              Anesthesia Plan    ASA 4     general with block   (A line)  intravenous induction     Anesthetic plan, all risks, benefits, and alternatives have been provided, discussed and informed consent has been obtained with: patient.  Use of blood products discussed with patient  Consented to blood products.   Plan discussed with CRNA and attending.

## 2021-03-19 NOTE — PROGRESS NOTES
"    Name: Chandler Buchanan ADMIT: 3/12/2021   : 1985  PCP: Provider, No Known    MRN: [id number] LOS: 7 days   AGE/SEX: 36 y.o. male  ROOM: [facility]/     Subjective   Subjective   Seen in the PACU.  His oxygen sats have been dropping down into the 80s and he was briefly on a face tent but while I was talking to him they took the 10 off and he stayed up in the mid 90s on just a high flow cannula.  He did not appear in any distress.  He was quite annoyed that he is being transferred to the ICU as he says \"I always run low\"    Review of Systems   All other systems reviewed and are negative.       Objective   Objective   Vital Signs  Temp:  [98 °F (36.7 °C)-98.6 °F (37 °C)] 98 °F (36.7 °C)  Heart Rate:  [74-92] 79  Resp:  [12-18] 14  BP: (105-156)/() 113/69  Arterial Line BP: ()/() 118/55  SpO2:  [90 %-99 %] 94 %  on  Flow (L/min):  [2-8] 8;   Device (Oxygen Therapy): high-flow nasal cannula  Body mass index is 25.52 kg/m².  Physical Exam  Vitals reviewed.   Constitutional:       General: He is not in acute distress.     Appearance: He is well-developed. He is not ill-appearing.   HENT:      Head: Normocephalic and atraumatic.   Eyes:      General: No scleral icterus.  Neck:      Vascular: No JVD.   Cardiovascular:      Rate and Rhythm: Normal rate and regular rhythm.      Heart sounds: No murmur heard.   No friction rub.   Pulmonary:      Effort: Pulmonary effort is normal. No respiratory distress.      Breath sounds: Normal breath sounds. No wheezing.   Abdominal:      General: Bowel sounds are normal. There is no distension.      Palpations: Abdomen is soft.      Tenderness: There is no abdominal tenderness.   Musculoskeletal:      Cervical back: Neck supple.   Skin:     General: Skin is warm and dry.      Findings: No rash.   Neurological:      Mental Status: He is alert and oriented to person, place, and time.         Results Review     I reviewed the patient's new clinical results.  Results " from last 7 days   Lab Units 03/19/21  1548 03/19/21  0649 03/17/21  0454 03/15/21  0534   WBC 10*3/mm3 15.07* 10.55 9.84 9.93   HEMOGLOBIN g/dL 6.8* 8.6* 7.6* 7.9*   PLATELETS 10*3/mm3 183 193 203 206     Results from last 7 days   Lab Units 03/19/21  1548 03/19/21  0649 03/17/21  0454 03/15/21  0534   SODIUM mmol/L 136 139 137 138   POTASSIUM mmol/L 6.0* 5.8* 5.2 5.2   CHLORIDE mmol/L 104 100 101 99   CO2 mmol/L 20.9* 23.9 22.3 23.0   BUN mg/dL 64* 58* 48* 43*   CREATININE mg/dL 8.48* 8.18* 7.53* 5.76*   GLUCOSE mg/dL 135* 91 85 92   Estimated Creatinine Clearance: 13.2 mL/min (A) (by C-G formula based on SCr of 8.48 mg/dL (H)).  Results from last 7 days   Lab Units 03/19/21  0649 03/17/21  0454 03/15/21  0534 03/13/21  0535   ALBUMIN g/dL 3.50 3.40* 3.10* 3.20*     Results from last 7 days   Lab Units 03/19/21  1548 03/19/21  0649 03/17/21  0454 03/15/21  0534 03/13/21  0535   CALCIUM mg/dL 8.0* 8.8 8.5* 8.7 8.8   ALBUMIN g/dL  --  3.50 3.40* 3.10* 3.20*   MAGNESIUM mg/dL  --   --   --   --  2.8*   PHOSPHORUS mg/dL  --  5.6* 4.8* 5.6* 8.6*       COVID19   Date Value Ref Range Status   03/18/2021 Not Detected Not Detected - Ref. Range Final   03/12/2021 Not Detected Not Detected - Ref. Range Final     No results found for: HGBA1C, POCGLU    XR Chest 1 View  ONE VIEW PORTABLE CHEST AT 3:08 PM     HISTORY: Recent right chest surgery. Chest tubes.     FINDINGS: The patient has had recent right chest surgery. There are 2  right-sided chest tubes in place including 1 tube which appears to enter  the lower right chest medially and then extends across the midline to  the left at the level of the left main pulmonary artery. There is no  evidence of pneumothorax. There is some dense localized atelectasis or  masslike density near the minor fissure which may potentially relate to  tumor or postobstructive changes as seen on the chest CT scan performed  7 days ago. The heart remains mildly enlarged.     This report was  finalized on 3/19/2021 3:25 PM by Dr. Festus Lund M.D.     Peripheral Block  Errol Red MD     3/19/2021 12:26 PM  Peripheral Block    Patient location during procedure: holding area  Reason for block: at surgeon's request  Performed by  Anesthesiologist: Errol Red MD  Preanesthetic Checklist  Completed: patient identified, IV checked, site marked, risks and benefits   discussed, surgical consent, monitors and equipment checked, pre-op   evaluation and timeout performed  Prep:  Pt Position: sitting  Sterile barriers:cap, gloves, sterile barriers, mask and   washed/disinfected hands  Prep: ChloraPrep  Patient monitoring: blood pressure monitoring, continuous pulse oximetry   and EKG  Procedure  Sedation:yes  Performed under: local infiltration  Guidance:ultrasound guided  ULTRASOUND INTERPRETATION. Using ultrasound guidance a 21 G gauge needle   was placed in close proximity to the nerve, at which point, under   ultrasound guidance anesthetic was injected in the area of the nerve and   spread of the anesthesia was seen on ultrasound in close proximity   thereto.  There were no abnormalities seen on ultrasound; a digital image   was taken; and the patient tolerated the procedure with no complications.   Images:still images obtained, printed/placed on chart    Laterality:right  Block Type:erector spinae block  Injection Technique:single-shot  Needle Type:echogenic  Needle Gauge:21 G  Resistance on Injection: none    Medications Used: bupivacaine liposome (EXPAREL) 1.3 % injection, 10 mL  bupivacaine PF (MARCAINE) 0.25 % injection, 20 mL    Post Assessment  Injection Assessment: negative aspiration for heme, no paresthesia on   injection and incremental injection  Patient Tolerance:comfortable throughout block  Complications:no  XR Chest 1 View  XR CHEST 1 VW-     Clinical: Preop pericardial window, pericardial effusion.     COMPARISON chest radiograph 1/23/2019     FINDINGS: There is  significant cardiac enlargement. Masslike area right  lower lobe vaguely demonstrated on the current examination, seen better  on recent CT. Left upper lobe periaortic density seen on CT cannot be  identified on the current chest radiograph. No acute airspace disease  has developed within the right lung. Band of discoid atelectasis left  midlung zone again seen. The remainder of examination is unremarkable.     CONCLUSION:  1. Substantial cardiac enlargement.  2. No acute pulmonary process has developed.     This report was finalized on 3/19/2021 7:11 AM by Dr. Tang Faith M.D.       Scheduled Medications  acetaminophen, 1,000 mg, Oral, TID  amLODIPine, 5 mg, Oral, Q24H  calcium acetate, 2,001 mg, Oral, TID With Meals  carvedilol, 25 mg, Oral, Q12H  ceFAZolin, 1 g, Intravenous, Q12H  docusate sodium, 100 mg, Oral, Daily  [START ON 3/20/2021] enoxaparin, 30 mg, Subcutaneous, Daily  gabapentin, 100 mg, Oral, Q8H  polyethylene glycol, 17 g, Oral, Daily  senna-docusate sodium, 2 tablet, Oral, Nightly  sodium chloride, 10 mL, Intravenous, Q12H    Infusions  lactated ringers, 9 mL/hr    Diet  Diet Regular; Consistent Carbohydrate, Renal, Cardiac       Assessment/Plan     Active Hospital Problems    Diagnosis  POA   • **Pericardial effusion [I31.3]  Yes   • Hilar adenopathy [R59.0]  Yes   • Elevated brain natriuretic peptide (BNP) level [R79.89]  Yes   • Right lower lobe lung mass [R91.8]  Yes   • Mediastinal adenopathy [R59.0]  Yes   • Anemia in ESRD (end-stage renal disease) (CMS/HCC) [N18.6, D63.1]  Yes   • NICM (nonischemic cardiomyopathy) (CMS/HCC) [I42.8]  Yes   • HTN (hypertension) [I10]  Yes   • History of COVID-19 [Z86.16]  Yes   • Idiopathic peripheral neuropathy [G60.9]  Yes   • ESRD on dialysis (CMS/HCC) [N18.6, Z99.2]  Not Applicable      Resolved Hospital Problems   No resolved problems to display.       36 y.o. male with h/o ESRD admitted with Pericardial effusion, mediastinal LAD and YEVGENIY lung  mass    · O2 sats seem to be improving.  I think he was still just a little bit drowsy and not breathing deeply.  Patient being transferred to CCU for closer monitoring  · Reviewed postop labs.  Noted plans for dialysis with 2 units packed red cells which I would certainly agree with  · HD per nephrology.  Discussed with Dr. Samano earlier  · Anemia- Suspect due to combo of ESRD and inflammatory process related to the pericardial and lung issues, now with some postoperative blood loss as well  · Monitor hypertension  · SCDs  · Dispo- home when post op care complete, likely few days      Brian hKanna MD  [facility]  03/19/21  17:30 EDT

## 2021-03-20 ENCOUNTER — APPOINTMENT (OUTPATIENT)
Dept: GENERAL RADIOLOGY | Facility: HOSPITAL | Age: 36
End: 2021-03-20

## 2021-03-20 LAB
ALBUMIN SERPL-MCNC: 3 G/DL (ref 3.5–5.2)
ANION GAP SERPL CALCULATED.3IONS-SCNC: 12.8 MMOL/L (ref 5–15)
BASE EXCESS BLDA CALC-SCNC: -2 MMOL/L (ref -5–5)
BASOPHILS # BLD AUTO: 0.04 10*3/MM3 (ref 0–0.2)
BASOPHILS NFR BLD AUTO: 0.2 % (ref 0–1.5)
BH BB BLOOD EXPIRATION DATE: NORMAL
BH BB BLOOD TYPE BARCODE: 7300
BH BB DISPENSE STATUS: NORMAL
BH BB PRODUCT CODE: NORMAL
BH BB UNIT NUMBER: NORMAL
BUN SERPL-MCNC: 43 MG/DL (ref 6–20)
BUN/CREAT SERPL: 7.2 (ref 7–25)
CA-I BLDA-SCNC: ABNORMAL MMOL/L
CALCIUM SPEC-SCNC: 8.3 MG/DL (ref 8.6–10.5)
CHLORIDE SERPL-SCNC: 99 MMOL/L (ref 98–107)
CO2 BLDA-SCNC: 26 MMOL/L (ref 24–29)
CO2 SERPL-SCNC: 24.2 MMOL/L (ref 22–29)
CREAT SERPL-MCNC: 5.95 MG/DL (ref 0.76–1.27)
CROSSMATCH INTERPRETATION: NORMAL
DEPRECATED RDW RBC AUTO: 48.9 FL (ref 37–54)
EOSINOPHIL # BLD AUTO: 0 10*3/MM3 (ref 0–0.4)
EOSINOPHIL NFR BLD AUTO: 0 % (ref 0.3–6.2)
ERYTHROCYTE [DISTWIDTH] IN BLOOD BY AUTOMATED COUNT: 15.1 % (ref 12.3–15.4)
GFR SERPL CREATININE-BSD FRML MDRD: 11 ML/MIN/1.73
GFR SERPL CREATININE-BSD FRML MDRD: ABNORMAL ML/MIN/{1.73_M2}
GLUCOSE BLDC GLUCOMTR-MCNC: 100 MG/DL (ref 70–130)
GLUCOSE SERPL-MCNC: 135 MG/DL (ref 65–99)
HCO3 BLDA-SCNC: 24.3 MMOL/L (ref 22–26)
HCT VFR BLD AUTO: 25.3 % (ref 37.5–51)
HCT VFR BLDA CALC: 19 % (ref 38–51)
HGB BLD-MCNC: 8.4 G/DL (ref 13–17.7)
HGB BLDA-MCNC: 6.5 G/DL (ref 12–17)
IMM GRANULOCYTES # BLD AUTO: 0.22 10*3/MM3 (ref 0–0.05)
IMM GRANULOCYTES NFR BLD AUTO: 1.2 % (ref 0–0.5)
LYMPHOCYTES # BLD AUTO: 0.91 10*3/MM3 (ref 0.7–3.1)
LYMPHOCYTES NFR BLD AUTO: 5.1 % (ref 19.6–45.3)
MAGNESIUM SERPL-MCNC: 2.5 MG/DL (ref 1.6–2.6)
MCH RBC QN AUTO: 29.9 PG (ref 26.6–33)
MCHC RBC AUTO-ENTMCNC: 33.2 G/DL (ref 31.5–35.7)
MCV RBC AUTO: 90 FL (ref 79–97)
MONOCYTES # BLD AUTO: 0.8 10*3/MM3 (ref 0.1–0.9)
MONOCYTES NFR BLD AUTO: 4.5 % (ref 5–12)
NEUTROPHILS NFR BLD AUTO: 15.83 10*3/MM3 (ref 1.7–7)
NEUTROPHILS NFR BLD AUTO: 89 % (ref 42.7–76)
NRBC BLD AUTO-RTO: 0 /100 WBC (ref 0–0.2)
PCO2 BLDA: 48.1 MM HG (ref 35–45)
PH BLDA: 7.31 PH UNITS (ref 7.35–7.6)
PHOSPHATE SERPL-MCNC: 6.6 MG/DL (ref 2.5–4.5)
PLATELET # BLD AUTO: 195 10*3/MM3 (ref 140–450)
PMV BLD AUTO: 9.2 FL (ref 6–12)
PO2 BLDA: 254 MMHG (ref 80–105)
POTASSIUM BLDA-SCNC: 7 MMOL/L (ref 3.5–4.9)
POTASSIUM SERPL-SCNC: 5.5 MMOL/L (ref 3.5–5.2)
RBC # BLD AUTO: 2.81 10*6/MM3 (ref 4.14–5.8)
SAO2 % BLDA: 100 % (ref 95–98)
SODIUM SERPL-SCNC: 136 MMOL/L (ref 136–145)
UNIT  ABO: NORMAL
UNIT  RH: NORMAL
WBC # BLD AUTO: 17.8 10*3/MM3 (ref 3.4–10.8)

## 2021-03-20 PROCEDURE — 85025 COMPLETE CBC W/AUTO DIFF WBC: CPT | Performed by: THORACIC SURGERY (CARDIOTHORACIC VASCULAR SURGERY)

## 2021-03-20 PROCEDURE — 94799 UNLISTED PULMONARY SVC/PX: CPT

## 2021-03-20 PROCEDURE — 25010000002 CEFAZOLIN 1-4 GM/50ML-% SOLUTION: Performed by: THORACIC SURGERY (CARDIOTHORACIC VASCULAR SURGERY)

## 2021-03-20 PROCEDURE — 99024 POSTOP FOLLOW-UP VISIT: CPT | Performed by: NURSE PRACTITIONER

## 2021-03-20 PROCEDURE — 25010000002 ENOXAPARIN PER 10 MG: Performed by: THORACIC SURGERY (CARDIOTHORACIC VASCULAR SURGERY)

## 2021-03-20 PROCEDURE — 71045 X-RAY EXAM CHEST 1 VIEW: CPT

## 2021-03-20 PROCEDURE — 80069 RENAL FUNCTION PANEL: CPT | Performed by: INTERNAL MEDICINE

## 2021-03-20 PROCEDURE — 25010000002 HYDROMORPHONE PER 4 MG: Performed by: THORACIC SURGERY (CARDIOTHORACIC VASCULAR SURGERY)

## 2021-03-20 PROCEDURE — 83735 ASSAY OF MAGNESIUM: CPT | Performed by: INTERNAL MEDICINE

## 2021-03-20 RX ORDER — ALBUMIN (HUMAN) 12.5 G/50ML
12.5 SOLUTION INTRAVENOUS AS NEEDED
Status: CANCELLED | OUTPATIENT
Start: 2021-03-20 | End: 2021-03-21

## 2021-03-20 RX ADMIN — HYDROMORPHONE HYDROCHLORIDE 0.5 MG: 1 INJECTION, SOLUTION INTRAMUSCULAR; INTRAVENOUS; SUBCUTANEOUS at 17:24

## 2021-03-20 RX ADMIN — ACETAMINOPHEN 1000 MG: 500 TABLET, FILM COATED ORAL at 16:02

## 2021-03-20 RX ADMIN — ENOXAPARIN SODIUM 30 MG: 30 INJECTION SUBCUTANEOUS at 08:30

## 2021-03-20 RX ADMIN — CEFAZOLIN SODIUM 1 G: 1 INJECTION, SOLUTION INTRAVENOUS at 13:25

## 2021-03-20 RX ADMIN — DOCUSATE SODIUM 50MG AND SENNOSIDES 8.6MG 2 TABLET: 8.6; 5 TABLET, FILM COATED ORAL at 20:44

## 2021-03-20 RX ADMIN — OXYCODONE 5 MG: 5 TABLET ORAL at 13:25

## 2021-03-20 RX ADMIN — CALCIUM ACETATE 2001 MG: 667 CAPSULE ORAL at 08:31

## 2021-03-20 RX ADMIN — CEFAZOLIN SODIUM 1 G: 1 INJECTION, SOLUTION INTRAVENOUS at 01:37

## 2021-03-20 RX ADMIN — DOCUSATE SODIUM 100 MG: 100 CAPSULE, LIQUID FILLED ORAL at 08:31

## 2021-03-20 RX ADMIN — SODIUM CHLORIDE, PRESERVATIVE FREE 10 ML: 5 INJECTION INTRAVENOUS at 20:45

## 2021-03-20 RX ADMIN — CARVEDILOL 6.25 MG: 6.25 TABLET, FILM COATED ORAL at 08:31

## 2021-03-20 RX ADMIN — CARVEDILOL 6.25 MG: 6.25 TABLET, FILM COATED ORAL at 20:43

## 2021-03-20 RX ADMIN — SODIUM CHLORIDE, PRESERVATIVE FREE 10 ML: 5 INJECTION INTRAVENOUS at 08:31

## 2021-03-20 RX ADMIN — GABAPENTIN 100 MG: 100 CAPSULE ORAL at 14:00

## 2021-03-20 RX ADMIN — GABAPENTIN 100 MG: 100 CAPSULE ORAL at 06:19

## 2021-03-20 RX ADMIN — CALCIUM ACETATE 2001 MG: 667 CAPSULE ORAL at 13:25

## 2021-03-20 RX ADMIN — ACETAMINOPHEN 1000 MG: 500 TABLET, FILM COATED ORAL at 08:30

## 2021-03-20 RX ADMIN — OXYCODONE 5 MG: 5 TABLET ORAL at 08:31

## 2021-03-20 RX ADMIN — POLYETHYLENE GLYCOL 3350 17 G: 17 POWDER, FOR SOLUTION ORAL at 08:30

## 2021-03-20 RX ADMIN — GABAPENTIN 100 MG: 100 CAPSULE ORAL at 22:48

## 2021-03-20 RX ADMIN — ACETAMINOPHEN 1000 MG: 500 TABLET, FILM COATED ORAL at 20:44

## 2021-03-20 RX ADMIN — HYDROMORPHONE HYDROCHLORIDE 0.5 MG: 1 INJECTION, SOLUTION INTRAMUSCULAR; INTRAVENOUS; SUBCUTANEOUS at 20:45

## 2021-03-20 RX ADMIN — DOCUSATE SODIUM 100 MG: 100 CAPSULE, LIQUID FILLED ORAL at 20:44

## 2021-03-20 RX ADMIN — CALCIUM ACETATE 2001 MG: 667 CAPSULE ORAL at 17:07

## 2021-03-20 NOTE — PLAN OF CARE
Goal Outcome Evaluation:         Pt remains in CCU on 8L HF. Has not required any pain medicine. Chest tube sites no longer oozing. Chest tube#1 output 106cc chest tube#2 output 40cc. Tolerating diet. Continue to monitor.

## 2021-03-20 NOTE — PROGRESS NOTES
[facility] INPATIENT PROGRESS NOTE         [facility] CORONARY CARE    3/20/2021      PATIENT IDENTIFICATION:  Name: Chandler Buchanan ADMIT: 3/12/2021   : 1985  PCP: Provider, No Known    MRN: [id number] LOS: 8 days   AGE/SEX: 36 y.o. male  ROOM: [facility]                     LOS 8    Reason for visit: Follow-up right lower lobe masslike consolidation and lymphadenopathy      SUBJECTIVE:      Sitting in bedside chair.  Complains of some discomfort at chest tube sites but otherwise doing fairly well.  No productive cough.  Scattered coarse breath sounds on auscultation.  No wheezing.  Chest tubes are stable without airleak.  Complains of some mild abdominal discomfort still unchanged from previous.  Discussed with family at bedside.  Can move out of intensive care unit from my standpoint.  I am seeing the patient for the first time today.  All patient problems are new to me.      Objective   OBJECTIVE:    Vital Sign Min/Max for last 24 hours  Temp  Min: 97.1 °F (36.2 °C)  Max: 98.2 °F (36.8 °C)   BP  Min: 100/64  Max: 132/78   Pulse  Min: 74  Max: 90   Resp  Min: 12  Max: 18   SpO2  Min: 86 %  Max: 99 %   No data recorded   Weight  Min: 79.5 kg (175 lb 4.3 oz)  Max: 79.5 kg (175 lb 4.3 oz)                         Body mass index is 25.88 kg/m².    Intake/Output Summary (Last 24 hours) at 3/20/2021 0930  Last data filed at 3/20/2021 0839  Gross per 24 hour   Intake 1347 ml   Output 201 ml   Net 1146 ml         Exam:  GEN:  No distress, appears stated age  EYES:   PERRL, anicteric sclera  ENT:    External ears/nose normal, OP clear  NECK:  No adenopathy, midline trachea  LUNGS: Normal chest on inspection, palpation and coarse breath sounds bilaterally on auscultation.  Chest tube stable without air leak.    CV:  Normal S1S2, without murmur  ABD:  Non tender, non distended, no hepatosplenomegaly, +BS  EXT:  No edema, cyanosis or clubbing    Assessment     Scheduled meds:  acetaminophen, 1,000 mg, Oral,  Subjective:      Brent Bailey is a 52 y.o. female who presents with Hypertension            1. Need for Tdap vaccination    - TDAP VACCINE =>6YO IM    2. Benign essential HTN  Currently treated for HTN, taking meds with no CP or sob, monitors bp at home periodically. controlled  On meds for 6 weeks and doing well will continue  - triamterene-hctz (MAXZIDE-25/DYAZIDE) 37.5-25 MG Tab; Take 1 Tab by mouth every day.  Dispense: 90 Tab; Refill: 1    3. Well adult exam    - REFERRAL TO GI FOR COLONOSCOPY  - MA-SCREEN MAMMO W/CAD-BILAT; Future    Past Medical History:  No date: Hypertension  History reviewed. No pertinent surgical history.  Smoking status: Former Smoker                                                              Packs/day: 0.50      Years: 20.00        Types: Cigarettes  Smokeless tobacco: Never Used                      Alcohol use: No              History reviewed.  No pertinent family history.      Current Outpatient Prescriptions: •  triamterene-hctz (MAXZIDE-25/DYAZIDE) 37.5-25 MG Tab, Take 1 Tab by mouth every day., Disp: 90 Tab, Rfl: 1•  buPROPion (WELLBUTRIN XL) 150 MG XL tablet, Take 1 Tab by mouth every morning., Disp: 30 Tab, Rfl: 3    Patient was instructed on the use of medications, either prescriptions or OTC and informed on when the appropriate follow up time period should be. In addition, patient was also instructed that should any acute worsening occur that they should notify this clinic asap or call 911.            Review of Systems   Constitutional: Negative.  Negative for chills and fever.        Past Medical History:  No date: Hypertension  History reviewed. No pertinent surgical history.  Smoking status: Former Smoker                                                              Packs/day: 0.50      Years: 20.00        Types: Cigarettes  Smokeless tobacco: Never Used                      Alcohol use: No              History reviewed.  No pertinent family history.     HENT: Negative.  "   Eyes: Negative.    Respiratory: Negative.  Negative for cough and hemoptysis.    Cardiovascular: Negative.  Negative for chest pain and palpitations.   Gastrointestinal: Negative.  Negative for constipation.   Genitourinary: Negative.  Negative for dysuria and urgency.   Musculoskeletal: Negative.  Negative for myalgias and neck pain.   Skin: Negative.  Negative for rash.   Neurological: Negative.  Negative for dizziness and headaches.   Endo/Heme/Allergies: Negative.    Psychiatric/Behavioral: Negative.  Negative for suicidal ideas.          Objective:     /60   Pulse 73   Temp 36.1 °C (96.9 °F)   Resp 14   Ht 1.638 m (5' 4.5\")   Wt 65.8 kg (145 lb)   SpO2 97%   BMI 24.50 kg/m²      Physical Exam   Constitutional: She is oriented to person, place, and time. She appears well-developed and well-nourished. No distress.   HENT:   Head: Normocephalic and atraumatic.   Mouth/Throat: Oropharynx is clear and moist. No oropharyngeal exudate.   Eyes: Pupils are equal, round, and reactive to light.   Cardiovascular: Normal rate, regular rhythm, normal heart sounds and intact distal pulses.  Exam reveals no gallop and no friction rub.    No murmur heard.  Pulmonary/Chest: Effort normal and breath sounds normal. No respiratory distress. She has no wheezes. She has no rales. She exhibits no tenderness.   Neurological: She is alert and oriented to person, place, and time.   Skin: She is not diaphoretic.   Psychiatric: She has a normal mood and affect. Her behavior is normal. Judgment and thought content normal.   Nursing note and vitals reviewed.              Assessment/Plan:     1. Need for Tdap vaccination    - TDAP VACCINE =>6YO IM    2. Benign essential HTN    - triamterene-hctz (MAXZIDE-25/DYAZIDE) 37.5-25 MG Tab; Take 1 Tab by mouth every day.  Dispense: 90 Tab; Refill: 1    3. Well adult exam    - REFERRAL TO GI FOR COLONOSCOPY  - MA-SCREEN MAMMO W/CAD-BILAT; Future      " TID  calcium acetate, 2,001 mg, Oral, TID With Meals  carvedilol, 6.25 mg, Oral, Q12H  ceFAZolin, 1 g, Intravenous, Q12H  docusate sodium, 100 mg, Oral, Daily  enoxaparin, 30 mg, Subcutaneous, Daily  gabapentin, 100 mg, Oral, Q8H  polyethylene glycol, 17 g, Oral, Daily  senna-docusate sodium, 2 tablet, Oral, Nightly  sodium chloride, 10 mL, Intravenous, Q12H      IV meds:                      lactated ringers, 9 mL/hr      Data Review:  Results from last 7 days   Lab Units 03/20/21 0418 03/19/21  1548 03/19/21  0649 03/17/21  0454 03/15/21  0534   SODIUM mmol/L 136 136 139 137 138   POTASSIUM mmol/L 5.5* 6.0* 5.8* 5.2 5.2   CHLORIDE mmol/L 99 104 100 101 99   CO2 mmol/L 24.2 20.9* 23.9 22.3 23.0   BUN mg/dL 43* 64* 58* 48* 43*   CREATININE mg/dL 5.95* 8.48* 8.18* 7.53* 5.76*   GLUCOSE mg/dL 135* 135* 91 85 92   CALCIUM mg/dL 8.3* 8.0* 8.8 8.5* 8.7         Estimated Creatinine Clearance: 19.1 mL/min (A) (by C-G formula based on SCr of 5.95 mg/dL (H)).  Results from last 7 days   Lab Units 03/20/21 0418 03/19/21  1548 03/19/21  0649 03/17/21  0454 03/15/21  0534   WBC 10*3/mm3 17.80* 15.07* 10.55 9.84 9.93   HEMOGLOBIN g/dL 8.4* 6.8* 8.6* 7.6* 7.9*   PLATELETS 10*3/mm3 195 183 193 203 206     Results from last 7 days   Lab Units 03/19/21  0649 03/15/21  0534   INR  1.25* 1.30*                     Chest x-ray 3/20/2021 reviewed        Microbiology reviewed  )        Active Hospital Problems    Diagnosis  POA   • **Pericardial effusion [I31.3]  Yes   • Hilar adenopathy [R59.0]  Yes   • Elevated brain natriuretic peptide (BNP) level [R79.89]  Yes   • Right lower lobe lung mass [R91.8]  Yes   • Mediastinal adenopathy [R59.0]  Yes   • Anemia in ESRD (end-stage renal disease) (CMS/HCC) [N18.6, D63.1]  Yes   • NICM (nonischemic cardiomyopathy) (CMS/HCC) [I42.8]  Yes   • HTN (hypertension) [I10]  Yes   • History of COVID-19 [Z86.16]  Yes   • Idiopathic peripheral neuropathy [G60.9]  Yes   • ESRD on dialysis (CMS/HCC)  [N18.6, Z99.2]  Not Applicable      Resolved Hospital Problems   No resolved problems to display.         ASSESSMENT:  Masslike consolidation right lower lobe  Mediastinal and hilar lymphadenopathy: Status post EBUS  Pericardial effusion status post window 3/19/2021  Ascites/anasarca  Systolic cardiomyopathy  ESRD: On dialysis  Hyperkalemia  Recent COVID-19 infection December 2020  Crampy abdominal pain  Anemia: Status post transfusion with appropriate response    PLAN:    Final pathology microbiology from bronchoscopy and surgical procedure with thoracic surgery pending.  No evidence of malignancy or granuloma on frozen section from lymph nodes.  Chest tube management per thoracic surgery.  Can transfer out of intensive care to 5 E. if okay with all.      Koffi Baez MD  Pulmonary and Critical Care Medicine  [facility], [facility]  3/20/2021    09:30 EDT

## 2021-03-20 NOTE — PROGRESS NOTES
Name: Chandler Buchanan ADMIT: 3/12/2021   : 1985  PCP: Provider, No Known    MRN: [id number] LOS: 8 days   AGE/SEX: 36 y.o. male  ROOM: E5/     Subjective   Subjective   No events. C/o some abdl discomfort but he says he has been having BMs    Review of Systems   All other systems reviewed and are negative.       Objective   Objective   Vital Signs  Temp:  [97.1 °F (36.2 °C)-97.9 °F (36.6 °C)] 97.9 °F (36.6 °C)  Heart Rate:  [69-90] 70  Resp:  [16-20] 20  BP: (100-135)/(54-91) 124/79  Arterial Line BP: ()/(21-83) 121/70  SpO2:  [86 %-99 %] 95 %  on  Flow (L/min):  [5-8] 5;   Device (Oxygen Therapy): nasal cannula  Body mass index is 25.88 kg/m².  Physical Exam  Vitals reviewed.   Constitutional:       General: He is not in acute distress.     Appearance: He is well-developed. He is not ill-appearing.   HENT:      Head: Normocephalic and atraumatic.   Eyes:      General: No scleral icterus.  Neck:      Vascular: No JVD.   Cardiovascular:      Rate and Rhythm: Normal rate and regular rhythm.      Heart sounds: No murmur heard.   No friction rub.   Pulmonary:      Effort: Pulmonary effort is normal. No respiratory distress.      Breath sounds: Normal breath sounds. No wheezing.   Abdominal:      General: Bowel sounds are normal. There is no distension.      Palpations: Abdomen is soft.      Tenderness: There is no abdominal tenderness.   Musculoskeletal:      Cervical back: Neck supple.   Skin:     General: Skin is warm and dry.      Findings: No rash.   Neurological:      Mental Status: He is alert and oriented to person, place, and time.         Results Review     I reviewed the patient's new clinical results.  Results from last 7 days   Lab Units 21  0418 21  1548 21  1432 21  0649 21  0454   WBC 10*3/mm3 17.80* 15.07*  --  10.55 9.84   HEMOGLOBIN g/dL 8.4* 6.8*  --  8.6* 7.6*   HEMOGLOBIN, POC g/dL  --   --  6.5*  --   --    PLATELETS 10*3/mm3 195 183  --  193 203      Results from last 7 days   Lab Units 03/20/21 0418 03/19/21  1548 03/19/21  0649 03/17/21  0454   SODIUM mmol/L 136 136 139 137   POTASSIUM mmol/L 5.5* 6.0* 5.8* 5.2   CHLORIDE mmol/L 99 104 100 101   CO2 mmol/L 24.2 20.9* 23.9 22.3   BUN mg/dL 43* 64* 58* 48*   CREATININE mg/dL 5.95* 8.48* 8.18* 7.53*   GLUCOSE mg/dL 135* 135* 91 85   Estimated Creatinine Clearance: 19.1 mL/min (A) (by C-G formula based on SCr of 5.95 mg/dL (H)).  Results from last 7 days   Lab Units 03/20/21 0418 03/19/21  0649 03/17/21  0454 03/15/21  0534   ALBUMIN g/dL 3.00* 3.50 3.40* 3.10*     Results from last 7 days   Lab Units 03/20/21 0418 03/19/21  1548 03/19/21  0649 03/17/21  0454 03/15/21  0534   CALCIUM mg/dL 8.3* 8.0* 8.8 8.5* 8.7   ALBUMIN g/dL 3.00*  --  3.50 3.40* 3.10*   MAGNESIUM mg/dL 2.5  --   --   --   --    PHOSPHORUS mg/dL 6.6*  --  5.6* 4.8* 5.6*       COVID19   Date Value Ref Range Status   03/18/2021 Not Detected Not Detected - Ref. Range Final   03/12/2021 Not Detected Not Detected - Ref. Range Final     Glucose   Date/Time Value Ref Range Status   03/19/2021 1736 113 70 - 130 mg/dL Final   03/19/2021 1432 100 70 - 130 mg/dL Final       XR Chest 1 View  Narrative: Patient: PLACIDO HUSTON  Time Out: 06:32  Exam(s): FILM CXR 1 VIEW     EXAM:    XR Chest, 1 View    CLINICAL HISTORY:     Post Op Lung Surgery  Reason for exam: Post Op Lung Surgery.    TECHNIQUE:    Frontal view of the chest.    COMPARISON:    3 19 2021    IMPRESSION:         Triangular opacity in the right lower lobe again seen.  Cardiomegaly.  Soft tissue emphysema along the right chest wall.  Right-sided chest tube in place.  No pneumothorax.  Impression: Electronically signed by Tino Quezada MD on 03-20-21 at 0632    Scheduled Medications  acetaminophen, 1,000 mg, Oral, TID  calcium acetate, 2,001 mg, Oral, TID With Meals  carvedilol, 6.25 mg, Oral, Q12H  ceFAZolin, 1 g, Intravenous, Q12H  docusate sodium, 100 mg, Oral, Daily  enoxaparin, 30  mg, Subcutaneous, Daily  gabapentin, 100 mg, Oral, Q8H  polyethylene glycol, 17 g, Oral, Daily  senna-docusate sodium, 2 tablet, Oral, Nightly  sodium chloride, 10 mL, Intravenous, Q12H    Infusions  lactated ringers, 9 mL/hr    Diet  Diet Regular; Consistent Carbohydrate, Renal, Cardiac       Assessment/Plan     Active Hospital Problems    Diagnosis  POA   • **Pericardial effusion [I31.3]  Yes   • Hilar adenopathy [R59.0]  Yes   • Elevated brain natriuretic peptide (BNP) level [R79.89]  Yes   • Right lower lobe lung mass [R91.8]  Yes   • Mediastinal adenopathy [R59.0]  Yes   • Anemia in ESRD (end-stage renal disease) (CMS/HCC) [N18.6, D63.1]  Yes   • NICM (nonischemic cardiomyopathy) (CMS/HCC) [I42.8]  Yes   • HTN (hypertension) [I10]  Yes   • History of COVID-19 [Z86.16]  Yes   • Idiopathic peripheral neuropathy [G60.9]  Yes   • ESRD on dialysis (CMS/HCC) [N18.6, Z99.2]  Not Applicable      Resolved Hospital Problems   No resolved problems to display.       36 y.o. male with h/o ESRD admitted with Pericardial effusion, mediastinal LAD and YEVGENIY lung mass    · Wean o2 as tolerated. D/w Dr Billingsley  · S/p transfusion with HD, follow hgb  · HD per nephrology. Still some hyperkalemia  · Cont bowel regimen  · SCDs  · Dispo- home when post op care complete, likely few days  · OK to go to floor today      Brian Khanna MD  [facility]  03/20/21  17:27 EDT

## 2021-03-20 NOTE — PROGRESS NOTES
"  POST-OPERATIVE NOTE     Chief Complaint: Paracardial effusion, postoperative care  S/P: Bronchoscopy, right video-assisted thoracoscopy with robot-assisted pericardial window, mediastinal lymphadenectomy, intercostal nerve block  POD # 1    Subjective:  Symptoms:  Stable.  He reports shortness of breath and chest pain.    Diet:  No nausea or vomiting.    Activity level: Normal with assistance.    Pain:  He complains of pain that is mild.  Pain is well controlled.        Objective:  General Appearance:  Uncomfortable, in no acute distress and not in pain.    Vital signs: (most recent): Blood pressure 128/91, pulse 85, temperature 97.5 °F (36.4 °C), temperature source Oral, resp. rate 18, height 175.3 cm (69\"), weight 79.5 kg (175 lb 4.3 oz), SpO2 92 %.  Vital signs are normal.    HEENT: Normal HEENT exam.    Lungs:  Normal effort and normal respiratory rate.  Breath sounds clear to auscultation.  He is not in respiratory distress.    Heart: Normal rate.  Regular rhythm.    Chest: Chest wall tenderness present.    Abdomen: Abdomen is distended.    Extremities: Normal range of motion.    Pulses: Distal pulses are intact.    Neurological: Patient is alert and oriented to person, place and time.    Skin:  Warm and dry.          Chest tube:   Site: Right, Clean, Dry, Intact and Securement device intact  Suction: -20 cm  Air Leak: negative  24 Hour Total: 40/106cc    Results Review:     I reviewed the patient's new clinical results.  I reviewed the patient's new imaging results and agree with the interpretation.  I reviewed the patient's other test results and agree with the interpretation  Discussed with patient, RN and Dr. Billingsley.    Assessment/Plan     Patient is POD #1, s/p robot-assisted pericardial window.  This morning's chest x-ray demonstrates no pneumothorax with some subcutaneous emphysema along the right lateral chest wall.  He is ambulatory with assistance.  Wean supplemental O2 as tolerated.  Keep chest " tubes to suction today and recheck x-ray in a.m.  Okay to transfer to  E from our standpoint.    CATIA White  [facility]  03/20/21  13:34 EDT    Patient was seen and assessed while wearing personal protective equipment including facemask, protective eyewear and gloves.  Hand hygiene performed prior to entering the room and upon exiting with doffing of gloves.

## 2021-03-20 NOTE — ANESTHESIA POSTPROCEDURE EVALUATION
Patient: Chandler Buchanan    Procedure Summary     Date: 03/19/21 Room / Location: [facility] OR 08 / [facility] MAIN OR    Anesthesia Start: 1221 Anesthesia Stop: 1453    Procedure: BRONCHOSCOPY, RIGHT VIDEO ASSISTED THORACOSCOPY WITH ROBOT ASSISTED PERICARDIAL WINDOW, MEDIASTINAL LYMPADENECTOMY, INTERCOSTAL NERVE BLOCKS (Right Chest) Diagnosis:       Pericardial effusion      (Pericardial effusion [I31.3])    Surgeons: Fran Billingsley III, MD Provider: Errol Red MD    Anesthesia Type: general ASA Status: 4          Anesthesia Type: general    Vitals  Vitals Value Taken Time   /69 03/19/21 1700   Temp 36.7 °C (98 °F) 03/19/21 1700   Pulse 77 03/19/21 1711   Resp 14 03/19/21 1700   SpO2 94 % 03/19/21 1712   Vitals shown include unvalidated device data.        Post Anesthesia Care and Evaluation      Comments: Patient discharged before being evaluated by an Anesthesiologist.  No apparent complications per the record.  THIS CASE WAS NOT MEDICALLY DIRECTED

## 2021-03-20 NOTE — PLAN OF CARE
Goal Outcome Evaluation:  Plan of Care Reviewed With: patient  Progress: improving  Outcome Summary: AAO x all.  VSS.  NSR on monitor.  Arrived this aziza from CCU.  Oriented to new surroundings.  Taking PO well.  Has ESRD and has not voided.  Up in chair at bedside.  CT x 2 to -20 suction.  No air leak, no fluctuation, no subcut air noted but was seen on CXR.  O2 conts at 5lpm via NC.  Dilaudid given x 1 for surgical pain.  Will continue to monitor.

## 2021-03-20 NOTE — PROGRESS NOTES
NEPHROLOGY PROGRESS NOTE    PATIENT IDENTIFICATION:   Name:  Chandler Buchanan      MRN:  [id number]     36 y.o.  male             Reason for visit: ESRD    SUBJECTIVE:   Has no new complaint.    OBJECTIVE:  Vitals:    03/20/21 0806 03/20/21 0830 03/20/21 0831 03/20/21 0901   BP:  135/85     Pulse:   86 80   Resp:       Temp: 97.5 °F (36.4 °C)      TempSrc: Oral      SpO2:   90% 94%   Weight:       Height:               Body mass index is 25.88 kg/m².    Intake/Output Summary (Last 24 hours) at 3/20/2021 0953  Last data filed at 3/20/2021 0839  Gross per 24 hour   Intake 1347 ml   Output 201 ml   Net 1146 ml     Wt Readings from Last 1 Encounters:   03/20/21 0533 79.5 kg (175 lb 4.3 oz)   03/19/21 0537 78.4 kg (172 lb 12.8 oz)   03/18/21 0639 76.2 kg (168 lb)   03/17/21 0406 79.2 kg (174 lb 11.2 oz)   03/16/21 0437 78 kg (172 lb)   03/15/21 0520 81.8 kg (180 lb 5.4 oz)   03/14/21 0600 81.2 kg (179 lb 0.2 oz)   03/14/21 0500 81.2 kg (179 lb 1.6 oz)   03/13/21 0554 82.2 kg (181 lb 4.8 oz)   03/12/21 1726 81.7 kg (180 lb 3.2 oz)     Wt Readings from Last 3 Encounters:   03/20/21 79.5 kg (175 lb 4.3 oz)   03/12/21 83.5 kg (184 lb)   03/08/21 80.3 kg (177 lb 0.5 oz)         Exam:  NAD; pleasant; groggy; gaunt  Flat affect  Chronically ill-appearing; very pale  MMM; AT/NC   No eye discharge; no scleral icterus  No JVD; no carotid bruits  Decreased breath sounds both bases, crackles and rhonchi on right; not labored  RRR, 2/6M, no rub  Soft, not tender, +D  No lower extremity edema  Left arm AV fistula patent  No clubbing  No asterixis  Moves all extremities       Scheduled meds:    acetaminophen, 1,000 mg, Oral, TID  calcium acetate, 2,001 mg, Oral, TID With Meals  carvedilol, 6.25 mg, Oral, Q12H  ceFAZolin, 1 g, Intravenous, Q12H  docusate sodium, 100 mg, Oral, Daily  enoxaparin, 30 mg, Subcutaneous, Daily  gabapentin, 100 mg, Oral, Q8H  polyethylene glycol, 17 g, Oral, Daily  senna-docusate sodium, 2 tablet, Oral,  Nightly  sodium chloride, 10 mL, Intravenous, Q12H      IV meds:                        lactated ringers, 9 mL/hr        Data Review:    Results from last 7 days   Lab Units 03/20/21  0418 03/19/21  1548 03/19/21  0649   SODIUM mmol/L 136 136 139   POTASSIUM mmol/L 5.5* 6.0* 5.8*   CHLORIDE mmol/L 99 104 100   CO2 mmol/L 24.2 20.9* 23.9   BUN mg/dL 43* 64* 58*   CREATININE mg/dL 5.95* 8.48* 8.18*   CALCIUM mg/dL 8.3* 8.0* 8.8   GLUCOSE mg/dL 135* 135* 91     Estimated Creatinine Clearance: 19.1 mL/min (A) (by C-G formula based on SCr of 5.95 mg/dL (H)).      Results from last 7 days   Lab Units 03/20/21  0418 03/19/21  0649 03/17/21  0454   MAGNESIUM mg/dL 2.5  --   --    PHOSPHORUS mg/dL 6.6* 5.6* 4.8*       Results from last 7 days   Lab Units 03/20/21  0418 03/19/21  1548 03/19/21  0649 03/17/21  0454 03/15/21  0534   WBC 10*3/mm3 17.80* 15.07* 10.55 9.84 9.93   HEMOGLOBIN g/dL 8.4* 6.8* 8.6* 7.6* 7.9*   PLATELETS 10*3/mm3 195 183 193 203 206       Results from last 7 days   Lab Units 03/19/21  0649 03/15/21  0534   INR  1.25* 1.30*             ASSESSMENT:     Pericardial effusion    ESRD on dialysis (CMS/[facility])    Idiopathic peripheral neuropathy    HTN (hypertension)    History of COVID-19    NICM (nonischemic cardiomyopathy) (CMS/[facility])    Anemia in ESRD (end-stage renal disease) (CMS/[facility])    Elevated brain natriuretic peptide (BNP) level    Hilar adenopathy    Right lower lobe lung mass    Mediastinal adenopathy    1.  ESRD: volume excess resolved.  Hyperkalemia intraoperatively, with most recent value now 6 postoperatively.  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   2.  Large pericardial effusion (> 2 cm circumferentially):  no tamponade noted by echocardiogram 3/12.  Pericardial window performed 3/19 along  with right VATS and lymphadenectomy.  3.  Anemia of ESRD: significant drop in hemoglobin.  Very low iron saturation, but ferritin nearly 1700  4.  Mediastinal and bilateral hilar lymphadenopathy as well as large mass-like opacity right lower lobe with central cavitation.  Lymphadenectomy performed 3/19  5.  Abdominal distention with ascites and transaminitis  6.  Covid-19 infection, December' 20  7.  Hypertension: now has hypotension  8.  Cough: this may represent an allergy to lisinopril      PLAN:  1.  Dialysis in a.m. to help with hyperkalemia, increase dialysis time to 4 hours  2.  Counseled on low potassium diet          Ariel Banuelos MD  3/20/2021    09:53 EDT

## 2021-03-21 ENCOUNTER — APPOINTMENT (OUTPATIENT)
Dept: GENERAL RADIOLOGY | Facility: HOSPITAL | Age: 36
End: 2021-03-21

## 2021-03-21 PROCEDURE — 25010000002 ENOXAPARIN PER 10 MG: Performed by: THORACIC SURGERY (CARDIOTHORACIC VASCULAR SURGERY)

## 2021-03-21 PROCEDURE — 71045 X-RAY EXAM CHEST 1 VIEW: CPT

## 2021-03-21 PROCEDURE — 25010000002 CEFAZOLIN 1-4 GM/50ML-% SOLUTION: Performed by: THORACIC SURGERY (CARDIOTHORACIC VASCULAR SURGERY)

## 2021-03-21 PROCEDURE — 5A1D70Z PERFORMANCE OF URINARY FILTRATION, INTERMITTENT, LESS THAN 6 HOURS PER DAY: ICD-10-PCS | Performed by: INTERNAL MEDICINE

## 2021-03-21 PROCEDURE — 94799 UNLISTED PULMONARY SVC/PX: CPT

## 2021-03-21 PROCEDURE — 99024 POSTOP FOLLOW-UP VISIT: CPT | Performed by: NURSE PRACTITIONER

## 2021-03-21 RX ADMIN — POLYETHYLENE GLYCOL 3350 17 G: 17 POWDER, FOR SOLUTION ORAL at 08:56

## 2021-03-21 RX ADMIN — GABAPENTIN 100 MG: 100 CAPSULE ORAL at 05:32

## 2021-03-21 RX ADMIN — DOCUSATE SODIUM 50MG AND SENNOSIDES 8.6MG 2 TABLET: 8.6; 5 TABLET, FILM COATED ORAL at 20:21

## 2021-03-21 RX ADMIN — CARVEDILOL 6.25 MG: 6.25 TABLET, FILM COATED ORAL at 20:21

## 2021-03-21 RX ADMIN — CEFAZOLIN SODIUM 1 G: 1 INJECTION, SOLUTION INTRAVENOUS at 00:46

## 2021-03-21 RX ADMIN — CALCIUM ACETATE 2001 MG: 667 CAPSULE ORAL at 11:49

## 2021-03-21 RX ADMIN — SODIUM CHLORIDE, PRESERVATIVE FREE 10 ML: 5 INJECTION INTRAVENOUS at 08:57

## 2021-03-21 RX ADMIN — ACETAMINOPHEN 1000 MG: 500 TABLET, FILM COATED ORAL at 17:35

## 2021-03-21 RX ADMIN — SODIUM CHLORIDE, PRESERVATIVE FREE 10 ML: 5 INJECTION INTRAVENOUS at 20:21

## 2021-03-21 RX ADMIN — FAMOTIDINE 20 MG: 20 TABLET, FILM COATED ORAL at 05:32

## 2021-03-21 RX ADMIN — CARVEDILOL 6.25 MG: 6.25 TABLET, FILM COATED ORAL at 08:56

## 2021-03-21 RX ADMIN — BISACODYL 10 MG: 10 SUPPOSITORY RECTAL at 10:30

## 2021-03-21 RX ADMIN — ENOXAPARIN SODIUM 30 MG: 30 INJECTION SUBCUTANEOUS at 08:56

## 2021-03-21 RX ADMIN — ACETAMINOPHEN 1000 MG: 500 TABLET, FILM COATED ORAL at 08:56

## 2021-03-21 RX ADMIN — GABAPENTIN 100 MG: 100 CAPSULE ORAL at 14:25

## 2021-03-21 RX ADMIN — CALCIUM ACETATE 2001 MG: 667 CAPSULE ORAL at 17:35

## 2021-03-21 RX ADMIN — ACETAMINOPHEN 1000 MG: 500 TABLET, FILM COATED ORAL at 20:21

## 2021-03-21 RX ADMIN — DOCUSATE SODIUM 100 MG: 100 CAPSULE, LIQUID FILLED ORAL at 08:56

## 2021-03-21 RX ADMIN — CEFAZOLIN SODIUM 1 G: 1 INJECTION, SOLUTION INTRAVENOUS at 12:29

## 2021-03-21 RX ADMIN — OXYCODONE 5 MG: 5 TABLET ORAL at 08:56

## 2021-03-21 RX ADMIN — CALCIUM ACETATE 2001 MG: 667 CAPSULE ORAL at 07:24

## 2021-03-21 NOTE — PROGRESS NOTES
"    Name: Chandler Buchanan ADMIT: 3/12/2021   : 1985  PCP: Provider, No Known    MRN: [id number] LOS: 9 days   AGE/SEX: 36 y.o. male  ROOM: E5/     Subjective   Subjective   Patient just had a bowel movement and says that he feels much better afterward.  Abdomen still feels a little \"sore\"    Review of Systems   Respiratory: Negative for shortness of breath.    All other systems reviewed and are negative.       Objective   Objective   Vital Signs  Temp:  [97.8 °F (36.6 °C)-98.3 °F (36.8 °C)] 98.1 °F (36.7 °C)  Heart Rate:  [70-80] 75  Resp:  [17-20] 18  BP: (124-147)/() 147/109  SpO2:  [92 %-100 %] 96 %  on  Flow (L/min):  [4-5] 4;   Device (Oxygen Therapy): nasal cannula  Body mass index is 26.01 kg/m².  Physical Exam  Vitals reviewed.   Constitutional:       General: He is not in acute distress.     Appearance: He is well-developed. He is not ill-appearing.   HENT:      Head: Normocephalic and atraumatic.   Eyes:      General: No scleral icterus.  Neck:      Vascular: No JVD.   Cardiovascular:      Rate and Rhythm: Normal rate and regular rhythm.      Heart sounds: No murmur heard.   No friction rub.   Pulmonary:      Effort: Pulmonary effort is normal. No respiratory distress.      Breath sounds: Normal breath sounds. No wheezing.   Abdominal:      General: Bowel sounds are normal. There is distension (Some better).      Palpations: Abdomen is soft.      Tenderness: There is no abdominal tenderness.   Musculoskeletal:      Cervical back: Neck supple.   Skin:     General: Skin is warm and dry.      Findings: No rash.   Neurological:      Mental Status: He is alert and oriented to person, place, and time.         Results Review     I reviewed the patient's new clinical results.  Results from last 7 days   Lab Units 21  0418 21  1548 21  1432 21  0649 21  0454   WBC 10*3/mm3 17.80* 15.07*  --  10.55 9.84   HEMOGLOBIN g/dL 8.4* 6.8*  --  8.6* 7.6*   HEMOGLOBIN, POC g/dL  " --   --  6.5*  --   --    PLATELETS 10*3/mm3 195 183  --  193 203     Results from last 7 days   Lab Units 03/20/21  0418 03/19/21  1548 03/19/21  0649 03/17/21  0454   SODIUM mmol/L 136 136 139 137   POTASSIUM mmol/L 5.5* 6.0* 5.8* 5.2   CHLORIDE mmol/L 99 104 100 101   CO2 mmol/L 24.2 20.9* 23.9 22.3   BUN mg/dL 43* 64* 58* 48*   CREATININE mg/dL 5.95* 8.48* 8.18* 7.53*   GLUCOSE mg/dL 135* 135* 91 85   Estimated Creatinine Clearance: 19.2 mL/min (A) (by C-G formula based on SCr of 5.95 mg/dL (H)).  Results from last 7 days   Lab Units 03/20/21  0418 03/19/21  0649 03/17/21  0454 03/15/21  0534   ALBUMIN g/dL 3.00* 3.50 3.40* 3.10*     Results from last 7 days   Lab Units 03/20/21  0418 03/19/21  1548 03/19/21  0649 03/17/21  0454 03/15/21  0534   CALCIUM mg/dL 8.3* 8.0* 8.8 8.5* 8.7   ALBUMIN g/dL 3.00*  --  3.50 3.40* 3.10*   MAGNESIUM mg/dL 2.5  --   --   --   --    PHOSPHORUS mg/dL 6.6*  --  5.6* 4.8* 5.6*       COVID19   Date Value Ref Range Status   03/18/2021 Not Detected Not Detected - Ref. Range Final   03/12/2021 Not Detected Not Detected - Ref. Range Final     Glucose   Date/Time Value Ref Range Status   03/19/2021 1736 113 70 - 130 mg/dL Final   03/19/2021 1432 100 70 - 130 mg/dL Final       XR Chest 1 View  Narrative: Patient: PLACIDO HUSTON  Time Out: 07:13  Exam(s): FILM CXR 1 VIEW     EXAM:    XR Chest, 1 View    CLINICAL HISTORY:     Chest tube management  Reason for exam: Chest tube management.    TECHNIQUE:    Frontal view of the chest.    COMPARISON:    3 20 2021    FINDINGS:    Lungs: Low lung volumes and by basilar streaky opacities, consistent   with atelectasis. Unchanged right perihilar triangular opacity.    Pleural space: Small left pleural effusion.  No pneumothorax.    Heart: Cardiomegaly, unchanged.    Mediastinum:  Unremarkable.    Bones joints:  Unremarkable.    Lines and tubes: Right-sided chest tube terminates in the right mid   hemithorax.    IMPRESSION:       1. Right-sided  chest tube terminates in the right mid hemithorax.    2.  Decreased aeration with increased bibasilar atelectasis compared to   3 20 2021.  Impression: Electronically signed by Wilfrid August M.D. on 03-21-21 at 0713    Scheduled Medications  acetaminophen, 1,000 mg, Oral, TID  calcium acetate, 2,001 mg, Oral, TID With Meals  carvedilol, 6.25 mg, Oral, Q12H  docusate sodium, 100 mg, Oral, Daily  enoxaparin, 30 mg, Subcutaneous, Daily  gabapentin, 100 mg, Oral, Q8H  polyethylene glycol, 17 g, Oral, Daily  senna-docusate sodium, 2 tablet, Oral, Nightly  sodium chloride, 10 mL, Intravenous, Q12H    Infusions   Diet  Diet Regular; Consistent Carbohydrate, Renal, Cardiac       Assessment/Plan     Active Hospital Problems    Diagnosis  POA   • **Pericardial effusion [I31.3]  Yes   • Hilar adenopathy [R59.0]  Yes   • Elevated brain natriuretic peptide (BNP) level [R79.89]  Yes   • Right lower lobe lung mass [R91.8]  Yes   • Mediastinal adenopathy [R59.0]  Yes   • Anemia in ESRD (end-stage renal disease) (CMS/HCC) [N18.6, D63.1]  Yes   • NICM (nonischemic cardiomyopathy) (CMS/[facility]) [I42.8]  Yes   • HTN (hypertension) [I10]  Yes   • History of COVID-19 [Z86.16]  Yes   • Idiopathic peripheral neuropathy [G60.9]  Yes   • ESRD on dialysis (CMS/HCC) [N18.6, Z99.2]  Not Applicable      Resolved Hospital Problems   No resolved problems to display.       36 y.o. male with h/o ESRD admitted with Pericardial effusion, mediastinal LAD and YEVGENIY lung mass    · Wean o2 as tolerated. D/w Ms Pedraza, hoping to get chest tubes out tomorrow  · HD per nephrology.  He says he typically does dialysis 4 times a week  · Cont bowel regimen, good result today  · Follow-up pathology from surgery.  Previous pathology from bronchoscopy negative for malignancy  · DVT prophylaxis-Lovenox  · Dispo-anticipate discharge in 2 to 3 days when chest tubes removed      Brian Khanna MD  [facility]  03/21/21  13:13 EDT

## 2021-03-21 NOTE — PROGRESS NOTES
[facility] INPATIENT PROGRESS NOTE         [address]    3/21/2021      PATIENT IDENTIFICATION:  Name: Chandler Buchanan ADMIT: 3/12/2021   : 1985  PCP: Provider, No Known    MRN: [id number] LOS: 9 days   AGE/SEX: 36 y.o. male  ROOM: [facility]                     LOS 9    Reason for visit: Follow-up right lower lobe masslike consolidation and lymphadenopathy      SUBJECTIVE:      No new pulmonary complaints.  Complains of pain at chest tube site no difference from yesterday.  Still complains of some crampy abdominal discomfort as well.  Chest tubes stable without airleak.  Coarse breath sounds right base but improved air movement compared to yesterday.      Objective   OBJECTIVE:    Vital Sign Min/Max for last 24 hours  Temp  Min: 97.8 °F (36.6 °C)  Max: 98.3 °F (36.8 °C)   BP  Min: 124/79  Max: 135/88   Pulse  Min: 69  Max: 85   Resp  Min: 17  Max: 20   SpO2  Min: 92 %  Max: 100 %   No data recorded   Weight  Min: 79.9 kg (176 lb 1.6 oz)  Max: 79.9 kg (176 lb 1.6 oz)                         Body mass index is 26.01 kg/m².    Intake/Output Summary (Last 24 hours) at 3/21/2021 0837  Last data filed at 3/21/2021 0657  Gross per 24 hour   Intake 1150 ml   Output 737 ml   Net 413 ml         Exam:  GEN:  No distress, appears stated age  EYES:   PERRL, anicteric sclera  ENT:    External ears/nose normal, OP clear  NECK:  No adenopathy, midline trachea  LUNGS: Normal chest on inspection, palpation and coarse breath sounds bilaterally on auscultation.  Chest tube stable without air leak.    CV:  Normal S1S2, without murmur  ABD:  Non tender, non distended, no hepatosplenomegaly, +BS  EXT:  No edema, cyanosis or clubbing    Assessment     Scheduled meds:  acetaminophen, 1,000 mg, Oral, TID  calcium acetate, 2,001 mg, Oral, TID With Meals  carvedilol, 6.25 mg, Oral, Q12H  ceFAZolin, 1 g, Intravenous, Q12H  docusate sodium, 100 mg, Oral, Daily  enoxaparin, 30 mg, Subcutaneous, Daily  gabapentin, 100 mg,  Oral, Q8H  polyethylene glycol, 17 g, Oral, Daily  senna-docusate sodium, 2 tablet, Oral, Nightly  sodium chloride, 10 mL, Intravenous, Q12H      IV meds:                      lactated ringers, 9 mL/hr      Data Review:  Results from last 7 days   Lab Units 03/20/21  0418 03/19/21  1548 03/19/21  0649 03/17/21  0454 03/15/21  0534   SODIUM mmol/L 136 136 139 137 138   POTASSIUM mmol/L 5.5* 6.0* 5.8* 5.2 5.2   CHLORIDE mmol/L 99 104 100 101 99   CO2 mmol/L 24.2 20.9* 23.9 22.3 23.0   BUN mg/dL 43* 64* 58* 48* 43*   CREATININE mg/dL 5.95* 8.48* 8.18* 7.53* 5.76*   GLUCOSE mg/dL 135* 135* 91 85 92   CALCIUM mg/dL 8.3* 8.0* 8.8 8.5* 8.7         Estimated Creatinine Clearance: 19.2 mL/min (A) (by C-G formula based on SCr of 5.95 mg/dL (H)).  Results from last 7 days   Lab Units 03/20/21  0418 03/19/21  1548 03/19/21  1432 03/19/21  0649 03/17/21  0454 03/15/21  0534   WBC 10*3/mm3 17.80* 15.07*  --  10.55 9.84 9.93   HEMOGLOBIN g/dL 8.4* 6.8*  --  8.6* 7.6* 7.9*   HEMOGLOBIN, POC g/dL  --   --  6.5*  --   --   --    PLATELETS 10*3/mm3 195 183  --  193 203 206     Results from last 7 days   Lab Units 03/19/21  0649 03/15/21  0534   INR  1.25* 1.30*         Results from last 7 days   Lab Units 03/19/21  1432   PH, ARTERIAL pH units 7.31*             Chest x-ray 3/20/2021 reviewed        Microbiology reviewed  )        Active Hospital Problems    Diagnosis  POA   • **Pericardial effusion [I31.3]  Yes   • Hilar adenopathy [R59.0]  Yes   • Elevated brain natriuretic peptide (BNP) level [R79.89]  Yes   • Right lower lobe lung mass [R91.8]  Yes   • Mediastinal adenopathy [R59.0]  Yes   • Anemia in ESRD (end-stage renal disease) (CMS/HCC) [N18.6, D63.1]  Yes   • NICM (nonischemic cardiomyopathy) (CMS/HCC) [I42.8]  Yes   • HTN (hypertension) [I10]  Yes   • History of COVID-19 [Z86.16]  Yes   • Idiopathic peripheral neuropathy [G60.9]  Yes   • ESRD on dialysis (CMS/HCC) [N18.6, Z99.2]  Not Applicable      [facility]  Problems   No resolved problems to display.         ASSESSMENT:  Masslike consolidation right lower lobe  Mediastinal and hilar lymphadenopathy: Status post EBUS  Pericardial effusion status post window 3/19/2021  Ascites/anasarca  Systolic cardiomyopathy  ESRD: On dialysis  Hyperkalemia  Recent COVID-19 infection December 2020  Crampy abdominal pain  Anemia: Status post transfusion with appropriate response    PLAN:    Final pathology from surgical procedure with thoracic surgery pending.  No evidence of malignancy or granuloma on frozen section from lymph nodes.  Chest tube management per thoracic surgery.  Doing well out of the intensive care unit.  Hospitalist service taking over as primary attending.  Following peripherally.      Koffi Baez MD  Pulmonary and Critical Care Medicine  [facility], [facility]  3/21/2021    08:37 EDT

## 2021-03-21 NOTE — PLAN OF CARE
Goal Outcome Evaluation:  Plan of Care Reviewed With: patient  Progress: improving  Outcome Summary: VSS, 4L via n/c, Dilaudid x1, Right Chest tubes x2 to -20 suction.  Chest tube #2 insertion site draining - changed dressing. IV Cefazolin as ordered. Colace given per pt request. Dialysis scheduled for today. Will continue to monitor.

## 2021-03-21 NOTE — PLAN OF CARE
Goal Outcome Evaluation:  Plan of Care Reviewed With: patient, mother  Progress: improving  Outcome Summary: AAO x all.  VSS.  NSR on monitor.  Sats in mid to high 90s on room air.  AMB in negron and to [place].  Has been up in chair most of day.  Taking PO  food and fluids well.  HD being done at bedside and has had 4 KG removed-mary ellen well.  Chest tubes to water seal with no air leak and no fluctuation.  Drsgs changed to CT insertion sites.  No subcut air heard or felt.  Had good BM after dulcolax supp.  IV antibiotics cont.  Pain med x 1 for incisional pain.  Plan is to have one CT removed tomorrow and the second one Tues and perhaps home Tues afternoon if all goes as planned.  Mom at bedside and supportive.  Will cont to monitor.     Removed 4 liters fluid in dialysis--charted 4 kg in error

## 2021-03-21 NOTE — PROGRESS NOTES
"  POST-OPERATIVE NOTE     Chief Complaint: Paracardial effusion, postoperative care  S/P: Bronchoscopy, right video-assisted thoracoscopy with robot-assisted pericardial window, mediastinal lymphadenectomy, intercostal nerve block  POD # 1    Subjective:  Symptoms:  Improved.  He reports shortness of breath and chest pain.    Diet:  Adequate intake.  No nausea or vomiting.    Activity level: Normal with assistance.    Pain:  He complains of pain that is mild.  Pain is well controlled.        Objective:  General Appearance:  Uncomfortable, in no acute distress and not in pain.    Vital signs: (most recent): Blood pressure (!) 147/109, pulse 75, temperature 98.1 °F (36.7 °C), temperature source Oral, resp. rate 18, height 175.3 cm (69\"), weight 79.9 kg (176 lb 1.6 oz), SpO2 96 %.  Vital signs are normal.    Output: Minimal urine output and producing stool.    HEENT: Normal HEENT exam.    Lungs:  Normal effort and normal respiratory rate.  Breath sounds clear to auscultation.  He is not in respiratory distress.    Heart: Normal rate.  Regular rhythm.    Chest: Chest wall tenderness present.    Abdomen: Abdomen is soft.  Bowel sounds are normal.     Extremities: Normal range of motion.    Pulses: Distal pulses are intact.    Neurological: Patient is alert and oriented to person, place and time.    Skin:  Warm and dry.          Chest tube:   Site: Right, Clean, Dry, Intact and Securement device intact  Suction: -20 cm  Air Leak: negative  24 Hour Total: 311/426cc    Results Review:     I reviewed the patient's new clinical results.  I reviewed the patient's new imaging results and agree with the interpretation.  I reviewed the patient's other test results and agree with the interpretation  Discussed with patient, RN and Dr. Billingsley.    Assessment/Plan     Patient is POD #2, s/p robot-assisted pericardial window.  This morning's chest x-ray demonstrates no pneumothorax with some subcutaneous emphysema along the right " lateral chest wall.  He is ambulatory with assistance.  Wean supplemental O2 as tolerated.  Place chest tubes to [facility] today and recheck x-ray in a.m.    CATIA White  [facility]  03/21/21  13:54 EDT    Patient was seen and assessed while wearing personal protective equipment including facemask, protective eyewear and gloves.  Hand hygiene performed prior to entering the room and upon exiting with doffing of gloves.

## 2021-03-22 ENCOUNTER — APPOINTMENT (OUTPATIENT)
Dept: GENERAL RADIOLOGY | Facility: HOSPITAL | Age: 36
End: 2021-03-22

## 2021-03-22 LAB
BACTERIA SPEC AEROBE CULT: NORMAL
BACTERIA SPEC AEROBE CULT: NORMAL
GRAM STN SPEC: NORMAL
GRAM STN SPEC: NORMAL

## 2021-03-22 PROCEDURE — 94799 UNLISTED PULMONARY SVC/PX: CPT

## 2021-03-22 PROCEDURE — 99024 POSTOP FOLLOW-UP VISIT: CPT | Performed by: NURSE PRACTITIONER

## 2021-03-22 PROCEDURE — 71045 X-RAY EXAM CHEST 1 VIEW: CPT

## 2021-03-22 PROCEDURE — 25010000002 ENOXAPARIN PER 10 MG: Performed by: THORACIC SURGERY (CARDIOTHORACIC VASCULAR SURGERY)

## 2021-03-22 RX ADMIN — GABAPENTIN 100 MG: 100 CAPSULE ORAL at 00:20

## 2021-03-22 RX ADMIN — GABAPENTIN 100 MG: 100 CAPSULE ORAL at 20:14

## 2021-03-22 RX ADMIN — CARVEDILOL 6.25 MG: 6.25 TABLET, FILM COATED ORAL at 20:14

## 2021-03-22 RX ADMIN — DOCUSATE SODIUM 100 MG: 100 CAPSULE, LIQUID FILLED ORAL at 08:24

## 2021-03-22 RX ADMIN — ACETAMINOPHEN 1000 MG: 500 TABLET, FILM COATED ORAL at 08:24

## 2021-03-22 RX ADMIN — CALCIUM ACETATE 2001 MG: 667 CAPSULE ORAL at 11:09

## 2021-03-22 RX ADMIN — DOCUSATE SODIUM 50MG AND SENNOSIDES 8.6MG 2 TABLET: 8.6; 5 TABLET, FILM COATED ORAL at 20:14

## 2021-03-22 RX ADMIN — SODIUM CHLORIDE, PRESERVATIVE FREE 10 ML: 5 INJECTION INTRAVENOUS at 20:14

## 2021-03-22 RX ADMIN — ACETAMINOPHEN 1000 MG: 500 TABLET, FILM COATED ORAL at 20:14

## 2021-03-22 RX ADMIN — ENOXAPARIN SODIUM 30 MG: 30 INJECTION SUBCUTANEOUS at 08:24

## 2021-03-22 RX ADMIN — CALCIUM ACETATE 2001 MG: 667 CAPSULE ORAL at 16:35

## 2021-03-22 RX ADMIN — ACETAMINOPHEN 1000 MG: 500 TABLET, FILM COATED ORAL at 15:14

## 2021-03-22 RX ADMIN — CARVEDILOL 6.25 MG: 6.25 TABLET, FILM COATED ORAL at 08:24

## 2021-03-22 RX ADMIN — GABAPENTIN 100 MG: 100 CAPSULE ORAL at 06:33

## 2021-03-22 RX ADMIN — POLYETHYLENE GLYCOL 3350 17 G: 17 POWDER, FOR SOLUTION ORAL at 08:24

## 2021-03-22 RX ADMIN — SODIUM CHLORIDE, PRESERVATIVE FREE 10 ML: 5 INJECTION INTRAVENOUS at 08:25

## 2021-03-22 RX ADMIN — CALCIUM ACETATE 2001 MG: 667 CAPSULE ORAL at 07:26

## 2021-03-22 RX ADMIN — GABAPENTIN 100 MG: 100 CAPSULE ORAL at 13:43

## 2021-03-22 NOTE — PROGRESS NOTES
[facility] INPATIENT PROGRESS NOTE         [address]    3/22/2021      PATIENT IDENTIFICATION:  Name: Chandler Buchanan ADMIT: 3/12/2021   : 1985  PCP: Provider, No Known    MRN: [id number] LOS: 10 days   AGE/SEX: 36 y.o. male  ROOM: [facility]                     LOS 10    Reason for visit: Follow-up right lower lobe masslike consolidation and lymphadenopathy      SUBJECTIVE:      No new pulmonary complaints.        Objective   OBJECTIVE:    Vital Sign Min/Max for last 24 hours  Temp  Min: 97.7 °F (36.5 °C)  Max: 98.2 °F (36.8 °C)   BP  Min: 124/91  Max: 141/90   Pulse  Min: 78  Max: 95   Resp  Min: 16  Max: 18   SpO2  Min: 93 %  Max: 96 %   No data recorded   Weight  Min: 75.9 kg (167 lb 5.3 oz)  Max: 76.1 kg (167 lb 12.8 oz)                         Body mass index is 24.78 kg/m².    Intake/Output Summary (Last 24 hours) at 3/22/2021 1136  Last data filed at 3/22/2021 0900  Gross per 24 hour   Intake 960 ml   Output 4290 ml   Net -3330 ml         Exam:  GEN:  No distress, appears stated age  LUNGS: Normal chest on inspection, palpation and coarse breath sounds bilaterally on auscultation.  Chest tube stable without air leak.    CV:  Normal S1S2, without murmur      Assessment     Scheduled meds:  acetaminophen, 1,000 mg, Oral, TID  calcium acetate, 2,001 mg, Oral, TID With Meals  carvedilol, 6.25 mg, Oral, Q12H  docusate sodium, 100 mg, Oral, Daily  enoxaparin, 30 mg, Subcutaneous, Daily  gabapentin, 100 mg, Oral, Q8H  polyethylene glycol, 17 g, Oral, Daily  senna-docusate sodium, 2 tablet, Oral, Nightly  sodium chloride, 10 mL, Intravenous, Q12H      IV meds:                         Data Review:  Results from last 7 days   Lab Units 21  0418 21  1548 21  0649 21  0454   SODIUM mmol/L 136 136 139 137   POTASSIUM mmol/L 5.5* 6.0* 5.8* 5.2   CHLORIDE mmol/L 99 104 100 101   CO2 mmol/L 24.2 20.9* 23.9 22.3   BUN mg/dL 43* 64* 58* 48*   CREATININE mg/dL 5.95* 8.48* 8.18*  7.53*   GLUCOSE mg/dL 135* 135* 91 85   CALCIUM mg/dL 8.3* 8.0* 8.8 8.5*         Estimated Creatinine Clearance: 18.3 mL/min (A) (by C-G formula based on SCr of 5.95 mg/dL (H)).  Results from last 7 days   Lab Units 03/20/21  0418 03/19/21  1548 03/19/21  1432 03/19/21  0649 03/17/21  0454   WBC 10*3/mm3 17.80* 15.07*  --  10.55 9.84   HEMOGLOBIN g/dL 8.4* 6.8*  --  8.6* 7.6*   HEMOGLOBIN, POC g/dL  --   --  6.5*  --   --    PLATELETS 10*3/mm3 195 183  --  193 203     Results from last 7 days   Lab Units 03/19/21  0649   INR  1.25*         Results from last 7 days   Lab Units 03/19/21  1432   PH, ARTERIAL pH units 7.31*             Chest x-ray 3/22/2021 reviewed        Microbiology reviewed  )        Active Hospital Problems    Diagnosis  POA   • **Pericardial effusion [I31.3]  Yes   • Hilar adenopathy [R59.0]  Yes   • Elevated brain natriuretic peptide (BNP) level [R79.89]  Yes   • Right lower lobe lung mass [R91.8]  Yes   • Mediastinal adenopathy [R59.0]  Yes   • Anemia in ESRD (end-stage renal disease) (CMS/HCC) [N18.6, D63.1]  Yes   • NICM (nonischemic cardiomyopathy) (CMS/HCC) [I42.8]  Yes   • HTN (hypertension) [I10]  Yes   • History of COVID-19 [Z86.16]  Yes   • Idiopathic peripheral neuropathy [G60.9]  Yes   • ESRD on dialysis (CMS/HCC) [N18.6, Z99.2]  Not Applicable      Resolved Hospital Problems   No resolved problems to display.         ASSESSMENT:  Masslike consolidation right lower lobe  Mediastinal and hilar lymphadenopathy: Status post EBUS  Pericardial effusion status post window 3/19/2021  Ascites/anasarca  Systolic cardiomyopathy  ESRD: On dialysis  Hyperkalemia  Recent COVID-19 infection December 2020  Crampy abdominal pain  Anemia: Status post transfusion with appropriate response  Atelectasis    PLAN:    Final pathology from surgical procedure with thoracic surgery pending.  No evidence of malignancy or granuloma on frozen section from lymph nodes.  Chest tube management per thoracic  surgery.  Encourage incentive spirometer to help with atelectasis.      Koffi Baez MD  Pulmonary and Critical Care Medicine  [facility], [facility]  3/22/2021    11:36 EDT

## 2021-03-22 NOTE — PROGRESS NOTES
"    NEPHROLOGY PROGRESS NOTE    PATIENT IDENTIFICATION:   Name:  Chandler Buchanan      MRN:  [id number]     36 y.o.  male             Reason for visit: ESRD    SUBJECTIVE:   Breathing is comfortable but remains on 1 L/min; appetite is fine; no N/V.  1 chest tube removed today, and he is hopeful that other will be removed tomorrow.  Had HD yesterday with 4 L removed    OBJECTIVE:  Vitals:    03/22/21 0824 03/22/21 1100 03/22/21 1329 03/22/21 1500   BP: 141/90 131/87  133/84   BP Location: Right arm Right arm  Right arm   Patient Position: Lying Lying  Lying   Pulse: 88 85  83   Resp: 18 18  18   Temp:  98.2 °F (36.8 °C)  97.9 °F (36.6 °C)   TempSrc:  Oral  Oral   SpO2: 94% 96%  92%   Weight:       Height:   175.3 cm (69.02\")            Body mass index is 24.77 kg/m².    Intake/Output Summary (Last 24 hours) at 3/22/2021 1808  Last data filed at 3/22/2021 0900  Gross per 24 hour   Intake 720 ml   Output 4110 ml   Net -3390 ml     Wt Readings from Last 1 Encounters:   03/22/21 0500 76.1 kg (167 lb 12.8 oz)   03/21/21 1900 75.9 kg (167 lb 5.3 oz)   03/21/21 0600 79.9 kg (176 lb 2.4 oz)   03/21/21 0500 79.9 kg (176 lb 1.6 oz)   03/20/21 0533 79.5 kg (175 lb 4.3 oz)   03/19/21 0537 78.4 kg (172 lb 12.8 oz)   03/18/21 0639 76.2 kg (168 lb)   03/17/21 0406 79.2 kg (174 lb 11.2 oz)   03/16/21 0437 78 kg (172 lb)   03/15/21 0520 81.8 kg (180 lb 5.4 oz)   03/14/21 0600 81.2 kg (179 lb 0.2 oz)   03/14/21 0500 81.2 kg (179 lb 1.6 oz)   03/13/21 0554 82.2 kg (181 lb 4.8 oz)   03/12/21 1726 81.7 kg (180 lb 3.2 oz)     Wt Readings from Last 3 Encounters:   03/22/21 76.1 kg (167 lb 12.8 oz)   03/12/21 83.5 kg (184 lb)   03/08/21 80.3 kg (177 lb 0.5 oz)         Exam:  NAD; pleasant; appropriate  Flat affect  Chronically ill-appearing; pale  MMM; AT/NC   No eye discharge; no scleral icterus  No JVD; no carotid bruits  Decreased BS both bases, crackles and rhonchi on right; not labored on 1 L/min  Chest tube on the right  RRR, 2/6M, no " rub  Soft, not tender, +D BS +  No significant lower extremity edema  Left arm AV fistula patent  No clubbing  No asterixis  Moves all extremities       Scheduled meds:    acetaminophen, 1,000 mg, Oral, TID  calcium acetate, 2,001 mg, Oral, TID With Meals  carvedilol, 6.25 mg, Oral, Q12H  docusate sodium, 100 mg, Oral, Daily  enoxaparin, 30 mg, Subcutaneous, Daily  gabapentin, 100 mg, Oral, Q8H  polyethylene glycol, 17 g, Oral, Daily  senna-docusate sodium, 2 tablet, Oral, Nightly  sodium chloride, 10 mL, Intravenous, Q12H      IV meds:                             Data Review:    Results from last 7 days   Lab Units 03/20/21 0418 03/19/21  1548 03/19/21  0649   SODIUM mmol/L 136 136 139   POTASSIUM mmol/L 5.5* 6.0* 5.8*   CHLORIDE mmol/L 99 104 100   CO2 mmol/L 24.2 20.9* 23.9   BUN mg/dL 43* 64* 58*   CREATININE mg/dL 5.95* 8.48* 8.18*   CALCIUM mg/dL 8.3* 8.0* 8.8   GLUCOSE mg/dL 135* 135* 91     Estimated Creatinine Clearance: 18.3 mL/min (A) (by C-G formula based on SCr of 5.95 mg/dL (H)).      Results from last 7 days   Lab Units 03/20/21 0418 03/19/21  0649 03/17/21  0454   MAGNESIUM mg/dL 2.5  --   --    PHOSPHORUS mg/dL 6.6* 5.6* 4.8*       Results from last 7 days   Lab Units 03/20/21 0418 03/19/21  1548 03/19/21  1432 03/19/21  0649 03/17/21  0454   WBC 10*3/mm3 17.80* 15.07*  --  10.55 9.84   HEMOGLOBIN g/dL 8.4* 6.8*  --  8.6* 7.6*   HEMOGLOBIN, POC g/dL  --   --  6.5*  --   --    PLATELETS 10*3/mm3 195 183  --  193 203       Results from last 7 days   Lab Units 03/19/21  0649   INR  1.25*             ASSESSMENT:     Pericardial effusion    ESRD on dialysis (CMS/HCC)    Idiopathic peripheral neuropathy    HTN (hypertension)    History of COVID-19    NICM (nonischemic cardiomyopathy) (CMS/HCC)    Anemia in ESRD (end-stage renal disease) (CMS/HCC)    Elevated brain natriuretic peptide (BNP) level    Hilar adenopathy    Right lower lobe lung mass    Mediastinal adenopathy    1.  ESRD: volume excess  resolved.  Last HD was yesterday.  No electrolytes today. 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   2.  Large pericardial effusion (> 2 cm circumferentially):  no tamponade noted by echocardiogram 3/12.  Pericardial window performed 3/19 along with right VATS and lymphadenectomy.  There had been concerns for compliance with treatments at home, but he confirms again that he has been doing 4 treatments per week  3.  Anemia of ESRD: s/p 2 units PRBC thus far this admission.  Very low iron saturation, but ferritin nearly 1700  4.  Mediastinal and bilateral hilar lymphadenopathy as well as large mass-like opacity right lower lobe with central cavitation.  Lymphadenectomy performed 3/19  5.  Abdominal distention with ascites and transaminitis, improving  6.  Covid-19 infection, December' 20  7.  Hypertension: Controlled  8.  Cough: this may represent an allergy to lisinopril which has since been stopped      PLAN:  1.  Hemodialysis tomorrow  2.  Talked at length with patient and his mother at bedside about need for routine assessment of dry weight with low threshold to remove more fluid when lower extremity swelling, orthopnea, and high blood pressures develop      Dre Samano MD  3/22/2021    18:08 EDT

## 2021-03-22 NOTE — PROGRESS NOTES
Name: Chandler Buchanan ADMIT: 3/12/2021   : 1985  PCP: Provider, No Known    MRN: [id number] LOS: 10 days   AGE/SEX: 36 y.o. male  ROOM: [facility]/     Subjective   Subjective   Patient denies any complaints.  No shortness of breath since chest tube removed    Review of Systems   Respiratory: Negative for shortness of breath.    All other systems reviewed and are negative.       Objective   Objective   Vital Signs  Temp:  [97.7 °F (36.5 °C)-98.2 °F (36.8 °C)] 97.9 °F (36.6 °C)  Heart Rate:  [83-95] 83  Resp:  [16-18] 18  BP: (124-141)/(84-91) 133/84  SpO2:  [92 %-96 %] 92 %  on  Flow (L/min):  [1-1.5] 1;   Device (Oxygen Therapy): nasal cannula  Body mass index is 24.77 kg/m².  Physical Exam  Vitals reviewed.   Constitutional:       General: He is not in acute distress.     Appearance: He is well-developed. He is not ill-appearing.   HENT:      Head: Normocephalic and atraumatic.   Eyes:      General: No scleral icterus.  Neck:      Vascular: No JVD.   Cardiovascular:      Rate and Rhythm: Normal rate and regular rhythm.      Heart sounds: No murmur heard.   No friction rub.   Pulmonary:      Effort: Pulmonary effort is normal. No respiratory distress.      Breath sounds: Normal breath sounds. No wheezing.   Abdominal:      General: Bowel sounds are normal. There is distension (Some better).      Palpations: Abdomen is soft.      Tenderness: There is no abdominal tenderness.   Musculoskeletal:      Cervical back: Neck supple.   Skin:     General: Skin is warm and dry.      Findings: No rash.   Neurological:      Mental Status: He is alert and oriented to person, place, and time.         Results Review     I reviewed the patient's new clinical results.  Results from last 7 days   Lab Units 21  0418 21  1548 21  1432 21  0649 21  0454   WBC 10*3/mm3 17.80* 15.07*  --  10.55 9.84   HEMOGLOBIN g/dL 8.4* 6.8*  --  8.6* 7.6*   HEMOGLOBIN, POC g/dL  --   --  6.5*  --   --    PLATELETS  10*3/mm3 195 183  --  193 203     Results from last 7 days   Lab Units 03/20/21  0418 03/19/21  1548 03/19/21  0649 03/17/21  0454   SODIUM mmol/L 136 136 139 137   POTASSIUM mmol/L 5.5* 6.0* 5.8* 5.2   CHLORIDE mmol/L 99 104 100 101   CO2 mmol/L 24.2 20.9* 23.9 22.3   BUN mg/dL 43* 64* 58* 48*   CREATININE mg/dL 5.95* 8.48* 8.18* 7.53*   GLUCOSE mg/dL 135* 135* 91 85   Estimated Creatinine Clearance: 18.3 mL/min (A) (by C-G formula based on SCr of 5.95 mg/dL (H)).  Results from last 7 days   Lab Units 03/20/21  0418 03/19/21  0649 03/17/21  0454   ALBUMIN g/dL 3.00* 3.50 3.40*     Results from last 7 days   Lab Units 03/20/21  0418 03/19/21  1548 03/19/21  0649 03/17/21  0454   CALCIUM mg/dL 8.3* 8.0* 8.8 8.5*   ALBUMIN g/dL 3.00*  --  3.50 3.40*   MAGNESIUM mg/dL 2.5  --   --   --    PHOSPHORUS mg/dL 6.6*  --  5.6* 4.8*       COVID19   Date Value Ref Range Status   03/18/2021 Not Detected Not Detected - Ref. Range Final   03/12/2021 Not Detected Not Detected - Ref. Range Final     Glucose   Date/Time Value Ref Range Status   03/19/2021 1736 113 70 - 130 mg/dL Final       XR Chest 1 View  Narrative: Patient: PLACIDO HUSTON  Time Out: 07:40  Exam(s): FILM CXR 1 VIEW     EXAM:    XR Chest, 1 View    CLINICAL HISTORY:     chest tube management  Reason for exam: chest tube management.    TECHNIQUE:    Frontal view of the chest.    COMPARISON:    3 21 2021    FINDINGS:    Lungs:  Slightly limited by apical lordotic positioning.  There are   persistent platelike opacities at the right lung base, consistent with   atelectasis.  Dense opacity in the retrocardiac left lung base has   improved.  There may be minimal small residual atelectasis.    Pleural space: Right chest tube is unchanged in position.  No   pneumothorax.    Heart:  The cardiac silhouette is mildly enlarged, possibly due to the   positioning of the patient.    Mediastinum:  Unremarkable.    Bones joints:  Unremarkable.    IMPRESSION:       1.  Stable  appearance to right chest tube without right pneumothorax or   effusion.    2.  Persistent bibasilar atelectasis.  Impression: Electronically signed by Isela Samuel MD on 03-22-21 at 0740    Scheduled Medications  acetaminophen, 1,000 mg, Oral, TID  calcium acetate, 2,001 mg, Oral, TID With Meals  carvedilol, 6.25 mg, Oral, Q12H  docusate sodium, 100 mg, Oral, Daily  enoxaparin, 30 mg, Subcutaneous, Daily  gabapentin, 100 mg, Oral, Q8H  polyethylene glycol, 17 g, Oral, Daily  senna-docusate sodium, 2 tablet, Oral, Nightly  sodium chloride, 10 mL, Intravenous, Q12H    Infusions   Diet  Diet Regular; Consistent Carbohydrate, Renal, Cardiac       Assessment/Plan     Active Hospital Problems    Diagnosis  POA   • **Pericardial effusion [I31.3]  Yes   • Hilar adenopathy [R59.0]  Yes   • Elevated brain natriuretic peptide (BNP) level [R79.89]  Yes   • Right lower lobe lung mass [R91.8]  Yes   • Mediastinal adenopathy [R59.0]  Yes   • Anemia in ESRD (end-stage renal disease) (CMS/HCC) [N18.6, D63.1]  Yes   • NICM (nonischemic cardiomyopathy) (CMS/HCC) [I42.8]  Yes   • HTN (hypertension) [I10]  Yes   • History of COVID-19 [Z86.16]  Yes   • Idiopathic peripheral neuropathy [G60.9]  Yes   • ESRD on dialysis (CMS/HCC) [N18.6, Z99.2]  Not Applicable      Resolved Hospital Problems   No resolved problems to display.       36 y.o. male with h/o ESRD admitted with Pericardial effusion, mediastinal LAD and YEVGENIY lung mass    · Wean o2 as tolerated.   · Follow-up repeat chest x-ray in a.m.  Hopefully will remove remaining drain tomorrow  · HD per nephrology.  He says he typically does dialysis 4 times a week.  We will recheck electrolytes in the a.m.  · Cont bowel regimen  · Follow-up pathology from surgery.  Previous pathology from bronchoscopy negative for malignancy  · DVT prophylaxis-Lovenox  · Dispo-anticipate discharge in 1-2 days when chest tubes removed      Brian Khanna MD  [facility]  03/22/21  16:56 EDT

## 2021-03-22 NOTE — PROGRESS NOTES
Continued Stay Note  [facility]     Patient Name: Chandler Buchanan  MRN: [id number]  Today's Date: 3/22/2021    Admit Date: 3/12/2021    Discharge Plan     Row Name 03/22/21 1322       Plan    Plan  Home    Patient/Family in Agreement with Plan  yes    Plan Comments  Met with pt at bedside who confirms plan to return home at discharge.  Does hemodialysis at home.  If O2 needed at discharge will use Jori Cuenca RN        Discharge Codes    No documentation.             Nasreen Cuenca RN

## 2021-03-22 NOTE — PROGRESS NOTES
"Adult Nutrition  Assessment/PES    Patient Name:  Chandler Buchanan  YOB: 1985  MRN: [id number]  Admit Date:  3/12/2021    Assessment Date:  3/22/2021  Nutrition assessment.  POD3 Bronchoscopy, right video-assisted thoracoscopy with robot-assisted pericardial window, mediastinal lymphadenectomy, intercostal nerve block.  Cardiac/cc/renal diet, adequate po intake- average 75% of meals.   Will continue to follow/monitor.    Reason for Assessment     Row Name 03/22/21 1327          Reason for Assessment    Reason For Assessment  per organizational policy length of stay     Diagnosis   Masslike consolidation right lower lobe, Mediastinal and hilar lymphadenopathy, PE, ascites/anasarca, systolic cardiomyopathy, ESRD on HD, hyperkalemia; recent COVID-19, anemia         Nutrition/Diet History     Row Name 03/22/21 1329          Nutrition/Diet History    Typical Food/Fluid Intake  cc/cardiac/renal diet; adequate intake, average 75% of meals         Anthropometrics     Row Name 03/22/21 1327          Anthropometrics    Height  175.3 cm (69.02\")        Admit Weight    Admit Weight  76.1 kg (167 lb 12.3 oz)        Ideal Body Weight (IBW)    Ideal Body Weight (IBW) (kg)  73.73     % of Ideal Body Weight Assessment  -- 103%        Body Mass Index (BMI)    BMI Assessment  BMI 18.5-24.9: normal BMI-24.7         Labs/Tests/Procedures/Meds     Row Name 03/22/21 1330          Labs/Procedures/Meds    Lab Results Reviewed  reviewed, pertinent     Lab Results Comments  K, Creat, BUN, Phos        Diagnostic Tests/Procedures    Diagnostic Test/Procedure Reviewed  reviewed, pertinent        Medications    Pertinent Medications Reviewed  reviewed, pertinent     Pertinent Medications Comments  phos binder, colace, miralax, NaCl         Physical Findings     Row Name 03/22/21 1330          Physical Findings    Overall Physical Appearance  -- B=20     Tubes  chest tube         Estimated/Assessed Needs     Row Name 03/22/21 132 " "         Calculation Measurements    Height  175.3 cm (69.02\")         Nutrition Prescription Ordered     Row Name 03/22/21 1331          Nutrition Prescription PO    Common Modifiers  Cardiac;Consistent Carbohydrate;Renal         Evaluation of Received Nutrient/Fluid Intake     Row Name 03/22/21 1331          PO Evaluation    Number of Meals  3     % PO Intake  75               Problem/Interventions:  Problem 1     Row Name 03/22/21 1331          Nutrition Diagnoses Problem 1    Problem 1  Nutrition Appropriate for Condition at this Time               Intervention Goal     Row Name 03/22/21 1331          Intervention Goal    General  Maintain nutrition;Meet nutritional needs for age/condition     PO  Tolerate PO;Maintain intake     Weight  Maintain weight         Nutrition Intervention     Row Name 03/22/21 1331          Nutrition Intervention    RD/Tech Action  Care plan reviewd;Follow Tx progress           Education/Evaluation     Row Name 03/22/21 1331          Education    Education  Will Instruct as appropriate        Monitor/Evaluation    Monitor  Per protocol           Electronically signed by:  Georgina Nguyen RD  03/22/21 13:32 EDT  "

## 2021-03-22 NOTE — PLAN OF CARE
Goal Outcome Evaluation:  Plan of Care Reviewed With: patient  Progress: improving  Outcome Summary: VSS, 2 right chest tubes to waterseal, dressing CDI. Ambulating well.  Several bowel movements and urinating well.  1.5L via nasal cannula. ERAS for pain - no additional pain medication required.  Plan for one chest tube removal today.  Will continue to monitor.

## 2021-03-22 NOTE — PAYOR COMM NOTE
"Chandler Huston (36 y.o. Male)     ATTN: CONTINUED STAY CLINICALS: [id number]     DEPT: NEYDA DIAZ RN,[facility]; [phone],  [phone]        Date of Birth Social Security Number Address Home Phone MRN    1985  [id number]  [facility] [address] [phone] [id number]    Jehovah's witness Marital Status          None Single       Admission Date Admission Type Admitting Provider Attending Provider Department, Room/Bed    3/12/21 Urgent Willem Sanchez MD [place], Matthew Brett, MD [address], E553/1    Discharge Date Discharge Disposition Discharge Destination                       Attending Provider: Brian Khanna MD    Allergies: No Known Allergies    Isolation: None   Infection: None   Code Status: CPR    Ht: 175.3 cm (69.02\")   Wt: 76.1 kg (167 lb 12.8 oz)    Admission Cmt: None   Principal Problem: Pericardial effusion [I31.3]                 Active Insurance as of 3/12/2021     Primary Coverage     Payor Plan Insurance Group Employer/Plan Group    [place] [id number]     Payor Plan Address Payor Plan Phone Number Payor Plan Fax Number Effective Dates    [employer] [address] [address] [phone]  1/1/2020 - None Entered    [facility] [id number]       Subscriber Name Subscriber Birth Date Member ID       CHANDLER HUSTON 1985 [id number]                 Emergency Contacts      (Rel.) Home Phone Work Phone Mobile Phone    JALIL CAMPOS (Father) [phone] -- --    FARAZGISELLA MOSHER (Mother) -- -- [phone]    Brionna Dave (Sister) -- -- [phone]            Vital Signs (last day)     Date/Time   Temp   Temp src   Pulse   Resp   BP   Patient Position   SpO2    03/22/21 1500   97.9 (36.6)   Oral   83   18   133/84   Lying   92    03/22/21 1100   98.2 (36.8)   Oral   85   18   131/87   Lying   96    03/22/21 0824   --   --   88   18   141/90   Lying   94    03/22/21 0823   --   --   91   --   --   --   95    " 03/22/21 0019   97.7 (36.5)   Oral   91   16   124/91   Lying   95    03/21/21 2021   --   --   95   --   --   --   --    03/21/21 2000   --   --   --   --   --   --   93    03/21/21 1900   --   --   95   18   125/85   Lying   --    03/21/21 1500   97.9 (36.6)   Oral   78   18   124/79   Lying   93    03/21/21 1400   --   --   --   --   --   --   94    03/21/21 1035   97.5 (36.4)   Oral   76   18   (!) 139/103   Lying   96    03/21/21 0720   98.1 (36.7)   Oral   75   18   (!) 147/109   Sitting   96    03/21/21 0000   --   --   80   --   --   --   100              Oxygen Therapy (last day)     Date/Time   SpO2   Device (Oxygen Therapy)   Flow (L/min)   Oxygen Concentration (%)   ETCO2 (mmHg)    03/22/21 1500   92   nasal cannula   1   --   --    03/22/21 1100   96   nasal cannula   1.5   --   --    03/22/21 0824   94   nasal cannula   1.5   --   --    03/22/21 0823   95   --   --   --   --    03/22/21 0019   95   nasal cannula   1.5   --   --    03/21/21 2000   93   room air   --   --   --    03/21/21 1600   --   room air   --   --   --    03/21/21 1500   93   room air   --   --   --    03/21/21 1400   94   room air   --   --   --    03/21/21 1035   96   --   --   --   --    03/21/21 0800   --   nasal cannula   3   --   --    03/21/21 0720   96   --   --   --   --    03/21/21 0000   100   nasal cannula   4   --   --           Intake & Output (last day)       03/21 0701 - 03/22 0700 03/22 0701 - 03/23 0700    P.O. 1200 240    IV Piggyback      Total Intake(mL/kg) 1200 (15.8) 240 (3.2)    Urine (mL/kg/hr) 100 (0.1)     Other 4000     Stool 0     Chest Tube 290     Total Output 4390     Net -3190 +240          Urine Unmeasured Occurrence 4 x     Stool Unmeasured Occurrence 4 x         Lines, Drains & Airways    Active LDAs     Name:   Placement date:   Placement time:   Site:   Days:    Peripheral IV 03/16/21 1600 Right Forearm   03/16/21    1600    Forearm   6    Chest Tube 2 Right Midaxillary   03/19/21    1407     Midaxillary   3                Blood Administration Record (From admission, onward)    Transfusions to release     Ordered     Start    Signed and Held  Transfuse RBC, 2 Units Infuse Each Unit Over: 3.5H  Transfusion      Signed and Held          Completed transfusions     Ordered     Start    03/19/21 1830  Transfuse RBC, 2 Units Infuse Each Unit Over: 3.5H  Transfusion     Released Time Blood Unit Number Status   03/19/21 2012   21  [id number]  [id number] Completed 03/19/21 2057 03/19/21 2111   21  [id number]  [id number] Completed 03/19/21 2156 03/19/21 1828    03/18/21 1606  Transfuse RBC Infuse Each Unit Over: 3.5H  Transfusion     Released Time Blood Unit Number Status   03/18/21 1621   21  [id number]  [id number] Completed 03/18/21 1945       03/18/21 1605                  Lab Results (last 24 hours)     Procedure Component Value Units Date/Time    Fungus Culture - Lymph Node, Mediastinum [[id number]] Collected: 03/15/21 1005    Specimen: Lymph Node from Mediastinum Updated: 03/22/21 1116     Fungus Culture No fungus isolated at 1 week    Fungus Culture - Lavage, Lung, Right Lower Lobe [[id number]] Collected: 03/15/21 1026    Specimen: Lavage from Lung, Right Lower Lobe Updated: 03/22/21 1116     Fungus Culture No fungus isolated at 1 week    AFB Culture - Lavage, Lung, Right Lower Lobe [[id number]] Collected: 03/15/21 1026    Specimen: Lavage from Lung, Right Lower Lobe Updated: 03/22/21 1116     AFB Culture No AFB isolated at 1 week     AFB Stain No acid fast bacilli seen on concentrated smear    AFB Culture - Brushing, Lung, Right Lower Lobe [[id number]] Collected: 03/15/21 1040    Specimen: Brushing from Lung, Right Lower Lobe Updated: 03/22/21 1116     AFB Culture No AFB isolated at 1 week     AFB Stain No acid fast bacilli seen on direct smear    Tissue / Bone Culture - Tissue, Pericardium [[id number]] Collected: 03/19/21 1331    Specimen: Tissue from Pericardium Updated: 03/22/21 0922     Tissue  Culture No growth at 3 days     Gram Stain No WBCs or organisms seen    Tissue / Bone Culture - Tissue, Lymph Node [[id number]] Collected: 03/19/21 1346    Specimen: Tissue from Lymph Node Updated: 03/22/21 0921     Tissue Culture No growth at 3 days     Gram Stain No WBCs or organisms seen        Imaging Results (Last 24 Hours)     Procedure Component Value Units Date/Time    XR Chest 1 View [[id number]] Collected: 03/22/21 0741     Updated: 03/22/21 0741    Narrative:        Patient: PLACIDO HUSTON  Time Out: 07:40  Exam(s): FILM CXR 1 VIEW     EXAM:    XR Chest, 1 View    CLINICAL HISTORY:     chest tube management  Reason for exam: chest tube management.    TECHNIQUE:    Frontal view of the chest.    COMPARISON:    3 21 2021    FINDINGS:    Lungs:  Slightly limited by apical lordotic positioning.  There are   persistent platelike opacities at the right lung base, consistent with   atelectasis.  Dense opacity in the retrocardiac left lung base has   improved.  There may be minimal small residual atelectasis.    Pleural space: Right chest tube is unchanged in position.  No   pneumothorax.    Heart:  The cardiac silhouette is mildly enlarged, possibly due to the   positioning of the patient.    Mediastinum:  Unremarkable.    Bones joints:  Unremarkable.    IMPRESSION:       1.  Stable appearance to right chest tube without right pneumothorax or   effusion.    2.  Persistent bibasilar atelectasis.      Impression:          Electronically signed by Isela Samuel MD on 03-22-21 at 0740        Orders (last 24 hrs)      Start     Ordered    03/23/21 0600  XR Chest 1 View  1 Time Imaging      03/22/21 1103    03/22/21 0600  XR Chest 1 View  1 Time Imaging      03/21/21 1358    03/20/21 0900  enoxaparin (LOVENOX) syringe 30 mg  Daily      03/19/21 1712 03/19/21 2100  sennosides-docusate (PERICOLACE) 8.6-50 MG per tablet 2 tablet  Nightly      03/19/21 1712 03/19/21 2100  carvedilol (COREG) tablet 6.25 mg  Every 12  Hours Scheduled      03/19/21 1844    03/19/21 2000  docusate sodium (COLACE) capsule 100 mg  Daily      03/19/21 1712    03/19/21 2000  polyethylene glycol (MIRALAX) packet 17 g  Daily      03/19/21 1712    03/19/21 1930  Oscillating Positive Expiratory Pressure (OPEP)  Every 4 Hours - RT      03/19/21 1712    03/19/21 1800  Vital Signs every 1 hour x4, then every 2 hours x2, then every 4 hours.  Every Hour,   Status:  Canceled      03/19/21 1712    03/19/21 1800  Incentive Spirometry  Every 2 Hours While Awake      03/19/21 1712    03/19/21 1714  acetaminophen (TYLENOL) tablet 1,000 mg  3 Times Daily      03/19/21 1712    03/19/21 1713  Arterial line monitoring  Every Shift,   Status:  Canceled      03/19/21 1712    03/19/21 1713  I&O every 4 hours for the first 24 hours, then every shift.  Every Shift      03/19/21 1712    03/19/21 1713  D/C A-line if hemodynamically stable  Every Shift,   Status:  Canceled     Comments: POD1    03/19/21 1712    03/19/21 1713  Chest Tube Dressing Change Daily  Daily     Comments: Dry Gauze & Tape    03/19/21 1712    03/19/21 1712  nitroglycerin (NITROSTAT) SL tablet 0.4 mg  Every 5 Minutes PRN      03/19/21 1712    03/19/21 1712  bisacodyl (DULCOLAX) suppository 10 mg  Daily PRN      03/19/21 1712    03/19/21 1505  gabapentin (NEURONTIN) capsule 100 mg  Every 8 Hours Scheduled      03/19/21 1503    03/19/21 1503  oxyCODONE (ROXICODONE) immediate release tablet 5 mg  Every 4 Hours PRN      03/19/21 1503    03/19/21 1503  HYDROmorphone (DILAUDID) injection 0.5 mg  Every 2 Hours PRN      03/19/21 1503    03/16/21 1303  famotidine (PEPCID) tablet 20 mg  Daily PRN      03/16/21 1304    03/16/21 1302  traMADol (ULTRAM) tablet 50 mg  Every 12 Hours PRN      03/16/21 1304    03/14/21 0800  calcium acetate (PHOS BINDER)) capsule 2,001 mg  3 Times Daily With Meals      03/13/21 2016 03/13/21 0902  docusate sodium (COLACE) capsule 100 mg  2 Times Daily PRN      03/13/21 0902    03/12/21  2100  sodium chloride 0.9 % flush 10 mL  Every 12 Hours Scheduled      03/12/21 1716    03/12/21 2000  Vital Signs  Every 4 Hours      03/12/21 1716 03/12/21 1717  Daily Weights  Daily      03/12/21 1716    03/12/21 1716  ondansetron (ZOFRAN) tablet 4 mg  Every 6 Hours PRN      03/12/21 1716    03/12/21 1716  ondansetron (ZOFRAN) injection 4 mg  Every 6 Hours PRN      03/12/21 1716    03/12/21 1716  acetaminophen (TYLENOL) tablet 650 mg  Every 4 Hours PRN      03/12/21 1716    03/12/21 1716  acetaminophen (TYLENOL) 160 MG/5ML solution 650 mg  Every 4 Hours PRN      03/12/21 1716    03/12/21 1716  acetaminophen (TYLENOL) suppository 650 mg  Every 4 Hours PRN      03/12/21 1716    03/12/21 1715  Intake & Output  Every Shift      03/12/21 1716    03/12/21 1714  sodium chloride 0.9 % flush 10 mL  As Needed      03/12/21 1716    Unscheduled  Up with assistance  As Needed      03/12/21 1716    Unscheduled  Up in Chair tonight if hemodynamically stable and for meals.  As Needed      03/19/21 1712    Unscheduled  ECG 12 Lead  As Needed     Comments: Nurse to Release if Patient Experiences Acute Chest Pain or Dystrythmias    03/19/21 1712    Unscheduled  Potassium  As Needed     Comments: For Sudden Ventricular Dysrhythmias      03/19/21 1712    Unscheduled  Magnesium  As Needed     Comments: For Sudden Ventricular Dysrhythmias      03/19/21 1712    Unscheduled  Digoxin Level  As Needed     Comments: Patient With Atrial Dysrhythmias      03/19/21 1712    Unscheduled  Blood Gas, Arterial -  As Needed     Comments: Per O2 policy.      03/19/21 1712    Signed and Held  albumin human 25 % IV SOLN 12.5 g  As Needed      Signed and Held    Signed and Held  albumin human 25 % IV SOLN 12.5 g  As Needed      Signed and Held    Signed and Held  albumin human 25 % IV SOLN 12.5 g  As Needed      Signed and Held    --  BRONCHOSCOPY      03/15/21 0846    Signed and Held  albumin human 25 % IV SOLN 12.5 g  As Needed      Signed and Held     Signed and Held  albumin human 25 % IV SOLN 12.5 g  As Needed      Signed and Held    Signed and Held  Transfuse RBC, 2 Units Infuse Each Unit Over: 3.5H  Transfusion      Signed and Held    Signed and Held  albumin human 25 % IV SOLN 12.5 g  As Needed      Signed and Held                   Operative/Procedure Notes (last 7 days) (Notes from 03/15/21 1602 through 03/22/21 1602)      Fran Billingsley III, MD at 03/19/21 1304          PERICARDIAL WINDOW WITH DAVINCI ROBOT  Progress Note    Chandler PATEL Dewayne  3/19/2021    Pre-op Diagnosis:   Pericardial effusion [I31.3]       Post-Op Diagnosis Codes:     * Pericardial effusion [I31.3]    Procedure/CPT® Codes:        Procedure(s):  BRONCHOSCOPY   RIGHT VIDEO ASSISTED THORACOSCOPY   ROBOT ASSISTED PERICARDIAL WINDOW   MEDIASTINAL LYMPHADENECTOMY   INTERCOSTAL NERVE BLOCKS    Surgeon(s):  Fran Billingsley III, MD    Anesthesia: General with Block    Staff:   Circulator: Juanita Middleton RN; Willem Jackman RN  Scrub Person: Leonie Burris; Shannon Hunter CSA         Estimated Blood Loss: 100ml    Urine Voided: * No values recorded between 3/19/2021 12:19 PM and 3/19/2021  2:45 PM *    Specimens:                Specimens     ID Source Type Tests Collected By Collected At Frozen?    1 Pericardium Tissue · FUNGAL CULTURE  · TISSUE / BONE CULTURE  · AFB CULTURE   Fran Billingsley III, MD 3/19/21 1331     Description: PERICARDIUM FOR CULTURES    2 Lymph Node Tissue · FUNGAL CULTURE  · TISSUE / BONE CULTURE  · AFB CULTURE   Fran Billingsley III, MD 3/19/21 1346     Description: R7 LYMPH NODE FOR CULTURES    A Pericardium Tissue · TISSUE PATHOLOGY EXAM   Fran Billingsley III, MD 3/19/21 1331     Description: PERICARDIUM    B Pericardium Tissue · TISSUE PATHOLOGY EXAM   Fran Billingsley III, MD 3/19/21 1332 Yes    Description: PERICARDIUM, OR 8 PHONE #[phone]    C Lymph Node Tissue · TISSUE PATHOLOGY EXAM   Fran Billingsley III, MD 3/19/21 1346 Yes    Description: R7 LYMPH  NODE, OR 8 PHONE# [phone]    D Lymph Node Tissue · TISSUE PATHOLOGY EXAM   Fran Billingsley III, MD 3/19/21 1349 No    Description: R7 LYMPH NODE #2    E Lymph Node Tissue · TISSUE PATHOLOGY EXAM   Fran Billingsley III, MD 3/19/21 1350 No    Description: R7 LYMPH NODE #3    F Pericardium Tissue · TISSUE PATHOLOGY EXAM   Fran Billingsley III, MD 3/19/21 1355 No    Description: EPICARDIUM    G Lymph Node Tissue · TISSUE PATHOLOGY EXAM   Fran Billingsley III, MD 3/19/21 1406 No    Description: R7 LYMPH NODE                Drains:   Chest Tube Right Midaxillary (Active)       Chest Tube 2 Right Midaxillary (Active)       Findings: 1200 cc of bloody fluid was aspirated from the pericardium.  Frozen section showed no evidence of malignancy in the pericardium.  Shaggy exudate over the entire epicardium.  Large engorged lymph nodes in the subcarinal space with some of the lymph nodes grossly calcified.  Frozen section showed no malignancy and no granulomas.    Complications: none    Summary procedure: Mr. Buchanan was brought to the operating room placed on the operating table in the supine position.  Following the induction of adequate general endotracheal anesthesia the flexible bronchoscope was passed down the endotracheal tube.  The distal trachea and tanisha appeared normal.  Tanihsa was sharp and nondisplaced.  Right mainstem bronchus, right upper lobe, right middle lobe, right lower lobe bronchi showed no gross endobronchial lesions or mucosal abnormalities.  The bronchoscope was then passed down the left main bronchus.  The left upper lobe and left lower lobe bronchi showed no gross endobronchial lesions or mucosal abnormalities.  The bronchoscope was then used to position the double-lumen tube in the left main bronchus.  Once the tube was in good position the patient was turned into the left lateral decubitus position.  All pressure points were padded.  A roll was placed in the left axilla.  Patient was secured to the  operating table with 3 inch adhesive tape and Velcro safety strap.  The right chest was then prepped and draped in usual sterile manner.  The right lung was deflated    A port site was created in the seventh intercostal space mid axillary line.  The scope was introduced into the pleural space.  No contraindication to proceeding with surgery.  4 other port sites were created.  The da Khushi robot was brought into the operative field and docked to the ports.  Instruments were passed into the pleural space under direct vision.  At this point I broke scrub and went to the da Khushi robot to perform the surgery.  My assistant stated bedside to pass and manipulate instruments.    CO2 was insufflated into the chest.  At this point we began having difficulty with oxygenation.  I tried decreasing the amount of CO2 pressure but this did not seem to help.  From this point onward we had to intermittently ventilate the left lung while we stopped procedure so that the patient's saturations could be brought up above 88% proceeding onward I elected to do the pericardial window first hoping that this would help with his oxygenation.  A large window was cut in the pericardium.  A portion of this tissue was sent for frozen section.  A portion was sent for culture and sensitivity acid-fast smear and culture and fungal smear and culture.  The remainder was sent for permanent section.  Frozen section showed no granulomas and no malignancy.  1200 cc of bloody fluid was aspirated out of the pleural space.  The entire epicardium was covered with a shaggy exudate.  Some of this peeled off quite easily.  Next I divided the inferior pulmonary ligament and extended this dissection up posterior along the mediastinum up into the subcarinal space.  The subcarinal space was just jammed with a acute engorged lymph nodes.  Multiple lymph nodes were removed.  Some these lymph nodes were grossly calcified.  Frozen section showed no malignancy and no  granulomas.  Tissue of these lymph nodes was sent for culture and sensitivity acid-fast smear and culture and fungal smear and culture.  Hemostasis was obtained.    A #28 Spanish Veto drain was now positioned in the pericardium and brought out through the working port site.  I scrubbed back into the operative field.  The instruments were removed under direct vision.  The robot was undocked from the ports.  Sponge instrument and needle counts were correct.  The Veto drain was secured to the chest wall with a suture of 0 silk.  A single #28 chest tube was passed through the camera port site and directed the apex of the chest.  Tube was secured to the chest wall with a suture of 0 silk.  Intercostal nerve blocks were performed with Exparel.  All port sites were closed in layers with 2-0 Vicryl and the skin with 4-0 Vicryl.  Dry sterile dressings were applied.    The patient was awakened and extubated in the operating room and transported to the recovery room in satisfactory condition having tolerated procedure well.        Dictated utilizing Dragon dictation          Fran Billingsley III, MD     Date: 3/19/2021  Time: 14:45 EDT        Electronically signed by Fran Billingsley III, MD at 21 1457          Physician Progress Notes (last 24 hours) (Notes from 21 1602 through 21 1602)      Koffi Baez MD at 21 1136              [facility] INPATIENT PROGRESS NOTE         [address]    3/22/2021      PATIENT IDENTIFICATION:  Name: Chandler Buchanan ADMIT: 3/12/2021   : 1985  PCP: Provider, No Known    MRN: [id number] LOS: 10 days   AGE/SEX: 36 y.o. male  ROOM: [facility]                     LOS 10    Reason for visit: Follow-up right lower lobe masslike consolidation and lymphadenopathy      SUBJECTIVE:      No new pulmonary complaints.        Objective   OBJECTIVE:    Vital Sign Min/Max for last 24 hours  Temp  Min: 97.7 °F (36.5 °C)  Max: 98.2 °F (36.8 °C)   BP  Min:  124/91  Max: 141/90   Pulse  Min: 78  Max: 95   Resp  Min: 16  Max: 18   SpO2  Min: 93 %  Max: 96 %   No data recorded   Weight  Min: 75.9 kg (167 lb 5.3 oz)  Max: 76.1 kg (167 lb 12.8 oz)             Body mass index is 24.78 kg/m².    Intake/Output Summary (Last 24 hours) at 3/22/2021 1136  Last data filed at 3/22/2021 0900  Gross per 24 hour   Intake 960 ml   Output 4290 ml   Net -3330 ml         Exam:  GEN:  No distress, appears stated age  LUNGS: Normal chest on inspection, palpation and coarse breath sounds bilaterally on auscultation.  Chest tube stable without air leak.    CV:  Normal S1S2, without murmur      Assessment     Scheduled meds:  acetaminophen, 1,000 mg, Oral, TID  calcium acetate, 2,001 mg, Oral, TID With Meals  carvedilol, 6.25 mg, Oral, Q12H  docusate sodium, 100 mg, Oral, Daily  enoxaparin, 30 mg, Subcutaneous, Daily  gabapentin, 100 mg, Oral, Q8H  polyethylene glycol, 17 g, Oral, Daily  senna-docusate sodium, 2 tablet, Oral, Nightly  sodium chloride, 10 mL, Intravenous, Q12H      IV meds:                         Data Review:  Results from last 7 days   Lab Units 03/20/21 0418 03/19/21  1548 03/19/21  0649 03/17/21  0454   SODIUM mmol/L 136 136 139 137   POTASSIUM mmol/L 5.5* 6.0* 5.8* 5.2   CHLORIDE mmol/L 99 104 100 101   CO2 mmol/L 24.2 20.9* 23.9 22.3   BUN mg/dL 43* 64* 58* 48*   CREATININE mg/dL 5.95* 8.48* 8.18* 7.53*   GLUCOSE mg/dL 135* 135* 91 85   CALCIUM mg/dL 8.3* 8.0* 8.8 8.5*         Estimated Creatinine Clearance: 18.3 mL/min (A) (by C-G formula based on SCr of 5.95 mg/dL (H)).  Results from last 7 days   Lab Units 03/20/21  0418 03/19/21  1548 03/19/21  1432 03/19/21  0649 03/17/21  0454   WBC 10*3/mm3 17.80* 15.07*  --  10.55 9.84   HEMOGLOBIN g/dL 8.4* 6.8*  --  8.6* 7.6*   HEMOGLOBIN, POC g/dL  --   --  6.5*  --   --    PLATELETS 10*3/mm3 195 183  --  193 203     Results from last 7 days   Lab Units 03/19/21  0649   INR  1.25*         Results from last 7 days   Lab Units  03/19/21  1432   PH, ARTERIAL pH units 7.31*             Chest x-ray 3/22/2021 reviewed        Microbiology reviewed  )       Active Hospital Problems    Diagnosis  POA   • **Pericardial effusion [I31.3]  Yes   • Hilar adenopathy [R59.0]  Yes   • Elevated brain natriuretic peptide (BNP) level [R79.89]  Yes   • Right lower lobe lung mass [R91.8]  Yes   • Mediastinal adenopathy [R59.0]  Yes   • Anemia in ESRD (end-stage renal disease) (CMS/HCC) [N18.6, D63.1]  Yes   • NICM (nonischemic cardiomyopathy) (CMS/[facility]) [I42.8]  Yes   • HTN (hypertension) [I10]  Yes   • History of COVID-19 [Z86.16]  Yes   • Idiopathic peripheral neuropathy [G60.9]  Yes   • ESRD on dialysis (CMS/HCC) [N18.6, Z99.2]  Not Applicable      Resolved Hospital Problems   No resolved problems to display.         ASSESSMENT:  Masslike consolidation right lower lobe  Mediastinal and hilar lymphadenopathy: Status post EBUS  Pericardial effusion status post window 3/19/2021  Ascites/anasarca  Systolic cardiomyopathy  ESRD: On dialysis  Hyperkalemia  Recent COVID-19 infection December 2020  Crampy abdominal pain  Anemia: Status post transfusion with appropriate response  Atelectasis    PLAN:    Final pathology from surgical procedure with thoracic surgery pending.  No evidence of malignancy or granuloma on frozen section from lymph nodes.  Chest tube management per thoracic surgery.  Encourage incentive spirometer to help with atelectasis.      Koffi Baez MD  Pulmonary and Critical Care Medicine  [facility], [facility]  3/22/2021    11:36 EDT       Electronically signed by Koffi Baez MD at 03/22/21 1138     Maritza Pedraza APRN at 03/22/21 1101          POST-OPERATIVE NOTE     Chief Complaint: Paracardial effusion, postoperative care  S/P: Bronchoscopy, right video-assisted thoracoscopy with robot-assisted pericardial window, mediastinal lymphadenectomy, intercostal nerve block  POD # 3    Subjective:  Symptoms:  Improved.  He reports  "chest pain.  No shortness of breath.    Diet:  Adequate intake.  No nausea or vomiting.    Activity level: Normal with assistance.    Pain:  He complains of pain that is mild.  He reports pain is improving.  Pain is well controlled.        Objective:  General Appearance:  Uncomfortable, in no acute distress and not in pain.    Vital signs: (most recent): Blood pressure 141/90, pulse 88, temperature 97.7 °F (36.5 °C), temperature source Oral, resp. rate 18, height 175.3 cm (69\"), weight 76.1 kg (167 lb 12.8 oz), SpO2 94 %.  Vital signs are normal.    Output: Minimal urine output and producing stool.    HEENT: Normal HEENT exam.    Lungs:  Normal effort and normal respiratory rate.  Breath sounds clear to auscultation.  He is not in respiratory distress.    Heart: Normal rate.  Regular rhythm.    Chest: Chest wall tenderness present.    Abdomen: Abdomen is soft.  Bowel sounds are normal.     Extremities: Normal range of motion.    Pulses: Distal pulses are intact.    Neurological: Patient is alert and oriented to person, place and time.    Skin:  Warm and dry.          Chest tube:   Site: Right, Clean, Dry, Intact and Securement device intact  Suction: -20 cm  Air Leak: negative  24 Hour Total: 80/210cc    Results Review:     I reviewed the patient's new clinical results.  I reviewed the patient's new imaging results and agree with the interpretation.  I reviewed the patient's other test results and agree with the interpretation  Discussed with patient, RN and Dr. Billingsley.    Assessment/Plan     Patient is POD #3, s/p robot-assisted pericardial window.  This morning's chest x-ray is stable with no pneumothorax.  Persistent bibasilar atelectasis.   Chest tube removed at the bedside without difficulty.  Patient tolerated well and tube intact upon removal.  We will leave Veto pericardial drain in place another day.  Recheck chest x-ray in a.m.  Weaning supplemental O2 as tolerated.  Continue to encourage ambulation, " incentive spirometry and good pulmonary hygiene.    CATIA White  [facility]  03/22/21  11:01 EDT    Patient was seen and assessed while wearing personal protective equipment including facemask, protective eyewear and gloves.  Hand hygiene performed prior to entering the room and upon exiting with doffing of gloves.         Electronically signed by Maritza Pedraza APRN at 03/22/21 [id number]

## 2021-03-22 NOTE — PROGRESS NOTES
"  POST-OPERATIVE NOTE     Chief Complaint: Paracardial effusion, postoperative care  S/P: Bronchoscopy, right video-assisted thoracoscopy with robot-assisted pericardial window, mediastinal lymphadenectomy, intercostal nerve block  POD # 3    Subjective:  Symptoms:  Improved.  He reports chest pain.  No shortness of breath.    Diet:  Adequate intake.  No nausea or vomiting.    Activity level: Normal with assistance.    Pain:  He complains of pain that is mild.  He reports pain is improving.  Pain is well controlled.        Objective:  General Appearance:  Uncomfortable, in no acute distress and not in pain.    Vital signs: (most recent): Blood pressure 141/90, pulse 88, temperature 97.7 °F (36.5 °C), temperature source Oral, resp. rate 18, height 175.3 cm (69\"), weight 76.1 kg (167 lb 12.8 oz), SpO2 94 %.  Vital signs are normal.    Output: Minimal urine output and producing stool.    HEENT: Normal HEENT exam.    Lungs:  Normal effort and normal respiratory rate.  Breath sounds clear to auscultation.  He is not in respiratory distress.    Heart: Normal rate.  Regular rhythm.    Chest: Chest wall tenderness present.    Abdomen: Abdomen is soft.  Bowel sounds are normal.     Extremities: Normal range of motion.    Pulses: Distal pulses are intact.    Neurological: Patient is alert and oriented to person, place and time.    Skin:  Warm and dry.          Chest tube:   Site: Right, Clean, Dry, Intact and Securement device intact  Suction: -20 cm  Air Leak: negative  24 Hour Total: 80/210cc    Results Review:     I reviewed the patient's new clinical results.  I reviewed the patient's new imaging results and agree with the interpretation.  I reviewed the patient's other test results and agree with the interpretation  Discussed with patient, RN and Dr. Billingsley.    Assessment/Plan     Patient is POD #3, s/p robot-assisted pericardial window.  This morning's chest x-ray is stable with no pneumothorax.  Persistent bibasilar " atelectasis.   Chest tube removed at the bedside without difficulty.  Patient tolerated well and tube intact upon removal.  We will leave Veto pericardial drain in place another day.  Recheck chest x-ray in a.m.  Weaning supplemental O2 as tolerated.  Continue to encourage ambulation, incentive spirometry and good pulmonary hygiene.    CATIA White  [facility]  03/22/21  11:01 EDT    Patient was seen and assessed while wearing personal protective equipment including facemask, protective eyewear and gloves.  Hand hygiene performed prior to entering the room and upon exiting with doffing of gloves.

## 2021-03-23 ENCOUNTER — APPOINTMENT (OUTPATIENT)
Dept: GENERAL RADIOLOGY | Facility: HOSPITAL | Age: 36
End: 2021-03-23

## 2021-03-23 LAB
ANION GAP SERPL CALCULATED.3IONS-SCNC: 11.8 MMOL/L (ref 5–15)
BUN SERPL-MCNC: 64 MG/DL (ref 6–20)
BUN/CREAT SERPL: 7.7 (ref 7–25)
CALCIUM SPEC-SCNC: 8.5 MG/DL (ref 8.6–10.5)
CHLORIDE SERPL-SCNC: 96 MMOL/L (ref 98–107)
CO2 SERPL-SCNC: 27.2 MMOL/L (ref 22–29)
CREAT SERPL-MCNC: 8.32 MG/DL (ref 0.76–1.27)
CYTO UR: NORMAL
DEPRECATED RDW RBC AUTO: 48.3 FL (ref 37–54)
ERYTHROCYTE [DISTWIDTH] IN BLOOD BY AUTOMATED COUNT: 14.9 % (ref 12.3–15.4)
GFR SERPL CREATININE-BSD FRML MDRD: 7 ML/MIN/1.73
GFR SERPL CREATININE-BSD FRML MDRD: ABNORMAL ML/MIN/{1.73_M2}
GLUCOSE SERPL-MCNC: 86 MG/DL (ref 65–99)
HCT VFR BLD AUTO: 25.5 % (ref 37.5–51)
HGB BLD-MCNC: 8.1 G/DL (ref 13–17.7)
LAB AP CASE REPORT: NORMAL
LAB AP DIAGNOSIS COMMENT: NORMAL
LAB AP FLOW CYTOMETRY SUMMARY: NORMAL
LAB AP SPECIAL STAINS: NORMAL
Lab: NORMAL
MCH RBC QN AUTO: 28.6 PG (ref 26.6–33)
MCHC RBC AUTO-ENTMCNC: 31.8 G/DL (ref 31.5–35.7)
MCV RBC AUTO: 90.1 FL (ref 79–97)
PATH REPORT.FINAL DX SPEC: NORMAL
PATH REPORT.GROSS SPEC: NORMAL
PLATELET # BLD AUTO: 216 10*3/MM3 (ref 140–450)
PMV BLD AUTO: 8.9 FL (ref 6–12)
POTASSIUM SERPL-SCNC: 6.3 MMOL/L (ref 3.5–5.2)
RBC # BLD AUTO: 2.83 10*6/MM3 (ref 4.14–5.8)
SODIUM SERPL-SCNC: 135 MMOL/L (ref 136–145)
WBC # BLD AUTO: 9.82 10*3/MM3 (ref 3.4–10.8)

## 2021-03-23 PROCEDURE — 71045 X-RAY EXAM CHEST 1 VIEW: CPT

## 2021-03-23 PROCEDURE — 94799 UNLISTED PULMONARY SVC/PX: CPT

## 2021-03-23 PROCEDURE — 99024 POSTOP FOLLOW-UP VISIT: CPT | Performed by: NURSE PRACTITIONER

## 2021-03-23 PROCEDURE — 5A1D70Z PERFORMANCE OF URINARY FILTRATION, INTERMITTENT, LESS THAN 6 HOURS PER DAY: ICD-10-PCS | Performed by: INTERNAL MEDICINE

## 2021-03-23 PROCEDURE — 80048 BASIC METABOLIC PNL TOTAL CA: CPT | Performed by: HOSPITALIST

## 2021-03-23 PROCEDURE — 25010000002 ENOXAPARIN PER 10 MG: Performed by: THORACIC SURGERY (CARDIOTHORACIC VASCULAR SURGERY)

## 2021-03-23 PROCEDURE — 85027 COMPLETE CBC AUTOMATED: CPT | Performed by: HOSPITALIST

## 2021-03-23 RX ORDER — CARVEDILOL 12.5 MG/1
12.5 TABLET ORAL EVERY 12 HOURS SCHEDULED
Status: DISCONTINUED | OUTPATIENT
Start: 2021-03-23 | End: 2021-03-24 | Stop reason: HOSPADM

## 2021-03-23 RX ORDER — ALBUMIN (HUMAN) 12.5 G/50ML
12.5 SOLUTION INTRAVENOUS AS NEEDED
Status: ACTIVE | OUTPATIENT
Start: 2021-03-23 | End: 2021-03-23

## 2021-03-23 RX ORDER — GABAPENTIN 100 MG/1
100 CAPSULE ORAL EVERY 12 HOURS SCHEDULED
Status: DISCONTINUED | OUTPATIENT
Start: 2021-03-23 | End: 2021-03-24 | Stop reason: HOSPADM

## 2021-03-23 RX ADMIN — ACETAMINOPHEN 1000 MG: 500 TABLET, FILM COATED ORAL at 15:51

## 2021-03-23 RX ADMIN — SODIUM CHLORIDE, PRESERVATIVE FREE 10 ML: 5 INJECTION INTRAVENOUS at 20:59

## 2021-03-23 RX ADMIN — GABAPENTIN 100 MG: 100 CAPSULE ORAL at 20:57

## 2021-03-23 RX ADMIN — ENOXAPARIN SODIUM 30 MG: 30 INJECTION SUBCUTANEOUS at 08:05

## 2021-03-23 RX ADMIN — CARVEDILOL 6.25 MG: 6.25 TABLET, FILM COATED ORAL at 08:05

## 2021-03-23 RX ADMIN — CALCIUM ACETATE 2001 MG: 667 CAPSULE ORAL at 07:19

## 2021-03-23 RX ADMIN — GABAPENTIN 100 MG: 100 CAPSULE ORAL at 05:47

## 2021-03-23 RX ADMIN — ACETAMINOPHEN 1000 MG: 500 TABLET, FILM COATED ORAL at 08:05

## 2021-03-23 RX ADMIN — SODIUM CHLORIDE, PRESERVATIVE FREE 10 ML: 5 INJECTION INTRAVENOUS at 08:05

## 2021-03-23 RX ADMIN — CALCIUM ACETATE 2001 MG: 667 CAPSULE ORAL at 16:33

## 2021-03-23 RX ADMIN — CARVEDILOL 12.5 MG: 12.5 TABLET, FILM COATED ORAL at 20:57

## 2021-03-23 RX ADMIN — ACETAMINOPHEN 1000 MG: 500 TABLET, FILM COATED ORAL at 20:57

## 2021-03-23 RX ADMIN — CALCIUM ACETATE 2001 MG: 667 CAPSULE ORAL at 14:26

## 2021-03-23 RX ADMIN — DOCUSATE SODIUM 50MG AND SENNOSIDES 8.6MG 2 TABLET: 8.6; 5 TABLET, FILM COATED ORAL at 20:57

## 2021-03-23 RX ADMIN — DOCUSATE SODIUM 100 MG: 100 CAPSULE, LIQUID FILLED ORAL at 08:05

## 2021-03-23 NOTE — PROGRESS NOTES
"  POST-OPERATIVE NOTE     Chief Complaint: Paracardial effusion, postoperative care  S/P: Bronchoscopy, right video-assisted thoracoscopy with robot-assisted pericardial window, mediastinal lymphadenectomy, intercostal nerve block  POD # 4    Subjective:  Symptoms:  Improved.  No shortness of breath or chest pain.    Diet:  Adequate intake.  No nausea or vomiting.    Activity level: Normal with assistance.    Pain:  He complains of pain that is mild.  He reports pain is improving.  Pain is well controlled.        Objective:  General Appearance:  Uncomfortable, in no acute distress and not in pain.    Vital signs: (most recent): Blood pressure 112/57, pulse 88, temperature 97.8 °F (36.6 °C), temperature source Temporal, resp. rate 16, height 175.3 cm (69.02\"), weight 78.4 kg (172 lb 12.8 oz), SpO2 97 %.  Vital signs are normal.    Output: Minimal urine output and producing stool.    HEENT: Normal HEENT exam.    Lungs:  Normal effort and normal respiratory rate.  Breath sounds clear to auscultation.  He is not in respiratory distress.    Heart: Normal rate.  Regular rhythm.    Chest: Chest wall tenderness present.  (Surgical incisions are clean, dry and intact with Dermabond.  Chest tube sites covered with gauze and DuoDERM.)  Abdomen: Abdomen is soft.  Bowel sounds are normal.     Extremities: Normal range of motion.    Pulses: Distal pulses are intact.    Neurological: Patient is alert and oriented to person, place and time.    Skin:  Warm and dry.          Chest tube:   Site: Right, Clean, Dry, Intact and Securement device intact  Suction: waterseal  Air Leak: negative  24 Hour Total: 40cc    Results Review:     I reviewed the patient's new clinical results.  I reviewed the patient's new imaging results and agree with the interpretation.  I reviewed the patient's other test results and agree with the interpretation  Discussed with patient, RN and Dr. Billingsley.    Assessment/Plan     Patient is POD #4, s/p " robot-assisted pericardial window.  Patient was off floor for dialysis for a lengthy time today.  4.5 L of fluid was removed.  He appears to be doing well.  Stable chest x-ray this morning.  Trace right apical pneumothorax with atelectasis in the right lung base.  Cardiac enlargement.  Persistent opacity at the left lung base.    Pericardial Veto drain removed at the bedside without difficulty.  Tube was intact upon removal and patient tolerated well.  We will recheck 1 more x-ray in the morning.  Patient will likely be okay to discharge tomorrow from our standpoint.  Continue to encourage ambulation, incentive spirometry and good pulmonary hygiene.    CATIA White  [facility]  03/23/21  15:14 EDT    Patient was seen and assessed while wearing personal protective equipment including facemask, protective eyewear and gloves.  Hand hygiene performed prior to entering the room and upon exiting with doffing of gloves.

## 2021-03-23 NOTE — PROGRESS NOTES
NEPHROLOGY PROGRESS NOTE    PATIENT IDENTIFICATION:   Name:  Chandler Buchanan      MRN:  [id number]     36 y.o.  male             Reason for visit: ESRD    SUBJECTIVE:   On dialysis.  Sleeping very soundly.  Awakens only briefly, but appropriate .  Qb 400.   systolic. Goal 5 kg .    OBJECTIVE:  Vitals:    03/23/21 0600 03/23/21 0700 03/23/21 0812 03/23/21 0900   BP:  (!) 150/103  (!) 163/102   BP Location:  Right arm  Right arm   Patient Position:  Lying  Lying   Pulse:  88 83 92   Resp:  16  16   Temp:  98.3 °F (36.8 °C)  98.4 °F (36.9 °C)   TempSrc:  Oral  Temporal   SpO2:  92% 97%    Weight: 78.4 kg (172 lb 12.8 oz)      Height:               Body mass index is 25.51 kg/m².    Intake/Output Summary (Last 24 hours) at 3/23/2021 1221  Last data filed at 3/23/2021 0800  Gross per 24 hour   Intake 1360 ml   Output 40 ml   Net 1320 ml     Wt Readings from Last 1 Encounters:   03/23/21 0600 78.4 kg (172 lb 12.8 oz)   03/22/21 0500 76.1 kg (167 lb 12.8 oz)   03/21/21 1900 75.9 kg (167 lb 5.3 oz)   03/21/21 0600 79.9 kg (176 lb 2.4 oz)   03/21/21 0500 79.9 kg (176 lb 1.6 oz)   03/20/21 0533 79.5 kg (175 lb 4.3 oz)   03/19/21 0537 78.4 kg (172 lb 12.8 oz)   03/18/21 0639 76.2 kg (168 lb)   03/17/21 0406 79.2 kg (174 lb 11.2 oz)   03/16/21 0437 78 kg (172 lb)   03/15/21 0520 81.8 kg (180 lb 5.4 oz)   03/14/21 0600 81.2 kg (179 lb 0.2 oz)   03/14/21 0500 81.2 kg (179 lb 1.6 oz)   03/13/21 0554 82.2 kg (181 lb 4.8 oz)   03/12/21 1726 81.7 kg (180 lb 3.2 oz)     Wt Readings from Last 3 Encounters:   03/23/21 78.4 kg (172 lb 12.8 oz)   03/12/21 83.5 kg (184 lb)   03/08/21 80.3 kg (177 lb 0.5 oz)         Exam:  On dialysis.  Qb 400.  Sleeping, but awakens briefly .Mask in place.   Chronically ill-appearing; pale  HEENTNo eye discharge; no scleral icterus  Lungs clear to auscultation anteriorly  Chest tube on the right  Heart RRR, 2/6syst m. no rub ,no s3  Abd Soft, + bs, not tender .  Ext No edema.  LUE AVF patent  .        Scheduled meds:    acetaminophen, 1,000 mg, Oral, TID  calcium acetate, 2,001 mg, Oral, TID With Meals  carvedilol, 6.25 mg, Oral, Q12H  docusate sodium, 100 mg, Oral, Daily  enoxaparin, 30 mg, Subcutaneous, Daily  gabapentin, 100 mg, Oral, Q8H  polyethylene glycol, 17 g, Oral, Daily  senna-docusate sodium, 2 tablet, Oral, Nightly  sodium chloride, 10 mL, Intravenous, Q12H      IV meds:                             Data Review:    Results from last 7 days   Lab Units 03/23/21  0641 03/20/21  0418 03/19/21  1548   SODIUM mmol/L 135* 136 136   POTASSIUM mmol/L 6.3* 5.5* 6.0*   CHLORIDE mmol/L 96* 99 104   CO2 mmol/L 27.2 24.2 20.9*   BUN mg/dL 64* 43* 64*   CREATININE mg/dL 8.32* 5.95* 8.48*   CALCIUM mg/dL 8.5* 8.3* 8.0*   GLUCOSE mg/dL 86 135* 135*     Estimated Creatinine Clearance: 13.5 mL/min (A) (by C-G formula based on SCr of 8.32 mg/dL (H)).      Results from last 7 days   Lab Units 03/20/21  0418 03/19/21  0649 03/17/21  0454   MAGNESIUM mg/dL 2.5  --   --    PHOSPHORUS mg/dL 6.6* 5.6* 4.8*       Results from last 7 days   Lab Units 03/23/21  0641 03/20/21  0418 03/19/21  1548 03/19/21  1432 03/19/21  0649 03/17/21  0454   WBC 10*3/mm3 9.82 17.80* 15.07*  --  10.55 9.84   HEMOGLOBIN g/dL 8.1* 8.4* 6.8*  --  8.6* 7.6*   HEMOGLOBIN, POC g/dL  --   --   --  6.5*  --   --    PLATELETS 10*3/mm3 216 195 183  --  193 203       Results from last 7 days   Lab Units 03/19/21  0649   INR  1.25*             ASSESSMENT:     Pericardial effusion    ESRD on dialysis (CMS/HCC)    Idiopathic peripheral neuropathy    HTN (hypertension)    History of COVID-19    NICM (nonischemic cardiomyopathy) (CMS/HCC)    Anemia in ESRD (end-stage renal disease) (CMS/HCC)    Elevated brain natriuretic peptide (BNP) level    Hilar adenopathy    Right lower lobe lung mass    Mediastinal adenopathy    1.  ESRD: volume excess resolved.  Dialysis today.   No electrolytes today. Underlying renal disease likely autoimmune (renal bx 2019  showed severe glomerular and tubulo-inbsteritial fibrosis, with collapsing variant FSGS or C3 GN as possible considerations). ANCA panel negative 3/12/21  2.  Large pericardial effusion (> 2 cm circumferentially):  no tamponade noted by echocardiogram 3/12.  Pericardial window  3/19 , right VATS and lymphadenectomy.  Despite concern for noncompliance, patient and Dr. Samano spoke yesterday and patient reports dialysis 4 times weekly . Pleural tube out.  Possible dc of pericardial tube today .  3.  Anemia of ESRD: s/p 2 units PRBC  this admission.  Very low iron saturation, but ferritin nearly 1700.  4.  Mediastinal and bilateral hilar lymphadenopathy as well as large mass-like opacity right lower lobe with central cavitation.  Lymphadenectomy performed 3/19.    5.  Abdominal distention with ascites and transaminitis, improving.  6.  Covid-19 infection, 12/20.  7.  Hypertension: Not well controlled.  Remove volume with hd.  increase coreg .  8.  Cough: Lisinopril dc. List as allergy.       PLAN:  1.  Hemodialysis today.   2. Decide on dialysis tomorrow am .  If dc he could likely do his own dialysis at home .    Rosa M Sauer MD  3/23/2021    12:21 EDT

## 2021-03-23 NOTE — PROGRESS NOTES
[facility] INPATIENT PROGRESS NOTE         [address]    3/23/2021      PATIENT IDENTIFICATION:  Name: Chandler Buchanan ADMIT: 3/12/2021   : 1985  PCP: Provider, No Known    MRN: [id number] LOS: 11 days   AGE/SEX: 36 y.o. male  ROOM: [facility]                     LOS 11    Reason for visit: Follow-up right lower lobe masslike consolidation and lymphadenopathy      SUBJECTIVE:      Resting comfortably.  One of his 2 chest tubes today removed yesterday.  Pain is well controlled.  No new complaints.      Objective   OBJECTIVE:    Vital Sign Min/Max for last 24 hours  Temp  Min: 97.5 °F (36.4 °C)  Max: 98.3 °F (36.8 °C)   BP  Min: 131/87  Max: 150/103   Pulse  Min: 83  Max: 88   Resp  Min: 16  Max: 18   SpO2  Min: 92 %  Max: 97 %   No data recorded   Weight  Min: 78.4 kg (172 lb 12.8 oz)  Max: 78.4 kg (172 lb 12.8 oz)                         Body mass index is 25.51 kg/m².    Intake/Output Summary (Last 24 hours) at 3/23/2021 0843  Last data filed at 3/23/2021 0600  Gross per 24 hour   Intake 1240 ml   Output 40 ml   Net 1200 ml         Exam:  GEN:  No distress, appears stated age  LUNGS: Normal chest on inspection, palpation and coarse breath sounds bilaterally on auscultation.  Chest tube stable without air leak.    CV:  Normal S1S2, without murmur      Assessment     Scheduled meds:  acetaminophen, 1,000 mg, Oral, TID  calcium acetate, 2,001 mg, Oral, TID With Meals  carvedilol, 6.25 mg, Oral, Q12H  docusate sodium, 100 mg, Oral, Daily  enoxaparin, 30 mg, Subcutaneous, Daily  gabapentin, 100 mg, Oral, Q8H  polyethylene glycol, 17 g, Oral, Daily  senna-docusate sodium, 2 tablet, Oral, Nightly  sodium chloride, 10 mL, Intravenous, Q12H      IV meds:                         Data Review:  Results from last 7 days   Lab Units 21  0641 21  0418 21  1548 21  0649 21  0454   SODIUM mmol/L 135* 136 136 139 137   POTASSIUM mmol/L 6.3* 5.5* 6.0* 5.8* 5.2   CHLORIDE mmol/L 96*  99 104 100 101   CO2 mmol/L 27.2 24.2 20.9* 23.9 22.3   BUN mg/dL 64* 43* 64* 58* 48*   CREATININE mg/dL 8.32* 5.95* 8.48* 8.18* 7.53*   GLUCOSE mg/dL 86 135* 135* 91 85   CALCIUM mg/dL 8.5* 8.3* 8.0* 8.8 8.5*         Estimated Creatinine Clearance: 13.5 mL/min (A) (by C-G formula based on SCr of 8.32 mg/dL (H)).  Results from last 7 days   Lab Units 03/23/21  0641 03/20/21  0418 03/19/21  1548 03/19/21  1432 03/19/21  0649 03/17/21  0454   WBC 10*3/mm3 9.82 17.80* 15.07*  --  10.55 9.84   HEMOGLOBIN g/dL 8.1* 8.4* 6.8*  --  8.6* 7.6*   HEMOGLOBIN, POC g/dL  --   --   --  6.5*  --   --    PLATELETS 10*3/mm3 216 195 183  --  193 203     Results from last 7 days   Lab Units 03/19/21  0649   INR  1.25*         Results from last 7 days   Lab Units 03/19/21  1432   PH, ARTERIAL pH units 7.31*             Chest x-ray 3/22/2021 reviewed        Microbiology reviewed  )        Active Hospital Problems    Diagnosis  POA   • **Pericardial effusion [I31.3]  Yes   • Hilar adenopathy [R59.0]  Yes   • Elevated brain natriuretic peptide (BNP) level [R79.89]  Yes   • Right lower lobe lung mass [R91.8]  Yes   • Mediastinal adenopathy [R59.0]  Yes   • Anemia in ESRD (end-stage renal disease) (CMS/HCC) [N18.6, D63.1]  Yes   • NICM (nonischemic cardiomyopathy) (CMS/HCC) [I42.8]  Yes   • HTN (hypertension) [I10]  Yes   • History of COVID-19 [Z86.16]  Yes   • Idiopathic peripheral neuropathy [G60.9]  Yes   • ESRD on dialysis (CMS/HCC) [N18.6, Z99.2]  Not Applicable      Resolved Hospital Problems   No resolved problems to display.         ASSESSMENT:  Masslike consolidation right lower lobe  Mediastinal and hilar lymphadenopathy: Status post EBUS  Pericardial effusion status post window 3/19/2021  Ascites/anasarca  Systolic cardiomyopathy  ESRD: On dialysis  Hyperkalemia  Recent COVID-19 infection December 2020  Crampy abdominal pain  Anemia: Status post transfusion with appropriate response  Atelectasis    PLAN:    Final pathology from  surgical procedure with thoracic surgery still pending.  No evidence of malignancy or granuloma on frozen section from lymph nodes.  Chest tube management per thoracic surgery.  Encourage incentive spirometer to help with atelectasis.      Koffi Baez MD  Pulmonary and Critical Care Medicine  [facility], [facility]  3/23/2021    08:43 EDT

## 2021-03-23 NOTE — PLAN OF CARE
Goal Outcome Evaluation:  Plan of Care Reviewed With: patient  Progress: improving  Outcome Summary: VSS. denies pain, managed with ERAS medications. CT #2 to water seal. ambulating independently. HD scheduled for today. plan for chest tube to be removed today. will continue to monitor

## 2021-03-23 NOTE — PROGRESS NOTES
Name: Chandler Buchanan ADMIT: 3/12/2021   : 1985  PCP: Provider, No Known    MRN: [id number] LOS: 11 days   AGE/SEX: 36 y.o. male  ROOM: [facility]/     Subjective   Subjective   Patient denies any complaints.  One chest tube still in place    Review of Systems   Respiratory: Negative for shortness of breath.    All other systems reviewed and are negative.       Objective   Objective   Vital Signs  Temp:  [97.5 °F (36.4 °C)-98.4 °F (36.9 °C)] 97.8 °F (36.6 °C)  Heart Rate:  [83-92] 88  Resp:  [16-18] 16  BP: (112-163)/() 112/57  SpO2:  [92 %-97 %] 97 %  on  Flow (L/min):  [1] 1;   Device (Oxygen Therapy): room air  Body mass index is 25.51 kg/m².  Physical Exam  Vitals reviewed.   Constitutional:       General: He is not in acute distress.     Appearance: He is well-developed. He is not ill-appearing.   HENT:      Head: Normocephalic and atraumatic.   Eyes:      General: No scleral icterus.  Neck:      Vascular: No JVD.   Cardiovascular:      Rate and Rhythm: Normal rate and regular rhythm.      Heart sounds: No murmur heard.   No friction rub.   Pulmonary:      Effort: Pulmonary effort is normal. No respiratory distress.      Breath sounds: Normal breath sounds. No wheezing.   Abdominal:      General: Bowel sounds are normal. There is distension (Some better).      Palpations: Abdomen is soft.      Tenderness: There is no abdominal tenderness.   Musculoskeletal:      Cervical back: Neck supple.   Skin:     General: Skin is warm and dry.      Findings: No rash.   Neurological:      Mental Status: He is alert and oriented to person, place, and time.         Results Review     I reviewed the patient's new clinical results.  Results from last 7 days   Lab Units 21  0641 21  0418 21  1548 21  1432 21  0649   WBC 10*3/mm3 9.82 17.80* 15.07*  --  10.55   HEMOGLOBIN g/dL 8.1* 8.4* 6.8*  --  8.6*   HEMOGLOBIN, POC g/dL  --   --   --  6.5*  --    PLATELETS 10*3/mm3 335 464 183  --   193     Results from last 7 days   Lab Units 03/23/21  0641 03/20/21  0418 03/19/21  1548 03/19/21  0649   SODIUM mmol/L 135* 136 136 139   POTASSIUM mmol/L 6.3* 5.5* 6.0* 5.8*   CHLORIDE mmol/L 96* 99 104 100   CO2 mmol/L 27.2 24.2 20.9* 23.9   BUN mg/dL 64* 43* 64* 58*   CREATININE mg/dL 8.32* 5.95* 8.48* 8.18*   GLUCOSE mg/dL 86 135* 135* 91   Estimated Creatinine Clearance: 13.5 mL/min (A) (by C-G formula based on SCr of 8.32 mg/dL (H)).  Results from last 7 days   Lab Units 03/20/21  0418 03/19/21  0649 03/17/21  0454   ALBUMIN g/dL 3.00* 3.50 3.40*     Results from last 7 days   Lab Units 03/23/21  0641 03/20/21  0418 03/19/21  1548 03/19/21  0649 03/17/21  0454   CALCIUM mg/dL 8.5* 8.3* 8.0* 8.8 8.5*   ALBUMIN g/dL  --  3.00*  --  3.50 3.40*   MAGNESIUM mg/dL  --  2.5  --   --   --    PHOSPHORUS mg/dL  --  6.6*  --  5.6* 4.8*       COVID19   Date Value Ref Range Status   03/18/2021 Not Detected Not Detected - Ref. Range Final   03/12/2021 Not Detected Not Detected - Ref. Range Final     No results found for: HGBA1C, POCGLU    XR Chest 1 View  XR CHEST 1 VW-     Clinical: Chest tube management     COMPARISON examination 3/22/2021     FINDINGS: One of the 2 right-sided chest tubes removed in the interim.  Trace right apical pneumothorax of less than 5%. Atelectasis at the  right lung base similar to the previous examination. There is cardiac  enlargement. Persistent opacity at the left lung base without interval  change. No pulmonary edema has developed. The remainder of examination  is unremarkable.     This report was finalized on 3/23/2021 6:17 AM by Dr. Tang Faith M.D.       Scheduled Medications  acetaminophen, 1,000 mg, Oral, TID  calcium acetate, 2,001 mg, Oral, TID With Meals  carvedilol, 12.5 mg, Oral, Q12H  docusate sodium, 100 mg, Oral, Daily  enoxaparin, 30 mg, Subcutaneous, Daily  gabapentin, 100 mg, Oral, Q12H  polyethylene glycol, 17 g, Oral, Daily  senna-docusate sodium, 2 tablet, Oral,  "Nightly  sodium chloride, 10 mL, Intravenous, Q12H    Infusions   Diet  Diet Regular; Cardiac, Low Potassium; 2 gm (50 mEq)       Assessment/Plan     Active Hospital Problems    Diagnosis  POA   • **Pericardial effusion [I31.3]  Yes   • Hilar adenopathy [R59.0]  Yes   • Elevated brain natriuretic peptide (BNP) level [R79.89]  Yes   • Right lower lobe lung mass [R91.8]  Yes   • Mediastinal adenopathy [R59.0]  Yes   • Anemia in ESRD (end-stage renal disease) (CMS/HCC) [N18.6, D63.1]  Yes   • NICM (nonischemic cardiomyopathy) (CMS/[facility]) [I42.8]  Yes   • HTN (hypertension) [I10]  Yes   • History of COVID-19 [Z86.16]  Yes   • Idiopathic peripheral neuropathy [G60.9]  Yes   • ESRD on dialysis (CMS/HCC) [N18.6, Z99.2]  Not Applicable      Resolved Hospital Problems   No resolved problems to display.       36 y.o. male with h/o ESRD admitted with Pericardial effusion, mediastinal LAD and YEVGENIY lung mass status post pericardial window    · Chest tube management per thoracic surgery.  Tiny apical pneumothorax on x-ray probably not significant  · Reviewed pathology and no overt malignancy noted.  Flow cytometry negative  · Wean o2 as tolerated.   · Persistent hyperkalemia-changed diet to focus more on low potassium, previously had been on consistent carb and \"renal diet\"  · HD per nephrology.  Does this at home normally  · Cont bowel regimen  · DVT prophylaxis-Lovenox  · Dispo-anticipate discharge tomorrow if chest tubes removed and cleared by all      Brian Khanna MD  [facility]  03/23/21  14:55 EDT    "

## 2021-03-23 NOTE — PLAN OF CARE
Goal Outcome Evaluation:  Plan of Care Reviewed With: patient  Progress: improving  Outcome Summary: VSS, no complaints of pain. Final CT pulled today. CXR in AM. Plan to DC in AM. Will continue to monitor.

## 2021-03-24 ENCOUNTER — APPOINTMENT (OUTPATIENT)
Dept: GENERAL RADIOLOGY | Facility: HOSPITAL | Age: 36
End: 2021-03-24

## 2021-03-24 VITALS
OXYGEN SATURATION: 98 % | WEIGHT: 163.1 LBS | SYSTOLIC BLOOD PRESSURE: 144 MMHG | HEIGHT: 69 IN | HEART RATE: 88 BPM | DIASTOLIC BLOOD PRESSURE: 88 MMHG | BODY MASS INDEX: 24.16 KG/M2 | TEMPERATURE: 98.4 F | RESPIRATION RATE: 16 BRPM

## 2021-03-24 LAB
ALBUMIN SERPL-MCNC: 3.2 G/DL (ref 3.5–5.2)
ANION GAP SERPL CALCULATED.3IONS-SCNC: 12 MMOL/L (ref 5–15)
BUN SERPL-MCNC: 44 MG/DL (ref 6–20)
BUN/CREAT SERPL: 7.3 (ref 7–25)
CALCIUM SPEC-SCNC: 8.7 MG/DL (ref 8.6–10.5)
CHLORIDE SERPL-SCNC: 96 MMOL/L (ref 98–107)
CO2 SERPL-SCNC: 27 MMOL/L (ref 22–29)
CREAT SERPL-MCNC: 6.01 MG/DL (ref 0.76–1.27)
DEPRECATED RDW RBC AUTO: 47.7 FL (ref 37–54)
ERYTHROCYTE [DISTWIDTH] IN BLOOD BY AUTOMATED COUNT: 14.7 % (ref 12.3–15.4)
GFR SERPL CREATININE-BSD FRML MDRD: 11 ML/MIN/1.73
GFR SERPL CREATININE-BSD FRML MDRD: ABNORMAL ML/MIN/{1.73_M2}
GLUCOSE SERPL-MCNC: 86 MG/DL (ref 65–99)
HCT VFR BLD AUTO: 24.8 % (ref 37.5–51)
HGB BLD-MCNC: 8 G/DL (ref 13–17.7)
MCH RBC QN AUTO: 28.8 PG (ref 26.6–33)
MCHC RBC AUTO-ENTMCNC: 32.3 G/DL (ref 31.5–35.7)
MCV RBC AUTO: 89.2 FL (ref 79–97)
PHOSPHATE SERPL-MCNC: 6.2 MG/DL (ref 2.5–4.5)
PLATELET # BLD AUTO: 211 10*3/MM3 (ref 140–450)
PMV BLD AUTO: 8.8 FL (ref 6–12)
POTASSIUM SERPL-SCNC: 5.6 MMOL/L (ref 3.5–5.2)
RBC # BLD AUTO: 2.78 10*6/MM3 (ref 4.14–5.8)
SODIUM SERPL-SCNC: 135 MMOL/L (ref 136–145)
WBC # BLD AUTO: 7.38 10*3/MM3 (ref 3.4–10.8)

## 2021-03-24 PROCEDURE — 99024 POSTOP FOLLOW-UP VISIT: CPT | Performed by: NURSE PRACTITIONER

## 2021-03-24 PROCEDURE — 71045 X-RAY EXAM CHEST 1 VIEW: CPT

## 2021-03-24 PROCEDURE — 85027 COMPLETE CBC AUTOMATED: CPT | Performed by: INTERNAL MEDICINE

## 2021-03-24 PROCEDURE — 94799 UNLISTED PULMONARY SVC/PX: CPT

## 2021-03-24 PROCEDURE — 80069 RENAL FUNCTION PANEL: CPT | Performed by: INTERNAL MEDICINE

## 2021-03-24 PROCEDURE — 25010000002 ENOXAPARIN PER 10 MG: Performed by: THORACIC SURGERY (CARDIOTHORACIC VASCULAR SURGERY)

## 2021-03-24 RX ORDER — OXYCODONE HYDROCHLORIDE 5 MG/1
5 TABLET ORAL EVERY 4 HOURS PRN
Qty: 15 TABLET | Refills: 0 | Status: SHIPPED | OUTPATIENT
Start: 2021-03-24 | End: 2021-03-27

## 2021-03-24 RX ORDER — ACETAMINOPHEN 500 MG
1000 TABLET ORAL 3 TIMES DAILY
Qty: 60 TABLET | Refills: 0 | Status: SHIPPED | OUTPATIENT
Start: 2021-03-24 | End: 2021-04-03

## 2021-03-24 RX ORDER — POLYETHYLENE GLYCOL 3350 17 G/17G
17 POWDER, FOR SOLUTION ORAL DAILY
Qty: 510 G | Refills: 1 | Status: SHIPPED | OUTPATIENT
Start: 2021-03-25

## 2021-03-24 RX ORDER — AMOXICILLIN 250 MG
2 CAPSULE ORAL NIGHTLY
Start: 2021-03-24

## 2021-03-24 RX ORDER — ACETAMINOPHEN 325 MG/1
650 TABLET ORAL EVERY 4 HOURS PRN
Start: 2021-03-24 | End: 2021-03-24 | Stop reason: HOSPADM

## 2021-03-24 RX ORDER — PSEUDOEPHEDRINE HCL 30 MG
100 TABLET ORAL DAILY
Start: 2021-03-25

## 2021-03-24 RX ORDER — GABAPENTIN 100 MG/1
100 CAPSULE ORAL 2 TIMES DAILY
Qty: 28 CAPSULE | Refills: 0 | Status: SHIPPED | OUTPATIENT
Start: 2021-03-24 | End: 2021-04-14

## 2021-03-24 RX ADMIN — ENOXAPARIN SODIUM 30 MG: 30 INJECTION SUBCUTANEOUS at 09:15

## 2021-03-24 RX ADMIN — CARVEDILOL 12.5 MG: 12.5 TABLET, FILM COATED ORAL at 09:16

## 2021-03-24 RX ADMIN — SODIUM CHLORIDE, PRESERVATIVE FREE 10 ML: 5 INJECTION INTRAVENOUS at 09:19

## 2021-03-24 RX ADMIN — CALCIUM ACETATE 2001 MG: 667 CAPSULE ORAL at 09:16

## 2021-03-24 RX ADMIN — ACETAMINOPHEN 1000 MG: 500 TABLET, FILM COATED ORAL at 09:16

## 2021-03-24 RX ADMIN — GABAPENTIN 100 MG: 100 CAPSULE ORAL at 09:15

## 2021-03-24 RX ADMIN — DOCUSATE SODIUM 100 MG: 100 CAPSULE, LIQUID FILLED ORAL at 09:16

## 2021-03-24 NOTE — PROGRESS NOTES
[facility] INPATIENT PROGRESS NOTE         [address]    3/24/2021      PATIENT IDENTIFICATION:  Name: Chandler Buchanan ADMIT: 3/12/2021   : 1985  PCP: Provider, No Known    MRN: [id number] LOS: 12 days   AGE/SEX: 36 y.o. male  ROOM: [facility]                     LOS 12    Reason for visit: Follow-up right lower lobe masslike consolidation and lymphadenopathy      SUBJECTIVE:      Resting comfortably.        Objective   OBJECTIVE:    Vital Sign Min/Max for last 24 hours  Temp  Min: 97.8 °F (36.6 °C)  Max: 98.4 °F (36.9 °C)   BP  Min: 112/57  Max: 163/102   Pulse  Min: 83  Max: 92   Resp  Min: 16  Max: 16   SpO2  Min: 96 %  Max: 97 %   No data recorded   Weight  Min: 74 kg (163 lb 1.6 oz)  Max: 74 kg (163 lb 1.6 oz)                         Body mass index is 24.07 kg/m².    Intake/Output Summary (Last 24 hours) at 3/24/2021 0734  Last data filed at 3/24/2021 0530  Gross per 24 hour   Intake 2160 ml   Output 5000 ml   Net -2840 ml         Exam:  GEN:  No distress, appears stated age  LUNGS: Normal chest on inspection, palpation and coarse breath sounds bilaterally on auscultation.     CV:  Normal S1S2, without murmur      Assessment     Scheduled meds:  acetaminophen, 1,000 mg, Oral, TID  calcium acetate, 2,001 mg, Oral, TID With Meals  carvedilol, 12.5 mg, Oral, Q12H  docusate sodium, 100 mg, Oral, Daily  enoxaparin, 30 mg, Subcutaneous, Daily  gabapentin, 100 mg, Oral, Q12H  polyethylene glycol, 17 g, Oral, Daily  senna-docusate sodium, 2 tablet, Oral, Nightly  sodium chloride, 10 mL, Intravenous, Q12H      IV meds:                         Data Review:  Results from last 7 days   Lab Units 21  0616 21  0641 21  0418 21  1548 21  0649   SODIUM mmol/L 135* 135* 136 136 139   POTASSIUM mmol/L 5.6* 6.3* 5.5* 6.0* 5.8*   CHLORIDE mmol/L 96* 96* 99 104 100   CO2 mmol/L 27.0 27.2 24.2 20.9* 23.9   BUN mg/dL 44* 64* 43* 64* 58*   CREATININE mg/dL 6.01* 8.32* 5.95* 8.48*  8.18*   GLUCOSE mg/dL 86 86 135* 135* 91   CALCIUM mg/dL 8.7 8.5* 8.3* 8.0* 8.8         Estimated Creatinine Clearance: 17.6 mL/min (A) (by C-G formula based on SCr of 6.01 mg/dL (H)).  Results from last 7 days   Lab Units 03/24/21  0616 03/23/21  0641 03/20/21  0418 03/19/21  1548 03/19/21  1432 03/19/21  0649   WBC 10*3/mm3 7.38 9.82 17.80* 15.07*  --  10.55   HEMOGLOBIN g/dL 8.0* 8.1* 8.4* 6.8*  --  8.6*   HEMOGLOBIN, POC g/dL  --   --   --   --  6.5*  --    PLATELETS 10*3/mm3 211 216 195 183  --  193     Results from last 7 days   Lab Units 03/19/21  0649   INR  1.25*         Results from last 7 days   Lab Units 03/19/21  1432   PH, ARTERIAL pH units 7.31*             Chest x-ray 3/24/2021 reviewed        Microbiology reviewed      Surgical pathology reviewed: Lymph nodes show reactive lymph node with plasma cell hyperplasia and numerous hemosiderin laden macrophages  )        Active Hospital Problems    Diagnosis  POA   • **Pericardial effusion [I31.3]  Yes   • Hilar adenopathy [R59.0]  Yes   • Elevated brain natriuretic peptide (BNP) level [R79.89]  Yes   • Right lower lobe lung mass [R91.8]  Yes   • Mediastinal adenopathy [R59.0]  Yes   • Anemia in ESRD (end-stage renal disease) (CMS/HCC) [N18.6, D63.1]  Yes   • NICM (nonischemic cardiomyopathy) (CMS/HCC) [I42.8]  Yes   • HTN (hypertension) [I10]  Yes   • History of COVID-19 [Z86.16]  Yes   • Idiopathic peripheral neuropathy [G60.9]  Yes   • ESRD on dialysis (CMS/HCC) [N18.6, Z99.2]  Not Applicable      Resolved Hospital Problems   No resolved problems to display.         ASSESSMENT:  Masslike consolidation right lower lobe  Mediastinal and hilar lymphadenopathy: Status post EBUS  Pericardial effusion status post window 3/19/2021  Ascites/anasarca  Systolic cardiomyopathy  ESRD: On dialysis  Hyperkalemia  Recent COVID-19 infection December 2020  Crampy abdominal pain  Anemia: Status post transfusion with appropriate response  Atelectasis    PLAN:    Encourage  pulmonary toilet.  No objection to discharge from my standpoint.  We will sign off.      Koffi Baez MD  Pulmonary and Critical Care Medicine  [facility], [facility]  3/24/2021    07:34 EDT

## 2021-03-24 NOTE — PROGRESS NOTES
"  POST-OPERATIVE NOTE     Chief Complaint: Paracardial effusion, postoperative care  S/P: Bronchoscopy, right video-assisted thoracoscopy with robot-assisted pericardial window, mediastinal lymphadenectomy, intercostal nerve block  POD # 5    Subjective:  Symptoms:  Improved.  No shortness of breath or chest pain.    Diet:  Adequate intake.  No nausea or vomiting.    Activity level: Normal with assistance.    Pain:  He complains of pain that is mild.  He reports pain is improving.  Pain is well controlled.        Objective:  General Appearance:  Uncomfortable, in no acute distress and not in pain.    Vital signs: (most recent): Blood pressure 144/88, pulse 88, temperature 98.4 °F (36.9 °C), temperature source Oral, resp. rate 16, height 175.3 cm (69.02\"), weight 74 kg (163 lb 1.6 oz), SpO2 98 %.  Vital signs are normal.    Output: Minimal urine output and producing stool.    HEENT: Normal HEENT exam.    Lungs:  Normal effort and normal respiratory rate.  Breath sounds clear to auscultation.  He is not in respiratory distress.    Heart: Normal rate.  Regular rhythm.    Chest: Chest wall tenderness present.  (Surgical incisions are clean, dry and intact with Dermabond.  Chest tube sites covered with gauze and DuoDERM. No significant drainage.)  Abdomen: Abdomen is soft.  Bowel sounds are normal.     Extremities: Normal range of motion.    Pulses: Distal pulses are intact.    Neurological: Patient is alert and oriented to person, place and time.    Skin:  Warm and dry.          Results Review:     I reviewed the patient's new clinical results.  I reviewed the patient's new imaging results and agree with the interpretation.  I reviewed the patient's other test results and agree with the interpretation  Discussed with patient, RN and Dr. Billingsley.    Assessment/Plan     Patient is POD #5, s/p robot-assisted pericardial window. Chest tube and esmer drain have been removed. No significant drainage.  Stable chest x-ray this " morning.  Patient okay to discharge from our standpoint.  Continue to encourage ambulation, incentive spirometry and good pulmonary hygiene.    CATIA White  [facility]  03/24/21  11:09 EDT    Patient was seen and assessed while wearing personal protective equipment including facemask, protective eyewear and gloves.  Hand hygiene performed prior to entering the room and upon exiting with doffing of gloves.

## 2021-03-24 NOTE — PLAN OF CARE
Goal Outcome Evaluation:   Pt pain well controlled, eating well, plan to go home today with mom and do dialysis at home.

## 2021-03-24 NOTE — DISCHARGE SUMMARY
Patient Name: Chandler Buchanan  : 1985  MRN: [id number]    Date of Admission: 3/12/2021  Date of Discharge:  3/24/2021  Primary Care Physician: Provider, No Known      Chief Complaint:   No chief complaint on file.      Discharge Diagnoses     Active Hospital Problems    Diagnosis  POA   • **Pericardial effusion [I31.3]  Yes   • Hilar adenopathy [R59.0]  Yes   • Elevated brain natriuretic peptide (BNP) level [R79.89]  Yes   • Right lower lobe lung mass [R91.8]  Yes   • Mediastinal adenopathy [R59.0]  Yes   • Anemia in ESRD (end-stage renal disease) (CMS/[facility]) [N18.6, D63.1]  Yes   • NICM (nonischemic cardiomyopathy) (CMS/[facility]) [I42.8]  Yes   • HTN (hypertension) [I10]  Yes   • History of COVID-19 [Z86.16]  Yes   • Idiopathic peripheral neuropathy [G60.9]  Yes   • ESRD on dialysis (CMS/[facility]) [N18.6, Z99.2]  Not Applicable      Resolved Hospital Problems   No resolved problems to display.        Hospital Course     Mr. Buchanan is a 36 y.o. male with a history of ESRD on home HD, nonischemic cardiomyopathy, and recent COVID-19 infection 3 months ago who presented as a direct admission from cardiology due to chest pain and shortness of breath.  Please see the admitting history and physical for further details.  He was found to have a large pericardial effusion on echocardiogram and was admitted.  He was not in tamponade.  Nephrology was consulted to manage his hemodialysis while he was here and tried to remove some extra volume.  CTA of the chest was done showing a large pericardial effusion along with mediastinal and bilateral hilar jaci enlargement and a pleural-based masslike soft tissue density in the posterior medial left upper lobe.  There was also a masslike opacity in the right lower lobe with central cavitation.  Bronchoscopy was performed by pulmonology who did some biopsies which were inconclusive.  Thoracic surgery had been following and due to the lymphadenopathy and inconclusive biopsies, plan was  changed to proceed with pericardial window and lymphadenectomy for diagnosis.  This was performed on 3/19.  Postoperatively he was monitored in ICU briefly as he was slightly hypoxic but this resolved with passage of time after surgery.  Chest tubes were left in place and have been removed over the last 2 days.  His chest x-ray shows no pneumothorax or other concerns.  He did have some abdominal bloating and epigastric discomfort which is probably secondary to constipation and has been improved with a bowel regimen.  Notably he was transfused 2 units of packed red cells on dialysis following surgery.  He has had some issues with his potassium and has been placed on a potassium restricted diet which I would recommend to continue.  Blood pressure medications were titrated down somewhat after surgery as his blood pressure was running lower but it is now returning back to baseline elevated levels so we will go back to his home dose of Coreg and he may require additional agents upon discharge as well.  He will be discharged home today with follow-up chest x-ray and echocardiogram in a few weeks.      Day of Discharge     Subjective:  No new complaints    Physical Exam:  Temp:  [97.8 °F (36.6 °C)-98.3 °F (36.8 °C)] 98.2 °F (36.8 °C)  Heart Rate:  [88-96] 96  Resp:  [16] 16  BP: (112-152)/(57-97) 152/97  Body mass index is 24.07 kg/m².  Physical Exam  Vitals reviewed.   Constitutional:       General: He is not in acute distress.     Appearance: He is well-developed. He is not ill-appearing.   HENT:      Head: Normocephalic and atraumatic.   Eyes:      General: No scleral icterus.  Neck:      Vascular: No JVD.   Cardiovascular:      Rate and Rhythm: Normal rate and regular rhythm.      Heart sounds: No murmur heard.   No friction rub.   Pulmonary:      Effort: Pulmonary effort is normal. No respiratory distress.      Breath sounds: Normal breath sounds. No wheezing.   Abdominal:      General: Bowel sounds are normal. There  is no distension.      Palpations: Abdomen is soft.      Tenderness: There is no abdominal tenderness.   Musculoskeletal:      Cervical back: Neck supple.   Skin:     General: Skin is warm and dry.      Findings: No rash.   Neurological:      Mental Status: He is alert and oriented to person, place, and time.         Consultants     Consult Orders (all) (From admission, onward)     Start     Ordered    03/14/21 1600  Inpatient Pulmonology Consult  Once     Specialty:  Pulmonary Disease  Provider:  Koffi Baez MD    03/14/21 1559    03/12/21 1717  Inpatient Nephrology Consult  STAT     Specialty:  Nephrology  Provider:  Ld Radford MD    03/12/21 1716 03/12/21 1717  Inpatient Thoracic Surgery Consult  Once     Specialty:  Thoracic Surgery  Provider:  Symone Dumont MD    03/12/21 1717 03/12/21 1716  Inpatient Cardiology Consult  Once     Specialty:  Cardiology  Provider:  Juanito Millan Jr., MD    03/12/21 1716              Procedures     3/15: Bronchoscopy    3/19: BRONCHOSCOPY, RIGHT VIDEO ASSISTED THORACOSCOPY WITH ROBOT ASSISTED PERICARDIAL WINDOW, MEDIASTINAL LYMPADENECTOMY, INTERCOSTAL NERVE BLOCKS    Imaging Results (All)     Procedure Component Value Units Date/Time    XR Chest 1 View [[id number]] Collected: 03/24/21 0817     Updated: 03/24/21 0821    Narrative:      XR CHEST 1 VW-     Clinical: Status post chest tube removal     FINDINGS: The right-sided chest tube has been removed in the interim, no  pneumothorax is demonstrated. Bilateral airspace disease similar to the  previous examination. Stable cardiac enlargement. The remainder is  unremarkable.     This report was finalized on 3/24/2021 8:18 AM by Dr. Tang Faith M.D.       XR Chest 1 View [[id number]] Collected: 03/23/21 0614     Updated: 03/23/21 0621    Narrative:      XR CHEST 1 VW-     Clinical: Chest tube management     COMPARISON examination 3/22/2021     FINDINGS: One of the 2 right-sided chest tubes removed in  the interim.  Trace right apical pneumothorax of less than 5%. Atelectasis at the  right lung base similar to the previous examination. There is cardiac  enlargement. Persistent opacity at the left lung base without interval  change. No pulmonary edema has developed. The remainder of examination  is unremarkable.     This report was finalized on 3/23/2021 6:17 AM by Dr. Tang Faith M.D.       XR Chest 1 View [[id number]] Collected: 03/22/21 0741     Updated: 03/22/21 0741    Narrative:        Patient: PLACIDO HUSTON  Time Out: 07:40  Exam(s): FILM CXR 1 VIEW     EXAM:    XR Chest, 1 View    CLINICAL HISTORY:     chest tube management  Reason for exam: chest tube management.    TECHNIQUE:    Frontal view of the chest.    COMPARISON:    3 21 2021    FINDINGS:    Lungs:  Slightly limited by apical lordotic positioning.  There are   persistent platelike opacities at the right lung base, consistent with   atelectasis.  Dense opacity in the retrocardiac left lung base has   improved.  There may be minimal small residual atelectasis.    Pleural space: Right chest tube is unchanged in position.  No   pneumothorax.    Heart:  The cardiac silhouette is mildly enlarged, possibly due to the   positioning of the patient.    Mediastinum:  Unremarkable.    Bones joints:  Unremarkable.    IMPRESSION:       1.  Stable appearance to right chest tube without right pneumothorax or   effusion.    2.  Persistent bibasilar atelectasis.      Impression:          Electronically signed by Isela Samuel MD on 03-22-21 at 0740    XR Chest 1 View [[id number]] Collected: 03/21/21 0714     Updated: 03/21/21 0714    Narrative:        Patient: PLACIDO HUSTON  Time Out: 07:13  Exam(s): FILM CXR 1 VIEW     EXAM:    XR Chest, 1 View    CLINICAL HISTORY:     Chest tube management  Reason for exam: Chest tube management.    TECHNIQUE:    Frontal view of the chest.    COMPARISON:    3 20 2021    FINDINGS:    Lungs: Low lung volumes and by basilar  streaky opacities, consistent   with atelectasis. Unchanged right perihilar triangular opacity.    Pleural space: Small left pleural effusion.  No pneumothorax.    Heart: Cardiomegaly, unchanged.    Mediastinum:  Unremarkable.    Bones joints:  Unremarkable.    Lines and tubes: Right-sided chest tube terminates in the right mid   hemithorax.    IMPRESSION:       1. Right-sided chest tube terminates in the right mid hemithorax.    2.  Decreased aeration with increased bibasilar atelectasis compared to   3 20 2021.    Impression:          Electronically signed by Wilfrid August M.D. on 03-21-21 at 0713    XR Chest 1 View [[id number]] Collected: 03/20/21 0633     Updated: 03/20/21 0633    Narrative:        Patient: PLACIDO HUSTON  Time Out: 06:32  Exam(s): FILM CXR 1 VIEW     EXAM:    XR Chest, 1 View    CLINICAL HISTORY:     Post Op Lung Surgery  Reason for exam: Post Op Lung Surgery.    TECHNIQUE:    Frontal view of the chest.    COMPARISON:    3 19 2021    IMPRESSION:         Triangular opacity in the right lower lobe again seen.  Cardiomegaly.  Soft tissue emphysema along the right chest wall.  Right-sided chest tube in place.  No pneumothorax.    Impression:          Electronically signed by Tino Quezada MD on 03-20-21 at 0632    XR Chest 1 View [[id number]] Collected: 03/19/21 1522     Updated: 03/19/21 1528    Narrative:      ONE VIEW PORTABLE CHEST AT 3:08 PM     HISTORY: Recent right chest surgery. Chest tubes.     FINDINGS: The patient has had recent right chest surgery. There are 2  right-sided chest tubes in place including 1 tube which appears to enter  the lower right chest medially and then extends across the midline to  the left at the level of the left main pulmonary artery. There is no  evidence of pneumothorax. There is some dense localized atelectasis or  masslike density near the minor fissure which may potentially relate to  tumor or postobstructive changes as seen on the chest CT scan  performed  7 days ago. The heart remains mildly enlarged.     This report was finalized on 3/19/2021 3:25 PM by Dr. Festus Lund M.D.       XR Chest 1 View [[id number]] Collected: 03/19/21 0708     Updated: 03/19/21 0714    Narrative:      XR CHEST 1 VW-     Clinical: Preop pericardial window, pericardial effusion.     COMPARISON chest radiograph 1/23/2019     FINDINGS: There is significant cardiac enlargement. Masslike area right  lower lobe vaguely demonstrated on the current examination, seen better  on recent CT. Left upper lobe periaortic density seen on CT cannot be  identified on the current chest radiograph. No acute airspace disease  has developed within the right lung. Band of discoid atelectasis left  midlung zone again seen. The remainder of examination is unremarkable.     CONCLUSION:  1. Substantial cardiac enlargement.  2. No acute pulmonary process has developed.     This report was finalized on 3/19/2021 7:11 AM by Dr. Tang Faith M.D.       XR Chest 1 View [[id number]] Collected: 03/15/21 1144     Updated: 03/15/21 1700    Narrative:      XR CHEST 1 VW-  INTRAOPERATIVE VIEWS 03/15/2021     HISTORY: Bronchoscopy.     Limited field-of-view image of the chest was used during bronchoscopy.  The bronchoscope is seen overlying the right lower lung. No unexpected  findings are noted.     Fluoroscopy time 1 minute 7 seconds, 1 image.     This report was finalized on 3/15/2021 4:57 PM by Dr. Santana Stahl M.D.       FL C Arm During Surgery [[id number]] Resulted: 03/15/21 1056     Updated: 03/15/21 1056    Narrative:      This procedure was auto-finalized with no dictation required.          Results for orders placed during the hospital encounter of 03/12/21    Adult Transthoracic Echo Limited W/ Cont if Necessary Per Protocol    Interpretation Summary  · There is a large (>2cm) circumferential pericardial effusion(more fluid localized posteriorly) with evidence of cardiac tamponade( early diastolic  RV and RA collapse) noted. IVC is dilated.  · Visually estimated LVEF seems to be significantly reduced.    Pertinent Labs     Results from last 7 days   Lab Units 03/24/21  0616 03/23/21  0641 03/20/21  0418 03/19/21  1548   WBC 10*3/mm3 7.38 9.82 17.80* 15.07*   HEMOGLOBIN g/dL 8.0* 8.1* 8.4* 6.8*   PLATELETS 10*3/mm3 211 216 195 183     Results from last 7 days   Lab Units 03/24/21  0616 03/23/21  0641 03/20/21  0418 03/19/21  1548   SODIUM mmol/L 135* 135* 136 136   POTASSIUM mmol/L 5.6* 6.3* 5.5* 6.0*   CHLORIDE mmol/L 96* 96* 99 104   CO2 mmol/L 27.0 27.2 24.2 20.9*   BUN mg/dL 44* 64* 43* 64*   CREATININE mg/dL 6.01* 8.32* 5.95* 8.48*   GLUCOSE mg/dL 86 86 135* 135*   Estimated Creatinine Clearance: 17.6 mL/min (A) (by C-G formula based on SCr of 6.01 mg/dL (H)).  Results from last 7 days   Lab Units 03/24/21  0616 03/20/21  0418 03/19/21  0649   ALBUMIN g/dL 3.20* 3.00* 3.50     Results from last 7 days   Lab Units 03/24/21  0616 03/23/21  0641 03/20/21  0418 03/19/21  1548 03/19/21  0649   CALCIUM mg/dL 8.7 8.5* 8.3* 8.0* 8.8   ALBUMIN g/dL 3.20*  --  3.00*  --  3.50   MAGNESIUM mg/dL  --   --  2.5  --   --    PHOSPHORUS mg/dL 6.2*  --  6.6*  --  5.6*               Invalid input(s): LDLCALC      Results from last 7 days   Lab Units 03/18/21  1802   COVID19  Not Detected       Test Results Pending at Discharge     Pending Labs     Order Current Status    Fungus Culture - Tissue, Lymph Node In process    Fungus Culture - Tissue, Pericardium In process    AFB Culture - Brushing, Lung, Right Lower Lobe Preliminary result    AFB Culture - Lavage, Lung, Right Lower Lobe Preliminary result    AFB Culture - Tissue, Lymph Node Preliminary result    AFB Culture - Tissue, Pericardium Preliminary result    Fungus Culture - Brushing, Lung, Right Lower Lobe Preliminary result    Fungus Culture - Lavage, Lung, Right Lower Lobe Preliminary result    Fungus Culture - Lymph Node, Mediastinum Preliminary result           Discharge Details        Discharge Medications      New Medications      Instructions Start Date   acetaminophen 325 MG tablet  Commonly known as: TYLENOL   650 mg, Oral, Every 4 Hours PRN      docusate sodium 100 MG capsule   100 mg, Oral, Daily   Start Date: March 25, 2021     oxyCODONE 5 MG immediate release tablet  Commonly known as: ROXICODONE   5 mg, Oral, Every 4 Hours PRN      polyethylene glycol 17 g packet  Commonly known as: MIRALAX   17 g, Oral, Daily   Start Date: March 25, 2021     sennosides-docusate 8.6-50 MG per tablet  Commonly known as: PERICOLACE   2 tablets, Oral, Nightly         Continue These Medications      Instructions Start Date   Auryxia 1  MG(Fe) tablet  Generic drug: Ferric Citrate   2 tablets, Oral, 3 Times Daily With Meals      carvedilol 25 MG tablet  Commonly known as: COREG   25 mg, Oral, Every 12 Hours Scheduled         Stop These Medications    metoclopramide 5 MG tablet  Commonly known as: REGLAN     sodium bicarbonate 650 MG tablet     traMADol 50 MG tablet  Commonly known as: Ultram            Allergies   Allergen Reactions   • Lisinopril Cough         Discharge Disposition:  Home or Self Care    Discharge Diet:  Diet Order   Procedures   • Diet Regular; Cardiac, Low Potassium; 2 gm (50 mEq)       Discharge Activity:       CODE STATUS:    Code Status and Medical Interventions:   Ordered at: 03/12/21 1716     Code Status:    CPR     Medical Interventions (Level of Support Prior to Arrest):    Full       Future Appointments   Date Time Provider Department Center   4/7/2021  9:15 AM Fran Billingsley III, MD [facility] None     Additional Instructions for the Follow-ups that You Need to Schedule     Discharge Follow-up with PCP   As directed       Currently Documented PCP:    Provider, No Known    PCP Phone Number:    [phone]     Follow Up Details: 2-3 weeks         Discharge Follow-up with Specialty: nephrology per their recs   As directed      Specialty:  nephrology per their recs         XR Chest 2 View    Apr 06, 2021 (Approximate)      Exam reason: post-op           Follow-up Information     Linker, Fran JAMIL III, MD. Go on 4/7/2021.    Specialty: Thoracic Surgery  Why: Please go to the [facility] for a chest x-ray at 8:30 AM on 4/7/2021, then come to our office.  Please call the office prior to entering the building due to waiting room restrictions.You will receive a call to schedule your echo in advance.  Contact information:  [address] [place] [address] [id number] [phone]             Provider, No Known .    Why: 2-3 weeks  Contact information:  [facility] [address]  [phone]                   Additional Instructions for the Follow-ups that You Need to Schedule     Discharge Follow-up with PCP   As directed       Currently Documented PCP:    Provider, No Known    PCP Phone Number:    [phone]     Follow Up Details: 2-3 weeks         Discharge Follow-up with Specialty: nephrology per their recs   As directed      Specialty: nephrology per their recs         XR Chest 2 View    Apr 06, 2021 (Approximate)      Exam reason: post-op           Time Spent on Discharge:  Greater than 30 minutes      Brian Khanna MD  [facility]  03/24/21  10:14 EDT

## 2021-03-24 NOTE — PAYOR COMM NOTE
"Chandler Buchanan (36 y.o. Male)     ATTN: DISCHARGE SUMMARY FOR REVIEW:  [id number]     DEPT: NEYDA DIAZ RN,[facility]; [phone],  [phone]        Date of Birth Social Security Number Address Home Phone MRN    1985  [id number] [facility] [address] [phone] [id number]    Denominational Marital Status          None Single       Admission Date Admission Type Admitting Provider Attending Provider Department, Room/Bed    3/12/21 Urgent Willem Sanchez MD  [address], E553/1    Discharge Date Discharge Disposition Discharge Destination        3/24/2021 Home or Self Care              Attending Provider: (none)   Allergies: Lisinopril    Isolation: None   Infection: None   Code Status: CPR    Ht: 175.3 cm (69.02\")   Wt: 74 kg (163 lb 1.6 oz)    Admission Cmt: None   Principal Problem: Pericardial effusion [I31.3]                 Active Insurance as of 3/12/2021     Primary Coverage     Payor Plan Insurance Group Employer/Plan Group    [place] [id number]     Payor Plan Address Payor Plan Phone Number Payor Plan Fax Number Effective Dates    [employer] [address] [address] [phone]  2020 - None Entered    Formerly [facility] [id number]       Subscriber Name Subscriber Birth Date Member ID       CHANDLER BUCHANAN 1985 [id number]                 Emergency Contacts      (Rel.) Home Phone Work Phone Mobile Phone    JALIL CAMPOS (Father) [phone] -- --    HILDAGISELLA MUNGUIA (Mother) -- -- [phone]    RufusBrionna wilder (Sister) -- -- [phone]               Discharge Summary      Brian Khanna MD at 21 1014              Patient Name: Chandler Buchanan  : 1985  MRN: [id number]    Date of Admission: 3/12/2021  Date of Discharge:  3/24/2021  Primary Care Physician: Provider, No Known      Chief Complaint:   No chief complaint on file.      Discharge Diagnoses     Active Hospital Problems    Diagnosis  POA   • " **Pericardial effusion [I31.3]  Yes   • Hilar adenopathy [R59.0]  Yes   • Elevated brain natriuretic peptide (BNP) level [R79.89]  Yes   • Right lower lobe lung mass [R91.8]  Yes   • Mediastinal adenopathy [R59.0]  Yes   • Anemia in ESRD (end-stage renal disease) (CMS/HCC) [N18.6, D63.1]  Yes   • NICM (nonischemic cardiomyopathy) (CMS/HCC) [I42.8]  Yes   • HTN (hypertension) [I10]  Yes   • History of COVID-19 [Z86.16]  Yes   • Idiopathic peripheral neuropathy [G60.9]  Yes   • ESRD on dialysis (CMS/HCC) [N18.6, Z99.2]  Not Applicable      Resolved Hospital Problems   No resolved problems to display.        Hospital Course     Mr. Buchanan is a 36 y.o. male with a history of ESRD on home HD, nonischemic cardiomyopathy, and recent COVID-19 infection 3 months ago who presented as a direct admission from cardiology due to chest pain and shortness of breath.  Please see the admitting history and physical for further details.  He was found to have a large pericardial effusion on echocardiogram and was admitted.  He was not in tamponade.  Nephrology was consulted to manage his hemodialysis while he was here and tried to remove some extra volume.  CTA of the chest was done showing a large pericardial effusion along with mediastinal and bilateral hilar jaci enlargement and a pleural-based masslike soft tissue density in the posterior medial left upper lobe.  There was also a masslike opacity in the right lower lobe with central cavitation.  Bronchoscopy was performed by pulmonology who did some biopsies which were inconclusive.  Thoracic surgery had been following and due to the lymphadenopathy and inconclusive biopsies, plan was changed to proceed with pericardial window and lymphadenectomy for diagnosis.  This was performed on 3/19.  Postoperatively he was monitored in ICU briefly as he was slightly hypoxic but this resolved with passage of time after surgery.  Chest tubes were left in place and have been removed over the  last 2 days.  His chest x-ray shows no pneumothorax or other concerns.  He did have some abdominal bloating and epigastric discomfort which is probably secondary to constipation and has been improved with a bowel regimen.  Notably he was transfused 2 units of packed red cells on dialysis following surgery.  He has had some issues with his potassium and has been placed on a potassium restricted diet which I would recommend to continue.  Blood pressure medications were titrated down somewhat after surgery as his blood pressure was running lower but it is now returning back to baseline elevated levels so we will go back to his home dose of Coreg and he may require additional agents upon discharge as well.  He will be discharged home today with follow-up chest x-ray and echocardiogram in a few weeks.      Day of Discharge     Subjective:  No new complaints    Physical Exam:  Temp:  [97.8 °F (36.6 °C)-98.3 °F (36.8 °C)] 98.2 °F (36.8 °C)  Heart Rate:  [88-96] 96  Resp:  [16] 16  BP: (112-152)/(57-97) 152/97  Body mass index is 24.07 kg/m².  Physical Exam  Vitals reviewed.   Constitutional:       General: He is not in acute distress.     Appearance: He is well-developed. He is not ill-appearing.   HENT:      Head: Normocephalic and atraumatic.   Eyes:      General: No scleral icterus.  Neck:      Vascular: No JVD.   Cardiovascular:      Rate and Rhythm: Normal rate and regular rhythm.      Heart sounds: No murmur heard.   No friction rub.   Pulmonary:      Effort: Pulmonary effort is normal. No respiratory distress.      Breath sounds: Normal breath sounds. No wheezing.   Abdominal:      General: Bowel sounds are normal. There is no distension.      Palpations: Abdomen is soft.      Tenderness: There is no abdominal tenderness.   Musculoskeletal:      Cervical back: Neck supple.   Skin:     General: Skin is warm and dry.      Findings: No rash.   Neurological:      Mental Status: He is alert and oriented to person, place,  and time.         Consultants     Consult Orders (all) (From admission, onward)     Start     Ordered    03/14/21 1600  Inpatient Pulmonology Consult  Once     Specialty:  Pulmonary Disease  Provider:  Koffi Baez MD    03/14/21 1559    03/12/21 1717  Inpatient Nephrology Consult  STAT     Specialty:  Nephrology  Provider:  Ld Radford MD    03/12/21 1716    03/12/21 1717  Inpatient Thoracic Surgery Consult  Once     Specialty:  Thoracic Surgery  Provider:  Symone Dumont MD    03/12/21 1717 03/12/21 1716  Inpatient Cardiology Consult  Once     Specialty:  Cardiology  Provider:  Juanito Millan Jr., MD    03/12/21 1716              Procedures     3/15: Bronchoscopy    3/19: BRONCHOSCOPY, RIGHT VIDEO ASSISTED THORACOSCOPY WITH ROBOT ASSISTED PERICARDIAL WINDOW, MEDIASTINAL LYMPADENECTOMY, INTERCOSTAL NERVE BLOCKS    Imaging Results (All)     Procedure Component Value Units Date/Time    XR Chest 1 View [[id number]] Collected: 03/24/21 0817     Updated: 03/24/21 0821    Narrative:      XR CHEST 1 VW-     Clinical: Status post chest tube removal     FINDINGS: The right-sided chest tube has been removed in the interim, no  pneumothorax is demonstrated. Bilateral airspace disease similar to the  previous examination. Stable cardiac enlargement. The remainder is  unremarkable.     This report was finalized on 3/24/2021 8:18 AM by Dr. Tang Faith M.D.       XR Chest 1 View [[id number]] Collected: 03/23/21 0614     Updated: 03/23/21 0621    Narrative:      XR CHEST 1 VW-     Clinical: Chest tube management     COMPARISON examination 3/22/2021     FINDINGS: One of the 2 right-sided chest tubes removed in the interim.  Trace right apical pneumothorax of less than 5%. Atelectasis at the  right lung base similar to the previous examination. There is cardiac  enlargement. Persistent opacity at the left lung base without interval  change. No pulmonary edema has developed. The remainder of examination  is  unremarkable.     This report was finalized on 3/23/2021 6:17 AM by Dr. Tang Faith M.D.       XR Chest 1 View [[id number]] Collected: 03/22/21 0741     Updated: 03/22/21 0741    Narrative:        Patient: PLACIDO HUSTON  Time Out: 07:40  Exam(s): FILM CXR 1 VIEW     EXAM:    XR Chest, 1 View    CLINICAL HISTORY:     chest tube management  Reason for exam: chest tube management.    TECHNIQUE:    Frontal view of the chest.    COMPARISON:    3 21 2021    FINDINGS:    Lungs:  Slightly limited by apical lordotic positioning.  There are   persistent platelike opacities at the right lung base, consistent with   atelectasis.  Dense opacity in the retrocardiac left lung base has   improved.  There may be minimal small residual atelectasis.    Pleural space: Right chest tube is unchanged in position.  No   pneumothorax.    Heart:  The cardiac silhouette is mildly enlarged, possibly due to the   positioning of the patient.    Mediastinum:  Unremarkable.    Bones joints:  Unremarkable.    IMPRESSION:       1.  Stable appearance to right chest tube without right pneumothorax or   effusion.    2.  Persistent bibasilar atelectasis.      Impression:          Electronically signed by Isela Samuel MD on 03-22-21 at 0740    XR Chest 1 View [[id number]] Collected: 03/21/21 0714     Updated: 03/21/21 0714    Narrative:        Patient: PLACIDO HUSTON  Time Out: 07:13  Exam(s): FILM CXR 1 VIEW     EXAM:    XR Chest, 1 View    CLINICAL HISTORY:     Chest tube management  Reason for exam: Chest tube management.    TECHNIQUE:    Frontal view of the chest.    COMPARISON:    3 20 2021    FINDINGS:    Lungs: Low lung volumes and by basilar streaky opacities, consistent   with atelectasis. Unchanged right perihilar triangular opacity.    Pleural space: Small left pleural effusion.  No pneumothorax.    Heart: Cardiomegaly, unchanged.    Mediastinum:  Unremarkable.    Bones joints:  Unremarkable.    Lines and tubes: Right-sided chest tube  terminates in the right mid   hemithorax.    IMPRESSION:       1. Right-sided chest tube terminates in the right mid hemithorax.    2.  Decreased aeration with increased bibasilar atelectasis compared to   3 20 2021.    Impression:          Electronically signed by Wilfrid August M.D. on 03-21-21 at 0713    XR Chest 1 View [[id number]] Collected: 03/20/21 0633     Updated: 03/20/21 0633    Narrative:        Patient: PLACIDO HUSTON  Time Out: 06:32  Exam(s): FILM CXR 1 VIEW     EXAM:    XR Chest, 1 View    CLINICAL HISTORY:     Post Op Lung Surgery  Reason for exam: Post Op Lung Surgery.    TECHNIQUE:    Frontal view of the chest.    COMPARISON:    3 19 2021    IMPRESSION:         Triangular opacity in the right lower lobe again seen.  Cardiomegaly.  Soft tissue emphysema along the right chest wall.  Right-sided chest tube in place.  No pneumothorax.    Impression:          Electronically signed by Tino Quezada MD on 03-20-21 at 0632    XR Chest 1 View [[id number]] Collected: 03/19/21 1522     Updated: 03/19/21 1528    Narrative:      ONE VIEW PORTABLE CHEST AT 3:08 PM     HISTORY: Recent right chest surgery. Chest tubes.     FINDINGS: The patient has had recent right chest surgery. There are 2  right-sided chest tubes in place including 1 tube which appears to enter  the lower right chest medially and then extends across the midline to  the left at the level of the left main pulmonary artery. There is no  evidence of pneumothorax. There is some dense localized atelectasis or  masslike density near the minor fissure which may potentially relate to  tumor or postobstructive changes as seen on the chest CT scan performed  7 days ago. The heart remains mildly enlarged.     This report was finalized on 3/19/2021 3:25 PM by Dr. Festus Lund M.D.       XR Chest 1 View [[id number]] Collected: 03/19/21 0708     Updated: 03/19/21 0714    Narrative:      XR CHEST 1 VW-     Clinical: Preop pericardial window, pericardial  effusion.     COMPARISON chest radiograph 1/23/2019     FINDINGS: There is significant cardiac enlargement. Masslike area right  lower lobe vaguely demonstrated on the current examination, seen better  on recent CT. Left upper lobe periaortic density seen on CT cannot be  identified on the current chest radiograph. No acute airspace disease  has developed within the right lung. Band of discoid atelectasis left  midlung zone again seen. The remainder of examination is unremarkable.     CONCLUSION:  1. Substantial cardiac enlargement.  2. No acute pulmonary process has developed.     This report was finalized on 3/19/2021 7:11 AM by Dr. Tang Faith M.D.       XR Chest 1 View [[id number]] Collected: 03/15/21 1144     Updated: 03/15/21 1700    Narrative:      XR CHEST 1 VW-  INTRAOPERATIVE VIEWS 03/15/2021     HISTORY: Bronchoscopy.     Limited field-of-view image of the chest was used during bronchoscopy.  The bronchoscope is seen overlying the right lower lung. No unexpected  findings are noted.     Fluoroscopy time 1 minute 7 seconds, 1 image.     This report was finalized on 3/15/2021 4:57 PM by Dr. Santana Stahl M.D.       FL C Arm During Surgery [[id number]] Resulted: 03/15/21 1056     Updated: 03/15/21 1056    Narrative:      This procedure was auto-finalized with no dictation required.          Results for orders placed during the hospital encounter of 03/12/21    Adult Transthoracic Echo Limited W/ Cont if Necessary Per Protocol    Interpretation Summary  · There is a large (>2cm) circumferential pericardial effusion(more fluid localized posteriorly) with evidence of cardiac tamponade( early diastolic RV and RA collapse) noted. IVC is dilated.  · Visually estimated LVEF seems to be significantly reduced.    Pertinent Labs     Results from last 7 days   Lab Units 03/24/21  0616 03/23/21  0641 03/20/21  0418 03/19/21  1548   WBC 10*3/mm3 7.38 9.82 17.80* 15.07*   HEMOGLOBIN g/dL 8.0* 8.1* 8.4* 6.8*    PLATELETS 10*3/mm3 211 216 195 183     Results from last 7 days   Lab Units 03/24/21  0616 03/23/21  0641 03/20/21  0418 03/19/21  1548   SODIUM mmol/L 135* 135* 136 136   POTASSIUM mmol/L 5.6* 6.3* 5.5* 6.0*   CHLORIDE mmol/L 96* 96* 99 104   CO2 mmol/L 27.0 27.2 24.2 20.9*   BUN mg/dL 44* 64* 43* 64*   CREATININE mg/dL 6.01* 8.32* 5.95* 8.48*   GLUCOSE mg/dL 86 86 135* 135*   Estimated Creatinine Clearance: 17.6 mL/min (A) (by C-G formula based on SCr of 6.01 mg/dL (H)).  Results from last 7 days   Lab Units 03/24/21  0616 03/20/21  0418 03/19/21  0649   ALBUMIN g/dL 3.20* 3.00* 3.50     Results from last 7 days   Lab Units 03/24/21  0616 03/23/21  0641 03/20/21  0418 03/19/21  1548 03/19/21  0649   CALCIUM mg/dL 8.7 8.5* 8.3* 8.0* 8.8   ALBUMIN g/dL 3.20*  --  3.00*  --  3.50   MAGNESIUM mg/dL  --   --  2.5  --   --    PHOSPHORUS mg/dL 6.2*  --  6.6*  --  5.6*               Invalid input(s): LDLCALC      Results from last 7 days   Lab Units 03/18/21  1802   COVID19  Not Detected       Test Results Pending at Discharge     Pending Labs     Order Current Status    Fungus Culture - Tissue, Lymph Node In process    Fungus Culture - Tissue, Pericardium In process    AFB Culture - Brushing, Lung, Right Lower Lobe Preliminary result    AFB Culture - Lavage, Lung, Right Lower Lobe Preliminary result    AFB Culture - Tissue, Lymph Node Preliminary result    AFB Culture - Tissue, Pericardium Preliminary result    Fungus Culture - Brushing, Lung, Right Lower Lobe Preliminary result    Fungus Culture - Lavage, Lung, Right Lower Lobe Preliminary result    Fungus Culture - Lymph Node, Mediastinum Preliminary result          Discharge Details        Discharge Medications      New Medications      Instructions Start Date   acetaminophen 325 MG tablet  Commonly known as: TYLENOL   650 mg, Oral, Every 4 Hours PRN      docusate sodium 100 MG capsule   100 mg, Oral, Daily   Start Date: March 25, 2021     oxyCODONE 5 MG immediate  release tablet  Commonly known as: ROXICODONE   5 mg, Oral, Every 4 Hours PRN      polyethylene glycol 17 g packet  Commonly known as: MIRALAX   17 g, Oral, Daily   Start Date: March 25, 2021     sennosides-docusate 8.6-50 MG per tablet  Commonly known as: PERICOLACE   2 tablets, Oral, Nightly         Continue These Medications      Instructions Start Date   Auryxia 1  MG(Fe) tablet  Generic drug: Ferric Citrate   2 tablets, Oral, 3 Times Daily With Meals      carvedilol 25 MG tablet  Commonly known as: COREG   25 mg, Oral, Every 12 Hours Scheduled         Stop These Medications    metoclopramide 5 MG tablet  Commonly known as: REGLAN     sodium bicarbonate 650 MG tablet     traMADol 50 MG tablet  Commonly known as: Ultram            Allergies   Allergen Reactions   • Lisinopril Cough         Discharge Disposition:  Home or Self Care    Discharge Diet:  Diet Order   Procedures   • Diet Regular; Cardiac, Low Potassium; 2 gm (50 mEq)       Discharge Activity:       CODE STATUS:    Code Status and Medical Interventions:   Ordered at: 03/12/21 6687     Code Status:    CPR     Medical Interventions (Level of Support Prior to Arrest):    Full       Future Appointments   Date Time Provider Department Center   4/7/2021  9:15 AM Fran Billingsley III, MD [facility] None     Additional Instructions for the Follow-ups that You Need to Schedule     Discharge Follow-up with PCP   As directed       Currently Documented PCP:    Yanet, No Known    PCP Phone Number:    [phone]     Follow Up Details: 2-3 weeks         Discharge Follow-up with Specialty: nephrology per their recs   As directed      Specialty: nephrology per their recs         XR Chest 2 View    Apr 06, 2021 (Approximate)      Exam reason: post-op           Follow-up Information     Fran Billingsley III, MD. Go on 4/7/2021.    Specialty: Thoracic Surgery  Why: Please go to the [facility] for a chest x-ray at 8:30 AM on 4/7/2021, then come to our  office.  Please call the office prior to entering the building due to waiting room restrictions.You will receive a call to schedule your echo in advance.  Contact information:  [address]  [facility] [address]  [facility] [id number] [phone]             Provider, No Known .    Why: 2-3 weeks  Contact information:  [facility] [address] [phone]                   Additional Instructions for the Follow-ups that You Need to Schedule     Discharge Follow-up with PCP   As directed       Currently Documented PCP:    Provider, No Known    PCP Phone Number:    [phone]     Follow Up Details: 2-3 weeks         Discharge Follow-up with Specialty: nephrology per their recs   As directed      Specialty: nephrology per their recs         XR Chest 2 View    Apr 06, 2021 (Approximate)      Exam reason: post-op           Time Spent on Discharge:  Greater than 30 minutes      Brian Khanna MD  [facility]  03/24/21  10:14 EDT              Electronically signed by Brian Khanna MD at 03/24/21 7875

## 2021-03-24 NOTE — PLAN OF CARE
Goal Outcome Evaluation:  Plan of Care Reviewed With: patient  Progress: improving  Outcome Summary: VSS. no c/o of pain. cxr this morning. plan to d/c today. will continue to monitor

## 2021-03-25 ENCOUNTER — READMISSION MANAGEMENT (OUTPATIENT)
Dept: CALL CENTER | Facility: HOSPITAL | Age: 36
End: 2021-03-25

## 2021-03-25 NOTE — PROGRESS NOTES
Case Management Discharge Note      Final Note: Pt discharged home, no known needs.  ASHLEE Cuenca RN         Selected Continued Care - Discharged on 3/24/2021 Admission date: 3/12/2021 - Discharge disposition: Home or Self Care    Destination    No services have been selected for the patient.              Durable Medical Equipment    No services have been selected for the patient.              Dialysis/Infusion    No services have been selected for the patient.              Home Medical Care    No services have been selected for the patient.              Therapy    No services have been selected for the patient.              Community Resources    No services have been selected for the patient.                  Transportation Services  Private: Car    Final Discharge Disposition Code: 01 - home or self-care

## 2021-03-25 NOTE — OUTREACH NOTE
Prep Survey      Responses   [facility] facility patient discharged from?  [place]   Is LACE score < 7 ?  No   Emergency Room discharge w/ pulse ox?  No   Eligibility  Readm Mgmt   Discharge diagnosis  Pericardial effusion, elevated BNP, right lower lobe lung mass, ESRD   Does the patient have one of the following disease processes/diagnoses(primary or secondary)?  Other   Does the patient have Home health ordered?  No   Is there a DME ordered?  No   Comments regarding appointments  Per AVS   Medication alerts for this patient  Per AVS   Prep survey completed?  Yes          Cynthia Westfall RN

## 2021-03-28 ENCOUNTER — READMISSION MANAGEMENT (OUTPATIENT)
Dept: CALL CENTER | Facility: HOSPITAL | Age: 36
End: 2021-03-28

## 2021-03-28 NOTE — OUTREACH NOTE
Medical Week 1 Survey      Responses   [facility] patient discharged from?  [place]   Does the patient have one of the following disease processes/diagnoses(primary or secondary)?  Other   Week 1 attempt successful?  Yes   Call start time  1245   Rescheduled  Rescheduled-pt requested   Call end time  1245          Sonia Cody RN

## 2021-03-30 ENCOUNTER — READMISSION MANAGEMENT (OUTPATIENT)
Dept: CALL CENTER | Facility: HOSPITAL | Age: 36
End: 2021-03-30

## 2021-03-30 NOTE — OUTREACH NOTE
Medical Week 1 Survey      Responses   [facility] patient discharged from?  [place]   Does the patient have one of the following disease processes/diagnoses(primary or secondary)?  Other   Week 1 attempt successful?  No   Rescheduled  Revoked [no answer after rescheduling]          Rachelle Good, RN

## 2021-04-06 ENCOUNTER — APPOINTMENT (OUTPATIENT)
Dept: CARDIOLOGY | Facility: HOSPITAL | Age: 36
End: 2021-04-06

## 2021-04-08 ENCOUNTER — HOSPITAL ENCOUNTER (OUTPATIENT)
Dept: CARDIOLOGY | Facility: HOSPITAL | Age: 36
Discharge: HOME | End: 2021-04-08
Admitting: NURSE PRACTITIONER

## 2021-04-08 VITALS — BODY MASS INDEX: 24.16 KG/M2 | HEART RATE: 88 BPM | HEIGHT: 69 IN | WEIGHT: 163.14 LBS

## 2021-04-08 DIAGNOSIS — I31.39 PERICARDIAL EFFUSION: ICD-10-CM

## 2021-04-08 LAB
ASCENDING AORTA: 3.3 CM
BH CV ECHO MEAS - ASC AORTA: 3.3 CM
BH CV ECHO MEAS - BSA(HAYCOCK): 1.9 M^2
BH CV ECHO MEAS - BSA: 1.9 M^2
BH CV ECHO MEAS - BZI_BMI: 24.1 KILOGRAMS/M^2
BH CV ECHO MEAS - BZI_METRIC_HEIGHT: 175.3 CM
BH CV ECHO MEAS - BZI_METRIC_WEIGHT: 73.9 KG
BH CV ECHO MEAS - EDV(MOD-SP2): 197 ML
BH CV ECHO MEAS - EDV(MOD-SP4): 145 ML
BH CV ECHO MEAS - EF(MOD-BP): 47 %
BH CV ECHO MEAS - EF(MOD-SP2): 42.6 %
BH CV ECHO MEAS - EF(MOD-SP4): 51.7 %
BH CV ECHO MEAS - ESV(MOD-SP2): 113 ML
BH CV ECHO MEAS - ESV(MOD-SP4): 70 ML
BH CV ECHO MEAS - LV DIASTOLIC VOL/BSA (35-75): 76.6 ML/M^2
BH CV ECHO MEAS - LV SYSTOLIC VOL/BSA (12-30): 37 ML/M^2
BH CV ECHO MEAS - LVLD AP2: 9.9 CM
BH CV ECHO MEAS - LVLD AP4: 7.4 CM
BH CV ECHO MEAS - LVLS AP2: 9 CM
BH CV ECHO MEAS - LVLS AP4: 6.3 CM
BH CV ECHO MEAS - RAP SYSTOLE: 3 MMHG
BH CV ECHO MEAS - RVSP: 41 MMHG
BH CV ECHO MEAS - SI(MOD-SP2): 44.4 ML/M^2
BH CV ECHO MEAS - SI(MOD-SP4): 39.6 ML/M^2
BH CV ECHO MEAS - SV(MOD-SP2): 84 ML
BH CV ECHO MEAS - SV(MOD-SP4): 75 ML
BH CV ECHO MEAS - TR MAX VEL: 307 CM/SEC
BH CV XLRA - RV BASE: 4.2 CM
BH CV XLRA - RV LENGTH: 7.8 CM
BH CV XLRA - RV MID: 2.9 CM
MAXIMAL PREDICTED HEART RATE: 183 BPM
SINUS: 3.1 CM
STJ: 3 CM
STRESS TARGET HR: 156 BPM

## 2021-04-08 PROCEDURE — 93325 DOPPLER ECHO COLOR FLOW MAPG: CPT | Performed by: INTERNAL MEDICINE

## 2021-04-08 PROCEDURE — 93308 TTE F-UP OR LMTD: CPT

## 2021-04-08 PROCEDURE — 93321 DOPPLER ECHO F-UP/LMTD STD: CPT | Performed by: INTERNAL MEDICINE

## 2021-04-08 PROCEDURE — 93308 TTE F-UP OR LMTD: CPT | Performed by: INTERNAL MEDICINE

## 2021-04-08 PROCEDURE — 93321 DOPPLER ECHO F-UP/LMTD STD: CPT

## 2021-04-08 PROCEDURE — 93325 DOPPLER ECHO COLOR FLOW MAPG: CPT

## 2021-04-09 ENCOUNTER — TELEPHONE (OUTPATIENT)
Dept: SURGERY | Facility: CLINIC | Age: 36
End: 2021-04-09

## 2021-04-09 DIAGNOSIS — R91.8 RIGHT LOWER LOBE LUNG MASS: Primary | ICD-10-CM

## 2021-04-09 NOTE — TELEPHONE ENCOUNTER
Patient was called to confirm appt with Dr. Billingsley on 4/14/21 at 915am. Patient mother was notified that he needs CXR before appointment and appt was confirmed.

## 2021-04-12 LAB
FUNGUS WND CULT: NORMAL

## 2021-04-14 ENCOUNTER — OFFICE VISIT (OUTPATIENT)
Dept: ORTHOPEDIC SURGERY | Facility: CLINIC | Age: 36
End: 2021-04-14

## 2021-04-14 ENCOUNTER — OFFICE VISIT (OUTPATIENT)
Dept: SURGERY | Facility: CLINIC | Age: 36
End: 2021-04-14

## 2021-04-14 ENCOUNTER — HOSPITAL ENCOUNTER (OUTPATIENT)
Dept: GENERAL RADIOLOGY | Facility: HOSPITAL | Age: 36
Discharge: HOME | End: 2021-04-14
Admitting: NURSE PRACTITIONER

## 2021-04-14 VITALS
WEIGHT: 162.8 LBS | BODY MASS INDEX: 24.11 KG/M2 | OXYGEN SATURATION: 98 % | HEIGHT: 69 IN | TEMPERATURE: 98.5 F | SYSTOLIC BLOOD PRESSURE: 184 MMHG | RESPIRATION RATE: 16 BRPM | DIASTOLIC BLOOD PRESSURE: 100 MMHG | HEART RATE: 79 BPM

## 2021-04-14 VITALS — WEIGHT: 162 LBS | TEMPERATURE: 98.7 F | HEIGHT: 69 IN | BODY MASS INDEX: 23.99 KG/M2

## 2021-04-14 DIAGNOSIS — M75.41 IMPINGEMENT SYNDROME OF RIGHT SHOULDER: ICD-10-CM

## 2021-04-14 DIAGNOSIS — I31.39 PERICARDIAL EFFUSION: ICD-10-CM

## 2021-04-14 DIAGNOSIS — R59.0 MEDIASTINAL ADENOPATHY: ICD-10-CM

## 2021-04-14 DIAGNOSIS — D63.1 ANEMIA IN ESRD (END-STAGE RENAL DISEASE) (HCC): ICD-10-CM

## 2021-04-14 DIAGNOSIS — Z99.2 ESRD ON DIALYSIS (HCC): ICD-10-CM

## 2021-04-14 DIAGNOSIS — R52 PAIN: Primary | ICD-10-CM

## 2021-04-14 DIAGNOSIS — G60.9 IDIOPATHIC PERIPHERAL NEUROPATHY: ICD-10-CM

## 2021-04-14 DIAGNOSIS — N18.6 ANEMIA IN ESRD (END-STAGE RENAL DISEASE) (HCC): ICD-10-CM

## 2021-04-14 DIAGNOSIS — N18.6 ESRD ON DIALYSIS (HCC): ICD-10-CM

## 2021-04-14 DIAGNOSIS — I31.39 PERICARDIAL EFFUSION: Primary | ICD-10-CM

## 2021-04-14 PROCEDURE — 71046 X-RAY EXAM CHEST 2 VIEWS: CPT

## 2021-04-14 PROCEDURE — 99213 OFFICE O/P EST LOW 20 MIN: CPT | Performed by: NURSE PRACTITIONER

## 2021-04-14 PROCEDURE — 99024 POSTOP FOLLOW-UP VISIT: CPT | Performed by: THORACIC SURGERY (CARDIOTHORACIC VASCULAR SURGERY)

## 2021-04-14 PROCEDURE — 73030 X-RAY EXAM OF SHOULDER: CPT | Performed by: NURSE PRACTITIONER

## 2021-04-14 RX ORDER — AMLODIPINE BESYLATE 5 MG/1
TABLET ORAL
COMMUNITY
Start: 2021-04-12

## 2021-04-14 RX ORDER — SODIUM BICARBONATE 650 MG/1
TABLET ORAL
COMMUNITY
Start: 2021-04-13

## 2021-04-14 NOTE — PROGRESS NOTES
Subjective   Patient ID: Chandler Buchanan is a 36 y.o. male is here today for follow-up.    History of Present Illness  Dear Colleagues,   Chandler Buchanan is here today for their first postoperative visit following a robot-assisted pericardial window with mediastinal lymphadenectomy performed March 19, 2021.  Pathology from the lymph nodes showed plasma cell hyperplasia hemosiderin laden macrophages and reactive lymph nodes.  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  The patient has made a good recovery.  He is receiving dialysis every Tuesday, Thursday, and Saturdays.  He looks and feels better.  He is active with no significant shortness of breath.  He reports no pain in his chest wall.  His appetite has returned to normal.    The following portions of the patient's history were reviewed and updated as appropriate: allergies, current medications, past family history, past medical history, past social history, past surgical history and problem list.  Review of Systems   Constitutional: Negative.   HENT: Negative.    Eyes: Negative.    Cardiovascular: Negative.    Respiratory: Negative.    Endocrine: Negative.    Hematologic/Lymphatic: Negative.    Skin: Negative.    Musculoskeletal: Negative.    Gastrointestinal: Negative.    Genitourinary: Negative.    Neurological: Negative.    Psychiatric/Behavioral: Negative.    Allergic/Immunologic: Negative.         Objective   Physical Exam  Constitutional:       Appearance: Normal appearance. He is well-developed.   HENT:      Head: Normocephalic.   Eyes:      General: Lids are normal.      Conjunctiva/sclera: Conjunctivae normal.      Pupils: Pupils are equal, round, and reactive to light.   Neck:      Thyroid: No thyroid mass or thyromegaly.      Vascular: No carotid bruit, hepatojugular reflux or JVD.      Trachea: Trachea normal.   Cardiovascular:      Rate and Rhythm: Normal rate and regular rhythm.  No extrasystoles are present.     Chest Wall: PMI  is not displaced.      Pulses: Normal pulses.      Heart sounds: Normal heart sounds, S1 normal and S2 normal. No murmur heard.   No friction rub.   Pulmonary:      Effort: Pulmonary effort is normal.      Breath sounds: Normal breath sounds.   Chest:      Comments: Incisions are well-healed.  The skin is abraded from tape but there is no sign of infection.  The chest wall is stable.  There are no subcutaneous masses or nodules.  Abdominal:      General: Bowel sounds are normal.      Palpations: Abdomen is soft. There is no mass.      Tenderness: There is no abdominal tenderness.      Hernia: No hernia is present.   Musculoskeletal:         General: Normal range of motion.      Cervical back: Normal range of motion and neck supple.   Skin:     General: Skin is warm and dry.   Neurological:      Mental Status: He is alert and oriented to person, place, and time.      Cranial Nerves: No cranial nerve deficit.      Sensory: No sensory deficit.      Deep Tendon Reflexes: Reflexes are normal and symmetric.   Psychiatric:         Speech: Speech normal.         Behavior: Behavior normal.         Thought Content: Thought content normal.         Judgment: Judgment normal.         Assessment/Plan   Independent Review of Radiographic Studies:    Echocardiogram performed April 8, 2021 was reviewed.  Ejection fraction is 47%.  Left ventricular systolic function is mildly decreased.  Left ventricular cavity is mildly dilated.  Left ventricular wall thickness is consistent with moderate concentric hypertrophy.  There is left ventricular global hypokinesis.  There is a trivial amount of fluid with some fibrinous material within the pericardial space.  No evidence of increased intrapericardial pressure.    Chest x-ray performed today was independently reviewed.  Heart size is enlarged but much smaller than preoperative.  Fullness in both marin.  Some opacity in the superior segment of the right lower lobe which is improved.  No  pleural effusion.    Assessment:  Patient has made a good recovery from his right robot-assisted pericardial window.  He shows no evidence of reaccumulation of the pericardial effusion or tamponade.  Overall he looks and feels better and has resumed his normal activities.  Patient will follow up with his nephrologist and his cardiologist.    Plan:  Return to our office if new problems or new symptoms arise.  Thank you for allowing us to participate in the care of Mr. Buchanan.    Diagnoses and all orders for this visit:    Pericardial effusion    Other orders  -     amLODIPine (NORVASC) 5 MG tablet  -     sodium bicarbonate 650 MG tablet

## 2021-04-14 NOTE — PATIENT INSTRUCTIONS
Shoulder Impingement Syndrome Rehab  Ask your health care provider which exercises are safe for you. Do exercises exactly as told by your health care provider and adjust them as directed. It is normal to feel mild stretching, pulling, tightness, or discomfort as you do these exercises. Stop right away if you feel sudden pain or your pain gets worse. Do not begin these exercises until told by your health care provider.  Stretching and range-of-motion exercise  This exercise warms up your muscles and joints and improves the movement and flexibility of your shoulder. This exercise also helps to relieve pain and stiffness.  Passive horizontal adduction  In passive adduction, you use your other hand to move the injured arm toward your body. The injured arm does not move on its own. In this movement, your arm is moved across your body in the horizontal plane (horizontal adduction).  1. Sit or stand and pull your left / right elbow across your chest, toward your other shoulder. Stop when you feel a gentle stretch in the back of your shoulder and upper arm.  ? Keep your arm at shoulder height.  ? Keep your arm as close to your body as you comfortably can.  2. Hold for __________ seconds.  3. Slowly return to the starting position.  Repeat __________ times. Complete this exercise __________ times a day.  Strengthening exercises  These exercises build strength and endurance in your shoulder. Endurance is the ability to use your muscles for a long time, even after they get tired.  External rotation, isometric  This is an exercise in which you press the back of your wrist against a door frame without moving your shoulder joint (isometric).  1. Stand or sit in a doorway, facing the door frame.  2. Bend your left / right elbow and place the back of your wrist against the door frame. Only the back of your wrist should be touching the frame. Keep your upper arm at your side.  3. Gently press your wrist against the door frame, as if  you are trying to push your arm away from your abdomen (external rotation). Press as hard as you are able without pain.  ? Avoid shrugging your shoulder while you press your wrist against the door frame. Keep your shoulder blade tucked down toward the middle of your back.  4. Hold for __________ seconds.  5. Slowly release the tension, and relax your muscles completely before you repeat the exercise.  Repeat __________ times. Complete this exercise __________ times a day.  Internal rotation, isometric  This is an exercise in which you press your palm against a door frame without moving your shoulder joint (isometric).  1. Stand or sit in a doorway, facing the door frame.  2. Bend your left / right elbow and place the palm of your hand against the door frame. Only your palm should be touching the frame. Keep your upper arm at your side.  3. Gently press your hand against the door frame, as if you are trying to push your arm toward your abdomen (internal rotation). Press as hard as you are able without pain.  ? Avoid shrugging your shoulder while you press your hand against the door frame. Keep your shoulder blade tucked down toward the middle of your back.  4. Hold for __________ seconds.  5. Slowly release the tension, and relax your muscles completely before you repeat the exercise.  Repeat __________ times. Complete this exercise __________ times a day.  Scapular protraction, supine    1. Lie on your back on a firm surface (supine position). Hold a __________ weight in your left / right hand.  2. Raise your left / right arm straight into the air so your hand is directly above your shoulder joint.  3. Push the weight into the air so your shoulder (scapula) lifts off the surface that you are lying on. The scapula will push up or forward (protraction). Do not move your head, neck, or back.  4. Hold for __________ seconds.  5. Slowly return to the starting position. Let your muscles relax completely before you repeat  this exercise.  Repeat __________ times. Complete this exercise __________ times a day.  Scapular retraction    1. Sit in a stable chair without armrests, or stand up.  2. Secure an exercise band to a stable object in front of you so the band is at shoulder height.  3. Hold one end of the exercise band in each hand. Your palms should face down.  4. Squeeze your shoulder blades together (retraction) and move your elbows slightly behind you. Do not shrug your shoulders upward while you do this.  5. Hold for __________ seconds.  6. Slowly return to the starting position.  Repeat __________ times. Complete this exercise __________ times a day.  Shoulder extension    1. Sit in a stable chair without armrests, or stand up.  2. Secure an exercise band to a stable object in front of you so the band is above shoulder height.  3. Hold one end of the exercise band in each hand.  4. Straighten your elbows and lift your hands up to shoulder height.  5. Squeeze your shoulder blades together and pull your hands down to the sides of your thighs (extension). Stop when your hands are straight down by your sides. Do not let your hands go behind your body.  6. Hold for __________ seconds.  7. Slowly return to the starting position.  Repeat __________ times. Complete this exercise __________ times a day.  This information is not intended to replace advice given to you by your health care provider. Make sure you discuss any questions you have with your health care provider.  Document Revised: 04/10/2020 Document Reviewed: 01/13/2020  [employer] Patient Education © 2021 [employer]  Shoulder Exercises  Ask your health care provider which exercises are safe for you. Do exercises exactly as told by your health care provider and adjust them as directed. It is normal to feel mild stretching, pulling, tightness, or discomfort as you do these exercises. Stop right away if you feel sudden pain or your pain gets worse. Do not begin these  exercises until told by your health care provider.  Stretching exercises  External rotation and abduction  This exercise is sometimes called corner stretch. This exercise rotates your arm outward (external rotation) and moves your arm out from your body (abduction).  1.  a doorway with one of your feet slightly in front of the other. This is called a staggered stance. If you cannot reach your forearms to the door frame, stand facing a corner of a room.  2. Choose one of the following positions as told by your health care provider:  ? Place your hands and forearms on the door frame above your head.  ? Place your hands and forearms on the door frame at the height of your head.  ? Place your hands on the door frame at the height of your elbows.  3. Slowly move your weight onto your front foot until you feel a stretch across your chest and in the front of your shoulders. Keep your head and chest upright and keep your abdominal muscles tight.  4. Hold for __________ seconds.  5. To release the stretch, shift your weight to your back foot.  Repeat __________ times. Complete this exercise __________ times a day.  Extension, standing  1. Stand and hold a broomstick, a cane, or a similar object behind your back.  ? Your hands should be a little wider than shoulder width apart.  ? Your palms should face away from your back.  2. Keeping your elbows straight and your shoulder muscles relaxed, move the stick away from your body until you feel a stretch in your shoulders (extension).  ? Avoid shrugging your shoulders while you move the stick. Keep your shoulder blades tucked down toward the middle of your back.  3. Hold for __________ seconds.  4. Slowly return to the starting position.  Repeat __________ times. Complete this exercise __________ times a day.  Range-of-motion exercises  Pendulum    1. Stand near a wall or a surface that you can hold onto for balance.  2. Bend at the waist and let your left / right arm hang  straight down. Use your other arm to support you. Keep your back straight and do not lock your knees.  3. Relax your left / right arm and shoulder muscles, and move your hips and your trunk so your left / right arm swings freely. Your arm should swing because of the motion of your body, not because you are using your arm or shoulder muscles.  4. Keep moving your hips and trunk so your arm swings in the following directions, as told by your health care provider:  ? Side to side.  ? Forward and backward.  ? In clockwise and counterclockwise circles.  5. Continue each motion for __________ seconds, or for as long as told by your health care provider.  6. Slowly return to the starting position.  Repeat __________ times. Complete this exercise __________ times a day.  Shoulder flexion, standing    1. Stand and hold a broomstick, a cane, or a similar object. Place your hands a little more than shoulder width apart on the object. Your left / right hand should be palm up, and your other hand should be palm down.  2. Keep your elbow straight and your shoulder muscles relaxed. Push the stick up with your healthy arm to raise your left / right arm in front of your body, and then over your head until you feel a stretch in your shoulder (flexion).  ? Avoid shrugging your shoulder while you raise your arm. Keep your shoulder blade tucked down toward the middle of your back.  3. Hold for __________ seconds.  4. Slowly return to the starting position.  Repeat __________ times. Complete this exercise __________ times a day.  Shoulder abduction, standing  1. Stand and hold a broomstick, a cane, or a similar object. Place your hands a little more than shoulder width apart on the object. Your left / right hand should be palm up, and your other hand should be palm down.  2. Keep your elbow straight and your shoulder muscles relaxed. Push the object across your body toward your left / right side. Raise your left / right arm to the side  of your body (abduction) until you feel a stretch in your shoulder.  ? Do not raise your arm above shoulder height unless your health care provider tells you to do that.  ? If directed, raise your arm over your head.  ? Avoid shrugging your shoulder while you raise your arm. Keep your shoulder blade tucked down toward the middle of your back.  3. Hold for __________ seconds.  4. Slowly return to the starting position.  Repeat __________ times. Complete this exercise __________ times a day.  Internal rotation    1. Place your left / right hand behind your back, palm up.  2. Use your other hand to dangle an exercise band, a towel, or a similar object over your shoulder. Grasp the band with your left / right hand so you are holding on to both ends.  3. Gently pull up on the band until you feel a stretch in the front of your left / right shoulder. The movement of your arm toward the center of your body is called internal rotation.  ? Avoid shrugging your shoulder while you raise your arm. Keep your shoulder blade tucked down toward the middle of your back.  4. Hold for __________ seconds.  5. Release the stretch by letting go of the band and lowering your hands.  Repeat __________ times. Complete this exercise __________ times a day.  Strengthening exercises  External rotation    1. Sit in a stable chair without armrests.  2. Secure an exercise band to a stable object at elbow height on your left / right side.  3. Place a soft object, such as a folded towel or a small pillow, between your left / right upper arm and your body to move your elbow about 4 inches (10 cm) away from your side.  4. Hold the end of the exercise band so it is tight and there is no slack.  5. Keeping your elbow pressed against the soft object, slowly move your forearm out, away from your abdomen (external rotation). Keep your body steady so only your forearm moves.  6. Hold for __________ seconds.  7. Slowly return to the starting  position.  Repeat __________ times. Complete this exercise __________ times a day.  Shoulder abduction    1. Sit in a stable chair without armrests, or stand up.  2. Hold a __________ weight in your left / right hand, or hold an exercise band with both hands.  3. Start with your arms straight down and your left / right palm facing in, toward your body.  4. Slowly lift your left / right hand out to your side (abduction). Do not lift your hand above shoulder height unless your health care provider tells you that this is safe.  ? Keep your arms straight.  ? Avoid shrugging your shoulder while you do this movement. Keep your shoulder blade tucked down toward the middle of your back.  5. Hold for __________ seconds.  6. Slowly lower your arm, and return to the starting position.  Repeat __________ times. Complete this exercise __________ times a day.  Shoulder extension  1. Sit in a stable chair without armrests, or stand up.  2. Secure an exercise band to a stable object in front of you so it is at shoulder height.  3. Hold one end of the exercise band in each hand. Your palms should face each other.  4. Straighten your elbows and lift your hands up to shoulder height.  5. Step back, away from the secured end of the exercise band, until the band is tight and there is no slack.  6. Squeeze your shoulder blades together as you pull your hands down to the sides of your thighs (extension). Stop when your hands are straight down by your sides. Do not let your hands go behind your body.  7. Hold for __________ seconds.  8. Slowly return to the starting position.  Repeat __________ times. Complete this exercise __________ times a day.  Shoulder row  1. Sit in a stable chair without armrests, or stand up.  2. Secure an exercise band to a stable object in front of you so it is at waist height.  3. Hold one end of the exercise band in each hand. Position your palms so that your thumbs are facing the ceiling (neutral  position).  4. Bend each of your elbows to a 90-degree angle (right angle) and keep your upper arms at your sides.  5. Step back until the band is tight and there is no slack.  6. Slowly pull your elbows back behind you.  7. Hold for __________ seconds.  8. Slowly return to the starting position.  Repeat __________ times. Complete this exercise __________ times a day.  Shoulder press-ups    1. Sit in a stable chair that has armrests. Sit upright, with your feet flat on the floor.  2. Put your hands on the armrests so your elbows are bent and your fingers are pointing forward. Your hands should be about even with the sides of your body.  3. Push down on the armrests and use your arms to lift yourself off the chair. Straighten your elbows and lift yourself up as much as you comfortably can.  ? Move your shoulder blades down, and avoid letting your shoulders move up toward your ears.  ? Keep your feet on the ground. As you get stronger, your feet should support less of your body weight as you lift yourself up.  4. Hold for __________ seconds.  5. Slowly lower yourself back into the chair.  Repeat __________ times. Complete this exercise __________ times a day.  Wall push-ups    1. Stand so you are facing a stable wall. Your feet should be about one arm-length away from the wall.  2. Lean forward and place your palms on the wall at shoulder height.  3. Keep your feet flat on the floor as you bend your elbows and lean forward toward the wall.  4. Hold for __________ seconds.  5. Straighten your elbows to push yourself back to the starting position.  Repeat __________ times. Complete this exercise __________ times a day.  This information is not intended to replace advice given to you by your health care provider. Make sure you discuss any questions you have with your health care provider.  Document Revised: 04/10/2020 Document Reviewed: 01/17/2020  [employer] Patient Education © 2021 [employer]

## 2021-04-14 NOTE — PROGRESS NOTES
Patient Name: Chandler Buchanan   YOB: 1985  Referring Primary Care Physician: Provider, No Known  BMI: Body mass index is 23.92 kg/m².    Chief Complaint:    Chief Complaint   Patient presents with   • Right Shoulder - Pain        HPI: New patient to me presents with a self-referral for right shoulder pain.  Patient has an extensive history medically with end-stage renal failure, hypertension that started in 2019 and he has a shunt in his left arm and does home dialysis.  Patient is trying to get back into shape after being deconditioned from a hospital admission and has pain in right shoulder.     Chandler Buchanan is a 36 y.o. male who presents today for evaluation of   Chief Complaint   Patient presents with   • Right Shoulder - Pain         Subjective   Medications:   Home Medications:  Current Outpatient Medications on File Prior to Visit   Medication Sig   • Auryxia 1  MG(Fe) tablet Take 2 tablets by mouth 3 (Three) Times a Day With Meals.   • carvedilol (COREG) 25 MG tablet Take 1 tablet by mouth Every 12 (Twelve) Hours.   • sodium bicarbonate 650 MG tablet    • amLODIPine (NORVASC) 5 MG tablet    • docusate sodium 100 MG capsule Take 1 capsule by mouth Daily.   • polyethylene glycol (MIRALAX) 17 GM/SCOOP powder mix 1 capful (17 grams) in liquid and drink once daily   • sennosides-docusate (PERICOLACE) 8.6-50 MG per tablet Take 2 tablets by mouth Every Night.     No current facility-administered medications on file prior to visit.     Current Medications:  Scheduled Meds:  Continuous Infusions:No current facility-administered medications for this visit.    PRN Meds:.    I have reviewed the patient's medical history in detail and updated the computerized patient record.  Review and summarization of old records includes:    Past Medical History:   Diagnosis Date   • A-V fistula (CMS/HCC)     LEFT ARM   • Acute renal failure (CMS/HCC)     ON DIALYSIS MON,WED AND FRI AT HOME   • Anemia     HX    • Cardiomyopathy (CMS/HCC)     New onset systolic PER NOTE ON ADMISSION 1/2019, PT SAW CARDIO IN THE HOSPITAL BUT STATES HE DOES NOT SEE CARDIO NOW   • Dialysis patient (CMS/HCC)     AT HOME - TUESDAY, THURSDAY, SATURDAY, SUNDAY   • History of COVID-19     12/2020   • History of transfusion 02/2021    NO REACTION    • Hypertension    • Pericardial effusion    • Stage 4 chronic kidney disease (CMS/HCC)         Past Surgical History:   Procedure Laterality Date   • ARTERIOVENOUS FISTULA/SHUNT SURGERY Left 1/23/2019    Procedure: LEFT ARM FISTULA CREATION;  Surgeon: Bogdan Velásquez MD;  Location: [facility] MAIN OR;  Service: Vascular   • ARTERIOVENOUS FISTULA/SHUNT SURGERY Left 3/8/2021    Procedure: LEFT ARM ARTERIAL VENOUS FISTULA REVISION;  Surgeon: Cuong Pedraza Jr., MD;  Location: [facility] MAIN OR;  Service: Vascular;  Laterality: Left;   • BRONCHOSCOPY Bilateral 3/15/2021    Procedure: BRONCHOSCOPY WITH ENDOBRONCHIAL ULTRASOUND, TRANSBRONCHIAL NEEDLE ASPIRATION, BIOPSIES, BRUSHINGS AND LAVAGE UNDER FLUOROSCOPY;  Surgeon: Brian Tilley MD;  [facility] END[facility]COPY;  Service: Pulmonary;  Laterality: Bilateral;  pre: lung mass, lymphadenopathy  post: same   • COLONOSCOPY N/A 3/12/2019    Procedure: COLONOSCOPY wwith Biiopsies;  Surgeon: Jered Obrien MD;  [facility] END[facility];  Service: General   • ENDOSCOPY N/A 3/12/2019    Procedure: ESOPHAGOGASTRODUODENOSCOPY WITH BIOPSY;  Surgeon: Jered Obrien MD;  [facility] END[facility];  Service: General   • INSERTION HEMODIALYSIS CATHETER N/A 1/21/2019    Procedure: HEMODIALYSIS CATHETER INSERTION;  Surgeon: Cuong Pedraza Jr., MD;  Location: [facility] MAIN OR;  Service: Vascular   • INSERTION PERITONEAL DIALYSIS CATHETER N/A 3/14/2019    Procedure: INSERTION PERITONEAL DIALYSIS CATHETER LAPAROSCOPIC AND OMENTOPEXY;  Surgeon: Jered Obrien MD;  Location: [facility] MAIN OR;  Service: General   • PERICARDIAL  WINDOW Right 3/19/2021    Procedure: BRONCHOSCOPY, RIGHT VIDEO ASSISTED THORACOSCOPY WITH ROBOT ASSISTED PERICARDIAL WINDOW, MEDIASTINAL LYMPADENECTOMY, INTERCOSTAL NERVE BLOCKS;  Surgeon: Fran Billingsley III, MD;  Location: [facility];  Service: [facility];  Laterality: Right;   • REMOVAL PERITONEAL DIALYSIS CATHETER N/A 10/31/2019    Procedure: REMOVAL PERITONEAL DIALYSIS CATHETER;  Surgeon: Jered Obrien MD;  Location: [facility] OR;  Service: General        Social History     Occupational History   • Not on file   Tobacco Use   • Smoking status: Never Smoker   • Smokeless tobacco: Never Used   Substance and Sexual Activity   • Alcohol use: No   • Drug use: No   • Sexual activity: Defer      Social History     Social History Narrative   • Not on file        Family History   Problem Relation Age of Onset   • Colon cancer Mother    • Hypertension Mother    • Seizures Maternal Grandmother    • Diabetes Maternal Grandmother    • Arthritis Father    • Diabetes Maternal Grandfather    • Diabetes Paternal Grandmother    • Diabetes Paternal Grandfather    • Malig Hyperthermia Neg Hx        ROS: 14 point review of systems was performed and all other systems were reviewed and are negative except for documented findings in HPI and today's encounter.     Allergies:   Allergies   Allergen Reactions   • Lisinopril Cough     Constitutional:  Denies fever, shaking or chills   Eyes:  Denies change in visual acuity   HENT:  Denies nasal congestion or sore throat   Respiratory:  Denies cough or shortness of breath   Cardiovascular:  Denies chest pain or severe LE edema   GI:  Denies abdominal pain, nausea, vomiting, bloody stools or diarrhea   Musculoskeletal:  Numbness, tingling, pain, or loss of motor function only as noted above in history of present illness.  : Denies painful urination or hematuria  Integument:  Denies rash, lesion or ulceration   Neurologic:  Denies headache or focal weakness  Endocrine:   "Denies lymphadenopathy  Psych:  Denies confusion or change in mental status   Hem:  Denies active bleeding    OBJECTIVE:  Physical Exam: 36 y.o. male  Wt Readings from Last 3 Encounters:   04/14/21 73.5 kg (162 lb)   04/14/21 73.8 kg (162 lb 12.8 oz)   04/08/21 74 kg (163 lb 2.3 oz)     Ht Readings from Last 1 Encounters:   04/14/21 175.3 cm (69\")     Body mass index is 23.92 kg/m².  Vitals:    04/14/21 1607   Temp: 98.7 °F (37.1 °C)     Vital signs reviewed.     General Appearance:    Alert, cooperative, in no acute distress                  Eyes: conjunctiva clear  ENT: external ears and nose atraumatic  CV: no peripheral edema  Resp: normal respiratory effort  Skin: no rashes or wounds; normal turgor  Psych: mood and affect appropriate  Lymph: no nodes appreciated  Neuro: gross sensation intact  Vascular:  Palpable peripheral pulse in noted extremity  Musculoskeletal Extremities: Right shoulder with muscle atrophy pain with overhead range of motion there is no arthrofibrosis good distal radius pulse pulses and capillary refill and grasp weakness to resisted abduction and abduction    Radiology:     Right shoulder x-rays were done 2 views with no comparison that show no acute fracture    Procedures  Will place patient in physical therapy and see if we can work on some muscle build up with his overall health hopefully some strengthening will improve the overall weakness  Assessment:     ICD-10-CM ICD-9-CM   1. Pain  R52 780.96   2. Impingement syndrome of right shoulder  M75.41 726.2   3. ESRD on dialysis (CMS/[facility])  N18.6 585.6    Z99.2 V45.11   4. Idiopathic peripheral neuropathy  G60.9 356.9   5. Anemia in ESRD (end-stage renal disease) (CMS/[facility])  N18.6 285.21    D63.1 585.6        MDM/Plan:   The diagnosis(es), natural history, pathophysiology and treatment for diagnosis(es) were discussed. Opportunity given and questions answered.  Biomechanics of pertinent body areas discussed.  When appropriate, the use of " ambulatory aids discussed.          4/19/2021    Much of this encounter note is an electronic transcription/translation of spoken language to printed text. The electronic translation of spoken language may permit erroneous, or at times, nonsensical words or phrases to be inadvertently transcribed; Although I have reviewed the note for such errors, some may still exist

## 2021-04-16 LAB
FUNGUS WND CULT: NORMAL
FUNGUS WND CULT: NORMAL

## 2021-04-26 LAB
MYCOBACTERIUM SPEC CULT: NORMAL
MYCOBACTERIUM SPEC CULT: NORMAL
NIGHT BLUE STAIN TISS: NORMAL
NIGHT BLUE STAIN TISS: NORMAL

## 2022-09-16 ENCOUNTER — TRANSCRIBE ORDERS (OUTPATIENT)
Dept: ADMINISTRATIVE | Facility: HOSPITAL | Age: 37
End: 2022-09-16

## 2022-09-16 DIAGNOSIS — Z92.25 STATUS POST RECENT IMMUNOSUPPRESSIVE THERAPY: Primary | ICD-10-CM

## 2022-10-28 ENCOUNTER — TRANSCRIBE ORDERS (OUTPATIENT)
Dept: ADMINISTRATIVE | Facility: HOSPITAL | Age: 37
End: 2022-10-28

## 2022-10-28 DIAGNOSIS — Z92.25 STATUS POST RECENT IMMUNOSUPPRESSIVE THERAPY: Primary | ICD-10-CM

## 2022-11-14 ENCOUNTER — INFUSION (OUTPATIENT)
Dept: ONCOLOGY | Facility: HOSPITAL | Age: 37
End: 2022-11-14

## 2022-11-14 VITALS
RESPIRATION RATE: 16 BRPM | DIASTOLIC BLOOD PRESSURE: 84 MMHG | WEIGHT: 174.4 LBS | HEART RATE: 66 BPM | TEMPERATURE: 98.8 F | BODY MASS INDEX: 25.75 KG/M2 | SYSTOLIC BLOOD PRESSURE: 125 MMHG | OXYGEN SATURATION: 95 %

## 2022-11-14 DIAGNOSIS — Z79.899 IMMUNOCOMPROMISED STATE DUE TO DRUG THERAPY: Primary | ICD-10-CM

## 2022-11-14 DIAGNOSIS — D84.821 IMMUNOCOMPROMISED STATE DUE TO DRUG THERAPY: Primary | ICD-10-CM

## 2022-11-14 PROCEDURE — 25010000002 INJECTION, TIXAGEVIMAB AND CILGAVIMAB,

## 2022-11-14 PROCEDURE — M0220 HC INJECTION, TIXAGEVIMAB AND CILGAVIMAB: HCPCS

## 2022-11-14 RX ORDER — EPINEPHRINE 1 MG/ML
0.3 INJECTION, SOLUTION, CONCENTRATE INTRAVENOUS AS NEEDED
OUTPATIENT
Start: 2022-11-14

## 2022-11-14 RX ORDER — EPINEPHRINE 1 MG/ML
0.3 INJECTION, SOLUTION, CONCENTRATE INTRAVENOUS AS NEEDED
Status: CANCELLED | OUTPATIENT
Start: 2022-11-14

## 2022-11-14 RX ORDER — DIPHENHYDRAMINE HCL 25 MG
50 CAPSULE ORAL ONCE AS NEEDED
Status: CANCELLED | OUTPATIENT
Start: 2022-11-14

## 2022-11-14 RX ORDER — DIPHENHYDRAMINE HCL 25 MG
50 CAPSULE ORAL ONCE AS NEEDED
OUTPATIENT
Start: 2022-11-14

## 2022-11-14 RX ADMIN — AZD7442 300 MG: KIT at 15:14

## (undated) DEVICE — VITAL SIGNS™ JACKSON-REES CIRCUITS: Brand: VITAL SIGNS™

## (undated) DEVICE — SOL NS 500ML

## (undated) DEVICE — INTENDED FOR TISSUE SEPARATION, AND OTHER PROCEDURES THAT REQUIRE A SHARP SURGICAL BLADE TO PUNCTURE OR CUT.: Brand: BARD-PARKER ® CARBON RIB-BACK BLADES

## (undated) DEVICE — SPNG LAP CIGARETTE KITTNER 5MM STRL PK/5

## (undated) DEVICE — SUT SILK 4/0 TIES 18IN A183H

## (undated) DEVICE — DRSNG SURESITE WNDW 2.38X2.75

## (undated) DEVICE — SUT PROLN 6/0 BV1 D/A 30IN 8709H

## (undated) DEVICE — ISOLATION BAG: Brand: CONVERTORS

## (undated) DEVICE — PK AV FISTL/SHNT 40

## (undated) DEVICE — FRCP BX RADJAW4 NDL 2.8 240CM LG OG BX40

## (undated) DEVICE — PENCL E/S ULTRAVAC TELESCP NOSE HOLSTR 10FT

## (undated) DEVICE — APPL CHLORAPREP W/TINT 26ML ORNG

## (undated) DEVICE — BIOPATCH™ ANTIMICROBIAL DRESSING WITH CHLORHEXIDINE GLUCONATE IS A HYDROPHILLIC POLYURETHANE ABSORPTIVE FOAM WITH CHLORHEXIDINE GLUCONATE (CHG) WHICH INHIBITS BACTERIAL GROWTH UNDER THE DRESSING. THE DRESSING IS INTENDED TO BE USED TO ABSORB EXUDATE, COVER A WOUND CAUSED BY VASCULAR AND NONVASCULAR PERCUTANEOUS MEDICAL DEVICES DURING SURGERY, AS WELL AS REDUCE LOCAL INFECTION AND COLONIZATION OF MICROORGANISMS.: Brand: BIOPATCH

## (undated) DEVICE — SINGLE USE ASPIRATION NEEDLE: Brand: SINGLE USE ASPIRATION NEEDLE

## (undated) DEVICE — 2, DISPOSABLE SUCTION/IRRIGATOR WITH DISPOSABLE TIP: Brand: STRYKEFLOW

## (undated) DEVICE — SUT VIC 2/0 UR6 27IN J602H

## (undated) DEVICE — ELECTRD BLD EZ CLN STD 6.5IN

## (undated) DEVICE — Device: Brand: BALLOON

## (undated) DEVICE — LOU THORACIC: Brand: MEDLINE INDUSTRIES, INC.

## (undated) DEVICE — DRSNG WND BORDR/ADHS NONADHR/GZ LF 4X4IN STRL

## (undated) DEVICE — MSK AIRWY LARYNG LMA PILOT SZ4

## (undated) DEVICE — SYR LUERLOK 5CC

## (undated) DEVICE — ADHS SKIN SURG TISS VISC PREMIERPRO EXOFIN HI/VISC FAST/DRY

## (undated) DEVICE — LOU MINOR PROCEDURE: Brand: MEDLINE INDUSTRIES, INC.

## (undated) DEVICE — PATIENT RETURN ELECTRODE, SINGLE-USE, CONTACT QUALITY MONITORING, ADULT, WITH 9FT CORD, FOR PATIENTS WEIGING OVER 33LBS. (15KG): Brand: MEGADYNE

## (undated) DEVICE — SYR LUERLOK 20CC

## (undated) DEVICE — GLV SURG BIOGEL LTX PF 6 1/2

## (undated) DEVICE — FRCP BX RADJAW4 PULM WO NDL STD1.8X2 100

## (undated) DEVICE — NDL HYPO PRECISIONGLIDE REG 20G 1 1/2

## (undated) DEVICE — REDUCER: Brand: ENDOWRIST

## (undated) DEVICE — ADHS SKIN DERMABOND TOP ADVANCED

## (undated) DEVICE — SOL ANTISTICK CAUTRY ELECTROLUBE LF

## (undated) DEVICE — CVR PROB 96IN LF STRL

## (undated) DEVICE — THE TORRENT IRRIGATION SCOPE CONNECTOR IS USED WITH THE TORRENT IRRIGATION TUBING TO PROVIDE IRRIGATION FLUIDS SUCH AS STERILE WATER DURING GASTROINTESTINAL ENDOSCOPIC PROCEDURES WHEN USED IN CONJUNCTION WITH AN IRRIGATION PUMP (OR ELECTROSURGICAL UNIT).: Brand: TORRENT

## (undated) DEVICE — SUT PROLN 0 CT1 30IN 8424H

## (undated) DEVICE — TRAP FLD MINIVAC MEGADYNE 100ML

## (undated) DEVICE — CANN NASL CO2 TRULINK W/O2 A/

## (undated) DEVICE — SUT SILK 2/0 TIES 18IN A185H

## (undated) DEVICE — SUT SILK 0 PSL 18IN 580H

## (undated) DEVICE — GLV SURG PREMIERPRO ORTHO LTX PF SZ8 BRN

## (undated) DEVICE — DEV TUNNELING SUBCONTANIOUS

## (undated) DEVICE — MARKR SKIN W/RULR 2TP

## (undated) DEVICE — ENDOCUT SCISSOR TIP, DISPOSABLE: Brand: RENEW

## (undated) DEVICE — PENCL E/S HNDSWCH ROCKR CB

## (undated) DEVICE — 3M™ STERI-STRIP™ REINFORCED ADHESIVE SKIN CLOSURES, R1547, 1/2 IN X 4 IN (12 MM X 100 MM), 6 STRIPS/ENVELOPE: Brand: 3M™ STERI-STRIP™

## (undated) DEVICE — DRAPE,REIN 53X77,STERILE: Brand: MEDLINE

## (undated) DEVICE — Device

## (undated) DEVICE — TOTAL TRAY, 16FR 10ML SIL FOLEY, URN: Brand: MEDLINE

## (undated) DEVICE — TP UMB COTN 1/8X36 U12T

## (undated) DEVICE — GLV SURG BIOGEL LTX PF 7 1/2

## (undated) DEVICE — TROCAR SITE CLOSURE DEVICE: Brand: ENDO CLOSE

## (undated) DEVICE — BLADELESS OBTURATOR: Brand: WECK VISTA

## (undated) DEVICE — SINGLE USE SUCTION VALVE MAJ-209: Brand: SINGLE USE SUCTION VALVE (STERILE)

## (undated) DEVICE — LN SMPL O2 NASL/ORL SMART/CAPNOLINE PLS A/

## (undated) DEVICE — SUT ETHLN 2/0 PS 18IN 585H

## (undated) DEVICE — GOWN,NON-REINFORCED,SIRUS,SET IN SLV,XXL: Brand: MEDLINE

## (undated) DEVICE — SOL NACL 0.9PCT 1000ML

## (undated) DEVICE — ENDOPATH XCEL UNIVERSAL TROCAR STABLILITY SLEEVES: Brand: ENDOPATH XCEL

## (undated) DEVICE — KT CATH TAL PALINDROME SAPPHIRE 14.5F23CM

## (undated) DEVICE — C-MAC® GUIDE: Brand: C-MAC / DBLADE

## (undated) DEVICE — 3M™ STERI-STRIP™ COMPOUND BENZOIN TINCTURE 40 BAGS/CARTON 4 CARTONS/CASE C1544: Brand: 3M™ STERI-STRIP™

## (undated) DEVICE — LOU LAP CHOLE: Brand: MEDLINE INDUSTRIES, INC.

## (undated) DEVICE — ST. SORBAVIEW ULTIMATE IJ SYSTEM A,C: Brand: CENTURION

## (undated) DEVICE — SENSR O2 OXIMAX FNGR A/ 18IN NONSTR

## (undated) DEVICE — SUT SILK 0 TIES 30IN A306H

## (undated) DEVICE — APPL CHLORAPREP W/TINT 10.5ML PERC STRL

## (undated) DEVICE — SUT SILK 3/0 SH CR5 18IN C0135

## (undated) DEVICE — 3M™ STERI-DRAPE™ INSTRUMENT POUCH 1018L: Brand: STERI-DRAPE™

## (undated) DEVICE — PENCL ES MEGADINE EZ/CLEAN BUTN W/HOLSTR 10FT

## (undated) DEVICE — SYR LUERLOK 30CC

## (undated) DEVICE — NDL HYPO PRECISIONGLIDE REG 25G 1 1/2

## (undated) DEVICE — TUBING, SUCTION, 1/4" X 10', STRAIGHT: Brand: MEDLINE

## (undated) DEVICE — VISUALIZATION SYSTEM: Brand: CLEARIFY

## (undated) DEVICE — TRAP,MUCUS SPECIMEN, 80CC: Brand: MEDLINE

## (undated) DEVICE — TBG INSUFFLATION LUER LOCK: Brand: MEDLINE INDUSTRIES, INC.

## (undated) DEVICE — ANTIBACTERIAL UNDYED BRAIDED (POLYGLACTIN 910), SYNTHETIC ABSORBABLE SUTURE: Brand: COATED VICRYL

## (undated) DEVICE — CONMED DISPOSABLE BRONCHIAL CYTOLOGY BRUSH, STRAIGHT HANDLE, 3 MM X 120 CM: Brand: CONMED

## (undated) DEVICE — GLV SURG SENSICARE PI MIC PF SZ7.5 LF STRL

## (undated) DEVICE — LARGE BORE STOPCOCK WITH EXTENSION SET, MALE LUER LOCK ADAPTER WITH RETRACTABLE COLLAR

## (undated) DEVICE — SPNG DISECTOR KTNER XRAY COTN 1/4X9/16IN PK/5

## (undated) DEVICE — SEAL

## (undated) DEVICE — NDL HYPO ECLPS SFTY 22G 1 1/2IN

## (undated) DEVICE — DECANT BG O JET

## (undated) DEVICE — STPLR SKIN VISISTAT WD 35CT

## (undated) DEVICE — SUT PROLN 5/0 RB1 D/A 36IN 8556H

## (undated) DEVICE — APPL CHLORAPREP HI/LITE 26ML ORNG

## (undated) DEVICE — ST IRR CYSTO W/SPK 77IN LF

## (undated) DEVICE — ADAPT SWVL FIBROPTIC BRONCH

## (undated) DEVICE — ENDOPATH XCEL BLADELESS TROCARS WITH STABILITY SLEEVES: Brand: ENDOPATH XCEL

## (undated) DEVICE — BITEBLOCK OMNI BLOC

## (undated) DEVICE — UNIVERSAL PACK: Brand: CARDINAL HEALTH

## (undated) DEVICE — DRSNG SURESITE WNDW 4X4.5

## (undated) DEVICE — GOWN,PREVENTION PLUS,XLONG/XLARGE,STRL: Brand: MEDLINE

## (undated) DEVICE — LAPAROSCOPIC SMOKE FILTRATION SYSTEM: Brand: PALL LAPAROSHIELD® PLUS LAPAROSCOPIC SMOKE FILTRATION SYSTEM

## (undated) DEVICE — Device: Brand: DEFENDO AIR/WATER/SUCTION AND BIOPSY VALVE

## (undated) DEVICE — BNDR ABD PREMIUM/UNIV 10IN 27TO48IN

## (undated) DEVICE — SUT MNCRYL PLS ANTIB UD 4/0 PS2 18IN

## (undated) DEVICE — CANNULA SEAL

## (undated) DEVICE — COLUMN DRAPE

## (undated) DEVICE — GLV SURG BIOGEL M LTX PF 7 1/2

## (undated) DEVICE — GLV SURG TRIUMPH CLASSIC PF LTX 8 STRL

## (undated) DEVICE — SPNG GZ WOVN 4X4IN 12PLY 10/BX STRL

## (undated) DEVICE — SUT VIC 0 TN 27IN DYED JTN0G

## (undated) DEVICE — SINGLE USE BIOPSY VALVE MAJ-210: Brand: SINGLE USE BIOPSY VALVE (STERILE)

## (undated) DEVICE — ARM DRAPE

## (undated) DEVICE — EXTENSION SET, MALE LUER LOCK ADAPTER WITH RETRACTABLE COLLAR

## (undated) DEVICE — KT CLN CLEANOR SCPE

## (undated) DEVICE — ELECTRD BLD EZ CLN MOD 6.5IN

## (undated) DEVICE — SUT SILK 3/0 TIES 18IN A184H